# Patient Record
Sex: FEMALE | Race: WHITE | NOT HISPANIC OR LATINO | Employment: OTHER | URBAN - METROPOLITAN AREA
[De-identification: names, ages, dates, MRNs, and addresses within clinical notes are randomized per-mention and may not be internally consistent; named-entity substitution may affect disease eponyms.]

---

## 2017-03-09 ENCOUNTER — TRANSCRIBE ORDERS (OUTPATIENT)
Dept: LAB | Facility: CLINIC | Age: 47
End: 2017-03-09

## 2017-03-09 ENCOUNTER — ALLSCRIPTS OFFICE VISIT (OUTPATIENT)
Dept: OTHER | Facility: OTHER | Age: 47
End: 2017-03-09

## 2017-03-09 ENCOUNTER — APPOINTMENT (OUTPATIENT)
Dept: LAB | Facility: CLINIC | Age: 47
End: 2017-03-09
Payer: COMMERCIAL

## 2017-03-09 ENCOUNTER — GENERIC CONVERSION - ENCOUNTER (OUTPATIENT)
Dept: OTHER | Facility: OTHER | Age: 47
End: 2017-03-09

## 2017-03-09 DIAGNOSIS — E11.9 TYPE 2 DIABETES MELLITUS WITHOUT COMPLICATIONS (HCC): ICD-10-CM

## 2017-03-09 LAB
EST. AVERAGE GLUCOSE BLD GHB EST-MCNC: 148 MG/DL
HBA1C MFR BLD: 6.8 % (ref 4.2–6.3)

## 2017-03-09 PROCEDURE — 83036 HEMOGLOBIN GLYCOSYLATED A1C: CPT

## 2017-03-09 PROCEDURE — 36415 COLL VENOUS BLD VENIPUNCTURE: CPT

## 2017-06-13 ENCOUNTER — ALLSCRIPTS OFFICE VISIT (OUTPATIENT)
Dept: OTHER | Facility: OTHER | Age: 47
End: 2017-06-13

## 2017-06-13 DIAGNOSIS — Z00.00 ENCOUNTER FOR GENERAL ADULT MEDICAL EXAMINATION WITHOUT ABNORMAL FINDINGS: ICD-10-CM

## 2017-06-13 DIAGNOSIS — Z12.31 ENCOUNTER FOR SCREENING MAMMOGRAM FOR MALIGNANT NEOPLASM OF BREAST: ICD-10-CM

## 2017-06-13 DIAGNOSIS — E11.9 TYPE 2 DIABETES MELLITUS WITHOUT COMPLICATIONS (HCC): ICD-10-CM

## 2017-06-16 ENCOUNTER — APPOINTMENT (OUTPATIENT)
Dept: LAB | Facility: CLINIC | Age: 47
End: 2017-06-16
Payer: COMMERCIAL

## 2017-06-16 ENCOUNTER — TRANSCRIBE ORDERS (OUTPATIENT)
Dept: LAB | Facility: CLINIC | Age: 47
End: 2017-06-16

## 2017-06-16 DIAGNOSIS — E11.8 TYPE 2 DIABETES MELLITUS WITH COMPLICATION, WITHOUT LONG-TERM CURRENT USE OF INSULIN (HCC): Primary | ICD-10-CM

## 2017-06-16 DIAGNOSIS — E11.8 TYPE 2 DIABETES MELLITUS WITH COMPLICATION, WITHOUT LONG-TERM CURRENT USE OF INSULIN (HCC): ICD-10-CM

## 2017-06-16 LAB
CHOLEST SERPL-MCNC: 168 MG/DL (ref 50–200)
HDLC SERPL-MCNC: 45 MG/DL (ref 40–60)
LDLC SERPL CALC-MCNC: 100 MG/DL (ref 0–100)
TRIGL SERPL-MCNC: 117 MG/DL

## 2017-06-16 PROCEDURE — 82043 UR ALBUMIN QUANTITATIVE: CPT | Performed by: PHYSICIAN ASSISTANT

## 2017-06-16 PROCEDURE — 80053 COMPREHEN METABOLIC PANEL: CPT | Performed by: PHYSICIAN ASSISTANT

## 2017-06-16 PROCEDURE — 83036 HEMOGLOBIN GLYCOSYLATED A1C: CPT | Performed by: PHYSICIAN ASSISTANT

## 2017-06-16 PROCEDURE — 82306 VITAMIN D 25 HYDROXY: CPT | Performed by: PHYSICIAN ASSISTANT

## 2017-06-16 PROCEDURE — 80061 LIPID PANEL: CPT

## 2017-06-16 PROCEDURE — 84443 ASSAY THYROID STIM HORMONE: CPT | Performed by: PHYSICIAN ASSISTANT

## 2017-06-16 PROCEDURE — 36415 COLL VENOUS BLD VENIPUNCTURE: CPT | Performed by: PHYSICIAN ASSISTANT

## 2017-06-16 PROCEDURE — 82570 ASSAY OF URINE CREATININE: CPT | Performed by: PHYSICIAN ASSISTANT

## 2017-07-12 ENCOUNTER — GENERIC CONVERSION - ENCOUNTER (OUTPATIENT)
Dept: INTERNAL MEDICINE CLINIC | Facility: CLINIC | Age: 47
End: 2017-07-12

## 2017-07-12 ENCOUNTER — GENERIC CONVERSION - ENCOUNTER (OUTPATIENT)
Dept: OTHER | Facility: OTHER | Age: 47
End: 2017-07-12

## 2017-08-22 ENCOUNTER — ALLSCRIPTS OFFICE VISIT (OUTPATIENT)
Dept: OTHER | Facility: OTHER | Age: 47
End: 2017-08-22

## 2017-09-05 ENCOUNTER — GENERIC CONVERSION - ENCOUNTER (OUTPATIENT)
Dept: OTHER | Facility: OTHER | Age: 47
End: 2017-09-05

## 2017-09-07 LAB
LEFT EYE DIABETIC RETINOPATHY: NORMAL
RIGHT EYE DIABETIC RETINOPATHY: NORMAL

## 2017-09-17 LAB
LEFT EYE DIABETIC RETINOPATHY: NORMAL
RIGHT EYE DIABETIC RETINOPATHY: NORMAL

## 2017-09-21 ENCOUNTER — GENERIC CONVERSION - ENCOUNTER (OUTPATIENT)
Dept: OTHER | Facility: OTHER | Age: 47
End: 2017-09-21

## 2017-09-21 ENCOUNTER — ALLSCRIPTS OFFICE VISIT (OUTPATIENT)
Dept: OTHER | Facility: OTHER | Age: 47
End: 2017-09-21

## 2017-11-10 ENCOUNTER — OFFICE VISIT (OUTPATIENT)
Dept: URGENT CARE | Facility: CLINIC | Age: 47
End: 2017-11-10
Payer: COMMERCIAL

## 2017-11-10 PROCEDURE — 99283 EMERGENCY DEPT VISIT LOW MDM: CPT

## 2017-11-10 PROCEDURE — G0382 LEV 3 HOSP TYPE B ED VISIT: HCPCS

## 2017-11-13 NOTE — PROGRESS NOTES
Assessment    1  Sinusitis (473 9) (J32 9)    Plan  Sinusitis    · Amoxicillin-Pot Clavulanate 875-125 MG Oral Tablet (Augmentin); TAKE 1 TABLETEVERY 12 HOURS DAILY    Discussion/Summary  Discussion Summary:   Discussed dx of sinusitis and will treat with augmentin and follow up with PCP in 1-2 days  Medication Side Effects Reviewed: Possible side effects of new medications were reviewed with the patient/guardian today  Understands and agrees with treatment plan: The treatment plan was reviewed with the patient/guardian  The patient/guardian understands and agrees with the treatment plan   Counseling Documentation With Imm: The patient was counseled regarding instructions for management,-- patient and family education,-- importance of compliance with treatment  total time of encounter was 25 minutes-- and-- 10 minutes was spent counseling  Follow Up Instructions: Follow Up with your Primary Care Provider in 1-2 days  If your symptoms worsen, go to the nearest [place] [address] Emergency Department  Chief Complaint    1  Ear Pain  Chief Complaint Free Text Note Form: C/o sore throat and bilateral ear pain x 3 days  History of Present Illness  HPI: 50year old female at urgent care today with chief complaint of sinus congestion for 1 week and sore throat and bilateral ear pain for 3 days has not used any OTC medications no improvement noted  Hospital Based Practices Required Assessment:  Pain Assessment  the patient states they have pain  The pain is located in the throat, ears  The patient describes the pain as aching  (on a scale of 0 to 10, the patient rates the pain at 2 )  Abuse And Domestic Violence Screen   Yes, the patient is safe at home  -- The patient states no one is hurting them  Depression And Suicide Screen  No, the patient has not had thoughts of hurting themself  No, the patient has not felt depressed in the past 7 days  Readiness To Learn: Receptive  Barriers To Learning: none  Preferred Learning: verbal  Education Completed: disease/condition,-- medications-- and-- further treatment/follow-up  Teaching Method: verbal  Person Taught: patient  Evaluation Of Learning: verbalized/demonstrated understanding   Ear Pain: Randolph Sandhu presents with complaints of ear pain  Associated symptoms include otalgia,-- ear plugging,-- ear pressure,-- nasal congestion-- and-- sore throat, but-- no ear drainage,-- no decreased hearing,-- no auricular pain,-- no ear sores,-- no ear pruritus,-- no fever,-- no cough,-- no swollen glands,-- no facial pain,-- no dental pain,-- no headache,-- no tinnitus,-- no vertigo,-- no loss of balance,-- no nausea-- and-- no temporomandibular joint pain  Review of Systems  Focused-Female:  Constitutional: No fever, no chills, feels well, no tiredness, no recent weight gain or loss  ENT: earache,-- sore throat-- and-- nasal discharge, but-- as noted in HPI  Cardiovascular: no complaints of slow or fast heart rate, no chest pain, no palpitations, no leg claudication or lower extremity edema  Respiratory: no complaints of shortness of breath, no wheezing, no dyspnea on exertion, no orthopnea or PND  Breasts: no complaints of breast pain, breast lump or nipple discharge  Gastrointestinal: no complaints of abdominal pain, no constipation, no nausea or diarrhea, no vomiting, no bloody stools  Genitourinary: no complaints of dysuria, no incontinence, no pelvic pain, no dysmenorrhea, no vaginal discharge or abnormal vaginal bleeding  Musculoskeletal: no complaints of arthralgia, no myalgia, no joint swelling or stiffness, no limb pain or swelling  Integumentary: no complaints of skin rash or lesion, no itching or dry skin, no skin wounds  Neurological: no complaints of headache, no confusion, no numbness or tingling, no dizziness or fainting  ROS Reviewed:   ROS reviewed  Active Problems  1  Arthritis (053 90) (M19 90)   2  Chest pain (786 50) (R07 9)   3  Contact dermatitis (692 9) (L25 9)   4  Depression (311) (F32 9)   5  Depression screening (V79 0) (Z13 89)   6  Fatty liver (571 8) (K76 0)   7  Gastroesophageal reflux disease (530 81) (K21 9)   8  Hypercholesterolemia (272 0) (E78 00)   9  Insomnia (780 52) (G47 00)   10  Obesity (278 00) (E66 9)   11  Pulmonary embolism (415 19) (I26 99)   12  Screening mammogram, encounter for (V76 12) (Z12 31)   13  Skin rash (782 1) (R21)   14  Type 2 diabetes mellitus (250 00) (E11 9)    Past Medical History  1  Denied: History of substance abuse   2  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)  Active Problems And Past Medical History Reviewed: The active problems and past medical history were reviewed and updated today  Family History  Mother    1  Family history of Bundle Branch Block   2  Denied: Family history of mental disorder   3  Family history of Ovarian cancer (183 0) (C56 9)   4  Family history of Tuberculosis  Father    5  Denied: Family history of mental disorder   6  Family history of substance abuse (V17 0) (Z81 4)  Brother    7  Family history of Malignant neoplasm of breast, unspecified laterality  Maternal Grandmother    8  Family history of Ascending Aortic Aneurysm Resection   9  Family history of Diabetes Mellitus (V18 0)  Paternal Grandmother    8  Family history of Diabetes Mellitus (V18 0)  Maternal Aunt    11  Family history of Tuberculosis  Family History    12  Denied: Family history of mental disorder   13  Family history of substance abuse (V17 0) (Z81 4)   14  Family history of Osteoporosis (733 00) (M81 0)  Family History Reviewed: The family history was reviewed and updated today  Social History   · Denied: History of Alcohol   · Caffeine Use   · Denied: History of Drug Use   · Former smoker (J39 92) (J03 076)  Social History Reviewed: The social history was reviewed and updated today  The social history was reviewed and is unchanged  Surgical History    1   History of  Section   2  History of Knee Surgery   3  History of Pancreatectomy  Surgical History Reviewed: The surgical history was reviewed and updated today  Current Meds   1  Abilify 20 MG Oral Tablet; TAKE 1 TABLET DAILY; Therapy: 87QBV2094 to (Ale Lang)  Requested for: 69IGZ3542; Last Rx:08Nov2016 Ordered   2  Accu-Chek Multiclix Lancet Dev KIT; Therapy: 15YSN3455 to Recorded   3  Accu-Chek SmartView In Vitro Strip; TEST ONCE DAILY; Therapy: 27XMP7090 to [phone]  Requested for: 89OVP1249; Last Rx:23Jan2015 Ordered   4  EpiPen 2-Kyler 0 3 MG/0 3ML ADONAY; INJECT INTRAMUSCULARLY AS DIRECTED; Therapy: 12EBX7587 to (Evaluate:25Jan2014)  Requested for: 35RHE4958; Last Rx:24Ycc9594 Ordered   5  Lisinopril 2 5 MG Oral Tablet; take 1 tablet by mouth once daily; Therapy: 24EGH0190 to (Evaluate:51Fbf3018)  Requested for: 46RNU8333; Last Rx:30Jun2017 Ordered   6  MetFORMIN HCl  MG Oral Tablet Extended Release 24 Hour; take 2 tablets by mouth twice a day; Therapy: 83Rdk2133 to (Evaluate:64Gvl6820)  Requested for: 21Jun2017; Last NJ:21GJN5892 Ordered   7  Pravastatin Sodium 20 MG Oral Tablet; take 1 tablet by mouth every evening for cholesterol; Therapy: 90PSY7673 to (Evaluate:20Jan2018)  Requested for: 33Nry7788; Last Rx:01Vib9717 Ordered   8  Venlafaxine HCl  MG Oral Tablet Extended Release 24 Hour; Take one daily along with 75mg tab; Therapy: 26SQD6049 to (Last Rx:14Jun2017) Ordered   9  Venlafaxine HCl ER 75 MG Oral Capsule Extended Release 24 Hour; take 1 capsule by mouth once daily; Therapy: 91Heb2322 to (Evaluate:37Wqo1827) Recorded   10  Ventolin  (90 Base) MCG/ACT Inhalation Aerosol Solution; two puffs every four  hours as needed; Therapy: 45Lyk0293 to (Evaluate:07Jun2017)  Requested for: 53XNS6521; Last  Rx:09Mar2017 Ordered   11  Xarelto 20 MG Oral Tablet; Take 1 tablet daily; Last Rx:05Sep2017 Ordered  Medication List Reviewed:    The medication list was reviewed and updated today  Allergies  1  No Known Drug Allergies    2  IVP Dye   3  Nuts    Vitals  Signs   Recorded: 10RFH6955 12:09PM   Temperature: 97 9 F  Heart Rate: 108  Respiration: 18  Systolic: 706  Diastolic: 78  BP Cuff Size: Large  Height: 5 ft 2 in  Weight: 216 lb   BMI Calculated: 39 51  BSA Calculated: 1 98  O2 Saturation: 96    Physical Exam   Constitutional  General appearance: No acute distress, well appearing and well nourished  Eyes  Conjunctiva and lids: No swelling, erythema or discharge  Pupils and irises: Equal, round and reactive to light  Ears, Nose, Mouth, and Throat  External inspection of ears and nose: Normal    Otoscopic examination: Tympanic membranes translucent with normal light reflex  Canals patent without erythema  Nasal mucosa, septum, and turbinates: Abnormal   normal nasal septum,-- no intranasal masses or polyps-- and-- normal nasal turbinates  There was a purulent discharge from both nares  The bilateral nasal mucosa was boggy-- and-- edematous  -- PND  Pulmonary  Respiratory effort: No increased work of breathing or signs of respiratory distress  Auscultation of lungs: Clear to auscultation  Cardiovascular  Palpation of heart: Normal PMI, no thrills  Auscultation of heart: Normal rate and rhythm, normal S1 and S2, without murmurs  Lymphatic  Palpation of lymph nodes in neck: No lymphadenopathy     Musculoskeletal  Gait and station: Normal    Psychiatric  Orientation to person, place, and time: Normal    Mood and affect: Normal        Future Appointments    Date/Time Provider Specialty Site   11/21/2017 11:00 AM [facility] [address] [facility]   Electronically signed by : Evelin Fagan NP; Nov 10 2017 12:21PM EST                       (Author)    Electronically signed by : JORGITO Stone ; Nov 12 2017  9:57AM EST                       (Co-author)

## 2017-11-21 ENCOUNTER — TRANSCRIBE ORDERS (OUTPATIENT)
Dept: LAB | Facility: CLINIC | Age: 47
End: 2017-11-21

## 2017-11-21 ENCOUNTER — APPOINTMENT (OUTPATIENT)
Dept: LAB | Facility: CLINIC | Age: 47
End: 2017-11-21
Payer: COMMERCIAL

## 2017-11-21 ENCOUNTER — ALLSCRIPTS OFFICE VISIT (OUTPATIENT)
Dept: OTHER | Facility: OTHER | Age: 47
End: 2017-11-21

## 2017-11-21 DIAGNOSIS — I26.99 OTHER PULMONARY EMBOLISM WITHOUT ACUTE COR PULMONALE (HCC): ICD-10-CM

## 2017-11-21 DIAGNOSIS — K76.0 FATTY (CHANGE OF) LIVER, NOT ELSEWHERE CLASSIFIED: ICD-10-CM

## 2017-11-21 LAB
ALBUMIN SERPL BCP-MCNC: 3.3 G/DL (ref 3.5–5)
ALP SERPL-CCNC: 100 U/L (ref 46–116)
ALT SERPL W P-5'-P-CCNC: 24 U/L (ref 12–78)
ANION GAP SERPL CALCULATED.3IONS-SCNC: 5 MMOL/L (ref 4–13)
AST SERPL W P-5'-P-CCNC: 12 U/L (ref 5–45)
BILIRUB SERPL-MCNC: 0.19 MG/DL (ref 0.2–1)
BUN SERPL-MCNC: 11 MG/DL (ref 5–25)
CALCIUM SERPL-MCNC: 8.9 MG/DL (ref 8.3–10.1)
CHLORIDE SERPL-SCNC: 105 MMOL/L (ref 100–108)
CO2 SERPL-SCNC: 28 MMOL/L (ref 21–32)
CREAT SERPL-MCNC: 0.82 MG/DL (ref 0.6–1.3)
ERYTHROCYTE [DISTWIDTH] IN BLOOD BY AUTOMATED COUNT: 13.9 % (ref 11.6–15.1)
GFR SERPL CREATININE-BSD FRML MDRD: 84 ML/MIN/1.73SQ M
GLUCOSE SERPL-MCNC: 147 MG/DL (ref 65–140)
HBA1C MFR BLD HPLC: 6.6 %
HCT VFR BLD AUTO: 39.9 % (ref 34.8–46.1)
HGB BLD-MCNC: 13 G/DL (ref 11.5–15.4)
MCH RBC QN AUTO: 28.9 PG (ref 26.8–34.3)
MCHC RBC AUTO-ENTMCNC: 32.6 G/DL (ref 31.4–37.4)
MCV RBC AUTO: 89 FL (ref 82–98)
PLATELET # BLD AUTO: 251 THOUSANDS/UL (ref 149–390)
PMV BLD AUTO: 11.4 FL (ref 8.9–12.7)
POTASSIUM SERPL-SCNC: 4.2 MMOL/L (ref 3.5–5.3)
PROT SERPL-MCNC: 6.8 G/DL (ref 6.4–8.2)
RBC # BLD AUTO: 4.5 MILLION/UL (ref 3.81–5.12)
SODIUM SERPL-SCNC: 138 MMOL/L (ref 136–145)
WBC # BLD AUTO: 6.97 THOUSAND/UL (ref 4.31–10.16)

## 2017-11-21 PROCEDURE — 80053 COMPREHEN METABOLIC PANEL: CPT

## 2017-11-21 PROCEDURE — 85027 COMPLETE CBC AUTOMATED: CPT

## 2017-11-21 PROCEDURE — 36415 COLL VENOUS BLD VENIPUNCTURE: CPT

## 2017-11-22 ENCOUNTER — GENERIC CONVERSION - ENCOUNTER (OUTPATIENT)
Dept: OTHER | Facility: OTHER | Age: 47
End: 2017-11-22

## 2017-11-29 NOTE — PROGRESS NOTES
Assessment    1  Pulmonary embolism (415 19) (I26 99)   2  Type 2 diabetes mellitus (250 00) (E11 9)   3  Sinusitis (473 9) (J32 9)    Plan  Hypercholesterolemia    · Pravastatin Sodium 20 MG Oral Tablet; take 1 tablet by mouth every evening for cholesterol  Sinusitis    · Flonase Allergy Relief 50 MCG/ACT Nasal Suspension (Fluticasone Propionate); USE 1 TO 2SPRAYS IN EACH NOSTRIL ONCE DAILY  Type 2 diabetes mellitus    · Lisinopril 2 5 MG Oral Tablet; take 1 tablet by mouth once daily   · MetFORMIN HCl  MG Oral Tablet Extended Release 24 Hour; take 2 tablets by mouthtwice a day   · Hemoglobin A1c- POC; Status:Complete;   Done: 35BKH5343 11:20AM    Discussion/Summary  Discussion Summary:   Pt is doing well overall  Start flonase for nasal congestion  Will get repeat CT chest to evaluate PE  Check labs per request of pts psychiatrist       Chief Complaint  Chief Complaint Free Text Note Form: Pt presents today for follow up to pain in left pain in armpit and breast  Pt states that this is gone  Pt seen in  on 11/10/17 for double ear infect, nose and throat  Pt finished course of 10 day Augmentin but still feels discomfort in ears and throat  HgbA1c today 6 6%  History of Present Illness  HPI: Pt presents for routine visit  She is doing well overall  At last visit she was complaining of episodic chest pain that radiated to her axilla and this has resolved  She is due for repeat CT of her chest to evalaute her PE that was dx in august  A1C in office today 6 6 which is stable for her  She was seen in urgent care on 11/10 and dx with b/l ear infection and treated with augmentin  She is noting lingering pressure in her ears and congestion  Review of Systems  Complete-Female:  Constitutional: as noted in HPI  Eyes: No complaints of eye pain, no red eyes, no eyesight problems, no discharge, no dry eyes, no itching of eyes    ENT: no complaints of earache, no loss of hearing, no nose bleeds, no nasal discharge, no sore throat, no hoarseness  Cardiovascular: No complaints of slow heart rate, no fast heart rate, no chest pain, no palpitations, no leg claudication, no lower extremity edema  Respiratory: No complaints of shortness of breath, no wheezing, no cough, no SOB on exertion, no orthopnea, no PND  Gastrointestinal: No complaints of abdominal pain, no constipation, no nausea or vomiting, no diarrhea, no bloody stools  Genitourinary: No complaints of dysuria, no incontinence, no pelvic pain, no dysmenorrhea, no vaginal discharge or bleeding  Musculoskeletal: No complaints of arthralgias, no myalgias, no joint swelling or stiffness, no limb pain or swelling  Psychiatric: Not suicidal, no sleep disturbance, no anxiety or depression, no change in personality, no emotional problems  Endocrine: No complaints of proptosis, no hot flashes, no muscle weakness, no deepening of the voice, no feelings of weakness  ROS Reviewed:   ROS reviewed  Active Problems  1  Arthritis (716 90) (M19 90)   2  Depression (311) (F32 9)   3  Depression screening (V79 0) (Z13 89)   4  Fatty liver (571 8) (K76 0)   5  Gastroesophageal reflux disease (530 81) (K21 9)   6  Hypercholesterolemia (272 0) (E78 00)   7  Insomnia (780 52) (G47 00)   8  Obesity (278 00) (E66 9)   9  Pulmonary embolism (415 19) (I26 99)   10  Screening mammogram, encounter for (V76 12) (Z12 31)   11  Sinusitis (473 9) (J32 9)   12  Type 2 diabetes mellitus (250 00) (E11 9)    Past Medical History  1  Denied: History of substance abuse   2  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)    Surgical History  1  History of  Section   2  History of Knee Surgery   3  History of Pancreatectomy  Surgical History Reviewed: The surgical history was reviewed and updated today  Family History  Mother    1  Family history of Bundle Branch Block   2  Denied: Family history of mental disorder   3  Family history of Ovarian cancer (183 0) (C56 9)   4   Family history of Tuberculosis  Father    5  Denied: Family history of mental disorder   6  Family history of substance abuse (V17 0) (Z81 4)  Brother    7  Family history of Malignant neoplasm of breast, unspecified laterality  Maternal Grandmother    8  Family history of Ascending Aortic Aneurysm Resection   9  Family history of Diabetes Mellitus (V18 0)  Paternal Grandmother    8  Family history of Diabetes Mellitus (V18 0)  Maternal Aunt    11  Family history of Tuberculosis  Family History    12  Denied: Family history of mental disorder   13  Family history of substance abuse (V17 0) (Z81 4)   14  Family history of Osteoporosis (733 00) (M81 0)  Family History Reviewed: The family history was reviewed and updated today  Social History     · Denied: History of Alcohol   · Caffeine Use   · Denied: History of Drug Use   · Former smoker (V35 21) (I39 923)  Social History Reviewed: The social history was reviewed and updated today  The social history was reviewed and is unchanged  Current Meds   1  Abilify 20 MG Oral Tablet; TAKE 1 TABLET DAILY; Therapy: 36RZJ6035 to (Julia Shane)  Requested for: 26QGE0005; Last Rx:08Nov2016 Ordered   2  Accu-Chek Multiclix Lancet Dev KIT; Therapy: 09XMQ0438 to Recorded   3  Accu-Chek SmartView In Vitro Strip; TEST ONCE DAILY; Therapy: 64WTG9673 to (Alyssa Guerrero)  Requested for: 97ONI7069; Last Rx:23Jan2015 Ordered   4  EpiPen 2-Kyler 0 3 MG/0 3ML ADONAY; INJECT INTRAMUSCULARLY AS DIRECTED; Therapy: 81RCE1818 to (Evaluate:25Jan2014)  Requested for: 11HJE9009; Last Rx:18Bcq9355 Ordered   5  Lisinopril 2 5 MG Oral Tablet; take 1 tablet by mouth once daily; Therapy: 12UXC4530 to (Evaluate:01Tpf2085)  Requested for: 71EFU0265; Last Rx:30Jun2017 Ordered   6  MetFORMIN HCl  MG Oral Tablet Extended Release 24 Hour; take 2 tablets by mouth twice a day; Therapy: 80Czx8348 to (Evaluate:92Wsy1073)  Requested for: 21Jun2017; Last LY:75DPN4682 Ordered   7   Pravastatin Sodium 20 MG Oral Tablet; take 1 tablet by mouth every evening for cholesterol; Therapy: 95WKX3900 to (Evaluate:20Jan2018)  Requested for: 24Jcw6384; Last Rx:24Jul2017 Ordered   8  Venlafaxine HCl  MG Oral Tablet Extended Release 24 Hour; Take one daily along with 75mg tab; Therapy: 16YOH0795 to (Last Rx:14Jun2017) Ordered   9  Venlafaxine HCl ER 75 MG Oral Capsule Extended Release 24 Hour; take 1 capsule by mouth once daily; Therapy: 67Inb5943 to (Evaluate:13Jul2017) Recorded   10  Ventolin  (90 Base) MCG/ACT Inhalation Aerosol Solution; two puffs every four hours as  needed; Therapy: 02Aug2013 to (Evaluate:07Jun2017)  Requested for: 36UXA1130; Last Rx:09Mar2017  Ordered   11  Xarelto 20 MG Oral Tablet; Take 1 tablet daily; Last Rx:11Xyz9720 Ordered  Medication List Reviewed: The medication list was reviewed and updated today  Allergies  1  No Known Drug Allergies  2  IVP Dye   3  Nuts    Vitals  Vital Signs    Recorded: 21Nov2017 11:12AM   Temperature 98 5 F   Heart Rate 100   Respiration 18   Systolic 063   Diastolic 78   Height 5 ft 2 in   Weight 222 lb    BMI Calculated 40 6   BSA Calculated 2   O2 Saturation 98       Physical Exam   Constitutional  General appearance: No acute distress, well appearing and well nourished  Ears, Nose, Mouth, and Throat  External inspection of ears and nose: Normal    Otoscopic examination: Tympanic membranes translucent with normal light reflex  Canals patent without erythema  Nasal mucosa, septum, and turbinates: Normal without edema or erythema  Oropharynx: Normal with no erythema, edema, exudate or lesions  Pulmonary  Respiratory effort: No increased work of breathing or signs of respiratory distress  Auscultation of lungs: Clear to auscultation  Cardiovascular  Auscultation of heart: Normal rate and rhythm, normal S1 and S2, without murmurs     Examination of extremities for edema and/or varicosities: Normal    Musculoskeletal  Gait and station: Normal    Inspection/palpation of joints, bones, and muscles: Normal    Skin  Skin and subcutaneous tissue: Normal without rashes or lesions  Psychiatric  Orientation to person, place, and time: Normal    Mood and affect: Normal          Results/Data  Hemoglobin A1c- POC 72YGX7860 11:20AM Gertrudis Obrien     Test Name Result Flag Reference   HEMOGLOBIN A1C 6 6         Attending Note  Collaborating Physician Note: Collaborating Physician: I agree with the Advanced Practitioner note        Future Appointments    Date/Time Provider Specialty Site   02/20/2018 11:00 AM Nubia Obrien, [facility]       Signatures   Electronically signed by : Nuno Marcano [facility]; Nov 21 2017 12:07PM EST                       (Author)    Electronically signed by : Vernell Garibay DO; Nov 28 2017  1:26PM EST                       (Co-author)

## 2018-01-11 NOTE — MISCELLANEOUS
Assessment    1  Pulmonary embolism (415 19) (I26 99)    Plan  Depression screening    · *VB-Depression Screening; Status:Complete;   Done: 78Kmp3823 10:28AM   Performed: In Office; Due:46Rfr7614; Ordered; For:Depression screening; Ordered By:Gertrudis Obrien;  Pulmonary embolism    · Xarelto 15 MG Oral Tablet; Take 1 tablet twice daily   Rx By: Snow Kam; Dispense: 21 Days ; #:42 Tablet; Refill: 0; For: Pulmonary embolism; ALBERTO = N; Record    Discussion/Summary  Discussion Summary:   Pt is much improved  Continue xarelto and transition to 20mg daily on day 22  Will get repeat CT in 3 months  Will likely discontinue anticoagulation in 6 months  No clotting workup necessary due to clot being provoked  RTO for routine visit  Counseling Documentation With Imm: The patient was counseled regarding instructions for management, risk factor reductions, patient and family education, risks and benefits of treatment options  total time of encounter was 30 minutes and 25 minutes was spent counseling  Medication SE Review and Pt Understands Tx: Possible side effects of new medications were reviewed with the patient/guardian today  The treatment plan was reviewed with the patient/guardian  The patient/guardian understands and agrees with the treatment plan      Chief Complaint  Chief Complaint Free Text Note Form: Pt presents today for FAUSTO IBARRA from [address] [place] [address] from 8/11/17-8/14/17 for Pulmonary Embolism  Pt was puto Xarelto 15 mg twice daily for 21 days the up to 20 mg daily  Pt states that she is doing okay but has some weakness in legs and tension under rib cage at times  History of Present Illness  TCM Communication [facility]: The patient is being contacted for APPT 8-22-17  Hospital records were reviewed  She was hospitalized [facility]  The date of admission: 8-11-17, date of discharge: 8-14-17  Diagnosis: PE  She was discharged to home     She scheduled a follow up appointment  The patient is currently asymptomatic  Counseling was provided to the patient  Topics counseled included diagnostic results, instructions for management and prognosis  Communication performed and completed by ROBERT 8-15-17   HPI: Pt presents for ROCHELLE  She was recently in [place] and awoke experiencing left sided CP  She went to the ED and was found to have elevated D-dimer  VQ scan was then performed and pt was found to have PE  It was presumed that this was likely provoked as the pt spent 2 days in the car driving to [facility]  She has no previous hx of DVT/PE in herself or immediate family member and no hx of coagulopathy  She was admitted on 8/11 and placed on Xarelto and is currently on 15mg BID x 21 days then will transition to 20mg daily  She is feeling much improved  Chest pain has resolved  Review of Systems  Complete-Female:   Constitutional: as noted in HPI  Eyes: No complaints of eye pain, no red eyes, no eyesight problems, no discharge, no dry eyes, no itching of eyes  ENT: no complaints of earache, no loss of hearing, no nose bleeds, no nasal discharge, no sore throat, no hoarseness  Respiratory: as noted in HPI  Gastrointestinal: No complaints of abdominal pain, no constipation, no nausea or vomiting, no diarrhea, no bloody stools  Genitourinary: No complaints of dysuria, no incontinence, no pelvic pain, no dysmenorrhea, no vaginal discharge or bleeding  Musculoskeletal: No complaints of arthralgias, no myalgias, no joint swelling or stiffness, no limb pain or swelling  Integumentary: No complaints of skin rash or lesions, no itching, no skin wounds, no breast pain or lump  Hematologic/Lymphatic: as noted in HPI  Active Problems    1  Arthritis (716 90) (M19 90)   2  Depression (311) (F32 9)   3  Fatty liver (571 8) (K76 0)   4  Gastroesophageal reflux disease (530 81) (K21 9)   5  Hypercholesterolemia (272 0) (E78 00)   6  Insomnia (780 52) (G47 00)   7   Obesity (278 00) (E66 9)   8  Screening mammogram, encounter for (V76 12) (Z12 31)   9  Type 2 diabetes mellitus (250 00) (E11 9)    Past Medical History    1  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)    Surgical History    1  History of  Section   2  History of Knee Surgery   3  History of Pancreatectomy  Surgical History Reviewed: The surgical history was reviewed and updated today  Family History  Mother    1  Family history of Bundle Branch Block   2  Family history of Ovarian cancer (183 0) (C56 9)   3  Family history of Tuberculosis  Brother    4  Family history of Malignant neoplasm of breast, unspecified laterality  Maternal Grandmother    5  Family history of Ascending Aortic Aneurysm Resection   6  Family history of Diabetes Mellitus (V18 0)  Paternal Grandmother    9  Family history of Diabetes Mellitus (V18 0)  Maternal Aunt    8  Family history of Tuberculosis  Family History    9  Family history of Osteoporosis (733 00) (M81 0)  Family History Reviewed: The family history was reviewed and updated today  Social History    · Denied: History of Alcohol   · Caffeine Use   · Denied: History of Drug Use   · Former smoker (G50 85) (X00 128)  Social History Reviewed: The social history was reviewed and updated today  The social history was reviewed and is unchanged  Current Meds   1  Abilify 20 MG Oral Tablet; TAKE 1 TABLET DAILY; Therapy: 65FNA0677 to (Vara Pour)  Requested for: 82VBD8168; Last   Rx:2016 Ordered   2  Accu-Chek Multiclix Lancet Dev KIT; Therapy: 39EFN9117 to Recorded   3  Accu-Chek SmartView In Vitro Strip; TEST ONCE DAILY; Therapy: 26BEJ6040 to ([phone])  Requested for: 31AGA9431; Last   Rx:2015 Ordered   4  EpiPen 2-Kyler 0 3 MG/0 3ML ADONAY; INJECT INTRAMUSCULARLY AS DIRECTED; Therapy: 65VHX3370 to (Evaluate:2014)  Requested for: 35OSS4447; Last   Rx:34Hdu3542 Ordered   5   Ibuprofen 400 MG Oral Tablet; TAKE 1 TABLET EVERY 6 HOURS AS NEEDED; Therapy: 16RAH6376 to ([phone])  Requested for: 07Hfx9475; Last   Rx:57Lzm4816 Ordered   6  Lisinopril 2 5 MG Oral Tablet; take 1 tablet by mouth once daily; Therapy: 23AZC3720 to (Evaluate:62Qzj7842)  Requested for: 11CZX1446; Last   Rx:30Jun2017 Ordered   7  MetFORMIN HCl  MG Oral Tablet Extended Release 24 Hour; take 2 tablets by   mouth twice a day; Therapy: 66Jld4047 to (Evaluate:33Mmb6887)  Requested for: 21Jun2017; Last   DI:95YAB8775 Ordered   8  Pravastatin Sodium 20 MG Oral Tablet; take 1 tablet by mouth every evening for   cholesterol; Therapy: 92RQV1071 to (Evaluate:20Jan2018)  Requested for: 05Xmh6328; Last   Rx:44Cvu2543 Ordered   9  Venlafaxine HCl  MG Oral Tablet Extended Release 24 Hour; Take one daily along   with 75mg tab; Therapy: 32PHB8166 to (Last Rx:14Jun2017) Ordered   10  Venlafaxine HCl ER 75 MG Oral Capsule Extended Release 24 Hour; take 1 capsule by    mouth once daily; Therapy: 89Nhv9470 to (Evaluate:33Wgd9682) Recorded   11  Ventolin  (90 Base) MCG/ACT Inhalation Aerosol Solution; two puffs every four    hours as needed; Therapy: 47Kvb2870 to (Evaluate:07Jun2017)  Requested for: 03HIW2768; Last    Rx:09Mar2017 Ordered   12  Xarelto 15 MG Oral Tablet; Take 1 tablet twice daily; Therapy: (Robbie Roles) to Recorded  Medication List Reviewed: The medication list was reviewed and updated today  Allergies    1  No Known Drug Allergies    2  IVP Dye   3  Nuts    Vitals  Signs   Recorded: 22Aug2017 09:12AM   Temperature: 96 F  Heart Rate: 120  Respiration: 18  Systolic: 689  Diastolic: 82  Height: 5 ft 2 in  Weight: 214 lb 8 oz  BMI Calculated: 39 23  BSA Calculated: 1 97  O2 Saturation: 98    Physical Exam    Constitutional   General appearance: No acute distress, well appearing and well nourished      Ears, Nose, Mouth, and Throat   External inspection of ears and nose: Normal     Otoscopic examination: Tympanic membranes translucent with normal light reflex  Canals patent without erythema  Nasal mucosa, septum, and turbinates: Normal without edema or erythema  Oropharynx: Normal with no erythema, edema, exudate or lesions  Pulmonary   Respiratory effort: No increased work of breathing or signs of respiratory distress  Auscultation of lungs: Clear to auscultation  Cardiovascular   Auscultation of heart: Normal rate and rhythm, normal S1 and S2, without murmurs  Examination of extremities for edema and/or varicosities: Normal     Carotid pulses: Normal     Abdomen   Abdomen: Non-tender, no masses  Musculoskeletal   Gait and station: Normal     Digits and nails: Normal without clubbing or cyanosis  Inspection/palpation of joints, bones, and muscles: Normal     Skin   Skin and subcutaneous tissue: Normal without rashes or lesions  Psychiatric   Orientation to person, place, and time: Normal     Mood and affect: Normal          Results/Data  *VB-Depression Screening 70Ttt7922 10:28AM Gertrudis Obrien     Test Name Result Flag Reference   Depression Scale Result      Depression Screen - Positive Findings     PHQ-9 Adult Depression Screening 64Luv0060 10:27AM Norma Calixto     Test Name Result Flag Reference   PHQ-9 Adult Depression Score 5     Over the last two weeks, how often have you been bothered by any of the following problems? Little interest or pleasure in doing things: Several days - 1  Feeling down, depressed, or hopeless: Several days - 1  Trouble falling or staying asleep, or sleeping too much: Several days - 1  Feeling tired or having little energy: Several days - 1  Poor appetite or over eating: Several days - 1  Feeling bad about yourself - or that you are a failure or have let yourself or your family down: Not at all - 0  Trouble concentrating on things, such as reading the newspaper or watching television: Not at all - 0  Moving or speaking so slowly that other people could have noticed   Or the opposite -  being so fidgety or restless that you have been moving around a lot more than usual: Not at all - 0  Thoughts that you would be better off dead, or of hurting yourself in some way: Not at all - 0   PHQ-9 Adult Depression Screening Negative     PHQ-9 Difficulty Level Not difficult at all     PHQ-9 Severity Mild Depression         Future Appointments    Date/Time Provider Specialty Site   09/21/2017 09:45 AM Ramon Obrien, [facility]     Signatures   Electronically signed by : Francisco Hester, [facility];  Aug 24 2017 12:00PM EST                       (Author)    Electronically signed by : DAMON Pham ; Aug 24 2017 12:14PM EST                       (Author)

## 2018-01-13 VITALS
DIASTOLIC BLOOD PRESSURE: 82 MMHG | RESPIRATION RATE: 18 BRPM | OXYGEN SATURATION: 98 % | TEMPERATURE: 96 F | HEART RATE: 120 BPM | SYSTOLIC BLOOD PRESSURE: 128 MMHG | BODY MASS INDEX: 39.47 KG/M2 | HEIGHT: 62 IN | WEIGHT: 214.5 LBS

## 2018-01-13 NOTE — RESULT NOTES
Verified Results  (1) HEMOGLOBIN A1C 39EJG0059 10:38AM Talita Obrien [facility] Order Number: [id number]     Test Name Result Flag Reference   HEMOGLOBIN A1C 6 8 % H 4 2-6 3   EST  AVG   GLUCOSE 148 mg/dl

## 2018-01-14 VITALS
SYSTOLIC BLOOD PRESSURE: 120 MMHG | HEART RATE: 76 BPM | DIASTOLIC BLOOD PRESSURE: 60 MMHG | BODY MASS INDEX: 50.28 KG/M2 | RESPIRATION RATE: 16 BRPM | HEIGHT: 55 IN | WEIGHT: 217.25 LBS | TEMPERATURE: 98.5 F

## 2018-01-15 VITALS
RESPIRATION RATE: 18 BRPM | HEART RATE: 82 BPM | WEIGHT: 213.25 LBS | SYSTOLIC BLOOD PRESSURE: 116 MMHG | HEIGHT: 62 IN | DIASTOLIC BLOOD PRESSURE: 72 MMHG | BODY MASS INDEX: 39.24 KG/M2 | TEMPERATURE: 98.6 F

## 2018-01-15 VITALS
BODY MASS INDEX: 40.85 KG/M2 | RESPIRATION RATE: 18 BRPM | TEMPERATURE: 98.5 F | WEIGHT: 222 LBS | HEIGHT: 62 IN | OXYGEN SATURATION: 98 % | HEART RATE: 100 BPM | SYSTOLIC BLOOD PRESSURE: 118 MMHG | DIASTOLIC BLOOD PRESSURE: 78 MMHG

## 2018-01-22 VITALS
BODY MASS INDEX: 39.75 KG/M2 | HEART RATE: 116 BPM | HEIGHT: 62 IN | DIASTOLIC BLOOD PRESSURE: 88 MMHG | RESPIRATION RATE: 18 BRPM | WEIGHT: 216 LBS | TEMPERATURE: 98.6 F | SYSTOLIC BLOOD PRESSURE: 124 MMHG

## 2018-01-22 VITALS
RESPIRATION RATE: 16 BRPM | BODY MASS INDEX: 39.41 KG/M2 | HEART RATE: 92 BPM | WEIGHT: 214.13 LBS | HEIGHT: 62 IN | OXYGEN SATURATION: 98 % | DIASTOLIC BLOOD PRESSURE: 88 MMHG | TEMPERATURE: 96.8 F | SYSTOLIC BLOOD PRESSURE: 120 MMHG

## 2018-02-13 NOTE — RESULT NOTES
Verified Results  (1) CBC/ PLT (NO DIFF) 09HWU2291 11:35AM [facility] Order Number: [id number]     Test Name Result Flag Reference   HEMATOCRIT 39 9 %  34 8-46 1   HEMOGLOBIN 13 0 g/dL  11 5-15 4   MCHC 32 6 g/dL  31 4-37 4   MCH 28 9 pg  26 8-34 3   MCV 89 fL  82-98   PLATELET COUNT 380 Thousands/uL  149-390   RBC COUNT 4 50 Million/uL  3 81-5 12   RDW 13 9 %  11 6-15 1   WBC COUNT 6 97 Thousand/uL  4 31-10 16   MPV 11 4 fL  8 9-12 7     (1) COMPREHENSIVE METABOLIC PANEL 74EHA8976 22:58CT [facility] Order Number: [id number]     Test Name Result Flag Reference   GLUCOSE,RANDM 147 mg/dL H    If the patient is fasting, the ADA then defines impaired fasting glucose as > 100 mg/dL and diabetes as > or equal to 123 mg/dL  Specimen collection should occur prior to Sulfasalazine administration due to the potential for falsely depressed results  Specimen collection should occur prior to Sulfapyridine administration due to the potential for falsely elevated results  SODIUM 138 mmol/L  136-145   POTASSIUM 4 2 mmol/L  3 5-5 3   CHLORIDE 105 mmol/L  100-108   CARBON DIOXIDE 28 mmol/L  21-32   ANION GAP (CALC) 5 mmol/L  4-13   BLOOD UREA NITROGEN 11 mg/dL  5-25   CREATININE 0 82 mg/dL  0 60-1 30   Standardized to IDMS reference method   CALCIUM 8 9 mg/dL  8 3-10 1   BILI, TOTAL 0 19 mg/dL L 0 20-1 00   ALK PHOSPHATAS 100 U/L     ALT (SGPT) 24 U/L  12-78   Specimen collection should occur prior to Sulfasalazine and/or Sulfapyridine administration due to the potential for falsely depressed results  AST(SGOT) 12 U/L  5-45   Specimen collection should occur prior to Sulfasalazine administration due to the potential for falsely depressed results     ALBUMIN 3 3 g/dL L 3 5-5 0   TOTAL PROTEIN 6 8 g/dL  6 4-8 2   eGFR 84 ml/min/1 73sq m     National Kidney Disease Education Program recommendations are as follows:  GFR calculation is accurate only with a steady state creatinine  Chronic Kidney disease less than 60 ml/min/1 73 sq  meters  Kidney failure less than 15 ml/min/1 73 sq  meters

## 2018-02-19 RX ORDER — ALBUTEROL SULFATE 90 UG/1
AEROSOL, METERED RESPIRATORY (INHALATION) EVERY 4 HOURS PRN
COMMUNITY
Start: 2013-08-02 | End: 2018-06-01 | Stop reason: SDUPTHER

## 2018-02-19 RX ORDER — METFORMIN HYDROCHLORIDE 500 MG/1
2 TABLET, EXTENDED RELEASE ORAL 2 TIMES DAILY
COMMUNITY
Start: 2013-08-02 | End: 2018-05-28 | Stop reason: SDUPTHER

## 2018-02-19 RX ORDER — VENLAFAXINE HYDROCHLORIDE 150 MG/1
150 CAPSULE, EXTENDED RELEASE ORAL DAILY
COMMUNITY
Start: 2013-08-02

## 2018-02-19 RX ORDER — FLUTICASONE PROPIONATE 50 MCG
1-2 SPRAY, SUSPENSION (ML) NASAL DAILY
COMMUNITY
Start: 2017-11-21 | End: 2018-07-03

## 2018-02-19 RX ORDER — VENLAFAXINE HYDROCHLORIDE 150 MG/1
TABLET, EXTENDED RELEASE ORAL
COMMUNITY
Start: 2017-06-14 | End: 2018-06-01 | Stop reason: ALTCHOICE

## 2018-02-19 RX ORDER — PRAVASTATIN SODIUM 20 MG
1 TABLET ORAL
COMMUNITY
Start: 2015-03-11 | End: 2018-06-01 | Stop reason: SDUPTHER

## 2018-02-19 RX ORDER — EPINEPHRINE 0.3 MG/.3ML
INJECTION SUBCUTANEOUS
COMMUNITY
Start: 2013-09-27 | End: 2018-06-01 | Stop reason: SDUPTHER

## 2018-02-19 RX ORDER — LISINOPRIL 2.5 MG/1
1 TABLET ORAL DAILY
COMMUNITY
Start: 2013-12-30 | End: 2018-06-01 | Stop reason: SDUPTHER

## 2018-02-19 RX ORDER — ARIPIPRAZOLE 15 MG/1
1 TABLET ORAL DAILY
COMMUNITY
Start: 2011-06-28 | End: 2022-01-13

## 2018-02-20 ENCOUNTER — OFFICE VISIT (OUTPATIENT)
Dept: INTERNAL MEDICINE CLINIC | Facility: CLINIC | Age: 48
End: 2018-02-20
Payer: COMMERCIAL

## 2018-02-20 VITALS
BODY MASS INDEX: 41.7 KG/M2 | WEIGHT: 226.6 LBS | OXYGEN SATURATION: 97 % | DIASTOLIC BLOOD PRESSURE: 86 MMHG | RESPIRATION RATE: 16 BRPM | HEIGHT: 62 IN | TEMPERATURE: 98.3 F | SYSTOLIC BLOOD PRESSURE: 124 MMHG | HEART RATE: 104 BPM

## 2018-02-20 DIAGNOSIS — E11.9 TYPE 2 DIABETES MELLITUS WITHOUT COMPLICATION, WITHOUT LONG-TERM CURRENT USE OF INSULIN (HCC): Primary | ICD-10-CM

## 2018-02-20 PROBLEM — I26.99 PULMONARY EMBOLISM (HCC): Status: ACTIVE | Noted: 2017-08-22

## 2018-02-20 PROCEDURE — 2028F FOOT EXAM PERFORMED: CPT | Performed by: PHYSICIAN ASSISTANT

## 2018-02-20 PROCEDURE — 99214 OFFICE O/P EST MOD 30 MIN: CPT | Performed by: PHYSICIAN ASSISTANT

## 2018-02-20 NOTE — PROGRESS NOTES
Assessment/Plan:    No problem-specific Assessment & Plan notes found for this encounter  There are no diagnoses linked to this encounter  Subjective:      Patient ID: Stacy Garcia is a 50 y o  female  Diabetes   She presents for her follow-up diabetic visit  She has type 2 diabetes mellitus  No MedicAlert identification noted  Her disease course has been stable  There are no hypoglycemic associated symptoms  Pertinent negatives for hypoglycemia include no dizziness, headaches or nervousness/anxiousness  Associated symptoms include polyphagia  Pertinent negatives for diabetes include no blurred vision, no chest pain, no fatigue, no foot paresthesias, no polydipsia, no polyuria and no weakness  There are no hypoglycemic complications  Symptoms are stable  There are no diabetic complications  Risk factors for coronary artery disease include dyslipidemia, hypertension and obesity  Current diabetic treatment includes oral agent (monotherapy)  She is compliant with treatment all of the time  Her weight is stable  She is following a generally healthy diet  When asked about meal planning, she reported none  She has not had a previous visit with a dietitian  She participates in exercise intermittently  Her home blood glucose trend is decreasing steadily  Her overall blood glucose range is 140-180 mg/dl  An ACE inhibitor/angiotensin II receptor blocker is being taken  She does not see a podiatrist Eye exam is current  The following portions of the patient's history were reviewed and updated as appropriate:   She  has a past medical history of Anxiety; Depression; and Diabetes mellitus ([facility] )    She   Patient Active Problem List    Diagnosis Date Noted    Pulmonary embolism ([facility] ) 08/22/2017    Gastroesophageal reflux disease 03/11/2015    Insomnia 08/02/2013    Type 2 diabetes mellitus ([facility] ) 08/02/2013    Depression 07/31/2013    Hypercholesterolemia 07/31/2013     She  has no past surgical history on file   Her family history includes Heart disease in her mother; Hyperlipidemia in her mother; Hypertension in her mother; Obesity in her mother  She  reports that she has quit smoking  She has never used smokeless tobacco  She reports that she does not drink alcohol or use drugs  Current Outpatient Prescriptions   Medication Sig Dispense Refill    albuterol (VENTOLIN HFA) 90 mcg/act inhaler Inhale      ARIPiprazole (ABILIFY) 20 MG tablet Take 1 tablet by mouth daily      fluticasone (FLONASE ALLERGY RELIEF) 50 mcg/act nasal spray 1-2 sprays into each nostril daily      glucose blood test strip by In Vitro route daily      Lancets Misc  (ACCU-CHEK MULTICLIX LANCET DEV) KIT by Does not apply route      lisinopril (ZESTRIL) 2 5 mg tablet Take 1 tablet by mouth daily      metFORMIN (GLUCOPHAGE-XR) 500 mg 24 hr tablet Take 2 tablets by mouth 2 (two) times a day      pravastatin (PRAVACHOL) 20 mg tablet Take 1 tablet by mouth      venlafaxine (EFFEXOR-XR) 75 mg 24 hr capsule Take 1 capsule by mouth daily      venlafaxine 150 MG TB24 Take by mouth      EPINEPHrine (EPIPEN) 0 3 mg/0 3 mL SOAJ Inject as directed       No current facility-administered medications for this visit  No current outpatient prescriptions on file prior to visit  No current facility-administered medications on file prior to visit  She is allergic to nuts and other       Review of Systems   Constitutional: Negative for chills, fatigue and fever  HENT: Negative for congestion, ear pain, hearing loss, postnasal drip, rhinorrhea, sinus pain, sinus pressure, sore throat and trouble swallowing  Eyes: Negative for blurred vision, pain and visual disturbance  Respiratory: Negative for cough, chest tightness, shortness of breath and wheezing  Cardiovascular: Negative  Negative for chest pain, palpitations and leg swelling     Gastrointestinal: Negative for abdominal pain, blood in stool, constipation, diarrhea, nausea and vomiting  Endocrine: Positive for polyphagia  Negative for cold intolerance, heat intolerance, polydipsia and polyuria  Genitourinary: Negative for difficulty urinating, dysuria, flank pain and urgency  Musculoskeletal: Negative for arthralgias, back pain, gait problem and myalgias  Skin: Negative for rash  Allergic/Immunologic: Negative  Neurological: Negative for dizziness, weakness, light-headedness and headaches  Hematological: Negative  Psychiatric/Behavioral: Negative for behavioral problems, dysphoric mood and sleep disturbance  The patient is not nervous/anxious  Objective:      /86 (BP Location: Left arm, Patient Position: Sitting, Cuff Size: Large)   Pulse 104   Temp 98 3 °F (36 8 °C)   Resp 16   Ht 5' 2" (1 575 m)   Wt 103 kg (226 lb 9 6 oz)   SpO2 97%   BMI 41 45 kg/m²          Physical Exam   Constitutional: She is oriented to person, place, and time  She appears well-developed and well-nourished  No distress  HENT:   Head: Normocephalic and atraumatic  Right Ear: External ear normal    Left Ear: External ear normal    Nose: Nose normal    Mouth/Throat: Oropharynx is clear and moist  No oropharyngeal exudate  Eyes: Conjunctivae and EOM are normal  Pupils are equal, round, and reactive to light  Right eye exhibits no discharge  Left eye exhibits no discharge  No scleral icterus  Neck: Normal range of motion  Neck supple  No thyromegaly present  Cardiovascular: Normal rate, regular rhythm and normal heart sounds  Exam reveals no gallop and no friction rub  Pulses are no weak pulses  No murmur heard  Pulses:       Dorsalis pedis pulses are 2+ on the right side, and 2+ on the left side  Posterior tibial pulses are 2+ on the right side, and 2+ on the left side  Pulmonary/Chest: Effort normal and breath sounds normal  No respiratory distress  She has no wheezes  She has no rales  Abdominal: Soft   Bowel sounds are normal  She exhibits no distension  There is no tenderness  Musculoskeletal: Normal range of motion  She exhibits no edema, tenderness or deformity  Feet:   Right Foot:   Skin Integrity: Negative for ulcer, skin breakdown, erythema, warmth, callus or dry skin  Left Foot:   Skin Integrity: Negative for ulcer, skin breakdown, erythema, warmth, callus or dry skin  Neurological: She is alert and oriented to person, place, and time  No cranial nerve deficit  Skin: Skin is warm and dry  She is not diaphoretic  Psychiatric: She has a normal mood and affect  Her behavior is normal  Judgment and thought content normal        SUBJECTIVE:  50 y o  female for follow up of diabetes  Diabetic Review of Systems - medication compliance: compliant all of the time  Current Outpatient Prescriptions   Medication Sig Dispense Refill    albuterol (VENTOLIN HFA) 90 mcg/act inhaler Inhale      ARIPiprazole (ABILIFY) 20 MG tablet Take 1 tablet by mouth daily      fluticasone (FLONASE ALLERGY RELIEF) 50 mcg/act nasal spray 1-2 sprays into each nostril daily      glucose blood test strip by In Vitro route daily      Lancets Misc  (ACCU-CHEK MULTICLIX LANCET DEV) KIT by Does not apply route      lisinopril (ZESTRIL) 2 5 mg tablet Take 1 tablet by mouth daily      metFORMIN (GLUCOPHAGE-XR) 500 mg 24 hr tablet Take 2 tablets by mouth 2 (two) times a day      pravastatin (PRAVACHOL) 20 mg tablet Take 1 tablet by mouth      venlafaxine (EFFEXOR-XR) 75 mg 24 hr capsule Take 1 capsule by mouth daily      venlafaxine 150 MG TB24 Take by mouth      EPINEPHrine (EPIPEN) 0 3 mg/0 3 mL SOAJ Inject as directed       No current facility-administered medications for this visit  OBJECTIVE:  Appearance: alert, well appearing, and in no distress    /86 (BP Location: Left arm, Patient Position: Sitting, Cuff Size: Large)   Pulse 104   Temp 98 3 °F (36 8 °C)   Resp 16   Ht 5' 2" (1 575 m)   Wt 103 kg (226 lb 9 6 oz)   SpO2 97%   BMI 41 45 kg/m²     Patient's shoes and socks removed  Right Foot/Ankle   Right Foot Inspection  Skin Exam: skin normal and skin intact no dry skin, no warmth, no callus, no erythema, no maceration, no abnormal color, no pre-ulcer, no ulcer and no callus                          Toe Exam: ROM and strength within normal limits  Sensory   Vibration: intact  Proprioception: intact   Monofilament testing: intact  Vascular  Capillary refills: < 3 seconds  The right DP pulse is 2+  The right PT pulse is 2+  Left Foot/Ankle  Left Foot Inspection  Skin Exam: skin normal and skin intactno dry skin, no warmth, no erythema, no maceration, normal color, no pre-ulcer, no ulcer and no callus                         Toe Exam: ROM and strength within normal limits                   Sensory   Vibration: intact  Proprioception: intact  Monofilament: intact  Vascular  Capillary refills: < 3 seconds  The left DP pulse is 2+  The left PT pulse is 2+  Assign Risk Category:  No deformity present; No loss of protective sensation;  No weak pulses       Risk: 0

## 2018-05-28 DIAGNOSIS — E11.9 TYPE 2 DIABETES MELLITUS WITHOUT COMPLICATION, WITHOUT LONG-TERM CURRENT USE OF INSULIN (HCC): Primary | ICD-10-CM

## 2018-05-29 RX ORDER — METFORMIN HYDROCHLORIDE 500 MG/1
TABLET, EXTENDED RELEASE ORAL
Qty: 120 TABLET | Refills: 5 | Status: SHIPPED | OUTPATIENT
Start: 2018-05-29 | End: 2018-11-27 | Stop reason: SDUPTHER

## 2018-06-01 ENCOUNTER — OFFICE VISIT (OUTPATIENT)
Dept: FAMILY MEDICINE CLINIC | Facility: CLINIC | Age: 48
End: 2018-06-01
Payer: COMMERCIAL

## 2018-06-01 VITALS
WEIGHT: 225 LBS | HEART RATE: 100 BPM | RESPIRATION RATE: 16 BRPM | SYSTOLIC BLOOD PRESSURE: 108 MMHG | BODY MASS INDEX: 41.41 KG/M2 | HEIGHT: 62 IN | DIASTOLIC BLOOD PRESSURE: 78 MMHG | TEMPERATURE: 99.5 F

## 2018-06-01 DIAGNOSIS — M62.838 SPASM OF LEFT PIRIFORMIS MUSCLE: ICD-10-CM

## 2018-06-01 DIAGNOSIS — E78.5 HYPERLIPIDEMIA ASSOCIATED WITH TYPE 2 DIABETES MELLITUS (HCC): ICD-10-CM

## 2018-06-01 DIAGNOSIS — E11.8 TYPE 2 DIABETES MELLITUS WITH COMPLICATION, WITHOUT LONG-TERM CURRENT USE OF INSULIN (HCC): ICD-10-CM

## 2018-06-01 DIAGNOSIS — Z13.0 SCREENING FOR DEFICIENCY ANEMIA: ICD-10-CM

## 2018-06-01 DIAGNOSIS — E55.9 VITAMIN D DEFICIENCY: ICD-10-CM

## 2018-06-01 DIAGNOSIS — Z13.29 SCREENING FOR THYROID DISORDER: ICD-10-CM

## 2018-06-01 DIAGNOSIS — Z91.030 BEE STING ALLERGY: ICD-10-CM

## 2018-06-01 DIAGNOSIS — E11.69 HYPERLIPIDEMIA ASSOCIATED WITH TYPE 2 DIABETES MELLITUS (HCC): ICD-10-CM

## 2018-06-01 DIAGNOSIS — I10 ESSENTIAL HYPERTENSION: ICD-10-CM

## 2018-06-01 DIAGNOSIS — J45.909 ASTHMA, UNSPECIFIED ASTHMA SEVERITY, UNSPECIFIED WHETHER COMPLICATED, UNSPECIFIED WHETHER PERSISTENT: ICD-10-CM

## 2018-06-01 DIAGNOSIS — R19.7 DIARRHEA, UNSPECIFIED TYPE: Primary | ICD-10-CM

## 2018-06-01 PROCEDURE — 3078F DIAST BP <80 MM HG: CPT | Performed by: PHYSICIAN ASSISTANT

## 2018-06-01 PROCEDURE — 3074F SYST BP LT 130 MM HG: CPT | Performed by: PHYSICIAN ASSISTANT

## 2018-06-01 PROCEDURE — 4010F ACE/ARB THERAPY RXD/TAKEN: CPT | Performed by: PHYSICIAN ASSISTANT

## 2018-06-01 PROCEDURE — 99214 OFFICE O/P EST MOD 30 MIN: CPT | Performed by: PHYSICIAN ASSISTANT

## 2018-06-01 RX ORDER — ALBUTEROL SULFATE 90 UG/1
2 AEROSOL, METERED RESPIRATORY (INHALATION) EVERY 4 HOURS PRN
Qty: 3 INHALER | Refills: 1 | Status: SHIPPED | OUTPATIENT
Start: 2018-06-01 | End: 2020-03-30 | Stop reason: SDUPTHER

## 2018-06-01 RX ORDER — PRAVASTATIN SODIUM 20 MG
20 TABLET ORAL DAILY
Qty: 90 TABLET | Refills: 1 | Status: SHIPPED | OUTPATIENT
Start: 2018-06-01 | End: 2019-01-15 | Stop reason: SDUPTHER

## 2018-06-01 RX ORDER — LISINOPRIL 2.5 MG/1
2.5 TABLET ORAL DAILY
Qty: 90 TABLET | Refills: 1 | Status: SHIPPED | OUTPATIENT
Start: 2018-06-01 | End: 2018-12-20 | Stop reason: SDUPTHER

## 2018-06-01 RX ORDER — MELOXICAM 15 MG/1
15 TABLET ORAL DAILY
Qty: 30 TABLET | Refills: 0 | Status: SHIPPED | OUTPATIENT
Start: 2018-06-01 | End: 2018-09-05 | Stop reason: SDUPTHER

## 2018-06-01 RX ORDER — EPINEPHRINE 0.3 MG/.3ML
0.3 INJECTION SUBCUTANEOUS ONCE
Qty: 0.3 ML | Refills: 2 | Status: SHIPPED | OUTPATIENT
Start: 2018-06-01 | End: 2022-03-23

## 2018-06-01 NOTE — PROGRESS NOTES
Assessment/Plan:    Spasm of left piriformis muscle  Pt did well with mobic in the past, will restart  Diarrhea  Check complete labs and get ultrasound  If negative consider hida  Diagnoses and all orders for this visit:    Diarrhea, unspecified type  -     Amylase; Future  -     Lipase; Future  -     US abdomen complete; Future    Screening for deficiency anemia  -     CBC and differential; Future    Type 2 diabetes mellitus with complication, without long-term current use of insulin ([facility])  -     Comprehensive metabolic panel; Future  -     HEMOGLOBIN A1C W/ EAG ESTIMATION; Future  -     Microalbumin / creatinine urine ratio    Hyperlipidemia associated with type 2 diabetes mellitus ([facility] [address] )  -     Lipid panel; Future  -     pravastatin (PRAVACHOL) 20 mg tablet; Take 1 tablet (20 mg total) by mouth daily    Screening for thyroid disorder  -     TSH, 3rd generation with T4 reflex; Future    Vitamin D deficiency  -     Vitamin D 25 hydroxy; Future    Spasm of left piriformis muscle  -     meloxicam (MOBIC) 15 mg tablet; Take 1 tablet (15 mg total) by mouth daily    Asthma, unspecified asthma severity, unspecified whether complicated, unspecified whether persistent  -     albuterol (VENTOLIN HFA) 90 mcg/act inhaler; Inhale 2 puffs every 4 (four) hours as needed for shortness of breath    Bee sting allergy  -     EPINEPHrine (EPIPEN) 0 3 mg/0 3 mL SOAJ; Inject 0 3 mL (0 3 mg total) into the shoulder, thigh, or buttocks once for 1 dose    Essential hypertension  -     lisinopril (ZESTRIL) 2 5 mg tablet; Take 1 tablet (2 5 mg total) by mouth daily          Subjective:      Patient ID: Nicole Hector is a 50 y o  female  Pt presents for evaluation of diarrhea that has been daily for the last month  She notes her stools are watery at times and other times loose and fluffy  She notes increased flatus  At times she has RUQ discomfort  She denies significant nausea or vomiting   At times she has fecal urgency that is worsened by eating  She is due for labs  Of note, 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  She denies urinary symptoms  She also complains of piriformis strain on the right  She has had this in the past and did well for a long time until she aggravated it more recently  The following portions of the patient's history were reviewed and updated as appropriate:   She  has a past medical history of Anxiety; Asthma; Depression; Diabetes mellitus ([place] [address] ); Diverticulitis; Hyperlipidemia associated with type 2 diabetes mellitus ([facility] ); and Hypertension  She   Patient Active Problem List    Diagnosis Date Noted    Diarrhea 2018    Spasm of left piriformis muscle 2018    Pulmonary embolism ([place] [address] ) 2017    Gastroesophageal reflux disease 2015    Insomnia 2013    Type 2 diabetes mellitus ([place] [address] ) 2013    Depression 2013    Hypercholesterolemia 2013     She  has a past surgical history that includes Pancreatectomy and  section  Her family history includes Heart disease in her mother; Hyperlipidemia in her mother; Hypertension in her mother; Obesity in her mother  She  reports that she quit smoking about 3 years ago  She has never used smokeless tobacco  She reports that she does not drink alcohol or use drugs    Current Outpatient Prescriptions   Medication Sig Dispense Refill    albuterol (VENTOLIN HFA) 90 mcg/act inhaler Inhale 2 puffs every 4 (four) hours as needed for shortness of breath 3 Inhaler 1    ARIPiprazole (ABILIFY) 20 MG tablet Take 1 tablet by mouth daily      glucose blood test strip by In Vitro route daily      Lancets Misc  (ACCU-CHEK MULTICLIX LANCET DEV) KIT by Does not apply route      lisinopril (ZESTRIL) 2 5 mg tablet Take 1 tablet (2 5 mg total) by mouth daily 90 tablet 1    metFORMIN (GLUCOPHAGE-XR) 500 mg 24 hr tablet  TAKE 2 TABLETS BY MOUTH TWICE DAILY 120 tablet 5    pravastatin (PRAVACHOL) 20 mg tablet Take 1 tablet (20 mg total) by mouth daily 90 tablet 1    venlafaxine (EFFEXOR-XR) 150 mg 24 hr capsule Take 150 mg by mouth daily        EPINEPHrine (EPIPEN) 0 3 mg/0 3 mL SOAJ Inject 0 3 mL (0 3 mg total) into the shoulder, thigh, or buttocks once for 1 dose 0 3 mL 2    fluticasone (FLONASE ALLERGY RELIEF) 50 mcg/act nasal spray 1-2 sprays into each nostril daily      meloxicam (MOBIC) 15 mg tablet Take 1 tablet (15 mg total) by mouth daily 30 tablet 0     No current facility-administered medications for this visit  Current Outpatient Prescriptions on File Prior to Visit   Medication Sig    ARIPiprazole (ABILIFY) 20 MG tablet Take 1 tablet by mouth daily    glucose blood test strip by In Vitro route daily    Lancets Misc  (ACCU-CHEK MULTICLIX LANCET DEV) KIT by Does not apply route    metFORMIN (GLUCOPHAGE-XR) 500 mg 24 hr tablet  TAKE 2 TABLETS BY MOUTH TWICE DAILY    venlafaxine (EFFEXOR-XR) 150 mg 24 hr capsule Take 150 mg by mouth daily      [DISCONTINUED] albuterol (VENTOLIN HFA) 90 mcg/act inhaler Inhale every 4 (four) hours as needed for shortness of breath      [DISCONTINUED] lisinopril (ZESTRIL) 2 5 mg tablet Take 1 tablet by mouth daily    [DISCONTINUED] pravastatin (PRAVACHOL) 20 mg tablet Take 1 tablet by mouth    fluticasone (FLONASE ALLERGY RELIEF) 50 mcg/act nasal spray 1-2 sprays into each nostril daily    [DISCONTINUED] EPINEPHrine (EPIPEN) 0 3 mg/0 3 mL SOAJ Inject as directed    [DISCONTINUED] venlafaxine 150 MG TB24 Take by mouth     No current facility-administered medications on file prior to visit  She is allergic to iodine and nuts       Review of Systems   Constitutional: Negative for chills and fever  HENT: Negative for congestion, ear pain, hearing loss, postnasal drip, rhinorrhea, sinus pain, sinus pressure, sore throat and trouble swallowing  Eyes: Negative for pain and visual disturbance     Respiratory: Negative for cough, chest tightness, shortness of breath and wheezing  Cardiovascular: Negative  Negative for chest pain, palpitations and leg swelling  Gastrointestinal: Positive for diarrhea  Negative for abdominal pain, blood in stool, constipation, nausea and vomiting  Endocrine: Negative for cold intolerance, heat intolerance, polydipsia, polyphagia and polyuria  Genitourinary: Negative for difficulty urinating, dysuria, flank pain and urgency  Musculoskeletal: Negative for arthralgias, back pain, gait problem and myalgias  Skin: Negative for rash  Allergic/Immunologic: Negative  Neurological: Negative for dizziness, weakness, light-headedness and headaches  Hematological: Negative  Psychiatric/Behavioral: Negative for behavioral problems, dysphoric mood and sleep disturbance  The patient is not nervous/anxious  Objective:      /78 (BP Location: Left arm, Patient Position: Sitting, Cuff Size: Large)   Pulse 100   Temp 99 5 °F (37 5 °C) (Tympanic)   Resp 16   Ht 5' 2" (1 575 m)   Wt 102 kg (225 lb)   BMI 41 15 kg/m²          Physical Exam   Constitutional: She is oriented to person, place, and time  She appears well-developed and well-nourished  No distress  HENT:   Head: Normocephalic and atraumatic  Right Ear: External ear normal    Left Ear: External ear normal    Nose: Nose normal    Mouth/Throat: Oropharynx is clear and moist  No oropharyngeal exudate  Eyes: Conjunctivae and EOM are normal  Pupils are equal, round, and reactive to light  Right eye exhibits no discharge  Left eye exhibits no discharge  No scleral icterus  Neck: Normal range of motion  Neck supple  No thyromegaly present  Cardiovascular: Normal rate, regular rhythm and normal heart sounds  Exam reveals no gallop and no friction rub  Pulses are no weak pulses  No murmur heard  Pulses:       Dorsalis pedis pulses are 2+ on the right side, and 2+ on the left side          Posterior tibial pulses are 2+ on the right side, and 2+ on the left side  Pulmonary/Chest: Effort normal and breath sounds normal  No respiratory distress  She has no wheezes  She has no rales  Abdominal: Soft  Bowel sounds are normal  She exhibits no distension  There is no tenderness  Musculoskeletal: Normal range of motion  She exhibits no edema, tenderness or deformity  Feet:   Right Foot:   Skin Integrity: Negative for ulcer, skin breakdown, erythema, warmth, callus or dry skin  Left Foot:   Skin Integrity: Negative for ulcer, skin breakdown, erythema, warmth, callus or dry skin  Neurological: She is alert and oriented to person, place, and time  No cranial nerve deficit  Skin: Skin is warm and dry  She is not diaphoretic  Psychiatric: She has a normal mood and affect  Her behavior is normal  Judgment and thought content normal            Patient's shoes and socks removed  Right Foot/Ankle   Right Foot Inspection  Skin Exam: skin normal and skin intact no dry skin, no warmth, no callus, no erythema, no maceration, no abnormal color, no pre-ulcer, no ulcer and no callus                          Toe Exam: ROM and strength within normal limits  Sensory   Vibration: intact  Proprioception: intact   Monofilament testing: intact  Vascular  Capillary refills: < 3 seconds  The right DP pulse is 2+  The right PT pulse is 2+  Left Foot/Ankle  Left Foot Inspection  Skin Exam: skin normal and skin intactno dry skin, no warmth, no erythema, no maceration, normal color, no pre-ulcer, no ulcer and no callus                         Toe Exam: ROM and strength within normal limits                   Sensory   Vibration: intact  Proprioception: intact  Monofilament: intact  Vascular  Capillary refills: < 3 seconds  The left DP pulse is 2+  The left PT pulse is 2+  Assign Risk Category:  No deformity present; Loss of protective sensation;  No weak pulses       Risk: 0

## 2018-06-09 LAB
25(OH)D3 SERPL-MCNC: 30.33 NG/ML
ALBUMIN SERPL BCP-MCNC: 3.9 G/DL (ref 3.5–5.7)
ALP SERPL-CCNC: 75 IU/L (ref 40–150)
ALT SERPL W P-5'-P-CCNC: 19 IU/L (ref 0–50)
AMYLASE (HISTORICAL): 20 U/L (ref 29–103)
ANION GAP SERPL CALCULATED.3IONS-SCNC: 11.3 MM/L
AST SERPL W P-5'-P-CCNC: 16 U/L (ref 7–26)
BASOPHILS # BLD AUTO: 0 X3/UL (ref 0–0.3)
BASOPHILS # BLD AUTO: 0.6 % (ref 0–2)
BILIRUB SERPL-MCNC: 0.4 MG/DL (ref 0.3–1)
BUN SERPL-MCNC: 9 MG/DL (ref 7–25)
CALCIUM SERPL-MCNC: 8.9 MG/DL (ref 8.6–10.5)
CHLORIDE SERPL-SCNC: 103 MM/L (ref 98–107)
CHOLEST SERPL-MCNC: 160 MG/DL (ref 0–200)
CO2 SERPL-SCNC: 26 MM/L (ref 21–31)
CREAT SERPL-MCNC: 0.76 MG/DL (ref 0.6–1.2)
DEPRECATED RDW RBC AUTO: 15.2 % (ref 11.5–14.5)
EGFR (HISTORICAL): > 60 GFR
EGFR AFRICAN AMERICAN (HISTORICAL): > 60 GFR
EOSINOPHIL # BLD AUTO: 0.1 X3/UL (ref 0–0.5)
EOSINOPHIL NFR BLD AUTO: 1.4 % (ref 0–5)
EST. AVERAGE GLUCOSE BLD GHB EST-MCNC: 169 MG/DL
GLUCOSE (HISTORICAL): 126 MG/DL (ref 65–99)
HBA1C MFR BLD HPLC: 7.5 % (ref 4–6.2)
HCT VFR BLD AUTO: 37.9 % (ref 37–47)
HDLC SERPL-MCNC: 42 MG/DL (ref 40–60)
HGB BLD-MCNC: 12.6 G/DL (ref 12–16)
LDLC SERPL CALC-MCNC: 97.9 MG/DL (ref 75–193)
LIPASE SERPL-CCNC: 27 U/L (ref 11–82)
LYMPHOCYTES # BLD AUTO: 1.6 X3/UL (ref 1.2–4.2)
LYMPHOCYTES NFR BLD AUTO: 36 % (ref 20.5–51.1)
MCH RBC QN AUTO: 28.3 PG (ref 26–34)
MCHC RBC AUTO-ENTMCNC: 33.2 G/DL (ref 31–36)
MCV RBC AUTO: 85.1 FL (ref 81–99)
MONOCYTES # BLD AUTO: 0.4 X3/UL (ref 0–1)
MONOCYTES NFR BLD AUTO: 9.4 % (ref 1.7–12)
NEUTROPHILS # BLD AUTO: 2.4 X3/UL (ref 1.4–6.5)
NEUTS SEG NFR BLD AUTO: 52.6 % (ref 42.2–75.2)
OSMOLALITY, SERUM (HISTORICAL): 272 MOSM (ref 262–291)
PLATELET # BLD AUTO: 204 X3/UL (ref 130–400)
PMV BLD AUTO: 9.3 FL (ref 8.6–11.7)
POTASSIUM SERPL-SCNC: 4.3 MM/L (ref 3.5–5.5)
RBC # BLD AUTO: 4.45 X6/UL (ref 3.9–5.2)
SODIUM SERPL-SCNC: 136 MM/L (ref 134–143)
TOTAL PROTEIN (HISTORICAL): 6 G/DL (ref 6.4–8.9)
TRIGL SERPL-MCNC: 103 MG/DL (ref 44–166)
TSH SERPL DL<=0.05 MIU/L-ACNC: 1.63 UIU/M (ref 0.45–5.33)
VLDL CHOLESTEROL (HISTORICAL): 21 MG/DL (ref 5–51)
WBC # BLD AUTO: 4.5 X3/UL (ref 4.8–10.8)

## 2018-06-11 LAB — HBA1C MFR BLD HPLC: 7.5 %

## 2018-06-15 ENCOUNTER — TELEPHONE (OUTPATIENT)
Dept: FAMILY MEDICINE CLINIC | Facility: CLINIC | Age: 48
End: 2018-06-15

## 2018-06-18 DIAGNOSIS — R19.7 DIARRHEA, UNSPECIFIED TYPE: Primary | ICD-10-CM

## 2018-06-26 ENCOUNTER — TRANSCRIBE ORDERS (OUTPATIENT)
Dept: ADMINISTRATIVE | Facility: HOSPITAL | Age: 48
End: 2018-06-26

## 2018-06-26 ENCOUNTER — HOSPITAL ENCOUNTER (OUTPATIENT)
Dept: NUCLEAR MEDICINE | Facility: HOSPITAL | Age: 48
Discharge: HOME | End: 2018-06-26
Payer: COMMERCIAL

## 2018-06-26 DIAGNOSIS — K81.9 CHOLECYSTITIS: Primary | ICD-10-CM

## 2018-06-26 DIAGNOSIS — R19.7 DIARRHEA, UNSPECIFIED TYPE: ICD-10-CM

## 2018-06-26 PROCEDURE — 78227 HEPATOBIL SYST IMAGE W/DRUG: CPT

## 2018-06-26 PROCEDURE — A9537 TC99M MEBROFENIN: HCPCS

## 2018-06-26 RX ADMIN — SINCALIDE 2 MCG: 5 INJECTION, POWDER, LYOPHILIZED, FOR SOLUTION INTRAVENOUS at 09:45

## 2018-07-03 ENCOUNTER — OFFICE VISIT (OUTPATIENT)
Dept: SURGERY | Facility: HOSPITAL | Age: 48
End: 2018-07-03
Payer: COMMERCIAL

## 2018-07-03 VITALS
HEART RATE: 102 BPM | DIASTOLIC BLOOD PRESSURE: 76 MMHG | WEIGHT: 224 LBS | HEIGHT: 62 IN | BODY MASS INDEX: 41.22 KG/M2 | TEMPERATURE: 97.9 F | SYSTOLIC BLOOD PRESSURE: 121 MMHG

## 2018-07-03 DIAGNOSIS — K81.9 CHOLECYSTITIS: ICD-10-CM

## 2018-07-03 DIAGNOSIS — K82.8 BILIARY DYSKINESIA: Primary | ICD-10-CM

## 2018-07-03 PROCEDURE — 99204 OFFICE O/P NEW MOD 45 MIN: CPT | Performed by: SURGERY

## 2018-07-03 NOTE — LETTER
July 8, 2018     Leonides Plaza PA-C  [address]  [facility] [address]    Patient: Efraín Goldstein   YOB: 1970   Date of Visit: 7/3/2018       Dear Dr Izzy Matt: Thank you for referring Katie Marnie to me for evaluation  Below are my notes for this consultation  If you have questions, please do not hesitate to call me  I look forward to following your patient along with you  Sincerely,        Lula Ludwig DO        CC: No Recipients  Lula Ludwig DO  7/8/2018 10:40 PM  Sign at close encounter  Assessment/Plan:    Biliary dyskinesia  Symptomatic biliary dyskinesia  Patient with right upper quadrant pain and bloating after eating  Does also have some diarrhea  History of distal pancreatectomy for insulinoma many many years ago which was done open  Patient is agreeable to have her gallbladder removed and she is aware that based on her previous surgery that the laparoscopic procedure may be converted open  - preop consent for laparoscopic cholecystectomy, possible open  The procedure itself including all associated risks and benefits were discussed with the patient in great length  The patient verbalized understands risks is willing to proceed, consent was signed  Diagnoses and all orders for this visit:    Biliary dyskinesia    Cholecystitis  -     Ambulatory referral to General Surgery  -     Case request operating room: CHOLECYSTECTOMY LAPAROSCOPIC; Standing  -     Case request operating room: CHOLECYSTECTOMY LAPAROSCOPIC    Other orders  -     Incentive spirometry; Standing  -     Insert and maintain IV line; Standing  -     Void On-Call to O R ; Standing  -     Place sequential compression device; Standing          Subjective:      Patient ID: Efraín Goldstein is a 50 y o  female      77-year-old female with history of diabetes, GERD, status post open distal pancreatectomy for insulinoma, presents today for evaluation of right upper quadrant pain and bloated after eating  Patient underwent ultrasound which was unremarkable  She also underwent HIDA scan which did show a decreased ejection fraction approximately 19% consistent with biliary dyskinesia  Patient states that at times it is pain with food that she is she will developed right upper quadrant and pain with associated bloating  She also complains of belching  And is currently having intermittent diarrhea  No chest pain shortness of breath  No changes in urinary habits  The following portions of the patient's history were reviewed and updated as appropriate:   She  has a past medical history of Anxiety; Asthma; Depression; Diabetes mellitus ([email or web address] ); Diverticulitis; Hyperlipidemia associated with type 2 diabetes mellitus ([email or web address] ); Hypertension; and Insulinoma  She   Patient Active Problem List    Diagnosis Date Noted    Biliary dyskinesia 2018    Cholecystitis 2018    Diarrhea 2018    Spasm of left piriformis muscle 2018    Pulmonary embolism ([email or web address] ) 2017    Gastroesophageal reflux disease 2015    Insomnia 2013    Type 2 diabetes mellitus ([email or web address] ) 2013    Depression 2013    Hypercholesterolemia 2013     She  has a past surgical history that includes Pancreatectomy;  section; and Knee surgery  Her family history includes Breast cancer in her brother; Diabetes in her maternal grandmother and paternal grandmother; Heart disease in her mother; Hyperlipidemia in her mother; Hypertension in her mother; Obesity in her mother; Osteoporosis in her family; Other in her maternal grandmother and mother; Ovarian cancer in her mother; Substance Abuse in her family and father; Tuberculosis in her maternal aunt and mother  She  reports that she quit smoking about 3 years ago  She has never used smokeless tobacco  She reports that she does not drink alcohol or use drugs    Current Outpatient Prescriptions   Medication Sig Dispense Refill    albuterol (VENTOLIN HFA) 90 mcg/act inhaler Inhale 2 puffs every 4 (four) hours as needed for shortness of breath 3 Inhaler 1    ARIPiprazole (ABILIFY) 20 MG tablet Take 1 tablet by mouth daily      glucose blood test strip by In Vitro route daily      Lancets Misc  (ACCU-CHEK MULTICLIX LANCET DEV) KIT by Does not apply route      lisinopril (ZESTRIL) 2 5 mg tablet Take 1 tablet (2 5 mg total) by mouth daily 90 tablet 1    metFORMIN (GLUCOPHAGE-XR) 500 mg 24 hr tablet  TAKE 2 TABLETS BY MOUTH TWICE DAILY 120 tablet 5    pravastatin (PRAVACHOL) 20 mg tablet Take 1 tablet (20 mg total) by mouth daily 90 tablet 1    venlafaxine (EFFEXOR-XR) 150 mg 24 hr capsule Take 150 mg by mouth daily        EPINEPHrine (EPIPEN) 0 3 mg/0 3 mL SOAJ Inject 0 3 mL (0 3 mg total) into the shoulder, thigh, or buttocks once for 1 dose 0 3 mL 2    meloxicam (MOBIC) 15 mg tablet Take 1 tablet (15 mg total) by mouth daily 30 tablet 0     No current facility-administered medications for this visit  She is allergic to iodine and nuts       Review of Systems      A 10 point review of systems was conducted, all negative except as noted above HPI  Objective:      /76   Pulse 102   Temp 97 9 °F (36 6 °C)   Ht 5' 2" (1 575 m)   Wt 102 kg (224 lb)   BMI 40 97 kg/m²           Physical Exam   Constitutional: She is oriented to person, place, and time  She appears well-developed and well-nourished  No distress  HENT:   Head: Normocephalic and atraumatic  Eyes: No scleral icterus  Neck: Normal range of motion  No tracheal deviation present  Cardiovascular: Normal rate, regular rhythm and normal heart sounds  Exam reveals no gallop and no friction rub  No murmur heard  Pulmonary/Chest: Effort normal and breath sounds normal  No respiratory distress  She has no wheezes  She has no rales  She exhibits no tenderness  Abdominal: Soft  She exhibits no distension and no mass   There is tenderness (Mild right upper quadrant tenderness with palpation)  There is no rebound and no guarding  Bilateral subcostal incision noted  Musculoskeletal: Normal range of motion  She exhibits no edema, tenderness or deformity  Lymphadenopathy:     She has no cervical adenopathy  Neurological: She is alert and oriented to person, place, and time  No cranial nerve deficit  Skin: Skin is warm and dry  No rash noted  She is not diaphoretic  No erythema  No pallor  Psychiatric: She has a normal mood and affect  Her behavior is normal    Vitals reviewed

## 2018-07-05 PROBLEM — K81.9 CHOLECYSTITIS: Status: ACTIVE | Noted: 2018-07-05

## 2018-07-08 PROBLEM — K82.8 BILIARY DYSKINESIA: Status: ACTIVE | Noted: 2018-07-08

## 2018-07-08 RX ORDER — SODIUM CHLORIDE, SODIUM LACTATE, POTASSIUM CHLORIDE, CALCIUM CHLORIDE 600; 310; 30; 20 MG/100ML; MG/100ML; MG/100ML; MG/100ML
125 INJECTION, SOLUTION INTRAVENOUS CONTINUOUS
Status: CANCELLED | OUTPATIENT
Start: 2018-08-01 | End: 2019-08-01

## 2018-07-08 RX ORDER — CHLORHEXIDINE GLUCONATE 4 G/100ML
SOLUTION TOPICAL DAILY PRN
Status: CANCELLED | OUTPATIENT
Start: 2018-07-08 | End: 2019-07-08

## 2018-07-09 NOTE — ASSESSMENT & PLAN NOTE
Symptomatic biliary dyskinesia  Patient with right upper quadrant pain and bloating after eating  Does also have some diarrhea  History of distal pancreatectomy for insulinoma many many years ago which was done open  Patient is agreeable to have her gallbladder removed and she is aware that based on her previous surgery that the laparoscopic procedure may be converted open  - preop consent for laparoscopic cholecystectomy, possible open  The procedure itself including all associated risks and benefits were discussed with the patient in great length  The patient verbalized understands risks is willing to proceed, consent was signed

## 2018-07-09 NOTE — PROGRESS NOTES
Assessment/Plan:    Biliary dyskinesia  Symptomatic biliary dyskinesia  Patient with right upper quadrant pain and bloating after eating  Does also have some diarrhea  History of distal pancreatectomy for insulinoma many many years ago which was done open  Patient is agreeable to have her gallbladder removed and she is aware that based on her previous surgery that the laparoscopic procedure may be converted open  - preop consent for laparoscopic cholecystectomy, possible open  The procedure itself including all associated risks and benefits were discussed with the patient in great length  The patient verbalized understands risks is willing to proceed, consent was signed  Diagnoses and all orders for this visit:    Biliary dyskinesia    Cholecystitis  -     Ambulatory referral to General Surgery  -     Case request operating room: CHOLECYSTECTOMY LAPAROSCOPIC; Standing  -     Case request operating room: CHOLECYSTECTOMY LAPAROSCOPIC    Other orders  -     Incentive spirometry; Standing  -     Insert and maintain IV line; Standing  -     Void On-Call to O R ; Standing  -     Place sequential compression device; Standing          Subjective:      Patient ID: Andressa Speaker is a 50 y o  female  43-year-old female with history of diabetes, GERD, status post open distal pancreatectomy for insulinoma, presents today for evaluation of right upper quadrant pain and bloated after eating  Patient underwent ultrasound which was unremarkable  She also underwent HIDA scan which did show a decreased ejection fraction approximately 19% consistent with biliary dyskinesia  Patient states that at times it is pain with food that she is she will developed right upper quadrant and pain with associated bloating  She also complains of belching  And is currently having intermittent diarrhea  No chest pain shortness of breath  No changes in urinary habits          The following portions of the patient's history were reviewed and updated as appropriate:   She  has a past medical history of Anxiety; Asthma; Depression; Diabetes mellitus ([place] [address] ); Diverticulitis; Hyperlipidemia associated with type 2 diabetes mellitus ([place] [address] ); Hypertension; and Insulinoma  She   Patient Active Problem List    Diagnosis Date Noted    Biliary dyskinesia 2018    Cholecystitis 2018    Diarrhea 2018    Spasm of left piriformis muscle 2018    Pulmonary embolism ([place] [address] ) 2017    Gastroesophageal reflux disease 2015    Insomnia 2013    Type 2 diabetes mellitus ([place] [address] ) 2013    Depression 2013    Hypercholesterolemia 2013     She  has a past surgical history that includes Pancreatectomy;  section; and Knee surgery  Her family history includes Breast cancer in her brother; Diabetes in her maternal grandmother and paternal grandmother; Heart disease in her mother; Hyperlipidemia in her mother; Hypertension in her mother; Obesity in her mother; Osteoporosis in her family; Other in her maternal grandmother and mother; Ovarian cancer in her mother; Substance Abuse in her family and father; Tuberculosis in her maternal aunt and mother  She  reports that she quit smoking about 3 years ago  She has never used smokeless tobacco  She reports that she does not drink alcohol or use drugs    Current Outpatient Prescriptions   Medication Sig Dispense Refill    albuterol (VENTOLIN HFA) 90 mcg/act inhaler Inhale 2 puffs every 4 (four) hours as needed for shortness of breath 3 Inhaler 1    ARIPiprazole (ABILIFY) 20 MG tablet Take 1 tablet by mouth daily      glucose blood test strip by In Vitro route daily      Lancets Misc  (ACCU-CHEK MULTICLIX LANCET DEV) KIT by Does not apply route      lisinopril (ZESTRIL) 2 5 mg tablet Take 1 tablet (2 5 mg total) by mouth daily 90 tablet 1    metFORMIN (GLUCOPHAGE-XR) 500 mg 24 hr tablet  TAKE 2 TABLETS BY MOUTH TWICE DAILY 120 tablet 5    pravastatin (PRAVACHOL) 20 mg tablet Take 1 tablet (20 mg total) by mouth daily 90 tablet 1    venlafaxine (EFFEXOR-XR) 150 mg 24 hr capsule Take 150 mg by mouth daily        EPINEPHrine (EPIPEN) 0 3 mg/0 3 mL SOAJ Inject 0 3 mL (0 3 mg total) into the shoulder, thigh, or buttocks once for 1 dose 0 3 mL 2    meloxicam (MOBIC) 15 mg tablet Take 1 tablet (15 mg total) by mouth daily 30 tablet 0     No current facility-administered medications for this visit  She is allergic to iodine and nuts       Review of Systems      A 10 point review of systems was conducted, all negative except as noted above HPI  Objective:      /76   Pulse 102   Temp 97 9 °F (36 6 °C)   Ht 5' 2" (1 575 m)   Wt 102 kg (224 lb)   BMI 40 97 kg/m²          Physical Exam   Constitutional: She is oriented to person, place, and time  She appears well-developed and well-nourished  No distress  HENT:   Head: Normocephalic and atraumatic  Eyes: No scleral icterus  Neck: Normal range of motion  No tracheal deviation present  Cardiovascular: Normal rate, regular rhythm and normal heart sounds  Exam reveals no gallop and no friction rub  No murmur heard  Pulmonary/Chest: Effort normal and breath sounds normal  No respiratory distress  She has no wheezes  She has no rales  She exhibits no tenderness  Abdominal: Soft  She exhibits no distension and no mass  There is tenderness (Mild right upper quadrant tenderness with palpation)  There is no rebound and no guarding  Bilateral subcostal incision noted  Musculoskeletal: Normal range of motion  She exhibits no edema, tenderness or deformity  Lymphadenopathy:     She has no cervical adenopathy  Neurological: She is alert and oriented to person, place, and time  No cranial nerve deficit  Skin: Skin is warm and dry  No rash noted  She is not diaphoretic  No erythema  No pallor  Psychiatric: She has a normal mood and affect  Her behavior is normal    Vitals reviewed

## 2018-07-09 NOTE — H&P
Gallbladder consult     Assessment/Plan:    Biliary dyskinesia  Symptomatic biliary dyskinesia  Patient with right upper quadrant pain and bloating after eating  Does also have some diarrhea  History of distal pancreatectomy for insulinoma many many years ago which was done open  Patient is agreeable to have her gallbladder removed and she is aware that based on her previous surgery that the laparoscopic procedure may be converted open  - preop consent for laparoscopic cholecystectomy, possible open  The procedure itself including all associated risks and benefits were discussed with the patient in great length  The patient verbalized understands risks is willing to proceed, consent was signed  Diagnoses and all orders for this visit:    Biliary dyskinesia    Cholecystitis  -     Ambulatory referral to General Surgery  -     Case request operating room: CHOLECYSTECTOMY LAPAROSCOPIC; Standing  -     Case request operating room: CHOLECYSTECTOMY LAPAROSCOPIC    Other orders  -     Incentive spirometry; Standing  -     Insert and maintain IV line; Standing  -     Void On-Call to O R ; Standing  -     Place sequential compression device; Standing          Subjective:      Patient ID: Jesica Hackett is a 50 y o  female  47-year-old female with history of diabetes, GERD, status post open distal pancreatectomy for insulinoma, presents today for evaluation of right upper quadrant pain and bloated after eating  Patient underwent ultrasound which was unremarkable  She also underwent HIDA scan which did show a decreased ejection fraction approximately 19% consistent with biliary dyskinesia  Patient states that at times it is pain with food that she is she will developed right upper quadrant and pain with associated bloating  She also complains of belching  And is currently having intermittent diarrhea  No chest pain shortness of breath  No changes in urinary habits          The following portions of the patient's history were reviewed and updated as appropriate:   She  has a past medical history of Anxiety; Asthma; Depression; Diabetes mellitus ([place] [address] ); Diverticulitis; Hyperlipidemia associated with type 2 diabetes mellitus ([place] [address] ); Hypertension; and Insulinoma  She   Patient Active Problem List    Diagnosis Date Noted    Biliary dyskinesia 2018    Cholecystitis 2018    Diarrhea 2018    Spasm of left piriformis muscle 2018    Pulmonary embolism ([place] [address] ) 2017    Gastroesophageal reflux disease 2015    Insomnia 2013    Type 2 diabetes mellitus ([place] [address] ) 2013    Depression 2013    Hypercholesterolemia 2013     She  has a past surgical history that includes Pancreatectomy;  section; and Knee surgery  Her family history includes Breast cancer in her brother; Diabetes in her maternal grandmother and paternal grandmother; Heart disease in her mother; Hyperlipidemia in her mother; Hypertension in her mother; Obesity in her mother; Osteoporosis in her family; Other in her maternal grandmother and mother; Ovarian cancer in her mother; Substance Abuse in her family and father; Tuberculosis in her maternal aunt and mother  She  reports that she quit smoking about 3 years ago  She has never used smokeless tobacco  She reports that she does not drink alcohol or use drugs    Current Outpatient Prescriptions   Medication Sig Dispense Refill    albuterol (VENTOLIN HFA) 90 mcg/act inhaler Inhale 2 puffs every 4 (four) hours as needed for shortness of breath 3 Inhaler 1    ARIPiprazole (ABILIFY) 20 MG tablet Take 1 tablet by mouth daily      glucose blood test strip by In Vitro route daily      Lancets Misc  (ACCU-CHEK MULTICLIX LANCET DEV) KIT by Does not apply route      lisinopril (ZESTRIL) 2 5 mg tablet Take 1 tablet (2 5 mg total) by mouth daily 90 tablet 1    metFORMIN (GLUCOPHAGE-XR) 500 mg 24 hr tablet  TAKE 2 TABLETS BY MOUTH TWICE DAILY 120 tablet 5  pravastatin (PRAVACHOL) 20 mg tablet Take 1 tablet (20 mg total) by mouth daily 90 tablet 1    venlafaxine (EFFEXOR-XR) 150 mg 24 hr capsule Take 150 mg by mouth daily        EPINEPHrine (EPIPEN) 0 3 mg/0 3 mL SOAJ Inject 0 3 mL (0 3 mg total) into the shoulder, thigh, or buttocks once for 1 dose 0 3 mL 2    meloxicam (MOBIC) 15 mg tablet Take 1 tablet (15 mg total) by mouth daily 30 tablet 0     No current facility-administered medications for this visit  She is allergic to iodine and nuts       Review of Systems      A 10 point review of systems was conducted, all negative except as noted above HPI  Objective:      /76   Pulse 102   Temp 97 9 °F (36 6 °C)   Ht 5' 2" (1 575 m)   Wt 102 kg (224 lb)   BMI 40 97 kg/m²           Physical Exam   Constitutional: She is oriented to person, place, and time  She appears well-developed and well-nourished  No distress  HENT:   Head: Normocephalic and atraumatic  Eyes: No scleral icterus  Neck: Normal range of motion  No tracheal deviation present  Cardiovascular: Normal rate, regular rhythm and normal heart sounds  Exam reveals no gallop and no friction rub  No murmur heard  Pulmonary/Chest: Effort normal and breath sounds normal  No respiratory distress  She has no wheezes  She has no rales  She exhibits no tenderness  Abdominal: Soft  She exhibits no distension and no mass  There is tenderness (Mild right upper quadrant tenderness with palpation)  There is no rebound and no guarding  Bilateral subcostal incision noted  Musculoskeletal: Normal range of motion  She exhibits no edema, tenderness or deformity  Lymphadenopathy:     She has no cervical adenopathy  Neurological: She is alert and oriented to person, place, and time  No cranial nerve deficit  Skin: Skin is warm and dry  No rash noted  She is not diaphoretic  No erythema  No pallor  Psychiatric: She has a normal mood and affect   Her behavior is normal    Vitals reviewed

## 2018-07-25 NOTE — PRE-PROCEDURE INSTRUCTIONS
Pre-Surgery Instructions:   Medication Instructions    albuterol (VENTOLIN HFA) 90 mcg/act inhaler Instructed patient per Anesthesia Guidelines   ARIPiprazole (ABILIFY) 20 MG tablet Instructed patient per Anesthesia Guidelines   lisinopril (ZESTRIL) 2 5 mg tablet Instructed patient per Anesthesia Guidelines   meloxicam (MOBIC) 15 mg tablet Instructed patient per Anesthesia Guidelines   metFORMIN (GLUCOPHAGE-XR) 500 mg 24 hr tablet Instructed patient per Anesthesia Guidelines   Multiple Vitamins-Minerals (MULTI ADULT GUMMIES PO) Instructed patient per Anesthesia Guidelines   pravastatin (PRAVACHOL) 20 mg tablet Instructed patient per Anesthesia Guidelines   venlafaxine (EFFEXOR-XR) 150 mg 24 hr capsule Instructed patient per Anesthesia Guidelines

## 2018-07-31 ENCOUNTER — ANESTHESIA EVENT (OUTPATIENT)
Dept: PERIOP | Facility: HOSPITAL | Age: 48
End: 2018-07-31
Payer: COMMERCIAL

## 2018-08-01 ENCOUNTER — ANESTHESIA (OUTPATIENT)
Dept: PERIOP | Facility: HOSPITAL | Age: 48
End: 2018-08-01
Payer: COMMERCIAL

## 2018-08-01 ENCOUNTER — HOSPITAL ENCOUNTER (OUTPATIENT)
Facility: HOSPITAL | Age: 48
Setting detail: OUTPATIENT SURGERY
Discharge: HOME | End: 2018-08-01
Attending: SURGERY | Admitting: SURGERY
Payer: COMMERCIAL

## 2018-08-01 VITALS
TEMPERATURE: 97.7 F | HEIGHT: 62 IN | SYSTOLIC BLOOD PRESSURE: 122 MMHG | HEART RATE: 72 BPM | RESPIRATION RATE: 18 BRPM | WEIGHT: 224 LBS | BODY MASS INDEX: 41.22 KG/M2 | OXYGEN SATURATION: 94 % | DIASTOLIC BLOOD PRESSURE: 65 MMHG

## 2018-08-01 DIAGNOSIS — K81.9 CHOLECYSTITIS: ICD-10-CM

## 2018-08-01 DIAGNOSIS — K82.8 BILIARY DYSKINESIA: Primary | ICD-10-CM

## 2018-08-01 LAB — GLUCOSE SERPL-MCNC: 148 MG/DL (ref 65–140)

## 2018-08-01 PROCEDURE — 88304 TISSUE EXAM BY PATHOLOGIST: CPT | Performed by: PATHOLOGY

## 2018-08-01 PROCEDURE — 82948 REAGENT STRIP/BLOOD GLUCOSE: CPT

## 2018-08-01 PROCEDURE — 47562 LAPAROSCOPIC CHOLECYSTECTOMY: CPT | Performed by: SURGERY

## 2018-08-01 PROCEDURE — 47562 LAPAROSCOPIC CHOLECYSTECTOMY: CPT | Performed by: PHYSICIAN ASSISTANT

## 2018-08-01 RX ORDER — BUPIVACAINE HYDROCHLORIDE 5 MG/ML
INJECTION, SOLUTION PERINEURAL AS NEEDED
Status: DISCONTINUED | OUTPATIENT
Start: 2018-08-01 | End: 2018-08-01 | Stop reason: HOSPADM

## 2018-08-01 RX ORDER — ONDANSETRON 2 MG/ML
INJECTION INTRAMUSCULAR; INTRAVENOUS AS NEEDED
Status: DISCONTINUED | OUTPATIENT
Start: 2018-08-01 | End: 2018-08-01 | Stop reason: SURG

## 2018-08-01 RX ORDER — ROCURONIUM BROMIDE 10 MG/ML
INJECTION, SOLUTION INTRAVENOUS AS NEEDED
Status: DISCONTINUED | OUTPATIENT
Start: 2018-08-01 | End: 2018-08-01 | Stop reason: SURG

## 2018-08-01 RX ORDER — CHLORHEXIDINE GLUCONATE 4 G/100ML
SOLUTION TOPICAL DAILY PRN
Status: DISCONTINUED | OUTPATIENT
Start: 2018-08-01 | End: 2018-08-01 | Stop reason: HOSPADM

## 2018-08-01 RX ORDER — PROPOFOL 10 MG/ML
INJECTION, EMULSION INTRAVENOUS AS NEEDED
Status: DISCONTINUED | OUTPATIENT
Start: 2018-08-01 | End: 2018-08-01 | Stop reason: SURG

## 2018-08-01 RX ORDER — OXYCODONE HYDROCHLORIDE AND ACETAMINOPHEN 5; 325 MG/1; MG/1
2 TABLET ORAL EVERY 4 HOURS PRN
Status: DISCONTINUED | OUTPATIENT
Start: 2018-08-01 | End: 2018-08-01 | Stop reason: HOSPADM

## 2018-08-01 RX ORDER — SODIUM CHLORIDE, SODIUM LACTATE, POTASSIUM CHLORIDE, CALCIUM CHLORIDE 600; 310; 30; 20 MG/100ML; MG/100ML; MG/100ML; MG/100ML
125 INJECTION, SOLUTION INTRAVENOUS CONTINUOUS
Status: DISCONTINUED | OUTPATIENT
Start: 2018-08-01 | End: 2018-08-01 | Stop reason: HOSPADM

## 2018-08-01 RX ORDER — ONDANSETRON 2 MG/ML
4 INJECTION INTRAMUSCULAR; INTRAVENOUS EVERY 8 HOURS PRN
Status: DISCONTINUED | OUTPATIENT
Start: 2018-08-01 | End: 2018-08-01 | Stop reason: HOSPADM

## 2018-08-01 RX ORDER — FENTANYL CITRATE 50 UG/ML
INJECTION, SOLUTION INTRAMUSCULAR; INTRAVENOUS AS NEEDED
Status: DISCONTINUED | OUTPATIENT
Start: 2018-08-01 | End: 2018-08-01 | Stop reason: SURG

## 2018-08-01 RX ORDER — MORPHINE SULFATE 4 MG/ML
2 INJECTION, SOLUTION INTRAMUSCULAR; INTRAVENOUS EVERY 4 HOURS PRN
Status: DISCONTINUED | OUTPATIENT
Start: 2018-08-01 | End: 2018-08-01 | Stop reason: HOSPADM

## 2018-08-01 RX ORDER — ONDANSETRON 2 MG/ML
4 INJECTION INTRAMUSCULAR; INTRAVENOUS ONCE AS NEEDED
Status: COMPLETED | OUTPATIENT
Start: 2018-08-01 | End: 2018-08-01

## 2018-08-01 RX ORDER — GLYCOPYRROLATE 0.2 MG/ML
INJECTION INTRAMUSCULAR; INTRAVENOUS AS NEEDED
Status: DISCONTINUED | OUTPATIENT
Start: 2018-08-01 | End: 2018-08-01 | Stop reason: SURG

## 2018-08-01 RX ORDER — SODIUM CHLORIDE, SODIUM LACTATE, POTASSIUM CHLORIDE, CALCIUM CHLORIDE 600; 310; 30; 20 MG/100ML; MG/100ML; MG/100ML; MG/100ML
125 INJECTION, SOLUTION INTRAVENOUS CONTINUOUS
Status: DISCONTINUED | OUTPATIENT
Start: 2018-08-01 | End: 2018-08-01

## 2018-08-01 RX ORDER — METOCLOPRAMIDE HYDROCHLORIDE 5 MG/ML
INJECTION INTRAMUSCULAR; INTRAVENOUS AS NEEDED
Status: DISCONTINUED | OUTPATIENT
Start: 2018-08-01 | End: 2018-08-01 | Stop reason: SURG

## 2018-08-01 RX ORDER — FENTANYL CITRATE/PF 50 MCG/ML
25 SYRINGE (ML) INJECTION
Status: COMPLETED | OUTPATIENT
Start: 2018-08-01 | End: 2018-08-01

## 2018-08-01 RX ORDER — OXYCODONE HYDROCHLORIDE AND ACETAMINOPHEN 5; 325 MG/1; MG/1
1-2 TABLET ORAL EVERY 4 HOURS PRN
Qty: 40 TABLET | Refills: 0 | Status: SHIPPED | OUTPATIENT
Start: 2018-08-01 | End: 2018-09-05 | Stop reason: ALTCHOICE

## 2018-08-01 RX ORDER — MEPERIDINE HYDROCHLORIDE 25 MG/ML
12.5 INJECTION INTRAMUSCULAR; INTRAVENOUS; SUBCUTANEOUS AS NEEDED
Status: DISCONTINUED | OUTPATIENT
Start: 2018-08-01 | End: 2018-08-01 | Stop reason: HOSPADM

## 2018-08-01 RX ORDER — ALBUTEROL SULFATE 2.5 MG/3ML
2.5 SOLUTION RESPIRATORY (INHALATION) ONCE AS NEEDED
Status: DISCONTINUED | OUTPATIENT
Start: 2018-08-01 | End: 2018-08-01 | Stop reason: HOSPADM

## 2018-08-01 RX ORDER — LIDOCAINE HYDROCHLORIDE 10 MG/ML
INJECTION, SOLUTION INFILTRATION; PERINEURAL AS NEEDED
Status: DISCONTINUED | OUTPATIENT
Start: 2018-08-01 | End: 2018-08-01 | Stop reason: SURG

## 2018-08-01 RX ORDER — LABETALOL HYDROCHLORIDE 5 MG/ML
5 INJECTION, SOLUTION INTRAVENOUS
Status: DISCONTINUED | OUTPATIENT
Start: 2018-08-01 | End: 2018-08-01 | Stop reason: HOSPADM

## 2018-08-01 RX ORDER — MIDAZOLAM HYDROCHLORIDE 1 MG/ML
INJECTION INTRAMUSCULAR; INTRAVENOUS AS NEEDED
Status: DISCONTINUED | OUTPATIENT
Start: 2018-08-01 | End: 2018-08-01 | Stop reason: SURG

## 2018-08-01 RX ORDER — ALBUTEROL SULFATE 2.5 MG/3ML
SOLUTION RESPIRATORY (INHALATION) AS NEEDED
Status: DISCONTINUED | OUTPATIENT
Start: 2018-08-01 | End: 2018-08-01 | Stop reason: SURG

## 2018-08-01 RX ADMIN — ALBUTEROL SULFATE 2.5 MG: 2.5 SOLUTION RESPIRATORY (INHALATION) at 13:10

## 2018-08-01 RX ADMIN — FENTANYL CITRATE 50 MCG: 50 INJECTION, SOLUTION INTRAMUSCULAR; INTRAVENOUS at 11:30

## 2018-08-01 RX ADMIN — ONDANSETRON 4 MG: 2 INJECTION INTRAMUSCULAR; INTRAVENOUS at 13:59

## 2018-08-01 RX ADMIN — ROCURONIUM BROMIDE 50 MG: 10 INJECTION INTRAVENOUS at 11:09

## 2018-08-01 RX ADMIN — FENTANYL CITRATE 25 MCG: 50 INJECTION, SOLUTION INTRAMUSCULAR; INTRAVENOUS at 13:47

## 2018-08-01 RX ADMIN — OXYCODONE HYDROCHLORIDE AND ACETAMINOPHEN 2 TABLET: 5; 325 TABLET ORAL at 15:56

## 2018-08-01 RX ADMIN — FENTANYL CITRATE 25 MCG: 50 INJECTION, SOLUTION INTRAMUSCULAR; INTRAVENOUS at 13:41

## 2018-08-01 RX ADMIN — FENTANYL CITRATE 50 MCG: 50 INJECTION, SOLUTION INTRAMUSCULAR; INTRAVENOUS at 12:00

## 2018-08-01 RX ADMIN — MIDAZOLAM HYDROCHLORIDE 2 MG: 1 INJECTION, SOLUTION INTRAMUSCULAR; INTRAVENOUS at 11:05

## 2018-08-01 RX ADMIN — GLYCOPYRROLATE 0.3 MG: 0.2 INJECTION, SOLUTION INTRAMUSCULAR; INTRAVENOUS at 13:10

## 2018-08-01 RX ADMIN — SODIUM CHLORIDE, SODIUM LACTATE, POTASSIUM CHLORIDE, AND CALCIUM CHLORIDE 125 ML/HR: .6; .31; .03; .02 INJECTION, SOLUTION INTRAVENOUS at 09:50

## 2018-08-01 RX ADMIN — FENTANYL CITRATE 25 MCG: 50 INJECTION, SOLUTION INTRAMUSCULAR; INTRAVENOUS at 13:50

## 2018-08-01 RX ADMIN — SODIUM CHLORIDE, SODIUM LACTATE, POTASSIUM CHLORIDE, AND CALCIUM CHLORIDE: .6; .31; .03; .02 INJECTION, SOLUTION INTRAVENOUS at 13:06

## 2018-08-01 RX ADMIN — ONDANSETRON HYDROCHLORIDE 4 MG: 2 INJECTION, SOLUTION INTRAVENOUS at 11:15

## 2018-08-01 RX ADMIN — LIDOCAINE HYDROCHLORIDE 50 MG: 10 INJECTION, SOLUTION INFILTRATION; PERINEURAL at 11:09

## 2018-08-01 RX ADMIN — PROPOFOL 200 MG: 10 INJECTION, EMULSION INTRAVENOUS at 11:09

## 2018-08-01 RX ADMIN — CEFAZOLIN SODIUM 2000 MG: 2 SOLUTION INTRAVENOUS at 11:05

## 2018-08-01 RX ADMIN — FENTANYL CITRATE 100 MCG: 50 INJECTION, SOLUTION INTRAMUSCULAR; INTRAVENOUS at 11:09

## 2018-08-01 RX ADMIN — METOCLOPRAMIDE HYDROCHLORIDE 10 MG: 5 INJECTION INTRAMUSCULAR; INTRAVENOUS at 11:15

## 2018-08-01 RX ADMIN — FENTANYL CITRATE 25 MCG: 50 INJECTION, SOLUTION INTRAMUSCULAR; INTRAVENOUS at 13:44

## 2018-08-01 RX ADMIN — HYDROMORPHONE HYDROCHLORIDE 0.5 MG: 1 INJECTION, SOLUTION INTRAMUSCULAR; INTRAVENOUS; SUBCUTANEOUS at 14:06

## 2018-08-01 RX ADMIN — ONDANSETRON HYDROCHLORIDE 4 MG: 2 INJECTION, SOLUTION INTRAVENOUS at 13:05

## 2018-08-01 RX ADMIN — NEOSTIGMINE METHYLSULFATE 2 MG: 1 INJECTION, SOLUTION INTRAMUSCULAR; INTRAVENOUS; SUBCUTANEOUS at 13:10

## 2018-08-01 NOTE — H&P (VIEW-ONLY)
Gallbladder consult     Assessment/Plan:    Biliary dyskinesia  Symptomatic biliary dyskinesia  Patient with right upper quadrant pain and bloating after eating  Does also have some diarrhea  History of distal pancreatectomy for insulinoma many many years ago which was done open  Patient is agreeable to have her gallbladder removed and she is aware that based on her previous surgery that the laparoscopic procedure may be converted open  - preop consent for laparoscopic cholecystectomy, possible open  The procedure itself including all associated risks and benefits were discussed with the patient in great length  The patient verbalized understands risks is willing to proceed, consent was signed  Diagnoses and all orders for this visit:    Biliary dyskinesia    Cholecystitis  -     Ambulatory referral to General Surgery  -     Case request operating room: CHOLECYSTECTOMY LAPAROSCOPIC; Standing  -     Case request operating room: CHOLECYSTECTOMY LAPAROSCOPIC    Other orders  -     Incentive spirometry; Standing  -     Insert and maintain IV line; Standing  -     Void On-Call to O R ; Standing  -     Place sequential compression device; Standing          Subjective:      Patient ID: Ana Smith is a 50 y o  female  66-year-old female with history of diabetes, GERD, status post open distal pancreatectomy for insulinoma, presents today for evaluation of right upper quadrant pain and bloated after eating  Patient underwent ultrasound which was unremarkable  She also underwent HIDA scan which did show a decreased ejection fraction approximately 19% consistent with biliary dyskinesia  Patient states that at times it is pain with food that she is she will developed right upper quadrant and pain with associated bloating  She also complains of belching  And is currently having intermittent diarrhea  No chest pain shortness of breath  No changes in urinary habits          The following portions of the patient's history were reviewed and updated as appropriate:   She  has a past medical history of Anxiety; Asthma; Depression; Diabetes mellitus ([place] [address] ); Diverticulitis; Hyperlipidemia associated with type 2 diabetes mellitus ([place] [address] ); Hypertension; and Insulinoma  She   Patient Active Problem List    Diagnosis Date Noted    Biliary dyskinesia 2018    Cholecystitis 2018    Diarrhea 2018    Spasm of left piriformis muscle 2018    Pulmonary embolism ([place] [address] ) 2017    Gastroesophageal reflux disease 2015    Insomnia 2013    Type 2 diabetes mellitus ([place] [address] ) 2013    Depression 2013    Hypercholesterolemia 2013     She  has a past surgical history that includes Pancreatectomy;  section; and Knee surgery  Her family history includes Breast cancer in her brother; Diabetes in her maternal grandmother and paternal grandmother; Heart disease in her mother; Hyperlipidemia in her mother; Hypertension in her mother; Obesity in her mother; Osteoporosis in her family; Other in her maternal grandmother and mother; Ovarian cancer in her mother; Substance Abuse in her family and father; Tuberculosis in her maternal aunt and mother  She  reports that she quit smoking about 3 years ago  She has never used smokeless tobacco  She reports that she does not drink alcohol or use drugs    Current Outpatient Prescriptions   Medication Sig Dispense Refill    albuterol (VENTOLIN HFA) 90 mcg/act inhaler Inhale 2 puffs every 4 (four) hours as needed for shortness of breath 3 Inhaler 1    ARIPiprazole (ABILIFY) 20 MG tablet Take 1 tablet by mouth daily      glucose blood test strip by In Vitro route daily      Lancets Misc  (ACCU-CHEK MULTICLIX LANCET DEV) KIT by Does not apply route      lisinopril (ZESTRIL) 2 5 mg tablet Take 1 tablet (2 5 mg total) by mouth daily 90 tablet 1    metFORMIN (GLUCOPHAGE-XR) 500 mg 24 hr tablet  TAKE 2 TABLETS BY MOUTH TWICE DAILY 120 tablet 5  pravastatin (PRAVACHOL) 20 mg tablet Take 1 tablet (20 mg total) by mouth daily 90 tablet 1    venlafaxine (EFFEXOR-XR) 150 mg 24 hr capsule Take 150 mg by mouth daily        EPINEPHrine (EPIPEN) 0 3 mg/0 3 mL SOAJ Inject 0 3 mL (0 3 mg total) into the shoulder, thigh, or buttocks once for 1 dose 0 3 mL 2    meloxicam (MOBIC) 15 mg tablet Take 1 tablet (15 mg total) by mouth daily 30 tablet 0     No current facility-administered medications for this visit  She is allergic to iodine and nuts       Review of Systems      A 10 point review of systems was conducted, all negative except as noted above HPI  Objective:      /76   Pulse 102   Temp 97 9 °F (36 6 °C)   Ht 5' 2" (1 575 m)   Wt 102 kg (224 lb)   BMI 40 97 kg/m²           Physical Exam   Constitutional: She is oriented to person, place, and time  She appears well-developed and well-nourished  No distress  HENT:   Head: Normocephalic and atraumatic  Eyes: No scleral icterus  Neck: Normal range of motion  No tracheal deviation present  Cardiovascular: Normal rate, regular rhythm and normal heart sounds  Exam reveals no gallop and no friction rub  No murmur heard  Pulmonary/Chest: Effort normal and breath sounds normal  No respiratory distress  She has no wheezes  She has no rales  She exhibits no tenderness  Abdominal: Soft  She exhibits no distension and no mass  There is tenderness (Mild right upper quadrant tenderness with palpation)  There is no rebound and no guarding  Bilateral subcostal incision noted  Musculoskeletal: Normal range of motion  She exhibits no edema, tenderness or deformity  Lymphadenopathy:     She has no cervical adenopathy  Neurological: She is alert and oriented to person, place, and time  No cranial nerve deficit  Skin: Skin is warm and dry  No rash noted  She is not diaphoretic  No erythema  No pallor  Psychiatric: She has a normal mood and affect   Her behavior is normal    Vitals reviewed

## 2018-08-01 NOTE — ANESTHESIA POSTPROCEDURE EVALUATION
Post-Op Assessment Note      CV Status:  Stable    Mental Status:  Alert and awake    Hydration Status:  Euvolemic    PONV Controlled:  Controlled    Airway Patency:  Patent    Post Op Vitals Reviewed: Yes          Staff: Anesthesiologist, CRNA           BP   164/88   Temp   99 0   Pulse  101   Resp   18   SpO2   96%

## 2018-08-01 NOTE — INTERIM OP NOTE
CHOLECYSTECTOMY LAPAROSCOPIC  Postoperative Note  PATIENT NAME: Liudmila Bruno  : 1970  MRN: [id number]  [facility] ROOM 02    Surgery Date: 2018    Preop Diagnosis:  Cholecystitis [K81 9]    Post-Op Diagnosis Codes:     * Cholecystitis [K81 9]    Procedure(s) (LRB):  CHOLECYSTECTOMY LAPAROSCOPIC (N/A)    Surgeon(s) and Role: * Kusum Hernandez DO - Primary     * Fritz Rizo PA-C - Assisting    Specimens:  ID Type Source Tests Collected by Time Destination   1 :  Tissue Gallbladder TISSUE EXAM Kusum Hernandez DO 2018 1259        Estimated Blood Loss:   Minimal    Anesthesia Type:   General     Findings:    Distended gallbladder  Significant amount of adhesions of colon and omentum to the anterior abdominal wall    Complications:   None    SIGNATURE: Kusum Hernandez DO   DATE: 2018   TIME: 1:22 PM

## 2018-08-01 NOTE — ANESTHESIA PREPROCEDURE EVALUATION
Review of Systems/Medical History  Patient summary reviewed        Cardiovascular  Negative cardio ROS Hyperlipidemia, Hypertension ,    Pulmonary  Negative pulmonary ROS Asthma , well controlled/ stable , Sleep apnea ,        GI/Hepatic  Negative GI/hepatic ROS          Negative  ROS        Endo/Other  Negative endo/other ROS   Obesity    GYN  Negative gynecology ROS          Hematology  Negative hematology ROS      Musculoskeletal  Negative musculoskeletal ROS        Neurology  Negative neurology ROS      Psychology   Negative psychology ROS Anxiety, Depression ,              Physical Exam    Airway    Mallampati score: II  TM Distance: >3 FB  Neck ROM: full     Dental   No notable dental hx     Cardiovascular  Comment: Negative ROS,     Pulmonary      Other Findings        Anesthesia Plan  ASA Score- 3     Anesthesia Type- general with ASA Monitors  Additional Monitors:   Airway Plan: ETT  Comment: Patient seen and examined  History reviewed  Patient to be done under general anesthesia with ETT and routine monitors  Risks discussed with the patient  Consent obtained        Plan Factors-    Induction- intravenous  Postoperative Plan-     Informed Consent- Anesthetic plan and risks discussed with patient  I personally reviewed this patient with the CRNA  Discussed and agreed on the Anesthesia Plan with the CRNA  Maddison Rivas

## 2018-08-01 NOTE — OP NOTE
OPERATIVE REPORT  PATIENT Kasi Meza    :  1970  MRN: [id number]  Pt Location: [facility] ROOM 02    SURGERY DATE: 2018    Surgeon(s) and Role: * Marisol Zhang DO - Primary     * Helen Combs PA-C - Assisting    Preop Diagnosis:  Cholecystitis [K81 9]    Post-Op Diagnosis Codes:     * Cholecystitis [K81 9]    Procedure(s) (LRB):  CHOLECYSTECTOMY LAPAROSCOPIC (N/A)    Specimen(s):  ID Type Source Tests Collected by Time Destination   1 :  Tissue Gallbladder TISSUE EXAM Marisol Zhang DO 2018 1259        Estimated Blood Loss:   Minimal    Drains:  Closed/Suction Drain Right Abdomen Bulb 15 Fr  (Active)   Dressing Status Clean; Intact;Dry 2018  1:19 PM   Drainage Appearance Bloody 2018  1:19 PM   Status To bulb suction 2018  1:19 PM   Number of days: 0       Anesthesia Type:   General    Operative Indications:  Cholecystitis [K81 9]      Operative Findings:  Distended gallbladder  Significant adhesions of the colon and omentum to the anterior abdominal wall    Complications:   None    Procedure and Technique:  The patient was brought to the operating arena and placed in supine position  All regular monitoring devices were connected  The patient underwent general anesthesia with endotracheal intubation without complication  The patient received perioperative antibiotics  The patient received subcutaneous heparin in addition to bilateral lower extremity sequential compression devices for DVT prophylaxis  A timeout was performed prior to incision to ensure correct patient position, procedure, and site  A vertical supraumbilical incision was made using a 15 blade scalpel  Incision was deepened through the deep dermis and simultaneous fat using electrocautery  Hemostasis was obtained  Using S retractors dissection was completed down to the fascia  The fascia was visualized grasped with Ysabel was elevated and incised  2 stay sutures of 0 Vicryl were then placed   A finger was inserted and the peritoneum palpated and using finger fracture technique the peritoneum was incised  The underside of the peritoneum was palpated and there was no evidence of any bowel or adhesions in the vicinity area  A Ayala trocar was then placed under direct vision  The abdomen was insufflated with carbon dioxide to a pressure of 12-14 mmHg  The patient tolerated insufflation well  A laparoscope was then entered and there was no evidence of any trauma from the initial trocar placement  The abdomen was inspected and there is a significant amount of adhesions of the colon and the omentum to the anterior abdominal wall  At that point time the liver and gallbladder were not into view  One additional 5 mm port was then placed in the right lower quadrant  Using a combination of blunt and sharp technique, and using graspers and also obviously hot scissors adhesions were taken down from the anterior abdominal wall  Bluntly the colon which pushed down which revealed the liver  The gallbladder was then in view and was grasped and elevated  Two additional trochars were then placed in the following positions: An 5 mm trocar was placed in the epigastrium, 1 additional 5 mm trochars were placed on the right costal margin  All trochars were inserted under direct vision and there is no evidence of any injury   The patient was then placed in reverse trendelenberg and right side up position  Additional filmy adhesion between the gallbladder and omentum, and/or other nearby organs were taken down with a combination of blunt and sharp technique  Additional atraumatic grasper was then placed in the infundibulum of the gallbladder was grasped and retracted to the right lower quadrant  The peritoneum was then was incised using Bovie electrocautery  There is a large amount of fat surrounding the gallbladder addition to numerous adhesions  These were all taken down with a combination of blunt sharp technique   Because of the numerous med adhesions a dome down approach was then started in the gallbladder was easily dissected from the top liver  The cystic artery was initially found and circumferentially dissected and doubly clipped and left transected  The  cystic duct were then circumferentially dissected and then doubly clipped close to the gallbladder  However in doing so I portion cystic duct may ripped and/or a clip may have actually going through portion the cystic duct as there is still some noted fatty material   There was some leakage of bile around the clip  This point time it appeared that an endoloop with safely fit around and just beneath the clips to prevent any additional leakage  Dr Gamble was called in for 2nd opinion given his expertise in biliary surgery and he agreed that an endoloop just beneath clips would likely work  One endoloop PDS was then placed snug just underneath the clip  The area was irrigated there appeared to be no additional leakage of bile  The gallbladder was then taken off the liver bed using hook electrocautery  Hemostasis was achieved with electrocautery  The gallbladder was in place an Endo Catch bag  The cystic artery was inspected and there is appeared to be no oozing  Cystic duct was also inspected and the clips in addition to the endoloop were intact and appeared to be no leakage of bile  A 50 [address] Krystina Mike drain was then placed within the gallbladder fossa and the bed of the more lateral right port and attached to bulb suction The upper abdominal trochars were then removed under direct vision there is no bleeding from trocar site  The Ayala cannula was then removed along with the Endo Catch bag containing the gallbladder and the abdomen was allowed to desufflate  The fascia of the umbillical port was then closed using 0 Vicryl suture  The skin of all trochars were then closed with a 4-0 Monocryl       The patient tolerated procedure well was taken to the post anesthesia care unit in stable condition  All lap, needle, and instrument counts were correct  I was present for the entire procedure and A qualified resident physician was not available, Chava Jones PA-C, was required for adequate exposure retraction during the case      Patient Disposition:  PACU     SIGNATURE: Anuel Devries DO  DATE: August 1, 2018  TIME: 1:23 PM

## 2018-08-01 NOTE — DISCHARGE INSTRUCTIONS
Follow-up with Dr Andrea Leonardo in 1 week as per appointment  Regular diet as tolerated  Low intensity activity as tolerated, no heavy lifting, nothing greater than 20 lb for 2 weeks  The dressings may be removed on Friday  May shower on Friday  No baths or swimming      If he developed fevers, nausea vomiting, worsening abdominal pain, redness or drainage from incisions or:  Ulcer go to the ER

## 2018-08-07 ENCOUNTER — OFFICE VISIT (OUTPATIENT)
Dept: SURGERY | Facility: HOSPITAL | Age: 48
End: 2018-08-07

## 2018-08-07 VITALS
WEIGHT: 228 LBS | HEART RATE: 91 BPM | SYSTOLIC BLOOD PRESSURE: 128 MMHG | BODY MASS INDEX: 41.96 KG/M2 | DIASTOLIC BLOOD PRESSURE: 75 MMHG | HEIGHT: 62 IN

## 2018-08-07 DIAGNOSIS — Z09 POSTOP CHECK: Primary | ICD-10-CM

## 2018-08-07 DIAGNOSIS — K82.8 BILIARY DYSKINESIA: ICD-10-CM

## 2018-08-07 PROCEDURE — 99024 POSTOP FOLLOW-UP VISIT: CPT | Performed by: SURGERY

## 2018-08-07 NOTE — ASSESSMENT & PLAN NOTE
Status post laparoscopic cholecystectomy  Doing well  Tolerating diet  Having bowel movements  Pain control  Drain removed in the office  Doing well  Pathology still pending    Patient will follow up in 2 weeks

## 2018-08-07 NOTE — PROGRESS NOTES
Assessment/Plan:    Biliary dyskinesia   Status post laparoscopic cholecystectomy  Doing well  Tolerating diet  Having bowel movements  Pain control  Drain removed in the office  Doing well  Pathology still pending  Patient will follow up in 2 weeks       Diagnoses and all orders for this visit:    Postop check    Biliary dyskinesia          Subjective:      Patient ID: Carl Mcdaniel is a 50 y o  female  23-year-old female status post laparoscopic cholecystectomy presents today for follow-up  Overall doing well  Patient had a significant amount of adhesions intraoperatively and therefore there was lysis adhesions and also removal of the gallbladder which was also tedious given significant he shins to the gallbladder  Drain was placed at that time and she comes in today for removal   Tolerating diet  No nausea vomiting  No fevers or chills  There is some abdominal pain that is controlled  Patient reports minimal pinkish drainage  The following portions of the patient's history were reviewed and updated as appropriate:   She  has a past medical history of Anxiety; Arthritis; Asthma; Cancer ([facility] [address] ); Depression; Diabetes mellitus ([address] ); Diverticulitis; Hyperlipidemia associated with type 2 diabetes mellitus ([address] ); Hypertension; Insulinoma; Pulmonary emboli ([address] ); and Sleep apnea  She   Patient Active Problem List    Diagnosis Date Noted    Biliary dyskinesia 2018    Cholecystitis 2018    Diarrhea 2018    Spasm of left piriformis muscle 2018    Pulmonary embolism ([address] ) 2017    Gastroesophageal reflux disease 2015    Insomnia 2013    Type 2 diabetes mellitus ([facility] [address] ) 2013    Depression 2013    Hypercholesterolemia 2013     She  has a past surgical history that includes Pancreatectomy;  section; Knee surgery; Knee arthroscopy (Left); and pr lap,cholecystectomy (N/A, 2018)    Her family history includes Breast cancer in her brother; Diabetes in her maternal grandmother and paternal grandmother; Heart disease in her mother; Hyperlipidemia in her mother; Hypertension in her mother; Obesity in her mother; Osteoporosis in her family; Other in her maternal grandmother and mother; Ovarian cancer in her mother; Substance Abuse in her family and father; Tuberculosis in her maternal aunt and mother  She  reports that she has quit smoking  She has never used smokeless tobacco  She reports that she does not drink alcohol or use drugs  Current Outpatient Prescriptions   Medication Sig Dispense Refill    albuterol (VENTOLIN HFA) 90 mcg/act inhaler Inhale 2 puffs every 4 (four) hours as needed for shortness of breath 3 Inhaler 1    ARIPiprazole (ABILIFY) 20 MG tablet Take 1 tablet by mouth daily      glucose blood test strip by In Vitro route daily      Lancets Misc  (ACCU-CHEK MULTICLIX LANCET DEV) KIT by Does not apply route      lisinopril (ZESTRIL) 2 5 mg tablet Take 1 tablet (2 5 mg total) by mouth daily 90 tablet 1    meloxicam (MOBIC) 15 mg tablet Take 1 tablet (15 mg total) by mouth daily 30 tablet 0    metFORMIN (GLUCOPHAGE-XR) 500 mg 24 hr tablet  TAKE 2 TABLETS BY MOUTH TWICE DAILY 120 tablet 5    Multiple Vitamins-Minerals (MULTI ADULT GUMMIES PO) Take by mouth      oxyCODONE-acetaminophen (PERCOCET) 5-325 mg per tablet Take 1-2 tablets by mouth every 4 (four) hours as needed for moderate pain or severe pain Max Daily Amount: 12 tablets 40 tablet 0    pravastatin (PRAVACHOL) 20 mg tablet Take 1 tablet (20 mg total) by mouth daily 90 tablet 1    venlafaxine (EFFEXOR-XR) 150 mg 24 hr capsule Take 150 mg by mouth daily        EPINEPHrine (EPIPEN) 0 3 mg/0 3 mL SOAJ Inject 0 3 mL (0 3 mg total) into the shoulder, thigh, or buttocks once for 1 dose 0 3 mL 2     No current facility-administered medications for this visit  She is allergic to iodine and nuts       Review of Systems   Constitutional: Negative for activity change, appetite change, chills, diaphoresis, fatigue, fever and unexpected weight change  Gastrointestinal: Positive for abdominal pain ( at the incisions)  Skin: Positive for wound ( drain in place right lower quadrant)  Objective:      /75 (BP Location: Right arm, Patient Position: Sitting)   Pulse 91   Ht 5' 2" (1 575 m)   Wt 103 kg (228 lb)   BMI 41 70 kg/m²          Physical Exam   Constitutional: She appears well-developed  No distress  Abdominal: Soft  She exhibits no distension and no mass  There is tenderness ( appropriately tenderAt the incision sites)  There is no rebound and no guarding  Incisions x3 clean, dry, intact with Steri-Strips  There is a small wound which appears to be a skin tear from the tape  Addition there is a drain in the right lower quadrant  Skin: She is not diaphoretic  Vitals reviewed         procedure:   right lower quadrant drain removed without complication

## 2018-09-05 ENCOUNTER — OFFICE VISIT (OUTPATIENT)
Dept: INTERNAL MEDICINE CLINIC | Facility: CLINIC | Age: 48
End: 2018-09-05
Payer: COMMERCIAL

## 2018-09-05 VITALS
DIASTOLIC BLOOD PRESSURE: 74 MMHG | HEIGHT: 62 IN | RESPIRATION RATE: 18 BRPM | HEART RATE: 104 BPM | TEMPERATURE: 96.9 F | OXYGEN SATURATION: 97 % | BODY MASS INDEX: 41.18 KG/M2 | WEIGHT: 223.8 LBS | SYSTOLIC BLOOD PRESSURE: 112 MMHG

## 2018-09-05 DIAGNOSIS — Z12.31 VISIT FOR SCREENING MAMMOGRAM: ICD-10-CM

## 2018-09-05 DIAGNOSIS — K82.8 BILIARY DYSKINESIA: ICD-10-CM

## 2018-09-05 DIAGNOSIS — E11.9 TYPE 2 DIABETES MELLITUS WITHOUT COMPLICATION, WITHOUT LONG-TERM CURRENT USE OF INSULIN (HCC): Primary | ICD-10-CM

## 2018-09-05 DIAGNOSIS — M62.838 SPASM OF LEFT PIRIFORMIS MUSCLE: ICD-10-CM

## 2018-09-05 PROBLEM — K81.9 CHOLECYSTITIS: Status: RESOLVED | Noted: 2018-07-05 | Resolved: 2018-09-05

## 2018-09-05 PROBLEM — I26.99 PULMONARY EMBOLISM (HCC): Status: RESOLVED | Noted: 2017-08-22 | Resolved: 2018-09-05

## 2018-09-05 PROCEDURE — 3008F BODY MASS INDEX DOCD: CPT | Performed by: PHYSICIAN ASSISTANT

## 2018-09-05 PROCEDURE — 99213 OFFICE O/P EST LOW 20 MIN: CPT | Performed by: PHYSICIAN ASSISTANT

## 2018-09-05 RX ORDER — MELOXICAM 15 MG/1
15 TABLET ORAL DAILY
Qty: 30 TABLET | Refills: 2 | Status: SHIPPED | OUTPATIENT
Start: 2018-09-05 | End: 2018-12-06 | Stop reason: SDUPTHER

## 2018-09-05 NOTE — PROGRESS NOTES
Assessment/Plan:    Type 2 diabetes mellitus (HCC)  Lab Results   Component Value Date    HGBA1C 7 5 (H) 06/09/2018       No results for input(s): POCGLU in the last 72 hours  Blood Sugar Average: Last 72 hrs:    A1C due next week and pt provided with order      Biliary dyskinesia  Symptoms resolved since cholecystectomy       Diagnoses and all orders for this visit:    Type 2 diabetes mellitus without complication, without long-term current use of insulin (HCC)  -     Hemoglobin A1C; Future  -     Glucometer  -     Lancets  -     Glucometer test strips    Spasm of left piriformis muscle  -     meloxicam (MOBIC) 15 mg tablet; Take 1 tablet (15 mg total) by mouth daily    Visit for screening mammogram  -     Mammo screening bilateral w 3d & cad; Future    Biliary dyskinesia          Subjective:      Patient ID: Daija Garcia is a 50 y o  female  Pt presents for routine visit  Since her last visit she did undergo laparoscopic cholecystectomy  She is doing well and many of her prior symptoms have resolved  She does have some ongoing loose stools but is aware this is to be expected  Her incisions are clean, dry, and well healing  She is not due for an A1C until next week and will provide order for this  She is also due for a mammogram  She feels well and denies any new complaints or concerns  The following portions of the patient's history were reviewed and updated as appropriate:   She  has a past medical history of Anxiety; Arthritis; Asthma; Cancer ([address] ); Depression; Diabetes mellitus ([address] ); Diverticulitis; GERD (gastroesophageal reflux disease); Hyperlipidemia associated with type 2 diabetes mellitus ([address] ); Hypertension; Insulinoma; Pulmonary emboli ([address] ); and Sleep apnea    She   Patient Active Problem List    Diagnosis Date Noted    Biliary dyskinesia 07/08/2018    Diarrhea 06/01/2018    Spasm of left piriformis muscle 06/01/2018    Gastroesophageal reflux disease 03/11/2015    Insomnia 08/02/2013  Type 2 diabetes mellitus ([facility] [address] ) 2013    Depression 2013    Hypercholesterolemia 2013     She  has a past surgical history that includes Pancreatectomy;  section; Knee surgery; Knee arthroscopy (Left); pr lap,cholecystectomy (N/A, 2018); and Cholecystectomy  Her family history includes Breast cancer in her brother; Diabetes in her maternal grandmother and paternal grandmother; Heart disease in her mother; Hyperlipidemia in her mother; Hypertension in her mother; Obesity in her mother; Osteoporosis in her family; Other in her maternal grandmother and mother; Ovarian cancer in her mother; Substance Abuse in her family and father; Tuberculosis in her maternal aunt and mother  She  reports that she has quit smoking  She has never used smokeless tobacco  She reports that she does not drink alcohol or use drugs    Current Outpatient Prescriptions   Medication Sig Dispense Refill    albuterol (VENTOLIN HFA) 90 mcg/act inhaler Inhale 2 puffs every 4 (four) hours as needed for shortness of breath 3 Inhaler 1    ARIPiprazole (ABILIFY) 20 MG tablet Take 1 tablet by mouth daily      Lancets Misc  (ACCU-CHEK MULTICLIX LANCET DEV) KIT by Does not apply route      lisinopril (ZESTRIL) 2 5 mg tablet Take 1 tablet (2 5 mg total) by mouth daily 90 tablet 1    meloxicam (MOBIC) 15 mg tablet Take 1 tablet (15 mg total) by mouth daily 30 tablet 2    metFORMIN (GLUCOPHAGE-XR) 500 mg 24 hr tablet  TAKE 2 TABLETS BY MOUTH TWICE DAILY 120 tablet 5    Multiple Vitamins-Minerals (MULTI ADULT GUMMIES PO) Take by mouth      pravastatin (PRAVACHOL) 20 mg tablet Take 1 tablet (20 mg total) by mouth daily 90 tablet 1    venlafaxine (EFFEXOR-XR) 150 mg 24 hr capsule Take 150 mg by mouth daily        EPINEPHrine (EPIPEN) 0 3 mg/0 3 mL SOAJ Inject 0 3 mL (0 3 mg total) into the shoulder, thigh, or buttocks once for 1 dose 0 3 mL 2    glucose blood test strip by In Vitro route daily       No current facility-administered medications for this visit  Current Outpatient Prescriptions on File Prior to Visit   Medication Sig    albuterol (VENTOLIN HFA) 90 mcg/act inhaler Inhale 2 puffs every 4 (four) hours as needed for shortness of breath    ARIPiprazole (ABILIFY) 20 MG tablet Take 1 tablet by mouth daily    Lancets Misc  (ACCU-CHEK MULTICLIX LANCET DEV) KIT by Does not apply route    lisinopril (ZESTRIL) 2 5 mg tablet Take 1 tablet (2 5 mg total) by mouth daily    metFORMIN (GLUCOPHAGE-XR) 500 mg 24 hr tablet  TAKE 2 TABLETS BY MOUTH TWICE DAILY    Multiple Vitamins-Minerals (MULTI ADULT GUMMIES PO) Take by mouth    pravastatin (PRAVACHOL) 20 mg tablet Take 1 tablet (20 mg total) by mouth daily    venlafaxine (EFFEXOR-XR) 150 mg 24 hr capsule Take 150 mg by mouth daily      [DISCONTINUED] meloxicam (MOBIC) 15 mg tablet Take 1 tablet (15 mg total) by mouth daily    EPINEPHrine (EPIPEN) 0 3 mg/0 3 mL SOAJ Inject 0 3 mL (0 3 mg total) into the shoulder, thigh, or buttocks once for 1 dose    glucose blood test strip by In Vitro route daily    [DISCONTINUED] oxyCODONE-acetaminophen (PERCOCET) 5-325 mg per tablet Take 1-2 tablets by mouth every 4 (four) hours as needed for moderate pain or severe pain Max Daily Amount: 12 tablets (Patient not taking: Reported on 9/5/2018 )     No current facility-administered medications on file prior to visit  She is allergic to iodine and nuts       Review of Systems   Constitutional: Negative for chills and fever  HENT: Negative for congestion, ear pain, hearing loss, postnasal drip, rhinorrhea, sinus pain, sinus pressure, sore throat and trouble swallowing  Eyes: Negative for pain and visual disturbance  Respiratory: Negative for cough, chest tightness, shortness of breath and wheezing  Cardiovascular: Negative  Negative for chest pain, palpitations and leg swelling  Gastrointestinal: Positive for diarrhea   Negative for abdominal pain, blood in stool, constipation, nausea and vomiting  Endocrine: Negative for cold intolerance, heat intolerance, polydipsia, polyphagia and polyuria  Genitourinary: Negative for difficulty urinating, dysuria, flank pain and urgency  Musculoskeletal: Negative for arthralgias, back pain, gait problem and myalgias  Skin: Negative for rash  Allergic/Immunologic: Negative  Neurological: Negative for dizziness, weakness, light-headedness and headaches  Hematological: Negative  Psychiatric/Behavioral: Negative for behavioral problems, dysphoric mood and sleep disturbance  The patient is not nervous/anxious  Objective:      /74 (BP Location: Left arm, Patient Position: Sitting, Cuff Size: Adult)   Pulse 104   Temp (!) 96 9 °F (36 1 °C) (Tympanic)   Resp 18   Ht 5' 2" (1 575 m)   Wt 102 kg (223 lb 12 8 oz)   SpO2 97%   BMI 40 93 kg/m²          Physical Exam   Constitutional: She is oriented to person, place, and time  She appears well-developed and well-nourished  No distress  HENT:   Head: Normocephalic and atraumatic  Right Ear: External ear normal    Left Ear: External ear normal    Nose: Nose normal    Mouth/Throat: Oropharynx is clear and moist  No oropharyngeal exudate  Eyes: Conjunctivae and EOM are normal  Pupils are equal, round, and reactive to light  Right eye exhibits no discharge  Left eye exhibits no discharge  No scleral icterus  Neck: Normal range of motion  Neck supple  No thyromegaly present  Cardiovascular: Normal rate, regular rhythm and normal heart sounds  Exam reveals no gallop and no friction rub  No murmur heard  Pulmonary/Chest: Effort normal and breath sounds normal  No respiratory distress  She has no wheezes  She has no rales  Abdominal: Soft  Bowel sounds are normal  She exhibits no distension  There is no tenderness  Musculoskeletal: Normal range of motion  She exhibits no edema, tenderness or deformity     Neurological: She is alert and oriented to person, place, and time  No cranial nerve deficit  Skin: Skin is warm and dry  She is not diaphoretic  Psychiatric: She has a normal mood and affect   Her behavior is normal  Judgment and thought content normal

## 2018-09-05 NOTE — ASSESSMENT & PLAN NOTE
Lab Results   Component Value Date    HGBA1C 7 5 (H) 06/09/2018       No results for input(s): POCGLU in the last 72 hours      Blood Sugar Average: Last 72 hrs:    A1C due next week and pt provided with order

## 2018-10-10 ENCOUNTER — IMMUNIZATION (OUTPATIENT)
Dept: INTERNAL MEDICINE CLINIC | Facility: CLINIC | Age: 48
End: 2018-10-10
Payer: COMMERCIAL

## 2018-10-10 ENCOUNTER — APPOINTMENT (OUTPATIENT)
Dept: LAB | Facility: CLINIC | Age: 48
End: 2018-10-10
Payer: COMMERCIAL

## 2018-10-10 DIAGNOSIS — Z23 ENCOUNTER FOR IMMUNIZATION: ICD-10-CM

## 2018-10-10 DIAGNOSIS — E11.9 TYPE 2 DIABETES MELLITUS WITHOUT COMPLICATION, WITHOUT LONG-TERM CURRENT USE OF INSULIN (HCC): ICD-10-CM

## 2018-10-10 LAB
EST. AVERAGE GLUCOSE BLD GHB EST-MCNC: 169 MG/DL
HBA1C MFR BLD: 7.5 % (ref 4.2–6.3)

## 2018-10-10 PROCEDURE — 83036 HEMOGLOBIN GLYCOSYLATED A1C: CPT

## 2018-10-10 PROCEDURE — 90682 RIV4 VACC RECOMBINANT DNA IM: CPT | Performed by: PHYSICIAN ASSISTANT

## 2018-10-10 PROCEDURE — 36415 COLL VENOUS BLD VENIPUNCTURE: CPT

## 2018-10-10 PROCEDURE — 90471 IMMUNIZATION ADMIN: CPT | Performed by: PHYSICIAN ASSISTANT

## 2018-10-11 DIAGNOSIS — E11.65 TYPE 2 DIABETES MELLITUS WITH HYPERGLYCEMIA, WITHOUT LONG-TERM CURRENT USE OF INSULIN (HCC): Primary | ICD-10-CM

## 2018-10-21 ENCOUNTER — APPOINTMENT (EMERGENCY)
Dept: RADIOLOGY | Facility: HOSPITAL | Age: 48
End: 2018-10-21
Payer: COMMERCIAL

## 2018-10-21 ENCOUNTER — HOSPITAL ENCOUNTER (EMERGENCY)
Facility: HOSPITAL | Age: 48
Discharge: HOME | End: 2018-10-21
Attending: EMERGENCY MEDICINE | Admitting: EMERGENCY MEDICINE
Payer: COMMERCIAL

## 2018-10-21 VITALS
DIASTOLIC BLOOD PRESSURE: 89 MMHG | HEIGHT: 62 IN | SYSTOLIC BLOOD PRESSURE: 135 MMHG | OXYGEN SATURATION: 97 % | BODY MASS INDEX: 41.04 KG/M2 | TEMPERATURE: 98.2 F | HEART RATE: 92 BPM | RESPIRATION RATE: 18 BRPM | WEIGHT: 223 LBS

## 2018-10-21 DIAGNOSIS — S60.221A CONTUSION OF RIGHT HAND, INITIAL ENCOUNTER: Primary | ICD-10-CM

## 2018-10-21 PROCEDURE — 99283 EMERGENCY DEPT VISIT LOW MDM: CPT

## 2018-10-21 PROCEDURE — 73130 X-RAY EXAM OF HAND: CPT

## 2018-10-21 NOTE — ED PROVIDER NOTES
History  Chief Complaint   Patient presents with    Hand Injury     Last week patient had a bar land on her hand and still has pain and swelling  Patient was using the son machine approximately 1 week ago when a bar came down and hit her in the hand  She complains pain and swelling to the left hand for the last 5-6 days difficulty moving it secondary to pain  She offers no other complaints at            Prior to Admission Medications   Prescriptions Last Dose Informant Patient Reported? Taking?    ARIPiprazole (ABILIFY) 20 MG tablet 10/21/2018 at Unknown time Self Yes Yes   Sig: Take 1 tablet by mouth daily   EPINEPHrine (EPIPEN) 0 3 mg/0 3 mL SOAJ   No No   Sig: Inject 0 3 mL (0 3 mg total) into the shoulder, thigh, or buttocks once for 1 dose   Lancets Misc  (ACCU-CHEK MULTICLIX LANCET DEV) KIT 10/21/2018 at Unknown time Self Yes Yes   Sig: by Does not apply route   Multiple Vitamins-Minerals (MULTI ADULT GUMMIES PO) 10/21/2018 at Unknown time  Yes Yes   Sig: Take by mouth   albuterol (VENTOLIN HFA) 90 mcg/act inhaler Past Month at Unknown time Self No Yes   Sig: Inhale 2 puffs every 4 (four) hours as needed for shortness of breath   canagliflozin (INVOKANA) 100 mg Not Taking at Unknown time  No No   Sig: Take 1 tablet (100 mg total) by mouth daily before breakfast   Patient not taking: Reported on 10/21/2018    glucose blood test strip 10/21/2018 at Unknown time Self Yes Yes   Sig: by In Vitro route daily   lisinopril (ZESTRIL) 2 5 mg tablet 10/21/2018 at Unknown time Self No Yes   Sig: Take 1 tablet (2 5 mg total) by mouth daily   meloxicam (MOBIC) 15 mg tablet 10/21/2018 at Unknown time  No Yes   Sig: Take 1 tablet (15 mg total) by mouth daily   metFORMIN (GLUCOPHAGE-XR) 500 mg 24 hr tablet 10/21/2018 at Unknown time Self No Yes   Sig:  TAKE 2 TABLETS BY MOUTH TWICE DAILY   pravastatin (PRAVACHOL) 20 mg tablet 10/20/2018 at Unknown time Self No Yes   Sig: Take 1 tablet (20 mg total) by mouth daily venlafaxine (EFFEXOR-XR) 150 mg 24 hr capsule 10/21/2018 at Unknown time Self Yes Yes   Sig: Take 150 mg by mouth daily        Facility-Administered Medications: None       Past Medical History:   Diagnosis Date    Anxiety     Arthritis     Asthma     Cancer ([place] [address] )     renal,cervical    Depression     Diabetes mellitus ([place] [address] )     Diverticulitis     GERD (gastroesophageal reflux disease)     Hyperlipidemia associated with type 2 diabetes mellitus ([place] [address] )     Hypertension     Insulinoma     Pulmonary emboli ([place] [address] )     Sleep apnea        Past Surgical History:   Procedure Laterality Date     SECTION      twice    CHOLECYSTECTOMY      KNEE ARTHROSCOPY Left     KNEE SURGERY      PANCREATECTOMY      Partial     [place] LAP,CHOLECYSTECTOMY N/A 2018    Procedure: CHOLECYSTECTOMY LAPAROSCOPIC;  Surgeon: Yuri Romero DO;  Location: [facility];  Service: General       Family History   Problem Relation Age of Onset    Heart disease Mother     Hyperlipidemia Mother     Obesity Mother     Hypertension Mother    Janak Boogie Other Mother         Bundle branch block    Ovarian cancer Mother     Tuberculosis Mother     Substance Abuse Father     Breast cancer Brother     Other Maternal Grandmother         ascending aortic aneurysm resection    Diabetes Maternal Grandmother     Diabetes Paternal Grandmother     Tuberculosis Maternal Aunt     Substance Abuse Family     Osteoporosis Family      I have reviewed and agree with the history as documented  Social History   Substance Use Topics    Smoking status: Former Smoker    Smokeless tobacco: Never Used      Comment: quit 6 yrs ago    Alcohol use No        Review of Systems   Constitutional: Negative for chills, diaphoresis, fatigue and fever  HENT: Negative for congestion, ear pain, rhinorrhea, sneezing and sore throat  Respiratory: Negative for cough, shortness of breath, wheezing and stridor      Cardiovascular: Negative for chest pain, palpitations and leg swelling  Gastrointestinal: Negative for abdominal distention, abdominal pain, blood in stool, constipation, diarrhea, nausea and vomiting  Genitourinary: Negative for difficulty urinating, dysuria, frequency, hematuria and urgency  Musculoskeletal: Positive for arthralgias and joint swelling  Negative for gait problem, myalgias and neck pain  Skin: Negative for color change, pallor, rash and wound  Neurological: Negative for dizziness, syncope, weakness, light-headedness and headaches  All other systems reviewed and are negative  Physical Exam  Physical Exam   Constitutional: She is oriented to person, place, and time  She appears well-developed and well-nourished  No distress  Eyes: Pupils are equal, round, and reactive to light  Conjunctivae and EOM are normal    Cardiovascular: Normal rate, regular rhythm and normal heart sounds  Exam reveals no gallop and no friction rub  No murmur heard  Pulmonary/Chest: Effort normal and breath sounds normal  No respiratory distress  She has no wheezes  She has no rales  She exhibits no tenderness  Musculoskeletal: She exhibits tenderness  She exhibits no edema or deformity  Neurological: She is alert and oriented to person, place, and time  Skin: Skin is warm and dry  No rash noted  She is not diaphoretic  No erythema  No pallor  Psychiatric: She has a normal mood and affect  Her behavior is normal    Nursing note and vitals reviewed        Vital Signs  ED Triage Vitals [10/21/18 1358]   Temperature Pulse Respirations Blood Pressure SpO2   98 2 °F (36 8 °C) 92 18 135/89 97 %      Temp Source Heart Rate Source Patient Position - Orthostatic VS BP Location FiO2 (%)   Tympanic Monitor Sitting Left arm --      Pain Score       7           Vitals:    10/21/18 1358   BP: 135/89   Pulse: 92   Patient Position - Orthostatic VS: Sitting       Visual Acuity      ED Medications  Medications - No data to display    Diagnostic Studies  Results Reviewed     None                 XR hand 3+ views RIGHT   ED Interpretation by Jake Benitez PA-C (10/21 7105)   No acute fracture dislocation of the right hand  Procedures  Procedures       Phone Contacts  ED Phone Contact    ED Course                               MDM  CritCare Time    Disposition  Final diagnoses:   Contusion of right hand, initial encounter     Time reflects when diagnosis was documented in both MDM as applicable and the Disposition within this note     Time User Action Codes Description Comment    10/21/2018  2:52 PM Price Cordial Add [[id number] [id number]] Contusion of right hand, initial encounter       ED Disposition     ED Disposition Condition Comment    Discharge  Katie Dye discharge to home/self care  Condition at discharge: Good        Follow-up Information     Follow up With Specialties Details Why Contact Haider Earl, DO Orthopedic Surgery In 1 week If symptoms worsen [address]   [id number] [facility] [phone]  [address] [facility] [address]            Discharge Medication List as of 10/21/2018  2:52 PM      CONTINUE these medications which have NOT CHANGED    Details   albuterol (VENTOLIN HFA) 90 mcg/act inhaler Inhale 2 puffs every 4 (four) hours as needed for shortness of breath, Starting Fri 6/1/2018, Normal      ARIPiprazole (ABILIFY) 20 MG tablet Take 1 tablet by mouth daily, Starting Tue 6/28/2011, Historical Med      glucose blood test strip by In Vitro route daily, Starting Fri 9/27/2013, Historical Med      Lancets Misc  (ACCU-CHEK MULTICLIX LANCET DEV) KIT by Does not apply route, Starting Fri 9/27/2013, Historical Med      lisinopril (ZESTRIL) 2 5 mg tablet Take 1 tablet (2 5 mg total) by mouth daily, Starting Fri 6/1/2018, Normal      meloxicam (MOBIC) 15 mg tablet Take 1 tablet (15 mg total) by mouth daily, Starting Wed 9/5/2018, Normal      metFORMIN (GLUCOPHAGE-XR) 500 mg 24 hr tablet  TAKE 2 TABLETS BY MOUTH TWICE DAILY, Normal Multiple Vitamins-Minerals (MULTI ADULT GUMMIES PO) Take by mouth, Historical Med      pravastatin (PRAVACHOL) 20 mg tablet Take 1 tablet (20 mg total) by mouth daily, Starting Fri 6/1/2018, Normal      venlafaxine (EFFEXOR-XR) 150 mg 24 hr capsule Take 150 mg by mouth daily  , Starting Fri 8/2/2013, Historical Med      canagliflozin (INVOKANA) 100 mg Take 1 tablet (100 mg total) by mouth daily before breakfast, Starting Thu 10/11/2018, Normal      EPINEPHrine (EPIPEN) 0 3 mg/0 3 mL SOAJ Inject 0 3 mL (0 3 mg total) into the shoulder, thigh, or buttocks once for 1 dose, Starting Fri 6/1/2018, Normal           No discharge procedures on file      ED Provider  Electronically Signed by           Derek Strong PA-C  10/21/18 5861

## 2018-10-21 NOTE — DISCHARGE INSTRUCTIONS

## 2018-10-23 DIAGNOSIS — E11.65 TYPE 2 DIABETES MELLITUS WITH HYPERGLYCEMIA, WITHOUT LONG-TERM CURRENT USE OF INSULIN (HCC): Primary | ICD-10-CM

## 2018-11-02 ENCOUNTER — HOSPITAL ENCOUNTER (OUTPATIENT)
Dept: MAMMOGRAPHY | Facility: HOSPITAL | Age: 48
Discharge: HOME | End: 2018-11-02
Payer: COMMERCIAL

## 2018-11-02 DIAGNOSIS — Z12.31 VISIT FOR SCREENING MAMMOGRAM: ICD-10-CM

## 2018-11-02 PROCEDURE — 77067 SCR MAMMO BI INCL CAD: CPT

## 2018-11-02 PROCEDURE — 77063 BREAST TOMOSYNTHESIS BI: CPT

## 2018-11-04 ENCOUNTER — OFFICE VISIT (OUTPATIENT)
Dept: URGENT CARE | Facility: CLINIC | Age: 48
End: 2018-11-04
Payer: COMMERCIAL

## 2018-11-04 VITALS
TEMPERATURE: 98.1 F | SYSTOLIC BLOOD PRESSURE: 129 MMHG | HEART RATE: 94 BPM | RESPIRATION RATE: 16 BRPM | HEIGHT: 62 IN | OXYGEN SATURATION: 97 % | BODY MASS INDEX: 41.04 KG/M2 | DIASTOLIC BLOOD PRESSURE: 62 MMHG | WEIGHT: 223 LBS

## 2018-11-04 DIAGNOSIS — J01.00 ACUTE NON-RECURRENT MAXILLARY SINUSITIS: Primary | ICD-10-CM

## 2018-11-04 PROCEDURE — 99283 EMERGENCY DEPT VISIT LOW MDM: CPT | Performed by: FAMILY MEDICINE

## 2018-11-04 PROCEDURE — G0382 LEV 3 HOSP TYPE B ED VISIT: HCPCS | Performed by: FAMILY MEDICINE

## 2018-11-04 RX ORDER — AMOXICILLIN AND CLAVULANATE POTASSIUM 875; 125 MG/1; MG/1
1 TABLET, FILM COATED ORAL EVERY 12 HOURS SCHEDULED
Qty: 20 TABLET | Refills: 0 | Status: SHIPPED | OUTPATIENT
Start: 2018-11-04 | End: 2018-11-14

## 2018-11-04 NOTE — PROGRESS NOTES
[facility]        NAME: Rosalind Romero is a 46 y o  female  : 1970    MRN: [id number]  DATE: 2018  TIME: 11:20 AM    Assessment and Plan   Acute non-recurrent maxillary sinusitis [J01 00]  1  Acute non-recurrent maxillary sinusitis           Patient Instructions     Tussin or Mucinex for congestion  Delsym for cough  Sinus massage, as demonstrated, every 2-3 hours  Follow up with PCP in 3-5 days  Proceed to  ER if symptoms worsen  Chief Complaint     Chief Complaint   Patient presents with    Cough    Sinusitis     x 3 days    Earache     bilateral         History of Present Illness       Cough   Associated symptoms include chills and ear pain  Sinusitis   This is a new problem  The current episode started in the past 7 days  The problem has been gradually worsening since onset  The maximum temperature recorded prior to her arrival was 101 - 101 9 F  The pain is moderate  Associated symptoms include chills, coughing, ear pain, sinus pressure and sneezing  Earache    Associated symptoms include coughing  Review of Systems   Review of Systems   Constitutional: Positive for chills  HENT: Positive for ear pain, sinus pressure and sneezing  Respiratory: Positive for cough  Cardiovascular: Negative            Current Medications       Current Outpatient Prescriptions:     albuterol (VENTOLIN HFA) 90 mcg/act inhaler, Inhale 2 puffs every 4 (four) hours as needed for shortness of breath, Disp: 3 Inhaler, Rfl: 1    ARIPiprazole (ABILIFY) 20 MG tablet, Take 1 tablet by mouth daily, Disp: , Rfl:     glucose blood test strip, by In Vitro route daily, Disp: , Rfl:     Lancets Misc  (ACCU-CHEK MULTICLIX LANCET DEV) KIT, by Does not apply route, Disp: , Rfl:     lisinopril (ZESTRIL) 2 5 mg tablet, Take 1 tablet (2 5 mg total) by mouth daily, Disp: 90 tablet, Rfl: 1    meloxicam (MOBIC) 15 mg tablet, Take 1 tablet (15 mg total) by mouth daily, Disp: 30 tablet, Rfl: 2   metFORMIN (GLUCOPHAGE-XR) 500 mg 24 hr tablet,  TAKE 2 TABLETS BY MOUTH TWICE DAILY, Disp: 120 tablet, Rfl: 5    Multiple Vitamins-Minerals (MULTI ADULT GUMMIES PO), Take by mouth, Disp: , Rfl:     pravastatin (PRAVACHOL) 20 mg tablet, Take 1 tablet (20 mg total) by mouth daily, Disp: 90 tablet, Rfl: 1    sitaGLIPtin (JANUVIA) 50 mg tablet, Take 1 tablet (50 mg total) by mouth daily, Disp: 30 tablet, Rfl: 5    venlafaxine (EFFEXOR-XR) 150 mg 24 hr capsule, Take 150 mg by mouth daily  , Disp: , Rfl:     EPINEPHrine (EPIPEN) 0 3 mg/0 3 mL SOAJ, Inject 0 3 mL (0 3 mg total) into the shoulder, thigh, or buttocks once for 1 dose, Disp: 0 3 mL, Rfl: 2    Current Allergies     Allergies as of 2018 - Reviewed 2018   Allergen Reaction Noted    Iodine Anaphylaxis 2018    Nuts Anaphylaxis 2013            The following portions of the patient's history were reviewed and updated as appropriate: allergies, current medications, past family history, past medical history, past social history, past surgical history and problem list      Past Medical History:   Diagnosis Date    Anxiety     Arthritis     Asthma     Cancer ([facility] [address] )     renal,cervical    Depression     Diabetes mellitus ([address] )     Diverticulitis     GERD (gastroesophageal reflux disease)     Hyperlipidemia associated with type 2 diabetes mellitus ([address] )     Hypertension     Insulinoma     Pulmonary emboli ([address] )     Sleep apnea        Past Surgical History:   Procedure Laterality Date     SECTION      twice    CHOLECYSTECTOMY      KNEE ARTHROSCOPY Left     KNEE SURGERY      PANCREATECTOMY      Partial     IA LAP,CHOLECYSTECTOMY N/A 2018    Procedure: CHOLECYSTECTOMY LAPAROSCOPIC;  Surgeon: Greg Quijano DO;  Location: [facility];  Service: General       Family History   Problem Relation Age of Onset    Heart disease Mother     Hyperlipidemia Mother     Obesity Mother     Hypertension Mother    Bharati Light Other Mother Bundle branch block    Ovarian cancer Mother     Tuberculosis Mother     Substance Abuse Father     Breast cancer Brother     Other Maternal Grandmother         ascending aortic aneurysm resection    Diabetes Maternal Grandmother     Diabetes Paternal Grandmother     Tuberculosis Maternal Aunt     Substance Abuse Family     Osteoporosis Family          Medications have been verified  Objective   /62   Pulse 94   Temp 98 1 °F (36 7 °C)   Resp 16   Ht 5' 2" (1 575 m)   Wt 101 kg (223 lb)   SpO2 97%   BMI 40 79 kg/m²        Physical Exam     Physical Exam   Constitutional: She is oriented to person, place, and time  She appears well-developed and well-nourished  HENT:   Right Ear: External ear normal    Left Ear: External ear normal    Nose: Right sinus exhibits maxillary sinus tenderness  Left sinus exhibits maxillary sinus tenderness  Mouth/Throat: Oropharynx is clear and moist  No oropharyngeal exudate  Eyes: Conjunctivae are normal    Neck: Normal range of motion  Neck supple  Cardiovascular: Normal rate, regular rhythm and normal heart sounds  No murmur heard  Pulmonary/Chest: Effort normal and breath sounds normal  No respiratory distress  She has no wheezes  She has no rales  She exhibits no tenderness  Abdominal: Soft  Musculoskeletal: Normal range of motion  Lymphadenopathy:     She has no cervical adenopathy  Neurological: She is alert and oriented to person, place, and time  Skin: Skin is warm  No rash noted  No erythema

## 2018-11-04 NOTE — PATIENT INSTRUCTIONS
Tussin or Mucinex for congestion  Delsym for cough  Sinus massage, as demonstrated, every 2-3 hours  Follow up with PCP in 3-5 days  Proceed to  ER if symptoms worsen  Sinusitis   AMBULATORY CARE:   Sinusitis  is inflammation or infection of your sinuses  It is most often caused by a virus  Acute sinusitis may last up to 12 weeks  Chronic sinusitis lasts longer than 12 weeks  Recurrent sinusitis means you have 4 or more times in 1 year  Common symptoms include the following:   · Fever    · Pain, pressure, redness, or swelling around the forehead, cheeks, or eyes    · Thick yellow or green discharge from your nose    · Tenderness when you touch your face over your sinuses    · Dry cough that happens mostly at night or when you lie down    · Headache and face pain that is worse when you lean forward    · Tooth pain, or pain when you chew  Seek care immediately if:   · Your eye and eyelid are red, swollen, and painful  · You cannot open your eye  · You have vision changes, such as double vision  · Your eyeball bulges out or you cannot move your eye  · You are more sleepy than normal, or you notice changes in your ability to think, move, or talk  · You have a stiff neck, a fever, or a bad headache  · You have swelling of your forehead or scalp  Contact your healthcare provider if:   · Your symptoms do not improve after 3 days  · Your symptoms do not go away after 10 days  · You have nausea and are vomiting  · Your nose is bleeding  · You have questions or concerns about your condition or care  Treatment for sinusitis:  Your symptoms may go away on their own  Your healthcare provider may recommend watchful waiting for up to 10 days before starting antibiotics  You may  need any of the following:  · Acetaminophen  decreases pain and fever  It is available without a doctor's order  Ask how much to take and how often to take it  Follow directions   Read the labels of all other medicines you are using to see if they also contain acetaminophen, or ask your doctor or pharmacist  Acetaminophen can cause liver damage if not taken correctly  Do not use more than 4 grams (4,000 milligrams) total of acetaminophen in one day  · NSAIDs , such as ibuprofen, help decrease swelling, pain, and fever  This medicine is available with or without a doctor's order  NSAIDs can cause stomach bleeding or kidney problems in certain people  If you take blood thinner medicine, always ask your healthcare provider if NSAIDs are safe for you  Always read the medicine label and follow directions  · Nasal steroid sprays  may help decrease inflammation in your nose and sinuses  · Decongestants  help reduce swelling and drain mucus in the nose and sinuses  They may help you breathe easier  · Antihistamines  help dry mucus in the nose and relieve sneezing  · Antibiotics  help treat or prevent a bacterial infection  · Take your medicine as directed  Contact your healthcare provider if you think your medicine is not helping or if you have side effects  Tell him or her if you are allergic to any medicine  Keep a list of the medicines, vitamins, and herbs you take  Include the amounts, and when and why you take them  Bring the list or the pill bottles to follow-up visits  Carry your medicine list with you in case of an emergency  Self-care:   · Rinse your sinuses  Use a sinus rinse device to rinse your nasal passages with a saline (salt water) solution or distilled water  Do not use tap water  This will help thin the mucus in your nose and rinse away pollen and dirt  It will also help reduce swelling so you can breathe normally  Ask your healthcare provider how often to do this  · Breathe in steam   Heat a bowl of water until you see steam  Lean over the bowl and make a tent over your head with a large towel  Breathe deeply for about 20 minutes   Be careful not to get too close to the steam or burn yourself  Do this 3 times a day  You can also breathe deeply when you take a hot shower  · Sleep with your head elevated  Place an extra pillow under your head before you go to sleep to help your sinuses drain  · Drink liquids as directed  Ask your healthcare provider how much liquid to drink each day and which liquids are best for you  Liquids will thin the mucus in your nose and help it drain  Avoid drinks that contain alcohol or caffeine  · Do not smoke, and avoid secondhand smoke  Nicotine and other chemicals in cigarettes and cigars can make your symptoms worse  Ask your healthcare provider for information if you currently smoke and need help to quit  E-cigarettes or smokeless tobacco still contain nicotine  Talk to your healthcare provider before you use these products  Prevent the spread of germs that cause sinusitis:  Wash your hands often with soap and water  Wash your hands after you use the bathroom, change a child's diaper, or sneeze  Wash your hands before you prepare or eat food  Follow up with your healthcare provider as directed: You may be referred to an ear, nose, and throat specialist  Write down your questions so you remember to ask them during your visits  © 2017 [address]  Information is for End User's use only and may not be sold, redistributed or otherwise used for commercial purposes  All illustrations and images included in CareNotes® are the copyrighted property of [employer] , [employer]  or [employer]  The above information is an  only  It is not intended as medical advice for individual conditions or treatments  Talk to your doctor, nurse or pharmacist before following any medical regimen to see if it is safe and effective for you

## 2018-11-27 DIAGNOSIS — E11.9 TYPE 2 DIABETES MELLITUS WITHOUT COMPLICATION, WITHOUT LONG-TERM CURRENT USE OF INSULIN (HCC): ICD-10-CM

## 2018-11-27 RX ORDER — METFORMIN HYDROCHLORIDE 500 MG/1
TABLET, EXTENDED RELEASE ORAL
Qty: 120 TABLET | Refills: 5 | Status: SHIPPED | OUTPATIENT
Start: 2018-11-27 | End: 2019-04-16

## 2018-12-05 ENCOUNTER — OFFICE VISIT (OUTPATIENT)
Dept: INTERNAL MEDICINE CLINIC | Facility: CLINIC | Age: 48
End: 2018-12-05
Payer: COMMERCIAL

## 2018-12-05 VITALS
BODY MASS INDEX: 42.1 KG/M2 | OXYGEN SATURATION: 98 % | SYSTOLIC BLOOD PRESSURE: 112 MMHG | DIASTOLIC BLOOD PRESSURE: 78 MMHG | RESPIRATION RATE: 18 BRPM | HEIGHT: 62 IN | TEMPERATURE: 98.1 F | WEIGHT: 228.8 LBS | HEART RATE: 99 BPM

## 2018-12-05 DIAGNOSIS — K21.9 GASTROESOPHAGEAL REFLUX DISEASE WITHOUT ESOPHAGITIS: ICD-10-CM

## 2018-12-05 DIAGNOSIS — T14.8XXA CONTUSION OF BONE: ICD-10-CM

## 2018-12-05 DIAGNOSIS — E11.65 TYPE 2 DIABETES MELLITUS WITH HYPERGLYCEMIA, WITHOUT LONG-TERM CURRENT USE OF INSULIN (HCC): Primary | ICD-10-CM

## 2018-12-05 DIAGNOSIS — Z12.11 SCREEN FOR COLON CANCER: ICD-10-CM

## 2018-12-05 PROBLEM — K82.8 BILIARY DYSKINESIA: Status: RESOLVED | Noted: 2018-07-08 | Resolved: 2018-12-05

## 2018-12-05 PROBLEM — R19.7 DIARRHEA: Status: RESOLVED | Noted: 2018-06-01 | Resolved: 2018-12-05

## 2018-12-05 PROCEDURE — 3008F BODY MASS INDEX DOCD: CPT | Performed by: PHYSICIAN ASSISTANT

## 2018-12-05 PROCEDURE — 99214 OFFICE O/P EST MOD 30 MIN: CPT | Performed by: PHYSICIAN ASSISTANT

## 2018-12-05 RX ORDER — RANITIDINE 150 MG/1
150 CAPSULE ORAL 2 TIMES DAILY
Qty: 60 CAPSULE | Refills: 2 | Status: SHIPPED | OUTPATIENT
Start: 2018-12-05 | End: 2019-02-07 | Stop reason: SDUPTHER

## 2018-12-05 NOTE — PROGRESS NOTES
Assessment/Plan:    Gastroesophageal reflux disease  Start rx zantac  If no improvement consider PPI  Type 2 diabetes mellitus (HCC)  Lab Results   Component Value Date    HGBA1C 7 5 (H) 10/10/2018       No results for input(s): POCGLU in the last 72 hours  Blood Sugar Average: Last 72 hrs:     She is tolerating [facility]  Will get A1C at next visit  Contusion of bone  Symptoms steadily improving  Try to rest the hand  Continue mobic prn       Diagnoses and all orders for this visit:    Type 2 diabetes mellitus with hyperglycemia, without long-term current use of insulin ([address] )  -     Ambulatory referral to Podiatry; Future    Screen for colon cancer  -     Ambulatory referral to Gastroenterology    Gastroesophageal reflux disease without esophagitis  -     ranitidine (ZANTAC) 150 MG capsule; Take 1 capsule (150 mg total) by mouth 2 (two) times a day    Contusion of bone          Subjective:      Patient ID: Charly Ford is a 46 y o  female  Pt presents for routine visit  Since she was last seen she suffered an injury to her hand  She had xray that was negative  Pt is improved but not completely resolved  It is her dominant hand and admits it has been hard to avoid using her hand so much  She is noting increased acid reflux symptoms  She has symptoms about 4 times per week  She also is interested in Shingrix  She is due for colonoscopy  Eye exam and labs are up to date  She was started on januvia in October to bring her A1C down from 7 5  She is tolerating the medication well  The following portions of the patient's history were reviewed and updated as appropriate:   She  has a past medical history of Anxiety; Arthritis; Asthma; Cancer ([address] ); Depression; Diabetes mellitus ([address] ); Diverticulitis; GERD (gastroesophageal reflux disease); Hyperlipidemia associated with type 2 diabetes mellitus ([address] ); Hypertension; Insulinoma; Pulmonary emboli ([address] ); and Sleep apnea    She   Patient Active Problem List    Diagnosis Date Noted    Contusion of bone 2018    Spasm of left piriformis muscle 2018    Gastroesophageal reflux disease 2015    Insomnia 2013    [facility] 2013    Depression 2013    Hypercholesterolemia 2013     She  has a past surgical history that includes Pancreatectomy;  section; Knee surgery; Knee arthroscopy (Left); pr lap,cholecystectomy (N/A, 2018); and Cholecystectomy  Her family history includes Breast cancer in her brother; Diabetes in her maternal grandmother and paternal grandmother; Heart disease in her mother; Hyperlipidemia in her mother; Hypertension in her mother; Obesity in her mother; Osteoporosis in her family; Other in her maternal grandmother and mother; Ovarian cancer in her mother; Substance Abuse in her family and father; Tuberculosis in her maternal aunt and mother  She  reports that she has quit smoking  She has never used smokeless tobacco  She reports that she does not drink alcohol or use drugs    Current Outpatient Prescriptions   Medication Sig Dispense Refill    albuterol (VENTOLIN HFA) 90 mcg/act inhaler Inhale 2 puffs every 4 (four) hours as needed for shortness of breath 3 Inhaler 1    ARIPiprazole (ABILIFY) 20 MG tablet Take 1 tablet by mouth daily      glucose blood test strip by In Vitro route daily      Lancets Misc  (ACCU-CHEK MULTICLIX LANCET DEV) KIT by Does not apply route      lisinopril (ZESTRIL) 2 5 mg tablet Take 1 tablet (2 5 mg total) by mouth daily 90 tablet 1    meloxicam (MOBIC) 15 mg tablet Take 1 tablet (15 mg total) by mouth daily 30 tablet 2    metFORMIN (GLUCOPHAGE-XR) 500 mg 24 hr tablet take 2 tablets by mouth twice a day 120 tablet 5    Multiple Vitamins-Minerals (MULTI ADULT GUMMIES PO) Take by mouth      pravastatin (PRAVACHOL) 20 mg tablet Take 1 tablet (20 mg total) by mouth daily 90 tablet 1    sitaGLIPtin (JANUVIA) 50 mg tablet Take 1 tablet (50 mg total) by mouth daily 30 tablet 5    venlafaxine (EFFEXOR-XR) 150 mg 24 hr capsule Take 150 mg by mouth daily        EPINEPHrine (EPIPEN) 0 3 mg/0 3 mL SOAJ Inject 0 3 mL (0 3 mg total) into the shoulder, thigh, or buttocks once for 1 dose 0 3 mL 2    ranitidine (ZANTAC) 150 MG capsule Take 1 capsule (150 mg total) by mouth 2 (two) times a day 60 capsule 2     No current facility-administered medications for this visit  Current Outpatient Prescriptions on File Prior to Visit   Medication Sig    albuterol (VENTOLIN HFA) 90 mcg/act inhaler Inhale 2 puffs every 4 (four) hours as needed for shortness of breath    ARIPiprazole (ABILIFY) 20 MG tablet Take 1 tablet by mouth daily    glucose blood test strip by In Vitro route daily    Lancets Misc  (ACCU-CHEK MULTICLIX LANCET DEV) KIT by Does not apply route    lisinopril (ZESTRIL) 2 5 mg tablet Take 1 tablet (2 5 mg total) by mouth daily    meloxicam (MOBIC) 15 mg tablet Take 1 tablet (15 mg total) by mouth daily    metFORMIN (GLUCOPHAGE-XR) 500 mg 24 hr tablet take 2 tablets by mouth twice a day    Multiple Vitamins-Minerals (MULTI ADULT GUMMIES PO) Take by mouth    pravastatin (PRAVACHOL) 20 mg tablet Take 1 tablet (20 mg total) by mouth daily    sitaGLIPtin (JANUVIA) 50 mg tablet Take 1 tablet (50 mg total) by mouth daily    venlafaxine (EFFEXOR-XR) 150 mg 24 hr capsule Take 150 mg by mouth daily      EPINEPHrine (EPIPEN) 0 3 mg/0 3 mL SOAJ Inject 0 3 mL (0 3 mg total) into the shoulder, thigh, or buttocks once for 1 dose     No current facility-administered medications on file prior to visit  She is allergic to iodine and nuts       Review of Systems   Constitutional: Negative for chills and fever  HENT: Negative for congestion, ear pain, hearing loss, postnasal drip, rhinorrhea, sinus pain, sinus pressure, sore throat and trouble swallowing  Eyes: Negative for pain and visual disturbance     Respiratory: Negative for cough, chest tightness, shortness of breath and wheezing  Cardiovascular: Negative  Negative for chest pain, palpitations and leg swelling  Gastrointestinal: Negative for abdominal pain, blood in stool, constipation, diarrhea, nausea and vomiting  Endocrine: Negative for cold intolerance, heat intolerance, polydipsia, polyphagia and polyuria  Genitourinary: Negative for difficulty urinating, dysuria, flank pain and urgency  Musculoskeletal: Positive for arthralgias  Negative for back pain, gait problem and myalgias  Skin: Negative for rash  Allergic/Immunologic: Negative  Neurological: Negative for dizziness, weakness, light-headedness and headaches  Hematological: Negative  Psychiatric/Behavioral: Negative for behavioral problems, dysphoric mood and sleep disturbance  The patient is not nervous/anxious  Objective:      /78 (BP Location: Left arm, Patient Position: Sitting, Cuff Size: Adult)   Pulse 99   Temp 98 1 °F (36 7 °C) (Tympanic)   Resp 18   Ht 5' 2" (1 575 m)   Wt 104 kg (228 lb 12 8 oz)   SpO2 98%   BMI 41 85 kg/m²          Physical Exam   Constitutional: She is oriented to person, place, and time  She appears well-developed and well-nourished  No distress  HENT:   Head: Normocephalic and atraumatic  Right Ear: External ear normal    Left Ear: External ear normal    Nose: Nose normal    Mouth/Throat: Oropharynx is clear and moist  No oropharyngeal exudate  Eyes: Pupils are equal, round, and reactive to light  Conjunctivae and EOM are normal  Right eye exhibits no discharge  Left eye exhibits no discharge  No scleral icterus  Neck: Normal range of motion  Neck supple  No thyromegaly present  Cardiovascular: Normal rate, regular rhythm and normal heart sounds  Exam reveals no gallop and no friction rub  No murmur heard  Pulmonary/Chest: Effort normal and breath sounds normal  No respiratory distress  She has no wheezes  She has no rales  Abdominal: Soft   Bowel sounds are normal  She exhibits no distension  There is no tenderness  Musculoskeletal: Normal range of motion  She exhibits no edema, tenderness or deformity  Arms:  Neurological: She is alert and oriented to person, place, and time  No cranial nerve deficit  Skin: Skin is warm and dry  She is not diaphoretic  Psychiatric: She has a normal mood and affect   Her behavior is normal  Judgment and thought content normal

## 2018-12-05 NOTE — ASSESSMENT & PLAN NOTE
Lab Results   Component Value Date    HGBA1C 7 5 (H) 10/10/2018       No results for input(s): POCGLU in the last 72 hours  Blood Sugar Average: Last 72 hrs:     She is tolerating [facility] [place]  Will get A1C at next visit

## 2018-12-06 DIAGNOSIS — M62.838 SPASM OF LEFT PIRIFORMIS MUSCLE: ICD-10-CM

## 2018-12-06 RX ORDER — MELOXICAM 15 MG/1
15 TABLET ORAL DAILY
Qty: 30 TABLET | Refills: 0 | Status: SHIPPED | OUTPATIENT
Start: 2018-12-06 | End: 2019-01-07 | Stop reason: SDUPTHER

## 2018-12-20 DIAGNOSIS — I10 ESSENTIAL HYPERTENSION: ICD-10-CM

## 2018-12-20 PROCEDURE — 4010F ACE/ARB THERAPY RXD/TAKEN: CPT | Performed by: PHYSICIAN ASSISTANT

## 2018-12-20 RX ORDER — LISINOPRIL 2.5 MG/1
TABLET ORAL
Qty: 90 TABLET | Refills: 1 | Status: SHIPPED | OUTPATIENT
Start: 2018-12-20 | End: 2019-06-10 | Stop reason: SDUPTHER

## 2018-12-27 ENCOUNTER — TELEPHONE (OUTPATIENT)
Dept: GASTROENTEROLOGY | Facility: AMBULARY SURGERY CENTER | Age: 48
End: 2018-12-27

## 2018-12-27 NOTE — TELEPHONE ENCOUNTER
NEW PT    Pt called requesting an earlier appt for colon consult in the [facility] location for diverticulitis and ibs, best time to call will be pm

## 2019-01-07 DIAGNOSIS — M62.838 SPASM OF LEFT PIRIFORMIS MUSCLE: ICD-10-CM

## 2019-01-07 RX ORDER — MELOXICAM 15 MG/1
15 TABLET ORAL DAILY
Qty: 30 TABLET | Refills: 2 | Status: SHIPPED | OUTPATIENT
Start: 2019-01-07 | End: 2019-04-05 | Stop reason: SDUPTHER

## 2019-01-15 DIAGNOSIS — E78.5 HYPERLIPIDEMIA ASSOCIATED WITH TYPE 2 DIABETES MELLITUS (HCC): ICD-10-CM

## 2019-01-15 DIAGNOSIS — E11.69 HYPERLIPIDEMIA ASSOCIATED WITH TYPE 2 DIABETES MELLITUS (HCC): ICD-10-CM

## 2019-01-15 RX ORDER — PRAVASTATIN SODIUM 20 MG
TABLET ORAL
Qty: 90 TABLET | Refills: 1 | Status: SHIPPED | OUTPATIENT
Start: 2019-01-15 | End: 2019-07-13 | Stop reason: SDUPTHER

## 2019-02-07 ENCOUNTER — APPOINTMENT (OUTPATIENT)
Dept: LAB | Facility: CLINIC | Age: 49
End: 2019-02-07
Payer: COMMERCIAL

## 2019-02-07 ENCOUNTER — OFFICE VISIT (OUTPATIENT)
Dept: INTERNAL MEDICINE CLINIC | Facility: CLINIC | Age: 49
End: 2019-02-07
Payer: COMMERCIAL

## 2019-02-07 VITALS
HEART RATE: 96 BPM | TEMPERATURE: 98.2 F | SYSTOLIC BLOOD PRESSURE: 102 MMHG | WEIGHT: 226.8 LBS | BODY MASS INDEX: 41.73 KG/M2 | HEIGHT: 62 IN | DIASTOLIC BLOOD PRESSURE: 78 MMHG | RESPIRATION RATE: 18 BRPM | OXYGEN SATURATION: 98 %

## 2019-02-07 DIAGNOSIS — E11.9 TYPE 2 DIABETES MELLITUS WITHOUT COMPLICATION, WITHOUT LONG-TERM CURRENT USE OF INSULIN (HCC): Primary | ICD-10-CM

## 2019-02-07 DIAGNOSIS — E11.65 TYPE 2 DIABETES MELLITUS WITH HYPERGLYCEMIA, WITHOUT LONG-TERM CURRENT USE OF INSULIN (HCC): ICD-10-CM

## 2019-02-07 DIAGNOSIS — J30.9 ALLERGIC RHINITIS, UNSPECIFIED SEASONALITY, UNSPECIFIED TRIGGER: ICD-10-CM

## 2019-02-07 DIAGNOSIS — K21.9 GASTROESOPHAGEAL REFLUX DISEASE WITHOUT ESOPHAGITIS: ICD-10-CM

## 2019-02-07 DIAGNOSIS — J00 COMMON COLD: ICD-10-CM

## 2019-02-07 LAB
CREAT UR-MCNC: 16.6 MG/DL
MICROALBUMIN UR-MCNC: <5 MG/L (ref 0–20)
MICROALBUMIN/CREAT 24H UR: <30 MG/G CREATININE (ref 0–30)
SL AMB POCT HEMOGLOBIN AIC: 7.3 (ref ?–6.5)

## 2019-02-07 PROCEDURE — 82043 UR ALBUMIN QUANTITATIVE: CPT | Performed by: PHYSICIAN ASSISTANT

## 2019-02-07 PROCEDURE — 2028F FOOT EXAM PERFORMED: CPT | Performed by: PHYSICIAN ASSISTANT

## 2019-02-07 PROCEDURE — 3008F BODY MASS INDEX DOCD: CPT | Performed by: PHYSICIAN ASSISTANT

## 2019-02-07 PROCEDURE — 83036 HEMOGLOBIN GLYCOSYLATED A1C: CPT | Performed by: PHYSICIAN ASSISTANT

## 2019-02-07 PROCEDURE — 82570 ASSAY OF URINE CREATININE: CPT | Performed by: PHYSICIAN ASSISTANT

## 2019-02-07 PROCEDURE — 3045F PR MOST RECENT HEMOGLOBIN A1C LEVEL 7.0-9.0%: CPT | Performed by: PHYSICIAN ASSISTANT

## 2019-02-07 PROCEDURE — 99214 OFFICE O/P EST MOD 30 MIN: CPT | Performed by: PHYSICIAN ASSISTANT

## 2019-02-07 RX ORDER — RANITIDINE 150 MG/1
150 CAPSULE ORAL 2 TIMES DAILY
Qty: 60 CAPSULE | Refills: 2 | Status: SHIPPED | OUTPATIENT
Start: 2019-02-07 | End: 2019-05-27 | Stop reason: SDUPTHER

## 2019-02-07 RX ORDER — LORATADINE 10 MG/1
10 TABLET ORAL DAILY
Qty: 30 TABLET | Refills: 1 | Status: SHIPPED | OUTPATIENT
Start: 2019-02-07 | End: 2019-04-05 | Stop reason: SDUPTHER

## 2019-02-07 NOTE — PROGRESS NOTES
Assessment/Plan:    Gastroesophageal reflux disease  Pts symptoms are stable with current regime  No changes at present  Type 2 diabetes mellitus (HCC)  Lab Results   Component Value Date    HGBA1C 7 3 (A) 02/07/2019       No results for input(s): POCGLU in the last 72 hours  Blood Sugar Average: Last 72 hrs: Foot exam performed  A1C updated  Will increase januvia to 100mg to get A1C to goal    Common cold  Pt is improving already  Treat supportively       Diagnoses and all orders for this visit:    Type 2 diabetes mellitus without complication, without long-term current use of insulin ([facility])  -     POCT hemoglobin A1c  -     Microalbumin / creatinine urine ratio    Gastroesophageal reflux disease without esophagitis  -     ranitidine (ZANTAC) 150 MG capsule; Take 1 capsule (150 mg total) by mouth 2 (two) times a day    Type 2 diabetes mellitus with hyperglycemia, without long-term current use of insulin ([facility])  -     sitaGLIPtin (JANUVIA) 100 mg tablet; Take 1 tablet (100 mg total) by mouth daily    Allergic rhinitis, unspecified seasonality, unspecified trigger  -     loratadine (CLARITIN) 10 mg tablet; Take 1 tablet (10 mg total) by mouth daily    Common cold    Other orders  -     Cancel: Ambulatory referral to Gastroenterology          Subjective:      Patient ID: Denice Palacios is a 46 y o  female  Pt presents for routine visit  She is due for A1C and foot exam  Last A1C was 7 5 and Januvia was added to her regime  She tolerates this well  A1C in the office today is 7 3  Foot exam performed and normal  Her acid reflux is improved with zantac use  She also c/o common cold symptoms as outlined below  URI    This is a new problem  The current episode started in the past 7 days  There has been no fever  Associated symptoms include congestion, coughing, rhinorrhea, sinus pain and a sore throat   Pertinent negatives include no abdominal pain, chest pain, diarrhea, dysuria, ear pain, headaches, nausea, rash, vomiting or wheezing  She has tried nothing for the symptoms  The treatment provided moderate relief  The following portions of the patient's history were reviewed and updated as appropriate:   She  has a past medical history of Anxiety; Arthritis; Asthma; Cancer ([facility] ); Depression; Diabetes mellitus ([facility] ); Diverticulitis; GERD (gastroesophageal reflux disease); Hyperlipidemia associated with type 2 diabetes mellitus ([place] [address] ); Hypertension; Insulinoma; Pulmonary emboli ([place] [address] ); and Sleep apnea  She   Patient Active Problem List    Diagnosis Date Noted    Common cold 2019    Contusion of bone 2018    Spasm of left piriformis muscle 2018    Gastroesophageal reflux disease 2015    Insomnia 2013    Type 2 diabetes mellitus ([place] [address] ) 2013    Depression 2013    Hypercholesterolemia 2013     She  has a past surgical history that includes Pancreatectomy;  section; Knee surgery; Knee arthroscopy (Left); pr lap,cholecystectomy (N/A, 2018); and Cholecystectomy  Her family history includes Breast cancer in her brother; Diabetes in her maternal grandmother and paternal grandmother; Heart disease in her mother; Hyperlipidemia in her mother; Hypertension in her mother; Obesity in her mother; Osteoporosis in her family; Other in her maternal grandmother and mother; Ovarian cancer in her mother; Substance Abuse in her family and father; Tuberculosis in her maternal aunt and mother  She  reports that she has quit smoking  She has never used smokeless tobacco  She reports that she does not drink alcohol or use drugs  Current Outpatient Prescriptions   Medication Sig Dispense Refill    albuterol (VENTOLIN HFA) 90 mcg/act inhaler Inhale 2 puffs every 4 (four) hours as needed for shortness of breath 3 Inhaler 1    ARIPiprazole (ABILIFY) 20 MG tablet Take 1 tablet by mouth daily      glucose blood test strip by In Vitro route daily      Lancets Misc  (ACCU-CHEK MULTICLIX LANCET DEV) KIT by Does not apply route      lisinopril (ZESTRIL) 2 5 mg tablet take 1 tablet by mouth daily 90 tablet 1    meloxicam (MOBIC) 15 mg tablet Take 1 tablet (15 mg total) by mouth daily 30 tablet 2    metFORMIN (GLUCOPHAGE-XR) 500 mg 24 hr tablet take 2 tablets by mouth twice a day 120 tablet 5    Multiple Vitamins-Minerals (MULTI ADULT GUMMIES PO) Take by mouth      pravastatin (PRAVACHOL) 20 mg tablet take 1 tablet by mouth once daily 90 tablet 1    ranitidine (ZANTAC) 150 MG capsule Take 1 capsule (150 mg total) by mouth 2 (two) times a day 60 capsule 2    sitaGLIPtin (JANUVIA) 100 mg tablet Take 1 tablet (100 mg total) by mouth daily 30 tablet 5    venlafaxine (EFFEXOR-XR) 150 mg 24 hr capsule Take 150 mg by mouth daily        EPINEPHrine (EPIPEN) 0 3 mg/0 3 mL SOAJ Inject 0 3 mL (0 3 mg total) into the shoulder, thigh, or buttocks once for 1 dose 0 3 mL 2    loratadine (CLARITIN) 10 mg tablet Take 1 tablet (10 mg total) by mouth daily 30 tablet 1     No current facility-administered medications for this visit        Current Outpatient Prescriptions on File Prior to Visit   Medication Sig    albuterol (VENTOLIN HFA) 90 mcg/act inhaler Inhale 2 puffs every 4 (four) hours as needed for shortness of breath    ARIPiprazole (ABILIFY) 20 MG tablet Take 1 tablet by mouth daily    glucose blood test strip by In Vitro route daily    Lancets Misc  (ACCU-CHEK MULTICLIX LANCET DEV) KIT by Does not apply route    lisinopril (ZESTRIL) 2 5 mg tablet take 1 tablet by mouth daily    meloxicam (MOBIC) 15 mg tablet Take 1 tablet (15 mg total) by mouth daily    metFORMIN (GLUCOPHAGE-XR) 500 mg 24 hr tablet take 2 tablets by mouth twice a day    Multiple Vitamins-Minerals (MULTI ADULT GUMMIES PO) Take by mouth    pravastatin (PRAVACHOL) 20 mg tablet take 1 tablet by mouth once daily    venlafaxine (EFFEXOR-XR) 150 mg 24 hr capsule Take 150 mg by mouth daily      [DISCONTINUED] ranitidine (ZANTAC) 150 MG capsule Take 1 capsule (150 mg total) by mouth 2 (two) times a day    [DISCONTINUED] sitaGLIPtin (JANUVIA) 50 mg tablet Take 1 tablet (50 mg total) by mouth daily    EPINEPHrine (EPIPEN) 0 3 mg/0 3 mL SOAJ Inject 0 3 mL (0 3 mg total) into the shoulder, thigh, or buttocks once for 1 dose     No current facility-administered medications on file prior to visit  She is allergic to iodine and nuts       Review of Systems   Constitutional: Negative for chills and fever  HENT: Positive for congestion, rhinorrhea, sinus pain and sore throat  Negative for ear pain, hearing loss, postnasal drip, sinus pressure and trouble swallowing  Eyes: Negative for pain and visual disturbance  Respiratory: Positive for cough  Negative for chest tightness, shortness of breath and wheezing  Cardiovascular: Negative  Negative for chest pain, palpitations and leg swelling  Gastrointestinal: Negative for abdominal pain, blood in stool, constipation, diarrhea, nausea and vomiting  Endocrine: Negative for cold intolerance, heat intolerance, polydipsia, polyphagia and polyuria  Genitourinary: Negative for difficulty urinating, dysuria, flank pain and urgency  Musculoskeletal: Negative for arthralgias, back pain, gait problem and myalgias  Skin: Negative for rash  Allergic/Immunologic: Negative  Neurological: Negative for dizziness, weakness, light-headedness and headaches  Hematological: Negative  Psychiatric/Behavioral: Negative for behavioral problems, dysphoric mood and sleep disturbance  The patient is not nervous/anxious  Objective:      /78 (BP Location: Left arm, Patient Position: Sitting, Cuff Size: Large)   Pulse 96   Temp 98 2 °F (36 8 °C)   Resp 18   Ht 5' 2" (1 575 m)   Wt 103 kg (226 lb 12 8 oz)   SpO2 98%   BMI 41 48 kg/m²          Physical Exam   Constitutional: She is oriented to person, place, and time   She appears well-developed and well-nourished  No distress  HENT:   Head: Normocephalic and atraumatic  Right Ear: External ear normal    Left Ear: External ear normal    Nose: Nose normal    Mouth/Throat: Oropharynx is clear and moist  No oropharyngeal exudate  Eyes: Pupils are equal, round, and reactive to light  Conjunctivae and EOM are normal  Right eye exhibits no discharge  Left eye exhibits no discharge  No scleral icterus  Neck: Normal range of motion  Neck supple  No thyromegaly present  Cardiovascular: Normal rate, regular rhythm and normal heart sounds  Exam reveals no gallop and no friction rub  Pulses are no weak pulses  No murmur heard  Pulses:       Dorsalis pedis pulses are 2+ on the right side, and 2+ on the left side  Posterior tibial pulses are 2+ on the right side, and 2+ on the left side  Pulmonary/Chest: Effort normal and breath sounds normal  No respiratory distress  She has no wheezes  She has no rales  Abdominal: Soft  Bowel sounds are normal  She exhibits no distension  There is no tenderness  Musculoskeletal: Normal range of motion  She exhibits no edema, tenderness or deformity  Feet:   Right Foot:   Skin Integrity: Negative for ulcer, skin breakdown, erythema, warmth, callus or dry skin  Left Foot:   Skin Integrity: Negative for ulcer, skin breakdown, erythema, warmth, callus or dry skin  Neurological: She is alert and oriented to person, place, and time  No cranial nerve deficit  Skin: Skin is warm and dry  She is not diaphoretic  Psychiatric: She has a normal mood and affect  Her behavior is normal  Judgment and thought content normal        Patient's shoes and socks removed  Right Foot/Ankle   Right Foot Inspection  Skin Exam: skin normal and skin intact no dry skin, no warmth, no callus, no erythema, no maceration, no abnormal color, no pre-ulcer, no ulcer and no callus                          Toe Exam: ROM and strength within normal limits  Sensory   Vibration: intact  Proprioception: intact   Monofilament testing: intact  Vascular  Capillary refills: < 3 seconds  The right DP pulse is 2+  The right PT pulse is 2+  Left Foot/Ankle  Left Foot Inspection  Skin Exam: skin normal and skin intactno dry skin, no warmth, no erythema, no maceration, normal color, no pre-ulcer, no ulcer and no callus                         Toe Exam: ROM and strength within normal limits                   Sensory   Vibration: intact  Proprioception: intact  Monofilament: intact  Vascular  Capillary refills: < 3 seconds  The left DP pulse is 2+  The left PT pulse is 2+  Assign Risk Category:  No deformity present; No loss of protective sensation;  No weak pulses       Risk: 0

## 2019-02-07 NOTE — ASSESSMENT & PLAN NOTE
Lab Results   Component Value Date    HGBA1C 7 3 (A) 02/07/2019       No results for input(s): POCGLU in the last 72 hours  Blood Sugar Average: Last 72 hrs: Foot exam performed  A1C updated   Will increase januvia to 100mg to get A1C to goal

## 2019-03-16 ENCOUNTER — APPOINTMENT (EMERGENCY)
Dept: CT IMAGING | Facility: HOSPITAL | Age: 49
End: 2019-03-16
Payer: COMMERCIAL

## 2019-03-16 ENCOUNTER — APPOINTMENT (EMERGENCY)
Dept: RADIOLOGY | Facility: HOSPITAL | Age: 49
End: 2019-03-16
Payer: COMMERCIAL

## 2019-03-16 ENCOUNTER — HOSPITAL ENCOUNTER (EMERGENCY)
Facility: HOSPITAL | Age: 49
Discharge: HOME | End: 2019-03-16
Payer: COMMERCIAL

## 2019-03-16 VITALS
OXYGEN SATURATION: 95 % | HEIGHT: 62 IN | DIASTOLIC BLOOD PRESSURE: 64 MMHG | TEMPERATURE: 97.3 F | WEIGHT: 225 LBS | SYSTOLIC BLOOD PRESSURE: 131 MMHG | BODY MASS INDEX: 41.41 KG/M2 | RESPIRATION RATE: 13 BRPM | HEART RATE: 96 BPM

## 2019-03-16 DIAGNOSIS — R07.89 ATYPICAL CHEST PAIN: Primary | ICD-10-CM

## 2019-03-16 LAB
ALBUMIN SERPL BCP-MCNC: 3.9 G/DL (ref 3.5–5.7)
ALP SERPL-CCNC: 81 U/L (ref 40–150)
ALT SERPL W P-5'-P-CCNC: 13 U/L (ref 7–52)
ANION GAP SERPL CALCULATED.3IONS-SCNC: 10 MMOL/L (ref 4–13)
APTT PPP: 35 SECONDS (ref 26–38)
AST SERPL W P-5'-P-CCNC: 11 U/L (ref 13–39)
ATRIAL RATE: 98 BPM
BASOPHILS # BLD AUTO: 0 THOUSANDS/ΜL (ref 0–0.1)
BASOPHILS NFR BLD AUTO: 1 % (ref 0–2)
BILIRUB SERPL-MCNC: 0.3 MG/DL (ref 0.2–1)
BUN SERPL-MCNC: 14 MG/DL (ref 7–25)
CALCIUM SERPL-MCNC: 9.5 MG/DL (ref 8.6–10.5)
CHLORIDE SERPL-SCNC: 104 MMOL/L (ref 98–107)
CO2 SERPL-SCNC: 24 MMOL/L (ref 21–31)
CREAT SERPL-MCNC: 0.73 MG/DL (ref 0.6–1.2)
DEPRECATED D DIMER PPP: 279 NG/ML (FEU)
EOSINOPHIL # BLD AUTO: 0.1 THOUSAND/ΜL (ref 0–0.61)
EOSINOPHIL NFR BLD AUTO: 2 % (ref 0–5)
ERYTHROCYTE [DISTWIDTH] IN BLOOD BY AUTOMATED COUNT: 13.9 % (ref 11.5–14.5)
GFR SERPL CREATININE-BSD FRML MDRD: 96 ML/MIN/1.73SQ M
GLUCOSE SERPL-MCNC: 165 MG/DL (ref 65–99)
HCG SERPL QL: NEGATIVE
HCT VFR BLD AUTO: 40 % (ref 42–47)
HGB BLD-MCNC: 13.4 G/DL (ref 12–16)
INR PPP: 1.06 (ref 0.9–1.5)
LYMPHOCYTES # BLD AUTO: 1.5 THOUSANDS/ΜL (ref 0.6–4.47)
LYMPHOCYTES NFR BLD AUTO: 28 % (ref 21–51)
MCH RBC QN AUTO: 28.9 PG (ref 26–34)
MCHC RBC AUTO-ENTMCNC: 33.4 G/DL (ref 31–37)
MCV RBC AUTO: 87 FL (ref 81–99)
MONOCYTES # BLD AUTO: 0.5 THOUSAND/ΜL (ref 0.17–1.22)
MONOCYTES NFR BLD AUTO: 9 % (ref 2–12)
NEUTROPHILS # BLD AUTO: 3.2 THOUSANDS/ΜL (ref 1.4–6.5)
NEUTS SEG NFR BLD AUTO: 60 % (ref 42–75)
NRBC BLD AUTO-RTO: 0 /100 WBCS
P AXIS: 53 DEGREES
PLATELET # BLD AUTO: 215 THOUSANDS/UL (ref 149–390)
PMV BLD AUTO: 8.9 FL (ref 8.6–11.7)
POTASSIUM SERPL-SCNC: 4.1 MMOL/L (ref 3.5–5.5)
PR INTERVAL: 140 MS
PROT SERPL-MCNC: 6.6 G/DL (ref 6.4–8.9)
PROTHROMBIN TIME: 12.3 SECONDS (ref 10.2–13)
QRS AXIS: 32 DEGREES
QRSD INTERVAL: 76 MS
QT INTERVAL: 546 MS
QTC INTERVAL: 697 MS
RBC # BLD AUTO: 4.62 MILLION/UL (ref 3.9–5.2)
SODIUM SERPL-SCNC: 138 MMOL/L (ref 134–143)
T WAVE AXIS: 56 DEGREES
TROPONIN I SERPL-MCNC: <0.03 NG/ML
TROPONIN I SERPL-MCNC: <0.03 NG/ML
VENTRICULAR RATE: 98 BPM
WBC # BLD AUTO: 5.3 THOUSAND/UL (ref 4.8–10.8)

## 2019-03-16 PROCEDURE — 71045 X-RAY EXAM CHEST 1 VIEW: CPT

## 2019-03-16 PROCEDURE — 85610 PROTHROMBIN TIME: CPT

## 2019-03-16 PROCEDURE — 99285 EMERGENCY DEPT VISIT HI MDM: CPT

## 2019-03-16 PROCEDURE — 84703 CHORIONIC GONADOTROPIN ASSAY: CPT

## 2019-03-16 PROCEDURE — 85730 THROMBOPLASTIN TIME PARTIAL: CPT

## 2019-03-16 PROCEDURE — 84484 ASSAY OF TROPONIN QUANT: CPT

## 2019-03-16 PROCEDURE — 93005 ELECTROCARDIOGRAM TRACING: CPT

## 2019-03-16 PROCEDURE — 96374 THER/PROPH/DIAG INJ IV PUSH: CPT

## 2019-03-16 PROCEDURE — 71275 CT ANGIOGRAPHY CHEST: CPT

## 2019-03-16 PROCEDURE — 96375 TX/PRO/DX INJ NEW DRUG ADDON: CPT

## 2019-03-16 PROCEDURE — 85025 COMPLETE CBC W/AUTO DIFF WBC: CPT

## 2019-03-16 PROCEDURE — 93010 ELECTROCARDIOGRAM REPORT: CPT | Performed by: INTERNAL MEDICINE

## 2019-03-16 PROCEDURE — 96361 HYDRATE IV INFUSION ADD-ON: CPT

## 2019-03-16 PROCEDURE — 85379 FIBRIN DEGRADATION QUANT: CPT

## 2019-03-16 PROCEDURE — 80053 COMPREHEN METABOLIC PANEL: CPT

## 2019-03-16 PROCEDURE — 36415 COLL VENOUS BLD VENIPUNCTURE: CPT

## 2019-03-16 RX ORDER — ASPIRIN 325 MG
325 TABLET ORAL ONCE
Status: COMPLETED | OUTPATIENT
Start: 2019-03-16 | End: 2019-03-16

## 2019-03-16 RX ORDER — METHYLPREDNISOLONE SODIUM SUCCINATE 125 MG/2ML
125 INJECTION, POWDER, LYOPHILIZED, FOR SOLUTION INTRAMUSCULAR; INTRAVENOUS ONCE
Status: COMPLETED | OUTPATIENT
Start: 2019-03-16 | End: 2019-03-16

## 2019-03-16 RX ORDER — DIPHENHYDRAMINE HYDROCHLORIDE 50 MG/ML
50 INJECTION INTRAMUSCULAR; INTRAVENOUS ONCE
Status: COMPLETED | OUTPATIENT
Start: 2019-03-16 | End: 2019-03-16

## 2019-03-16 RX ORDER — SODIUM CHLORIDE 9 MG/ML
125 INJECTION, SOLUTION INTRAVENOUS CONTINUOUS
Status: DISCONTINUED | OUTPATIENT
Start: 2019-03-16 | End: 2019-03-16 | Stop reason: HOSPADM

## 2019-03-16 RX ADMIN — ASPIRIN 325 MG: 325 TABLET, FILM COATED ORAL at 11:31

## 2019-03-16 RX ADMIN — IOHEXOL 85 ML: 350 INJECTION, SOLUTION INTRAVENOUS at 14:06

## 2019-03-16 RX ADMIN — SODIUM CHLORIDE 1000 ML: 0.9 INJECTION, SOLUTION INTRAVENOUS at 14:23

## 2019-03-16 RX ADMIN — DIPHENHYDRAMINE HYDROCHLORIDE 50 MG: 50 INJECTION, SOLUTION INTRAMUSCULAR; INTRAVENOUS at 12:48

## 2019-03-16 RX ADMIN — SODIUM CHLORIDE 125 ML/HR: 0.9 INJECTION, SOLUTION INTRAVENOUS at 11:24

## 2019-03-16 RX ADMIN — METHYLPREDNISOLONE SODIUM SUCCINATE 125 MG: 125 INJECTION, POWDER, FOR SOLUTION INTRAMUSCULAR; INTRAVENOUS at 12:47

## 2019-03-16 NOTE — ED PROVIDER NOTES
History  Chief Complaint   Patient presents with    Shortness of Breath     shortness of breath  left sided back and chest pain  history of PE  This began when she bent down in the shower yesterday   Chest Pain     Guy Bowie is a 49-year-old female who came to the emergency department due to left-sided pleuritic chest pain more on the left upper back radiating to the left side of the chest that started today  Patient denies shortness of breath  Patient has occasional nonproductive by cough but she has no fever  History provided by:  Patient   used: No    Chest Pain   Pain location:  L chest  Pain quality: aching    Pain radiates to:  Upper back  Pain radiates to the back: yes    Pain severity:  Moderate  Onset quality:  Sudden  Duration: today  Timing:  Constant  Progression:  Unchanged  Chronicity:  New  Context: breathing and at rest    Context: no drug use, not eating, no intercourse, not lifting, no movement, not raising an arm, no stress and no trauma    Relieved by:  Nothing  Worsened by:  Nothing tried  Ineffective treatments:  None tried  Associated symptoms: back pain    Associated symptoms: no abdominal pain, no AICD problem, no altered mental status, no anorexia, no anxiety, no claudication, no cough, no diaphoresis, no dizziness, no dysphagia, no fatigue, no fever, no headache, no heartburn, no lower extremity edema, no nausea, no near-syncope, no numbness, no orthopnea, no palpitations, no PND, no shortness of breath, no syncope, not vomiting and no weakness    Risk factors: diabetes mellitus and obesity        Prior to Admission Medications   Prescriptions Last Dose Informant Patient Reported? Taking?    ARIPiprazole (ABILIFY) 20 MG tablet  Self Yes No   Sig: Take 1 tablet by mouth daily   EPINEPHrine (EPIPEN) 0 3 mg/0 3 mL SOAJ   No No   Sig: Inject 0 3 mL (0 3 mg total) into the shoulder, thigh, or buttocks once for 1 dose   Lancets Misc  (ACCU-CHEK MULTICLIX LANCET DEV) KIT  Self Yes No   Sig: by Does not apply route   Multiple Vitamins-Minerals (MULTI ADULT GUMMIES PO)   Yes No   Sig: Take by mouth   albuterol (VENTOLIN HFA) 90 mcg/act inhaler  Self No No   Sig: Inhale 2 puffs every 4 (four) hours as needed for shortness of breath   glucose blood test strip  Self Yes No   Sig: by In Vitro route daily   lisinopril (ZESTRIL) 2 5 mg tablet   No No   Sig: take 1 tablet by mouth daily   loratadine (CLARITIN) 10 mg tablet   No No   Sig: Take 1 tablet (10 mg total) by mouth daily   meloxicam (MOBIC) 15 mg tablet   No No   Sig: Take 1 tablet (15 mg total) by mouth daily   metFORMIN (GLUCOPHAGE-XR) 500 mg 24 hr tablet   No No   Sig: take 2 tablets by mouth twice a day   pravastatin (PRAVACHOL) 20 mg tablet   No No   Sig: take 1 tablet by mouth once daily   ranitidine (ZANTAC) 150 MG capsule   No No   Sig: Take 1 capsule (150 mg total) by mouth 2 (two) times a day   sitaGLIPtin (JANUVIA) 100 mg tablet   No No   Sig: Take 1 tablet (100 mg total) by mouth daily   venlafaxine (EFFEXOR-XR) 150 mg 24 hr capsule  Self Yes No   Sig: Take 150 mg by mouth daily        Facility-Administered Medications: None       Past Medical History:   Diagnosis Date    Anxiety     Arthritis     Asthma     [facility]     renal,cervical    Depression     [facility]     Diverticulitis     GERD (gastroesophageal reflux disease)     Hyperlipidemia associated with type 2 diabetes mellitus (HCC)     Hypertension     Insulinoma     Pulmonary emboli (HCC)     Sleep apnea        Past Surgical History:   Procedure Laterality Date     SECTION      twice    CHOLECYSTECTOMY      KNEE ARTHROSCOPY Left     KNEE SURGERY      PANCREATECTOMY      Partial     IN [place],CHOLECYSTECTOMY N/A 2018    Procedure: CHOLECYSTECTOMY LAPAROSCOPIC;  Surgeon: Paola Cruz DO;  Location: [facility];  Service: General       Family History   Problem Relation Age of Onset    Heart disease Mother    Sandeep Sandhu Hyperlipidemia Mother     Obesity Mother     Hypertension Mother    [employer] Other Mother         Bundle branch block    Ovarian cancer Mother     Tuberculosis Mother     Substance Abuse Father     Breast cancer Brother     Other Maternal Grandmother         ascending aortic aneurysm resection    Diabetes Maternal Grandmother     Diabetes Paternal Grandmother     Tuberculosis Maternal Aunt     Substance Abuse Family     Osteoporosis Family      I have reviewed and agree with the history as documented  Social History     Tobacco Use    Smoking status: Former Smoker    Smokeless tobacco: Never Used    Tobacco comment: quit 6 yrs ago   Substance Use Topics    Alcohol use: No    Drug use: No        Review of Systems   Constitutional: Negative for diaphoresis, fatigue and fever  HENT: Negative for trouble swallowing  Eyes: Negative  Respiratory: Negative for cough and shortness of breath  Cardiovascular: Positive for chest pain  Negative for palpitations, orthopnea, claudication, syncope, PND and near-syncope  Gastrointestinal: Negative for abdominal pain, anorexia, heartburn, nausea and vomiting  Endocrine: Negative  Genitourinary: Negative  Musculoskeletal: Positive for back pain  Skin: Negative  Allergic/Immunologic: Negative  Neurological: Negative for dizziness, weakness, numbness and headaches  Hematological: Negative  Psychiatric/Behavioral: Negative  Physical Exam  Physical Exam   Constitutional: She is oriented to person, place, and time  She appears well-developed and well-nourished  Non-toxic appearance  She does not appear ill  No distress  HENT:   Head: Normocephalic and atraumatic  Mouth/Throat: Oropharynx is clear and moist  No oropharyngeal exudate or posterior oropharyngeal edema  Eyes: Pupils are equal, round, and reactive to light  EOM are normal    Neck: Normal range of motion  Neck supple  No tracheal deviation present   No thyromegaly present  Cardiovascular: Normal rate and normal heart sounds  No murmur heard  Pulmonary/Chest: Effort normal and breath sounds normal  Chest wall is not dull to percussion  She exhibits no mass, no tenderness, no bony tenderness and no crepitus  Abdominal: Soft  Bowel sounds are normal  She exhibits no distension  There is no tenderness  Musculoskeletal: Normal range of motion  Right lower leg: Normal  She exhibits no tenderness and no edema  Left lower leg: Normal  She exhibits no tenderness and no edema  Neurological: She is alert and oriented to person, place, and time  She is not disoriented  No cranial nerve deficit  Skin: Skin is warm and dry  Capillary refill takes less than 2 seconds  No ecchymosis and no rash noted  She is not diaphoretic  No cyanosis or erythema  No pallor  Nails show no clubbing  Psychiatric: She has a normal mood and affect  Her behavior is normal  Her mood appears not anxious  She is not agitated  Nursing note and vitals reviewed        Vital Signs  ED Triage Vitals [03/16/19 1102]   Temperature Pulse Respirations Blood Pressure SpO2   (!) 97 3 °F (36 3 °C) 104 18 146/68 97 %      Temp Source Heart Rate Source Patient Position - Orthostatic VS BP Location FiO2 (%)   Temporal Monitor Sitting Left arm --      Pain Score       8           Vitals:    03/16/19 1445 03/16/19 1500 03/16/19 1515 03/16/19 1530   BP: 133/61 123/72 116/66 131/64   Pulse: 97 95 93 96   Patient Position - Orthostatic VS:           qSOFA     Row Name 03/16/19 1530 03/16/19 1515 03/16/19 1500 03/16/19 1445 03/16/19 1345    Altered mental status GCS < 15              Respiratory Rate > / =22  0  0  0  0  0    Systolic BP < / =953  0  0  0  0  0    Q Sofa Score  0  0  0  0  0    Row Name 03/16/19 1330 03/16/19 1230 03/16/19 1215 03/16/19 1200 03/16/19 1145    Altered mental status GCS < 15              Respiratory Rate > / =22  0  0  0  0  0    Systolic BP < / =075  0  0  0  0  0 Q Sofa Score  0  0  0  0  0    Row Name 03/16/19 1102                Altered mental status GCS < 15          Respiratory Rate > / =64  0        Systolic BP < / =351  0        Q Sofa Score  0              Visual Acuity      ED Medications  Medications   sodium chloride 0 9 % infusion (0 mL/hr Intravenous Stopped 3/16/19 1543)   aspirin tablet 325 mg (325 mg Oral Given 3/16/19 1131)   sodium chloride 0 9 % bolus 1,000 mL (0 mL Intravenous Stopped 3/16/19 1539)   methylPREDNISolone sodium succinate (Solu-MEDROL) injection 125 mg (125 mg Intravenous Given 3/16/19 1247)   diphenhydrAMINE (BENADRYL) injection 50 mg (50 mg Intravenous Given 3/16/19 1248)   iohexol (OMNIPAQUE) 350 MG/ML injection (MULTI-DOSE) 85 mL (85 mL Intravenous Given 3/16/19 1406)       Diagnostic Studies  Results Reviewed     Procedure Component Value Units Date/Time    Troponin I [[id number]]     Lab Status:  No result Specimen:  Blood     Troponin I [[id number]]  (Normal) Collected:  03/16/19 1318    Lab Status:  Final result Specimen:  Blood from Arm, Right Updated:  03/16/19 1346     Troponin I <0 03 ng/mL     hCG, qualitative pregnancy [[id number]]  (Normal) Collected:  03/16/19 1123    Lab Status:  Final result Specimen:  Blood from Arm, Right Updated:  03/16/19 1211     Preg, Serum Negative    Troponin I [[id number]]  (Normal) Collected:  03/16/19 1120    Lab Status:  Final result Specimen:  Blood from Arm, Right Updated:  03/16/19 1152     Troponin I <0 03 ng/mL     Comprehensive metabolic panel [[id number]]  (Abnormal) Collected:  03/16/19 1120    Lab Status:  Final result Specimen:  Blood from Arm, Right Updated:  03/16/19 1152     Sodium 138 mmol/L      Potassium 4 1 mmol/L      Chloride 104 mmol/L      CO2 24 mmol/L      ANION GAP 10 mmol/L      BUN 14 mg/dL      Creatinine 0 73 mg/dL      Glucose 165 mg/dL      Calcium 9 5 mg/dL      AST 11 U/L      ALT 13 U/L      Alkaline Phosphatase 81 U/L      Total Protein 6 6 g/dL      Albumin 3 9 g/dL      Total Bilirubin 0 30 mg/dL      eGFR 96 ml/min/1 73sq m     Narrative:       National Kidney Disease Education Program recommendations are as follows:  GFR calculation is accurate only with a steady state creatinine  Chronic Kidney disease less than 60 ml/min/1 73 sq  meters  Kidney failure less than 15 ml/min/1 73 sq  meters  Protime-INR [[id number]]  (Normal) Collected:  03/16/19 1120    Lab Status:  Final result Specimen:  Blood from Arm, Right Updated:  03/16/19 1144     Protime 12 3 seconds      INR 1 06    APTT [[id number]]  (Normal) Collected:  03/16/19 1120    Lab Status:  Final result Specimen:  Blood from Arm, Right Updated:  03/16/19 1144     PTT 35 seconds     D-Dimer [[id number]]  (Abnormal) Collected:  03/16/19 1120    Lab Status:  Final result Specimen:  Blood from Arm, Right Updated:  03/16/19 1144     D-Dimer, Quant 279 ng/ml (FEU)     CBC and differential [[id number]]  (Abnormal) Collected:  03/16/19 1120    Lab Status:  Final result Specimen:  Blood from Arm, Right Updated:  03/16/19 1138     WBC 5 30 Thousand/uL      RBC 4 62 Million/uL      Hemoglobin 13 4 g/dL      Hematocrit 40 0 %      MCV 87 fL      MCH 28 9 pg      MCHC 33 4 g/dL      RDW 13 9 %      MPV 8 9 fL      Platelets 865 Thousands/uL      nRBC 0 /100 WBCs      Neutrophils Relative 60 %      Lymphocytes Relative 28 %      Monocytes Relative 9 %      Eosinophils Relative 2 %      Basophils Relative 1 %      Neutrophils Absolute 3 20 Thousands/µL      Lymphocytes Absolute 1 50 Thousands/µL      Monocytes Absolute 0 50 Thousand/µL      Eosinophils Absolute 0 10 Thousand/µL      Basophils Absolute 0 00 Thousands/µL                  CTA ED chest PE study   Final Result by Vish Bearden MD (03/16 1447)         1  No pulmonary embolism  2   Hepatic steatosis                    Workstation performed: [id number]         XR chest 1 view portable   ED Interpretation by Gladis Pearl MD (03/16 4127)   No acute disease Procedures  ECG 12 Lead Documentation  Date/Time: 3/16/2019 11:09 AM  Performed by: Jaycee Donaldson MD  Authorized by: Jaycee Donaldson MD     Indications / Diagnosis:  Chest and back pain  ECG reviewed by me, the ED Provider: yes    Patient location:  ED and bedside  Previous ECG:     Previous ECG:  Unavailable    Comparison to cardiac monitor: Yes    Interpretation:     Interpretation: non-specific    Quality:     Tracing quality:  Limited by artifact  Rate:     ECG rate:  98 beats per minute    ECG rate assessment: normal    Rhythm:     Rhythm: sinus rhythm    Ectopy:     Ectopy: none    QRS:     QRS axis:  Normal    QRS intervals:  Normal  Conduction:     Conduction: normal    ST segments:     ST segments:  Non-specific  T waves:     T waves: non-specific    Other findings:     Other findings: prolonged qTc interval             Phone Contacts  ED Phone Contact    ED Course  ED Course as of Mar 16 1548   Sat Mar 16, 2019   1208 Patient is resting comfortably on the stretcher  She is not in any distress  She is pain-free at this time  I discussed with her the results of her blood work including mildly elevated D-dimer  I informed her that she well have a CT scan of her chest with intravenous contrast in order to rule out pulmonary embolism and she agreed  1235 Patient states that she had CT of chest with IV contrast 1 5 years ago in [place] to R/O Pulmonary embolism and she did not have any adverse reaction  1530 Patient is resting comfortably on the stretcher  I discussed with her the results of the CT scan of the chest rule out pulmonary embolism              HEART Risk Score      Most Recent Value   History  0 Filed at: 03/16/2019 1159   ECG  0 Filed at: 03/16/2019 1159   Age  1 Filed at: 03/16/2019 1159   Risk Factors  1 Filed at: 03/16/2019 1159   Troponin  0 Filed at: 03/16/2019 1159   Heart Score Risk Calculator   History  0 Filed at: 03/16/2019 1159   ECG  0 Filed at: 03/16/2019 1159 Age  1 Filed at: 03/16/2019 1159   Risk Factors  1 Filed at: 03/16/2019 1159   Troponin  0 Filed at: 03/16/2019 1159   HEART Score  2 Filed at: 03/16/2019 1159   HEART Score  2 Filed at: 03/16/2019 1159                            [facility]  Number of Diagnoses or Management Options  Atypical chest pain: new and requires workup     Amount and/or Complexity of Data Reviewed  Clinical lab tests: ordered and reviewed  Tests in the radiology section of CPT®: ordered and reviewed  Tests in the medicine section of CPT®: ordered and reviewed  Decide to obtain previous medical records or to obtain history from someone other than the patient: yes  Obtain history from someone other than the patient: yes  Review and summarize past medical records: yes  Independent visualization of images, tracings, or specimens: yes    Risk of Complications, Morbidity, and/or Mortality  Presenting problems: moderate  Diagnostic procedures: moderate  Management options: moderate    Patient Progress  Patient progress: stable      Disposition  Final diagnoses:   Atypical chest pain     Time reflects when diagnosis was documented in both MDM as applicable and the Disposition within this note     Time User Action Codes Description Comment    3/16/2019  3:31 PM Jazmine Gibson Add [R07 89] Atypical chest pain       ED Disposition     ED Disposition Condition Date/Time Comment    Discharge Stable Sat Mar 16, 2019  3:31 PM Katie Dye discharge to home/self care              Follow-up Information     Follow up With Specialties Details Why Contact Info    Anthony Humphrey PA-C Family Medicine, Internal Medicine, Physician Assistant In 3 days  [address] [facility]  Zaida Schroeder [address] [facility]  [phone]            Discharge Medication List as of 3/16/2019  3:31 PM      CONTINUE these medications which have NOT CHANGED    Details   albuterol (VENTOLIN HFA) 90 mcg/act inhaler Inhale 2 puffs every 4 (four) hours as needed for shortness of breath, Starting Fri 6/1/2018, Normal      ARIPiprazole (ABILIFY) 20 MG tablet Take 1 tablet by mouth daily, Starting Tue 6/28/2011, Historical Med      EPINEPHrine (EPIPEN) 0 3 mg/0 3 mL SOAJ Inject 0 3 mL (0 3 mg total) into the shoulder, thigh, or buttocks once for 1 dose, Starting Fri 6/1/2018, Normal      glucose blood test strip by In Vitro route daily, Starting Fri 9/27/2013, Historical Med      Lancets Misc  (ACCU-CHEK MULTICLIX LANCET DEV) KIT by Does not apply route, Starting Fri 9/27/2013, Historical Med      lisinopril (ZESTRIL) 2 5 mg tablet take 1 tablet by mouth daily, Normal      loratadine (CLARITIN) 10 mg tablet Take 1 tablet (10 mg total) by mouth daily, Starting Thu 2/7/2019, Normal      meloxicam (MOBIC) 15 mg tablet Take 1 tablet (15 mg total) by mouth daily, Starting Mon 1/7/2019, Normal      metFORMIN (GLUCOPHAGE-XR) 500 mg 24 hr tablet take 2 tablets by mouth twice a day, Normal      Multiple Vitamins-Minerals (MULTI ADULT GUMMIES PO) Take by mouth, Historical Med      pravastatin (PRAVACHOL) 20 mg tablet take 1 tablet by mouth once daily, Normal      ranitidine (ZANTAC) 150 MG capsule Take 1 capsule (150 mg total) by mouth 2 (two) times a day, Starting Thu 2/7/2019, Normal      sitaGLIPtin (JANUVIA) 100 mg tablet Take 1 tablet (100 mg total) by mouth daily, Starting Thu 2/7/2019, Normal      venlafaxine (EFFEXOR-XR) 150 mg 24 hr capsule Take 150 mg by mouth daily  , Starting Fri 8/2/2013, Historical Med           No discharge procedures on file      ED Provider  Electronically Signed by           Tony Renae MD  03/16/19 0421       Tony Renae MD  03/16/19 0118

## 2019-03-30 ENCOUNTER — OFFICE VISIT (OUTPATIENT)
Dept: URGENT CARE | Facility: CLINIC | Age: 49
End: 2019-03-30
Payer: COMMERCIAL

## 2019-03-30 VITALS
OXYGEN SATURATION: 96 % | TEMPERATURE: 98.3 F | WEIGHT: 230 LBS | BODY MASS INDEX: 42.33 KG/M2 | SYSTOLIC BLOOD PRESSURE: 129 MMHG | DIASTOLIC BLOOD PRESSURE: 80 MMHG | HEART RATE: 110 BPM | HEIGHT: 62 IN | RESPIRATION RATE: 16 BRPM

## 2019-03-30 DIAGNOSIS — J01.00 ACUTE NON-RECURRENT MAXILLARY SINUSITIS: Primary | ICD-10-CM

## 2019-03-30 PROCEDURE — G0382 LEV 3 HOSP TYPE B ED VISIT: HCPCS | Performed by: NURSE PRACTITIONER

## 2019-03-30 PROCEDURE — 99283 EMERGENCY DEPT VISIT LOW MDM: CPT | Performed by: NURSE PRACTITIONER

## 2019-03-30 PROCEDURE — 99213 OFFICE O/P EST LOW 20 MIN: CPT | Performed by: NURSE PRACTITIONER

## 2019-03-30 NOTE — PROGRESS NOTES
[address] [facility]        NAME: Kate Wharton is a 46 y o  female  : 1970    MRN: [id number]  DATE: 2019  TIME: 2:26 PM    Assessment and Plan   Acute non-recurrent maxillary sinusitis [J01 00]  1  Acute non-recurrent maxillary sinusitis           Patient Instructions   Take the amoxicillin as ordered until completed  You may continue to use over-the-counter medications to treat symptoms  Follow up with PCP in 3-5 days  Proceed to  ER if symptoms worsen  Chief Complaint     Chief Complaint   Patient presents with    Cold Like Symptoms     x 5 days    Earache     left         History of Present Illness       Patient reports onset of symptoms Monday morning  She reports during worse and not better  She has used over-the-counter medications with minimal effect  Review of Systems   Review of Systems   Constitutional: Positive for chills and fever  HENT: Positive for congestion, postnasal drip, rhinorrhea, sinus pressure, sinus pain and sore throat  Negative for ear discharge and ear pain  Eyes: Negative for discharge  Respiratory: Positive for cough  Negative for chest tightness and shortness of breath  Gastrointestinal: Negative for abdominal pain, constipation, diarrhea, nausea and vomiting  Musculoskeletal: Positive for myalgias  Negative for arthralgias  Allergic/Immunologic: Positive for environmental allergies  Neurological: Positive for headaches  Negative for dizziness, weakness, light-headedness and numbness  All other systems reviewed and are negative          Current Medications       Current Outpatient Medications:     albuterol (VENTOLIN HFA) 90 mcg/act inhaler, Inhale 2 puffs every 4 (four) hours as needed for shortness of breath, Disp: 3 Inhaler, Rfl: 1    ARIPiprazole (ABILIFY) 20 MG tablet, Take 1 tablet by mouth daily, Disp: , Rfl:     glucose blood test strip, by In Vitro route daily, Disp: , Rfl:     Lancets Misc  (ACCU-CHEK MULTICLIX LANCET DEV) KIT, by Does not apply route, Disp: , Rfl:     lisinopril (ZESTRIL) 2 5 mg tablet, take 1 tablet by mouth daily, Disp: 90 tablet, Rfl: 1    loratadine (CLARITIN) 10 mg tablet, Take 1 tablet (10 mg total) by mouth daily, Disp: 30 tablet, Rfl: 1    meloxicam (MOBIC) 15 mg tablet, Take 1 tablet (15 mg total) by mouth daily, Disp: 30 tablet, Rfl: 2    metFORMIN (GLUCOPHAGE-XR) 500 mg 24 hr tablet, take 2 tablets by mouth twice a day, Disp: 120 tablet, Rfl: 5    Multiple Vitamins-Minerals (MULTI ADULT GUMMIES PO), Take by mouth, Disp: , Rfl:     pravastatin (PRAVACHOL) 20 mg tablet, take 1 tablet by mouth once daily, Disp: 90 tablet, Rfl: 1    ranitidine (ZANTAC) 150 MG capsule, Take 1 capsule (150 mg total) by mouth 2 (two) times a day, Disp: 60 capsule, Rfl: 2    sitaGLIPtin (JANUVIA) 100 mg tablet, Take 1 tablet (100 mg total) by mouth daily, Disp: 30 tablet, Rfl: 5    venlafaxine (EFFEXOR-XR) 150 mg 24 hr capsule, Take 150 mg by mouth daily  , Disp: , Rfl:     EPINEPHrine (EPIPEN) 0 3 mg/0 3 mL SOAJ, Inject 0 3 mL (0 3 mg total) into the shoulder, thigh, or buttocks once for 1 dose, Disp: 0 3 mL, Rfl: 2    Current Allergies     Allergies as of 03/30/2019 - Reviewed 03/30/2019   Allergen Reaction Noted    Iodine Anaphylaxis 06/01/2018    Nuts Anaphylaxis 09/27/2013            The following portions of the patient's history were reviewed and updated as appropriate: allergies, current medications, past family history, past medical history, past social history, past surgical history and problem list      Past Medical History:   Diagnosis Date    Anxiety     Arthritis     Asthma     Cancer ([facility] )     renal,cervical    Depression     Diabetes mellitus [facility]     Diverticulitis     GERD (gastroesophageal reflux disease)     Hyperlipidemia associated with type 2 diabetes mellitus ([facility] )     Hypertension     Insulinoma     Pulmonary emboli ([facility] )     Sleep apnea        Past Surgical History: Procedure Laterality Date     SECTION      twice    CHOLECYSTECTOMY      KNEE ARTHROSCOPY Left     KNEE SURGERY      PANCREATECTOMY      Partial     TN LAP,CHOLECYSTECTOMY N/A 2018    Procedure: CHOLECYSTECTOMY LAPAROSCOPIC;  Surgeon: Yoni Silva DO;  Location: [facility];  Service: General       Family History   Problem Relation Age of Onset    Heart disease Mother     Hyperlipidemia Mother     Obesity Mother     Hypertension Mother    Conrado Sanford Other Mother         Bundle branch block    Ovarian cancer Mother     Tuberculosis Mother     Substance Abuse Father     Breast cancer Brother     Other Maternal Grandmother         ascending aortic aneurysm resection    Diabetes Maternal Grandmother     Diabetes Paternal Grandmother     Tuberculosis Maternal Aunt     Substance Abuse Family     Osteoporosis Family          Medications have been verified  Objective   /80   Pulse (!) 110   Temp 98 3 °F (36 8 °C)   Resp 16   Ht 5' 2" (1 575 m)   Wt 104 kg (230 lb)   SpO2 96%   BMI 42 07 kg/m²        Physical Exam     Physical Exam   Constitutional: She is oriented to person, place, and time  She appears well-developed and well-nourished  No distress  HENT:   Head: Normocephalic and atraumatic  Right Ear: Hearing, external ear and ear canal normal  No swelling or tenderness  Tympanic membrane is bulging  Tympanic membrane is not injected, not perforated and not erythematous  No middle ear effusion  No hemotympanum  No decreased hearing is noted  Left Ear: Hearing, external ear and ear canal normal  No swelling or tenderness  Tympanic membrane is bulging  Tympanic membrane is not injected, not perforated and not erythematous  No middle ear effusion  No hemotympanum  No decreased hearing is noted  Nose: Mucosal edema, rhinorrhea and sinus tenderness present  Right sinus exhibits maxillary sinus tenderness  Right sinus exhibits no frontal sinus tenderness   Left sinus exhibits maxillary sinus tenderness  Left sinus exhibits no frontal sinus tenderness  Mouth/Throat: Uvula is midline and mucous membranes are normal  Posterior oropharyngeal erythema present  No oropharyngeal exudate, posterior oropharyngeal edema or tonsillar abscesses  Tonsils are 0 on the right  Tonsils are 0 on the left  No tonsillar exudate  Eyes: Pupils are equal, round, and reactive to light  Neck: Normal range of motion  Neck supple  Cardiovascular: Normal rate, regular rhythm and normal heart sounds  Pulmonary/Chest: Effort normal and breath sounds normal  No accessory muscle usage or stridor  No apnea, no tachypnea and no bradypnea  No respiratory distress  She has no decreased breath sounds  She has no wheezes  She has no rhonchi  She has no rales  Abdominal: Soft  Bowel sounds are normal  She exhibits no distension  There is no tenderness  Musculoskeletal: Normal range of motion  Neurological: She is alert and oriented to person, place, and time  Skin: Skin is warm and dry  Capillary refill takes less than 2 seconds  She is not diaphoretic  Psychiatric: She has a normal mood and affect  Her behavior is normal  Judgment and thought content normal    Nursing note and vitals reviewed

## 2019-03-30 NOTE — PATIENT INSTRUCTIONS
Take the amoxicillin as ordered until completed  You may continue to use over-the-counter medications to treat symptoms  Sinusitis, Ambulatory Care   GENERAL INFORMATION:   Sinusitis  is inflammation or infection of your sinuses  It is most often caused by a virus  Acute sinusitis may last up to 12 weeks  Chronic sinusitis lasts longer than 12 weeks  Recurrent sinusitis is when you have 3 or more episodes of sinusitis in 1 year  Common symptoms include the following:   · Fever    · Pain, pressure, redness, or swelling around the forehead, cheeks, or eyes    · Thick yellow or green discharge from your nose    · Tenderness when you touch your face over your sinuses    · Dry cough that happens mostly at night or when you lie down    · Headache and face pain that is worse when you lean forward    · Teeth pain or pain when you chew  Seek immediate care for the following symptoms:   · Vision changes such as double vision    · Confusion or trouble thinking clearly    · Headache and stiff neck    · Trouble breathing  Treatment for sinusitis  may include medicines to relieve nasal and sinus congestion or to decrease pain and fever  Ask your healthcare provider which medicines you should take and how much is safe  Manage sinusitis:   · Drink liquids as directed  Ask your healthcare provider how much liquid to drink each day and which liquids are best for you  Liquids will help loosen and drain the mucus in your sinuses  · Breathe in steam   Heat a bowl of water until you see steam  Lean over the bowl and make a tent over your head with a large towel  Breathe deeply for about 20 minutes  Be careful not to get too close to the steam or burn yourself  Do this 3 times a day  You can also breathe deeply when you take a hot shower  · Rinse your sinuses  Use a sinus rinse device to rinse your nasal passages with a saline (salt water) solution  This will help thin the mucus in your nose and rinse away pollen and dirt  It will also help reduce swelling so you can breathe normally  Ask how often to do this  · Use heat on your sinuses  to decrease pain  Apply heat for 15 to 20 minutes every hour for as many days as directed  · Sleep with your head elevated  Place an extra pillow under your head before you go to sleep to help your sinuses drain  · Do not smoke and avoid secondhand smoke  If you smoke, it is never too late to quit  Ask for information about how to stop smoking if you need help  Prevent the spread of germs that cause sinusitis:  Wash your hands often with soap and water  Wash your hands after you use the bathroom, change a child's diaper, or sneeze  Wash your hands before you prepare or eat food  Follow up with your healthcare provider as directed:  Write down your questions so you remember to ask them during your visits  CARE AGREEMENT:   You have the right to help plan your care  Learn about your health condition and how it may be treated  Discuss treatment options with your caregivers to decide what care you want to receive  You always have the right to refuse treatment  The above information is an  only  It is not intended as medical advice for individual conditions or treatments  Talk to your doctor, nurse or pharmacist before following any medical regimen to see if it is safe and effective for you  © 2014 [address] is for End User's use only and may not be sold, redistributed or otherwise used for commercial purposes  All illustrations and images included in CareNotes® are the copyrighted property of [employer]  or [employer]

## 2019-04-03 ENCOUNTER — CONSULT (OUTPATIENT)
Dept: GASTROENTEROLOGY | Facility: HOSPITAL | Age: 49
End: 2019-04-03
Payer: COMMERCIAL

## 2019-04-03 VITALS
WEIGHT: 230 LBS | BODY MASS INDEX: 42.33 KG/M2 | HEIGHT: 62 IN | DIASTOLIC BLOOD PRESSURE: 71 MMHG | SYSTOLIC BLOOD PRESSURE: 118 MMHG | HEART RATE: 112 BPM

## 2019-04-03 DIAGNOSIS — K59.1 FUNCTIONAL DIARRHEA: ICD-10-CM

## 2019-04-03 DIAGNOSIS — Z12.11 COLON CANCER SCREENING: Primary | ICD-10-CM

## 2019-04-03 PROCEDURE — 99244 OFF/OP CNSLTJ NEW/EST MOD 40: CPT | Performed by: INTERNAL MEDICINE

## 2019-04-05 DIAGNOSIS — M62.838 SPASM OF LEFT PIRIFORMIS MUSCLE: ICD-10-CM

## 2019-04-05 DIAGNOSIS — J30.9 ALLERGIC RHINITIS, UNSPECIFIED SEASONALITY, UNSPECIFIED TRIGGER: ICD-10-CM

## 2019-04-05 RX ORDER — LORATADINE 10 MG/1
10 TABLET ORAL DAILY
Qty: 30 TABLET | Refills: 5 | Status: SHIPPED | OUTPATIENT
Start: 2019-04-05 | End: 2019-09-30 | Stop reason: SDUPTHER

## 2019-04-05 RX ORDER — MELOXICAM 15 MG/1
15 TABLET ORAL DAILY
Qty: 30 TABLET | Refills: 5 | Status: SHIPPED | OUTPATIENT
Start: 2019-04-05 | End: 2019-09-30 | Stop reason: SDUPTHER

## 2019-04-16 ENCOUNTER — OFFICE VISIT (OUTPATIENT)
Dept: INTERNAL MEDICINE CLINIC | Facility: CLINIC | Age: 49
End: 2019-04-16
Payer: COMMERCIAL

## 2019-04-16 ENCOUNTER — ANESTHESIA EVENT (OUTPATIENT)
Dept: PERIOP | Facility: HOSPITAL | Age: 49
End: 2019-04-16
Payer: COMMERCIAL

## 2019-04-16 VITALS
WEIGHT: 229 LBS | HEIGHT: 62 IN | SYSTOLIC BLOOD PRESSURE: 126 MMHG | DIASTOLIC BLOOD PRESSURE: 72 MMHG | TEMPERATURE: 98.6 F | RESPIRATION RATE: 18 BRPM | BODY MASS INDEX: 42.14 KG/M2 | OXYGEN SATURATION: 98 % | HEART RATE: 118 BPM

## 2019-04-16 DIAGNOSIS — E11.65 TYPE 2 DIABETES MELLITUS WITH HYPERGLYCEMIA, WITHOUT LONG-TERM CURRENT USE OF INSULIN (HCC): ICD-10-CM

## 2019-04-16 DIAGNOSIS — H69.82 EUSTACHIAN TUBE DYSFUNCTION, LEFT: Primary | ICD-10-CM

## 2019-04-16 PROBLEM — J00 COMMON COLD: Status: RESOLVED | Noted: 2019-02-07 | Resolved: 2019-04-16

## 2019-04-16 PROBLEM — H69.92 EUSTACHIAN TUBE DYSFUNCTION, LEFT: Status: ACTIVE | Noted: 2019-04-16

## 2019-04-16 PROBLEM — T14.8XXA CONTUSION OF BONE: Status: RESOLVED | Noted: 2018-12-05 | Resolved: 2019-04-16

## 2019-04-16 PROCEDURE — 99213 OFFICE O/P EST LOW 20 MIN: CPT | Performed by: PHYSICIAN ASSISTANT

## 2019-04-16 RX ORDER — SITAGLIPTIN 50 MG/1
TABLET, FILM COATED ORAL
Qty: 30 TABLET | Refills: 5 | Status: SHIPPED | OUTPATIENT
Start: 2019-04-16 | End: 2019-04-16

## 2019-04-17 ENCOUNTER — ANESTHESIA (OUTPATIENT)
Dept: PERIOP | Facility: HOSPITAL | Age: 49
End: 2019-04-17
Payer: COMMERCIAL

## 2019-04-17 ENCOUNTER — HOSPITAL ENCOUNTER (OUTPATIENT)
Facility: HOSPITAL | Age: 49
Setting detail: OUTPATIENT SURGERY
Discharge: HOME | End: 2019-04-17
Attending: INTERNAL MEDICINE | Admitting: INTERNAL MEDICINE
Payer: COMMERCIAL

## 2019-04-17 VITALS
HEIGHT: 62 IN | HEART RATE: 81 BPM | TEMPERATURE: 96.8 F | BODY MASS INDEX: 42.14 KG/M2 | DIASTOLIC BLOOD PRESSURE: 72 MMHG | OXYGEN SATURATION: 98 % | RESPIRATION RATE: 18 BRPM | WEIGHT: 229 LBS | SYSTOLIC BLOOD PRESSURE: 128 MMHG

## 2019-04-17 DIAGNOSIS — Z12.11 COLON CANCER SCREENING: ICD-10-CM

## 2019-04-17 LAB
GLUCOSE SERPL-MCNC: 133 MG/DL (ref 65–140)
GLUCOSE SERPL-MCNC: 149 MG/DL (ref 65–140)

## 2019-04-17 PROCEDURE — 88305 TISSUE EXAM BY PATHOLOGIST: CPT | Performed by: PATHOLOGY

## 2019-04-17 PROCEDURE — NC001 PR NO CHARGE: Performed by: INTERNAL MEDICINE

## 2019-04-17 PROCEDURE — 45380 COLONOSCOPY AND BIOPSY: CPT | Performed by: INTERNAL MEDICINE

## 2019-04-17 PROCEDURE — 82948 REAGENT STRIP/BLOOD GLUCOSE: CPT

## 2019-04-17 RX ORDER — LIDOCAINE HYDROCHLORIDE 10 MG/ML
INJECTION, SOLUTION INFILTRATION; PERINEURAL AS NEEDED
Status: DISCONTINUED | OUTPATIENT
Start: 2019-04-17 | End: 2019-04-17 | Stop reason: SURG

## 2019-04-17 RX ORDER — PROPOFOL 10 MG/ML
INJECTION, EMULSION INTRAVENOUS AS NEEDED
Status: DISCONTINUED | OUTPATIENT
Start: 2019-04-17 | End: 2019-04-17 | Stop reason: SURG

## 2019-04-17 RX ORDER — SODIUM CHLORIDE, SODIUM LACTATE, POTASSIUM CHLORIDE, CALCIUM CHLORIDE 600; 310; 30; 20 MG/100ML; MG/100ML; MG/100ML; MG/100ML
125 INJECTION, SOLUTION INTRAVENOUS CONTINUOUS
Status: DISCONTINUED | OUTPATIENT
Start: 2019-04-17 | End: 2019-04-17

## 2019-04-17 RX ORDER — LIDOCAINE HYDROCHLORIDE 10 MG/ML
0.5 INJECTION, SOLUTION EPIDURAL; INFILTRATION; INTRACAUDAL; PERINEURAL ONCE AS NEEDED
Status: DISCONTINUED | OUTPATIENT
Start: 2019-04-17 | End: 2019-04-17 | Stop reason: HOSPADM

## 2019-04-17 RX ADMIN — PROPOFOL 50 MG: 10 INJECTION, EMULSION INTRAVENOUS at 07:38

## 2019-04-17 RX ADMIN — PROPOFOL 50 MG: 10 INJECTION, EMULSION INTRAVENOUS at 07:34

## 2019-04-17 RX ADMIN — PROPOFOL 50 MG: 10 INJECTION, EMULSION INTRAVENOUS at 07:41

## 2019-04-17 RX ADMIN — PROPOFOL 50 MG: 10 INJECTION, EMULSION INTRAVENOUS at 07:31

## 2019-04-17 RX ADMIN — SODIUM CHLORIDE, SODIUM LACTATE, POTASSIUM CHLORIDE, AND CALCIUM CHLORIDE 125 ML/HR: .6; .31; .03; .02 INJECTION, SOLUTION INTRAVENOUS at 07:17

## 2019-04-17 RX ADMIN — PROPOFOL 50 MG: 10 INJECTION, EMULSION INTRAVENOUS at 07:25

## 2019-04-17 RX ADMIN — LIDOCAINE HYDROCHLORIDE 20 MG: 10 INJECTION, SOLUTION INFILTRATION; PERINEURAL at 07:25

## 2019-04-17 RX ADMIN — PROPOFOL 50 MG: 10 INJECTION, EMULSION INTRAVENOUS at 07:28

## 2019-04-17 RX ADMIN — PROPOFOL 50 MG: 10 INJECTION, EMULSION INTRAVENOUS at 07:27

## 2019-05-08 ENCOUNTER — OFFICE VISIT (OUTPATIENT)
Dept: INTERNAL MEDICINE CLINIC | Facility: CLINIC | Age: 49
End: 2019-05-08
Payer: COMMERCIAL

## 2019-05-08 VITALS
WEIGHT: 230.38 LBS | HEART RATE: 87 BPM | BODY MASS INDEX: 42.4 KG/M2 | HEIGHT: 62 IN | OXYGEN SATURATION: 97 % | DIASTOLIC BLOOD PRESSURE: 80 MMHG | SYSTOLIC BLOOD PRESSURE: 120 MMHG | TEMPERATURE: 97 F

## 2019-05-08 DIAGNOSIS — E78.00 HYPERCHOLESTEROLEMIA: ICD-10-CM

## 2019-05-08 DIAGNOSIS — E66.01 MORBID OBESITY WITH BMI OF 40.0-44.9, ADULT (HCC): ICD-10-CM

## 2019-05-08 DIAGNOSIS — K21.9 GASTROESOPHAGEAL REFLUX DISEASE WITHOUT ESOPHAGITIS: ICD-10-CM

## 2019-05-08 DIAGNOSIS — E11.3293 TYPE 2 DIABETES MELLITUS WITH BOTH EYES AFFECTED BY MILD NONPROLIFERATIVE RETINOPATHY WITHOUT MACULAR EDEMA, WITHOUT LONG-TERM CURRENT USE OF INSULIN (HCC): Primary | ICD-10-CM

## 2019-05-08 DIAGNOSIS — L57.0 ACTINIC KERATOSIS: ICD-10-CM

## 2019-05-08 DIAGNOSIS — K86.89 PANCREATIC INSUFFICIENCY: ICD-10-CM

## 2019-05-08 DIAGNOSIS — E55.9 VITAMIN D DEFICIENCY: ICD-10-CM

## 2019-05-08 DIAGNOSIS — Z13.29 SCREENING FOR HYPOTHYROIDISM: ICD-10-CM

## 2019-05-08 DIAGNOSIS — B07.0 PLANTAR WART: ICD-10-CM

## 2019-05-08 PROBLEM — H69.82 EUSTACHIAN TUBE DYSFUNCTION, LEFT: Status: RESOLVED | Noted: 2019-04-16 | Resolved: 2019-05-08

## 2019-05-08 PROBLEM — H69.92 EUSTACHIAN TUBE DYSFUNCTION, LEFT: Status: RESOLVED | Noted: 2019-04-16 | Resolved: 2019-05-08

## 2019-05-08 PROCEDURE — 99214 OFFICE O/P EST MOD 30 MIN: CPT | Performed by: PHYSICIAN ASSISTANT

## 2019-05-14 DIAGNOSIS — E11.9 TYPE 2 DIABETES MELLITUS WITHOUT COMPLICATION, WITHOUT LONG-TERM CURRENT USE OF INSULIN (HCC): ICD-10-CM

## 2019-05-14 RX ORDER — METFORMIN HYDROCHLORIDE 500 MG/1
TABLET, EXTENDED RELEASE ORAL
Qty: 120 TABLET | Refills: 5 | Status: SHIPPED | OUTPATIENT
Start: 2019-05-14 | End: 2019-06-11

## 2019-05-27 DIAGNOSIS — K21.9 GASTROESOPHAGEAL REFLUX DISEASE WITHOUT ESOPHAGITIS: ICD-10-CM

## 2019-05-28 ENCOUNTER — TELEPHONE (OUTPATIENT)
Dept: GASTROENTEROLOGY | Facility: AMBULARY SURGERY CENTER | Age: 49
End: 2019-05-28

## 2019-05-28 RX ORDER — RANITIDINE 150 MG/1
TABLET ORAL
Qty: 60 TABLET | Refills: 2 | Status: SHIPPED | OUTPATIENT
Start: 2019-05-28 | End: 2019-08-26 | Stop reason: SDUPTHER

## 2019-05-29 ENCOUNTER — APPOINTMENT (OUTPATIENT)
Dept: LAB | Facility: CLINIC | Age: 49
End: 2019-05-29
Payer: COMMERCIAL

## 2019-05-29 DIAGNOSIS — E11.3293 TYPE 2 DIABETES MELLITUS WITH BOTH EYES AFFECTED BY MILD NONPROLIFERATIVE RETINOPATHY WITHOUT MACULAR EDEMA, WITHOUT LONG-TERM CURRENT USE OF INSULIN (HCC): ICD-10-CM

## 2019-05-29 LAB
25(OH)D3 SERPL-MCNC: 31.7 NG/ML (ref 30–100)
CHOLEST SERPL-MCNC: 171 MG/DL (ref 50–200)
HDLC SERPL-MCNC: 44 MG/DL (ref 40–60)
LDLC SERPL CALC-MCNC: 98 MG/DL (ref 0–100)
TRIGL SERPL-MCNC: 144 MG/DL
TSH SERPL DL<=0.05 MIU/L-ACNC: 2.21 UIU/ML (ref 0.36–3.74)

## 2019-05-29 PROCEDURE — 84443 ASSAY THYROID STIM HORMONE: CPT | Performed by: PHYSICIAN ASSISTANT

## 2019-05-29 PROCEDURE — 36415 COLL VENOUS BLD VENIPUNCTURE: CPT | Performed by: PHYSICIAN ASSISTANT

## 2019-05-29 PROCEDURE — 82306 VITAMIN D 25 HYDROXY: CPT | Performed by: PHYSICIAN ASSISTANT

## 2019-05-29 PROCEDURE — 80061 LIPID PANEL: CPT | Performed by: PHYSICIAN ASSISTANT

## 2019-05-29 PROCEDURE — 83036 HEMOGLOBIN GLYCOSYLATED A1C: CPT

## 2019-05-30 LAB
EST. AVERAGE GLUCOSE BLD GHB EST-MCNC: 194 MG/DL
HBA1C MFR BLD: 8.4 % (ref 4.2–6.3)

## 2019-06-01 DIAGNOSIS — E11.65 TYPE 2 DIABETES MELLITUS WITH HYPERGLYCEMIA, WITHOUT LONG-TERM CURRENT USE OF INSULIN (HCC): Primary | ICD-10-CM

## 2019-06-03 ENCOUNTER — TELEPHONE (OUTPATIENT)
Dept: INTERNAL MEDICINE CLINIC | Facility: CLINIC | Age: 49
End: 2019-06-03

## 2019-06-10 DIAGNOSIS — I10 ESSENTIAL HYPERTENSION: ICD-10-CM

## 2019-06-10 PROCEDURE — 4010F ACE/ARB THERAPY RXD/TAKEN: CPT | Performed by: PHYSICIAN ASSISTANT

## 2019-06-10 RX ORDER — LISINOPRIL 2.5 MG/1
TABLET ORAL
Qty: 90 TABLET | Refills: 1 | Status: SHIPPED | OUTPATIENT
Start: 2019-06-10 | End: 2019-12-09 | Stop reason: SDUPTHER

## 2019-06-11 ENCOUNTER — OFFICE VISIT (OUTPATIENT)
Dept: INTERNAL MEDICINE CLINIC | Facility: CLINIC | Age: 49
End: 2019-06-11
Payer: COMMERCIAL

## 2019-06-11 VITALS
DIASTOLIC BLOOD PRESSURE: 78 MMHG | HEIGHT: 62 IN | OXYGEN SATURATION: 98 % | WEIGHT: 229 LBS | SYSTOLIC BLOOD PRESSURE: 114 MMHG | RESPIRATION RATE: 16 BRPM | TEMPERATURE: 97.3 F | HEART RATE: 90 BPM | BODY MASS INDEX: 42.14 KG/M2

## 2019-06-11 DIAGNOSIS — Z01.419 ENCOUNTER FOR GYNECOLOGICAL EXAMINATION WITHOUT ABNORMAL FINDING: Primary | ICD-10-CM

## 2019-06-11 DIAGNOSIS — Z12.4 SCREENING FOR CERVICAL CANCER: ICD-10-CM

## 2019-06-11 DIAGNOSIS — R10.2 PELVIC PAIN IN FEMALE: ICD-10-CM

## 2019-06-11 PROCEDURE — 99396 PREV VISIT EST AGE 40-64: CPT | Performed by: PHYSICIAN ASSISTANT

## 2019-06-11 PROCEDURE — G0145 SCR C/V CYTO,THINLAYER,RESCR: HCPCS | Performed by: PHYSICIAN ASSISTANT

## 2019-06-11 PROCEDURE — 99214 OFFICE O/P EST MOD 30 MIN: CPT | Performed by: PHYSICIAN ASSISTANT

## 2019-06-11 PROCEDURE — 3008F BODY MASS INDEX DOCD: CPT | Performed by: PHYSICIAN ASSISTANT

## 2019-06-11 PROCEDURE — 1036F TOBACCO NON-USER: CPT | Performed by: PHYSICIAN ASSISTANT

## 2019-06-18 LAB
LAB AP GYN PRIMARY INTERPRETATION: NORMAL
LAB AP LMP: NORMAL
Lab: NORMAL

## 2019-06-20 ENCOUNTER — HOSPITAL ENCOUNTER (OUTPATIENT)
Dept: ULTRASOUND IMAGING | Facility: HOSPITAL | Age: 49
Discharge: HOME | End: 2019-06-20
Payer: COMMERCIAL

## 2019-06-20 DIAGNOSIS — R10.2 PELVIC PAIN IN FEMALE: ICD-10-CM

## 2019-06-20 PROCEDURE — 76830 TRANSVAGINAL US NON-OB: CPT

## 2019-06-20 PROCEDURE — 76856 US EXAM PELVIC COMPLETE: CPT

## 2019-06-25 DIAGNOSIS — N85.9 DISORDER OF ENDOMETRIUM: Primary | ICD-10-CM

## 2019-07-13 DIAGNOSIS — E11.69 HYPERLIPIDEMIA ASSOCIATED WITH TYPE 2 DIABETES MELLITUS (HCC): ICD-10-CM

## 2019-07-13 DIAGNOSIS — E78.5 HYPERLIPIDEMIA ASSOCIATED WITH TYPE 2 DIABETES MELLITUS (HCC): ICD-10-CM

## 2019-07-13 RX ORDER — PRAVASTATIN SODIUM 20 MG
TABLET ORAL
Qty: 90 TABLET | Refills: 1 | Status: SHIPPED | OUTPATIENT
Start: 2019-07-13 | End: 2019-07-26 | Stop reason: SDUPTHER

## 2019-07-26 DIAGNOSIS — E11.69 HYPERLIPIDEMIA ASSOCIATED WITH TYPE 2 DIABETES MELLITUS (HCC): ICD-10-CM

## 2019-07-26 DIAGNOSIS — E78.5 HYPERLIPIDEMIA ASSOCIATED WITH TYPE 2 DIABETES MELLITUS (HCC): ICD-10-CM

## 2019-07-27 RX ORDER — PRAVASTATIN SODIUM 20 MG
20 TABLET ORAL DAILY
Qty: 90 TABLET | Refills: 1 | Status: SHIPPED | OUTPATIENT
Start: 2019-07-27 | End: 2020-07-29 | Stop reason: SDUPTHER

## 2019-07-30 DIAGNOSIS — E11.65 TYPE 2 DIABETES MELLITUS WITH HYPERGLYCEMIA, WITHOUT LONG-TERM CURRENT USE OF INSULIN (HCC): ICD-10-CM

## 2019-07-30 RX ORDER — SITAGLIPTIN 100 MG/1
TABLET, FILM COATED ORAL
Qty: 30 TABLET | Refills: 5 | Status: SHIPPED | OUTPATIENT
Start: 2019-07-30 | End: 2019-08-13

## 2019-08-13 ENCOUNTER — OFFICE VISIT (OUTPATIENT)
Dept: INTERNAL MEDICINE CLINIC | Facility: CLINIC | Age: 49
End: 2019-08-13
Payer: COMMERCIAL

## 2019-08-13 VITALS
DIASTOLIC BLOOD PRESSURE: 70 MMHG | HEART RATE: 88 BPM | SYSTOLIC BLOOD PRESSURE: 92 MMHG | TEMPERATURE: 98.3 F | RESPIRATION RATE: 16 BRPM | HEIGHT: 62 IN | BODY MASS INDEX: 42.33 KG/M2 | WEIGHT: 230 LBS

## 2019-08-13 DIAGNOSIS — H66.002 NON-RECURRENT ACUTE SUPPURATIVE OTITIS MEDIA OF LEFT EAR WITHOUT SPONTANEOUS RUPTURE OF TYMPANIC MEMBRANE: ICD-10-CM

## 2019-08-13 DIAGNOSIS — E11.65 TYPE 2 DIABETES MELLITUS WITH HYPERGLYCEMIA, WITHOUT LONG-TERM CURRENT USE OF INSULIN (HCC): Primary | ICD-10-CM

## 2019-08-13 DIAGNOSIS — R10.2 PELVIC PAIN IN FEMALE: ICD-10-CM

## 2019-08-13 PROBLEM — M62.838 SPASM OF LEFT PIRIFORMIS MUSCLE: Status: RESOLVED | Noted: 2018-06-01 | Resolved: 2019-08-13

## 2019-08-13 LAB — SL AMB POCT HEMOGLOBIN AIC: 7.6 (ref ?–6.5)

## 2019-08-13 PROCEDURE — 83036 HEMOGLOBIN GLYCOSYLATED A1C: CPT | Performed by: PHYSICIAN ASSISTANT

## 2019-08-13 PROCEDURE — 99214 OFFICE O/P EST MOD 30 MIN: CPT | Performed by: PHYSICIAN ASSISTANT

## 2019-08-13 RX ORDER — AMOXICILLIN 500 MG/1
500 CAPSULE ORAL EVERY 8 HOURS SCHEDULED
Qty: 30 CAPSULE | Refills: 0 | Status: SHIPPED | OUTPATIENT
Start: 2019-08-13 | End: 2019-08-23

## 2019-08-13 NOTE — ASSESSMENT & PLAN NOTE
Lab Results   Component Value Date    HGBA1C 8 4 (H) 05/29/2019       No results for input(s): POCGLU in the last 72 hours  Blood Sugar Average: Last 72 hrs:     Sugars improving since starting ozempic  She tolerates this well  Will increase to 1mg to see if we can achieve goal of A1C under 7

## 2019-08-13 NOTE — PROGRESS NOTES
Assessment/Plan:  Problem List Items Addressed This Visit        Endocrine    Type 2 diabetes mellitus ([facility] ) - Primary     Lab Results   Component Value Date    HGBA1C 8 4 (H) 05/29/2019       No results for input(s): POCGLU in the last 72 hours  Blood Sugar Average: Last 72 hrs:     Sugars improving since starting ozempic  She tolerates this well  Will increase to 1mg to see if we can achieve goal of A1C under 7  Relevant Medications    metFORMIN (GLUCOPHAGE) 1000 MG tablet    Semaglutide (OZEMPIC) 0 25 or 0 5 MG/DOSE SOPN    Other Relevant Orders    Ambulatory referral to Ophthalmology    POCT hemoglobin A1c       Nervous and Auditory    Non-recurrent acute suppurative otitis media of left ear without spontaneous rupture of tympanic membrane     Start amoxil  Dry ear precautions  Directions for use and possible side effects discussed and patient verbalized understanding of these  Relevant Medications    amoxicillin (AMOXIL) 500 mg capsule       Other    Pelvic pain in female     U/S reviewed and pt has appt with gyn regarding opinion for possible endometrial bx  Diagnoses and all orders for this visit:    Type 2 diabetes mellitus with hyperglycemia, without long-term current use of insulin ([place] [id number] )  -     Ambulatory referral to Ophthalmology; Future  -     POCT hemoglobin A1c  -     Semaglutide (OZEMPIC) 0 25 or 0 5 MG/DOSE SOPN; Inject 1 mg under the skin once a week    Non-recurrent acute suppurative otitis media of left ear without spontaneous rupture of tympanic membrane  -     amoxicillin (AMOXIL) 500 mg capsule; Take 1 capsule (500 mg total) by mouth every 8 (eight) hours for 10 days    Pelvic pain in female    Other orders  -     metFORMIN (GLUCOPHAGE) 1000 MG tablet; Take 1,000 mg by mouth 2 (two) times a day with meals        Pelvic pain in female  U/S reviewed and pt has appt with gyn regarding opinion for possible endometrial bx       Non-recurrent acute suppurative otitis media of left ear without spontaneous rupture of tympanic membrane  Start amoxil  Dry ear precautions  Directions for use and possible side effects discussed and patient verbalized understanding of these  Type 2 diabetes mellitus (HCC)  Lab Results   Component Value Date    HGBA1C 8 4 (H) 2019       No results for input(s): POCGLU in the last 72 hours  Blood Sugar Average: Last 72 hrs:     Sugars improving since starting ozempic  She tolerates this well  Will increase to 1mg to see if we can achieve goal of A1C under 7  Subjective:      Patient ID: Carl Mcdaniel is a 46 y o  female  Pt presents for routine visit  She is due for an A1C and diabetic eye exam  At the last visit she was started on ozempic as her A1C was 8 4  Today it has come down nicely to 7 6  She is up to date on mammogram and CRC Screening  BP is well controlled  She complains of left ear pain  She has been swimming a lot and notes associated decreased hearing and feeling like her ears need to pop  We reviewed recent pelvic u/s which showed an abnormal endometrium with recommendation for possible endometrial bx  She has appt in 2 weeks  The following portions of the patient's history were reviewed and updated as appropriate:   She has a past medical history of Anxiety, Arthritis, Asthma, Cancer ([address] ), Depression, Diabetes mellitus ([address] ), Diverticulitis, GERD (gastroesophageal reflux disease), Hyperlipidemia associated with type 2 diabetes mellitus ([address] ), Hypertension, Insulinoma, Non-recurrent acute suppurative otitis media of left ear without spontaneous rupture of tympanic membrane (2019), Pulmonary emboli ([address] ), and Sleep apnea  ,  does not have any pertinent problems on file  ,   has a past surgical history that includes Pancreatectomy;  section; Knee surgery; Knee arthroscopy (Left); pr lap,cholecystectomy (N/A, 2018);  Cholecystectomy; and pr colonoscopy flx dx w/collj spec when pfrmd (N/A, 4/17/2019)  ,  family history includes Breast cancer in her brother; Diabetes in her maternal grandmother and paternal grandmother; Heart disease in her mother; Hyperlipidemia in her mother; Hypertension in her mother; Obesity in her mother; Osteoporosis in her family; Other in her maternal grandmother and mother; Ovarian cancer in her mother; Substance Abuse in her family and father; Tuberculosis in her maternal aunt and mother  ,   reports that she has quit smoking  She has never used smokeless tobacco  She reports that she does not drink alcohol or use drugs  ,  is allergic to iodine and nuts     Current Outpatient Medications   Medication Sig Dispense Refill    albuterol (VENTOLIN HFA) 90 mcg/act inhaler Inhale 2 puffs every 4 (four) hours as needed for shortness of breath 3 Inhaler 1    ARIPiprazole (ABILIFY) 20 MG tablet Take 1 tablet by mouth daily      glucose blood test strip by In Vitro route daily      Lancets Misc  (ACCU-CHEK MULTICLIX LANCET DEV) KIT by Does not apply route      lisinopril (ZESTRIL) 2 5 mg tablet take 1 tablet by mouth once daily 90 tablet 1    loratadine (CLARITIN) 10 mg tablet Take 1 tablet (10 mg total) by mouth daily 30 tablet 5    meloxicam (MOBIC) 15 mg tablet Take 1 tablet (15 mg total) by mouth daily 30 tablet 5    metFORMIN (GLUCOPHAGE) 1000 MG tablet Take 1,000 mg by mouth 2 (two) times a day with meals      Multiple Vitamins-Minerals (MULTI ADULT GUMMIES PO) Take by mouth      pravastatin (PRAVACHOL) 20 mg tablet Take 1 tablet (20 mg total) by mouth daily 90 tablet 1    ranitidine (ZANTAC) 150 mg tablet take 1 tablet by mouth twice a day 60 tablet 2    Semaglutide (OZEMPIC) 0 25 or 0 5 MG/DOSE SOPN Inject 1 mg under the skin once a week 2 pen 2    sitaGLIPtin (JANUVIA) 100 mg tablet Take 100 mg by mouth daily      venlafaxine (EFFEXOR-XR) 150 mg 24 hr capsule Take 150 mg by mouth daily        amoxicillin (AMOXIL) 500 mg capsule Take 1 capsule (500 mg total) by mouth every 8 (eight) hours for 10 days 30 capsule 0    EPINEPHrine (EPIPEN) 0 3 mg/0 3 mL SOAJ Inject 0 3 mL (0 3 mg total) into the shoulder, thigh, or buttocks once for 1 dose 0 3 mL 2     No current facility-administered medications for this visit  Review of Systems   Constitutional: Negative for chills and fever  HENT: Positive for ear pain  Negative for congestion, hearing loss, postnasal drip, rhinorrhea, sinus pressure, sinus pain, sore throat and trouble swallowing  Eyes: Negative for pain and visual disturbance  Respiratory: Negative for cough, chest tightness, shortness of breath and wheezing  Cardiovascular: Negative  Negative for chest pain, palpitations and leg swelling  Gastrointestinal: Negative for abdominal pain, blood in stool, constipation, diarrhea, nausea and vomiting  Endocrine: Negative for cold intolerance, heat intolerance, polydipsia, polyphagia and polyuria  Genitourinary: Negative for difficulty urinating, dysuria, flank pain and urgency  Musculoskeletal: Negative for arthralgias, back pain, gait problem and myalgias  Skin: Negative for rash  Allergic/Immunologic: Negative  Neurological: Negative for dizziness, weakness, light-headedness and headaches  Hematological: Negative  Psychiatric/Behavioral: Negative for behavioral problems, dysphoric mood and sleep disturbance  The patient is not nervous/anxious  Objective:  Vitals:    08/13/19 0852   BP: 92/70   Pulse: 88   Resp: 16   Temp: 98 3 °F (36 8 °C)   TempSrc: Tympanic   Weight: 104 kg (230 lb)   Height: 5' 2" (1 575 m)     Body mass index is 42 07 kg/m²  Physical Exam   Constitutional: She is oriented to person, place, and time  She appears well-developed and well-nourished  No distress  HENT:   Head: Normocephalic and atraumatic  Right Ear: External ear normal    Left Ear: External ear normal  Tympanic membrane is injected  A middle ear effusion is present   Decreased hearing is noted    Nose: Nose normal    Mouth/Throat: Oropharynx is clear and moist  No oropharyngeal exudate  Eyes: Pupils are equal, round, and reactive to light  Conjunctivae and EOM are normal  Right eye exhibits no discharge  Left eye exhibits no discharge  No scleral icterus  Neck: Normal range of motion  Neck supple  No thyromegaly present  Cardiovascular: Normal rate, regular rhythm and normal heart sounds  Exam reveals no gallop and no friction rub  No murmur heard  Pulmonary/Chest: Effort normal and breath sounds normal  No respiratory distress  She has no wheezes  She has no rales  Abdominal: Soft  Bowel sounds are normal  She exhibits no distension  There is no tenderness  Musculoskeletal: Normal range of motion  She exhibits no edema, tenderness or deformity  Neurological: She is alert and oriented to person, place, and time  No cranial nerve deficit  Skin: Skin is warm and dry  She is not diaphoretic  Psychiatric: She has a normal mood and affect   Her behavior is normal  Judgment and thought content normal

## 2019-08-13 NOTE — ASSESSMENT & PLAN NOTE
Start amoxil  Dry ear precautions  Directions for use and possible side effects discussed and patient verbalized understanding of these

## 2019-08-23 DIAGNOSIS — E11.65 TYPE 2 DIABETES MELLITUS WITH HYPERGLYCEMIA, WITHOUT LONG-TERM CURRENT USE OF INSULIN (HCC): Primary | ICD-10-CM

## 2019-08-26 DIAGNOSIS — K21.9 GASTROESOPHAGEAL REFLUX DISEASE WITHOUT ESOPHAGITIS: ICD-10-CM

## 2019-08-26 RX ORDER — RANITIDINE 150 MG/1
TABLET ORAL
Qty: 60 TABLET | Refills: 2 | Status: SHIPPED | OUTPATIENT
Start: 2019-08-26 | End: 2019-11-22 | Stop reason: SDUPTHER

## 2019-08-29 ENCOUNTER — OFFICE VISIT (OUTPATIENT)
Dept: GASTROENTEROLOGY | Facility: HOSPITAL | Age: 49
End: 2019-08-29
Payer: COMMERCIAL

## 2019-08-29 VITALS
TEMPERATURE: 97.6 F | BODY MASS INDEX: 41.99 KG/M2 | DIASTOLIC BLOOD PRESSURE: 68 MMHG | HEIGHT: 62 IN | SYSTOLIC BLOOD PRESSURE: 123 MMHG | HEART RATE: 105 BPM | WEIGHT: 228.2 LBS

## 2019-08-29 DIAGNOSIS — Z98.890 HISTORY OF PANCREATIC SURGERY: ICD-10-CM

## 2019-08-29 DIAGNOSIS — R10.84 GENERALIZED ABDOMINAL PAIN: ICD-10-CM

## 2019-08-29 DIAGNOSIS — R19.7 DIARRHEA, UNSPECIFIED TYPE: Primary | ICD-10-CM

## 2019-08-29 DIAGNOSIS — Z90.49 HISTORY OF CHOLECYSTECTOMY: ICD-10-CM

## 2019-08-29 PROCEDURE — 99214 OFFICE O/P EST MOD 30 MIN: CPT | Performed by: PHYSICIAN ASSISTANT

## 2019-08-29 NOTE — PROGRESS NOTES
[facility] - Outpatient Follow-up Note  Nilam Rm 46 y o  female MRN: [id number]  Encounter: [id number]          ASSESSMENT AND PLAN:    1  Chronic diarrhea  2  History of partial pancreatectomy  3  History of cholecystectomy  4  Abdominal pain   -she has a long history of chronic loose stools, she states she has 2-3 bowel movements per day  She believes this 1st began after her pancreatic surgery in 1990    -she has abdominal pain and urgency associated with her diarrhea, she states that abdominal pain resolves after bowel movement    -she has been treated for IBS including with tricyclic antidepressants without any improvement her symptoms  She did not find a low FODMAP diet to be helpful    -she did have a cholecystectomy last August but she believes her symptoms predated this    -she had a colonoscopy in April that was normal, random colon biopsies were negative for microscopic colitis  -recent TSH within normal limits   -given the duration of her symptoms and no recent worsening, unlikely infectious cause   -will check pancreatic elastase to evaluate for pancreatic insufficiency  If this is positive, we can start Creon and titrate as necessary  If this is negative, we could try Questran to see if this helps with her diarrhea    -follow-up in the office in a few months to see how she is doing    ____________________________________________________________    SUBJECTIVE:    68-year-old female past medical history of insulinoma status post partial pancreatectomy, type 2 diabetes, asthma and ANSHUL, hypertension, pulmonary emboli, anxiety and depression presents to the office for diarrhea  She reports she has been having diarrhea with about 2 bowel movements per day since 1990  She states she had a partial pancreatectomy that year for insulinomas and has had loose stool since  She states that the diarrhea is associated with urgency and crampy abdominal pain    She did have a cholecystectomy last August but states the diarrhea preceded this  She has had treatments for IBS, she has tried a low FODMAP diet, Bentyl, probiotics and tricyclic antidepressants without success  She does have a history of reflux and takes Zantac 150 milligrams as needed for this  She states she does get occasional nausea, she denies any vomiting  She denies any fevers, chills, change in appetite, unintended weight loss, difficulty swallowing, early satiety, hematochezia, melena or jaundice  She had a colonoscopy in April that was normal, random colon biopsies were negative for microscopic colitis  REVIEW OF SYSTEMS IS OTHERWISE NEGATIVE        Historical Information   Past Medical History:   Diagnosis Date    Anxiety     Arthritis     Asthma     Cancer ([facility] [address] )     renal,cervical    Depression     [facility]     Diverticulitis     GERD (gastroesophageal reflux disease)     Hyperlipidemia associated with type 2 diabetes mellitus (HCC)     Hypertension     Insulinoma     Non-recurrent acute suppurative otitis media of left ear without spontaneous rupture of tympanic membrane 2019    Pulmonary emboli (HCC)     Sleep apnea      Past Surgical History:   Procedure Laterality Date     SECTION      twice    CHOLECYSTECTOMY      KNEE ARTHROSCOPY Left     KNEE SURGERY      PANCREATECTOMY      Partial     [place] COLONOSCOPY FLX DX W/COLLJ SPEC WHEN PFRMD N/A 2019    Procedure: COLONOSCOPY;  Surgeon: Ernie Freed DO;  [facility]: [facility];  Service: Gastroenterology    [place] LAP,CHOLECYSTECTOMY N/A 2018    Procedure: CHOLECYSTECTOMY LAPAROSCOPIC;  Surgeon: Zamzam Brothers DO;  Location: [facility];  Service: General     Social History   Social History     Substance and Sexual Activity   Alcohol Use No     Social History     Substance and Sexual Activity   Drug Use No     Social History     Tobacco Use   Smoking Status Former Smoker   Smokeless Tobacco Never Used Tobacco Comment    quit 6 yrs ago     Family History   Problem Relation Age of Onset    Heart disease Mother     Hyperlipidemia Mother     Obesity Mother     Hypertension Mother    Lui Art Other Mother         Bundle branch block    Ovarian cancer Mother     Tuberculosis Mother     Substance Abuse Father     Breast cancer Brother     Other Maternal Grandmother         ascending aortic aneurysm resection    Diabetes Maternal Grandmother     Diabetes Paternal Grandmother     Tuberculosis Maternal Aunt     Substance Abuse Family     Osteoporosis Family        Meds/Allergies       Current Outpatient Medications:     albuterol (VENTOLIN HFA) 90 mcg/act inhaler    ARIPiprazole (ABILIFY) 20 MG tablet    EPINEPHrine (EPIPEN) 0 3 mg/0 3 mL SOAJ    glucose blood test strip    Lancets Misc  (ACCU-CHEK MULTICLIX LANCET DEV) KIT    lisinopril (ZESTRIL) 2 5 mg tablet    loratadine (CLARITIN) 10 mg tablet    meloxicam (MOBIC) 15 mg tablet    metFORMIN (GLUCOPHAGE) 1000 MG tablet    Multiple Vitamins-Minerals (MULTI ADULT GUMMIES PO)    pravastatin (PRAVACHOL) 20 mg tablet    ranitidine (ZANTAC) 150 mg tablet    Semaglutide (OZEMPIC) 1 MG/DOSE SOPN    sitaGLIPtin (JANUVIA) 100 mg tablet    venlafaxine (EFFEXOR-XR) 150 mg 24 hr capsule    Allergies   Allergen Reactions    Iodine Anaphylaxis     IVP DYE     Nuts Anaphylaxis     Annotation - [id number]: Pine nuts           Objective     Blood pressure 123/68, pulse 105, temperature 97 6 °F (36 4 °C), temperature source Tympanic, height 5' 2" (1 575 m), weight 104 kg (228 lb 3 2 oz)  PHYSICAL EXAM:      Physical Exam   Constitutional: She is oriented to person, place, and time  She appears well-developed  No distress  Obese   HENT:   Head: Normocephalic and atraumatic  Eyes: Right eye exhibits no discharge  Left eye exhibits no discharge  No scleral icterus  Neck: Neck supple  No tracheal deviation present     Cardiovascular: Normal rate, regular rhythm and normal heart sounds  Exam reveals no gallop and no friction rub  No murmur heard  Pulmonary/Chest: Effort normal  No respiratory distress  She has no wheezes  She has no rales  Abdominal: Soft  Bowel sounds are normal  She exhibits no distension  There is no tenderness  There is no rebound and no guarding  Neurological: She is alert and oriented to person, place, and time  Skin: Skin is warm and dry  Psychiatric: She has a normal mood and affect  Lab Results:   No visits with results within 1 Day(s) from this visit  Latest known visit with results is:   Office Visit on 08/13/2019   Component Date Value    Hemoglobin A1C 08/13/2019 7 6*         Radiology Results:   No results found

## 2019-08-30 ENCOUNTER — OFFICE VISIT (OUTPATIENT)
Dept: OBGYN CLINIC | Facility: CLINIC | Age: 49
End: 2019-08-30
Payer: COMMERCIAL

## 2019-08-30 VITALS
HEIGHT: 62 IN | BODY MASS INDEX: 42.03 KG/M2 | WEIGHT: 228.4 LBS | SYSTOLIC BLOOD PRESSURE: 104 MMHG | DIASTOLIC BLOOD PRESSURE: 78 MMHG

## 2019-08-30 DIAGNOSIS — R10.2 PELVIC PAIN IN FEMALE: ICD-10-CM

## 2019-08-30 DIAGNOSIS — N92.6 IRREGULAR MENSES: Primary | ICD-10-CM

## 2019-08-30 DIAGNOSIS — R93.89 ABNORMAL FINDING ON IMAGING: ICD-10-CM

## 2019-08-30 PROCEDURE — 88305 TISSUE EXAM BY PATHOLOGIST: CPT | Performed by: PATHOLOGY

## 2019-08-30 PROCEDURE — 58100 BIOPSY OF UTERUS LINING: CPT | Performed by: OBSTETRICS & GYNECOLOGY

## 2019-08-30 PROCEDURE — 99203 OFFICE O/P NEW LOW 30 MIN: CPT | Performed by: OBSTETRICS & GYNECOLOGY

## 2019-08-30 NOTE — PROGRESS NOTES
Endometrial biopsy  Date/Time: 8/30/2019 1:07 PM  Performed by: Deon Torres MD  Authorized by: Deon Torres MD     Consent:     Consent obtained:  Verbal and written    Consent given by:  Patient    Procedural risks discussed:  Bleeding, infection and repeat procedure    Patient questions answered: yes      Patient agrees, verbalizes understanding, and wants to proceed: yes      Instructions and paperwork completed: yes    Indication:     Indications: Other disorder of menstruation and other abnormal bleeding from female genital tract    Pre-procedure:     Pre-procedure timeout performed: yes    Procedure:     Procedure: endometrial biopsy with Pipelle      A bivalve speculum was placed in the vagina: yes      Uterus sounded: yes      Uterus sound depth (cm):  9    Curettes used:  1    Specimen collected: specimen collected and sent to pathology    Findings:     Uterus size:  Non-gravid    Cervix: normal      Adnexa: normal    Comments:       Will call patient with results

## 2019-08-30 NOTE — PROGRESS NOTES
Assessment Diagnoses and all orders for this visit:    Irregular menses    Pelvic pain in female  -     Tissue Exam    Abnormal finding on imaging         Plan  Today we reviewed US sent by PCP showing abnormal lining of Uterus - given irregular cycle and uncontrolled type 2 diabetes we discussed EMB (see procedure note)   We also reviewed her pelvic pain seems to be related more the MS origin as it starts in her back and radiates to groin and personal hx of fractured dics in the past     Subjective   Katie Dye is a 46 y o  female here for a problem visit  Patient was sent for a pelvic US by PCP secondary to hx of irregular cycles and pelvic pain   Patient states her pain starts in her back and radiates to her groin  Her cycles up to approx one or 2 years ago had been fairly regular until recently when she will at times skip one or 2 cycles   Patient Active Problem List   Diagnosis    Depression    Gastroesophageal reflux disease    Hypercholesterolemia    Insomnia    Type 2 diabetes mellitus ([facility] [address] )    Encounter for gynecological examination without abnormal finding    Pancreatic insufficiency    Actinic keratosis    Pelvic pain in female    Non-recurrent acute suppurative otitis media of left ear without spontaneous rupture of tympanic membrane       Gynecologic History  No LMP recorded   Patient is perimenopausal       Past Medical History:   Diagnosis Date    Anxiety     Arthritis     Asthma     Cancer ([address] )     renal,cervical    Depression     Diabetes mellitus ([address] )     Diverticulitis     GERD (gastroesophageal reflux disease)     Hyperlipidemia associated with type 2 diabetes mellitus ([address] )     Hypertension     Insulinoma     Non-recurrent acute suppurative otitis media of left ear without spontaneous rupture of tympanic membrane 2019    Pulmonary emboli (HCC)     Sleep apnea      Past Surgical History:   Procedure Laterality Date     SECTION      twice    CHOLECYSTECTOMY      KNEE ARTHROSCOPY Left     KNEE SURGERY      PANCREATECTOMY      Partial     [place] COLONOSCOPY FLX DX W/COLLJ SPEC WHEN PFRMD N/A 4/17/2019    Procedure: COLONOSCOPY;  Surgeon: Paramjit Randolph DO;  [facility];  Service: Gastroenterology    [place] LAP,CHOLECYSTECTOMY N/A 8/1/2018    Procedure: CHOLECYSTECTOMY LAPAROSCOPIC;  Surgeon: Deonte King DO;  [facility]: [facility];  Service: General     Family History   Problem Relation Age of Onset    Heart disease Mother     Hyperlipidemia Mother     Obesity Mother     Hypertension Mother    Yahaira Roles Other Mother         Bundle branch block    Ovarian cancer Mother     Tuberculosis Mother     Substance Abuse Father     Breast cancer Brother     Other Maternal Grandmother         ascending aortic aneurysm resection    Diabetes Maternal Grandmother     Diabetes Paternal Grandmother     Tuberculosis Maternal Aunt     Substance Abuse Family     Osteoporosis Family      Social History     Socioeconomic History    Marital status:      Spouse name: Not on file    Number of children: Not on file    Years of education: Not on file    Highest education level: Not on file   Occupational History    Occupation: UNEMPLOYED   Social Needs    Financial resource strain: Not on file    Food insecurity:     Worry: Not on file     Inability: Not on file    Transportation needs:     Medical: Not on file     Non-medical: Not on file   Tobacco Use    Smoking status: Former Smoker    Smokeless tobacco: Never Used    Tobacco comment: quit 6 yrs ago   Substance and Sexual Activity    Alcohol use: No    Drug use: No    Sexual activity: Not Currently   Lifestyle    Physical activity:     Days per week: Not on file     Minutes per session: Not on file    Stress: Not on file   Relationships    Social connections:     Talks on phone: Not on file     Gets together: Not on file     Attends Congregational service: Not on file     Active member of club or organization: Not on file     Attends meetings of clubs or organizations: Not on file     Relationship status: Not on file    Intimate partner violence:     Fear of current or ex partner: Not on file     Emotionally abused: Not on file     Physically abused: Not on file     Forced sexual activity: Not on file   Other Topics Concern    Not on file   Social History Narrative    Single-    Unemployed    Lives at home with mother     Caffeine use     Allergies   Allergen Reactions    Iodine Anaphylaxis     IVP DYE     Nuts Anaphylaxis     Annotation - [id number]: Pine nuts       Current Outpatient Medications:     albuterol (VENTOLIN HFA) 90 mcg/act inhaler, Inhale 2 puffs every 4 (four) hours as needed for shortness of breath, Disp: 3 Inhaler, Rfl: 1    ARIPiprazole (ABILIFY) 20 MG tablet, Take 1 tablet by mouth daily, Disp: , Rfl:     glucose blood test strip, by In Vitro route daily, Disp: , Rfl:     Lancets Misc  (ACCU-CHEK MULTICLIX LANCET DEV) KIT, by Does not apply route, Disp: , Rfl:     lisinopril (ZESTRIL) 2 5 mg tablet, take 1 tablet by mouth once daily, Disp: 90 tablet, Rfl: 1    loratadine (CLARITIN) 10 mg tablet, Take 1 tablet (10 mg total) by mouth daily, Disp: 30 tablet, Rfl: 5    meloxicam (MOBIC) 15 mg tablet, Take 1 tablet (15 mg total) by mouth daily, Disp: 30 tablet, Rfl: 5    metFORMIN (GLUCOPHAGE) 1000 MG tablet, Take 1,000 mg by mouth 2 (two) times a day with meals, Disp: , Rfl:     Multiple Vitamins-Minerals (MULTI ADULT GUMMIES PO), Take by mouth, Disp: , Rfl:     pravastatin (PRAVACHOL) 20 mg tablet, Take 1 tablet (20 mg total) by mouth daily, Disp: 90 tablet, Rfl: 1    ranitidine (ZANTAC) 150 mg tablet, take 1 tablet by mouth twice a day, Disp: 60 tablet, Rfl: 2    Semaglutide (OZEMPIC) 1 MG/DOSE SOPN, Inject 1 mg under the skin once a week, Disp: 1 5 mL, Rfl: 3    sitaGLIPtin (JANUVIA) 100 mg tablet, Take 100 mg by mouth daily, Disp: , Rfl:     venlafaxine (EFFEXOR-XR) 150 mg 24 hr capsule, Take 150 mg by mouth daily  , Disp: , Rfl:     EPINEPHrine (EPIPEN) 0 3 mg/0 3 mL SOAJ, Inject 0 3 mL (0 3 mg total) into the shoulder, thigh, or buttocks once for 1 dose, Disp: 0 3 mL, Rfl: 2    Review of Systems  Constitutional :no fever, feels well, no tiredness, no recent weight gain or loss  ENT: no ear ache, no loss of hearing, no nosebleeds or nasal discharge, no sore throat or hoarseness  Cardiovascular: no complaints of slow or fast heart beat, no chest pain, no palpitations, no leg claudication or lower extremity edema  Respiratory: no complaints of shortness of shortness of breath, no DALE  Breasts:no complaints of breast pain, breast lump, or nipple discharge  Gastrointestinal: no complaints of abdominal pain, constipation, nausea, vomiting, or diarrhea or bloody stools  Genitourinary :  as noted in HPI  Musculoskeletal: no complaints of arthralgia, no myalgia, no joint swelling or stiffness, no limb pain or swelling  Integumentary: no complaints of skin rash or lesion, itching or dry skin  Neurological: no complaints of headache, no confusion, no numbness or tingling, no dizziness or fainting     Objective     /78   Ht 5' 2" (1 575 m)   Wt 104 kg (228 lb 6 4 oz)   BMI 41 77 kg/m²     General:   appears stated age, cooperative, alert normal mood and affect   Abdomen: soft, non-tender, without masses or organomegaly   Vulva: normal   Vagina: normal vagina, no discharge, exudate, lesion, or erythema   Urethra: normal   Cervix: Normal, no discharge  Nontender  Uterus: normal size, contour, position, consistency, mobility, non-tender, limited exam secondary to habitus    Adnexa: no mass, fullness, tenderness   Lymphatic palpation of lymph nodes in neck, axilla, groin and/or other locations: no lymphadenopathy or masses noted   Skin normal skin turgor and no rashes     Psychiatric orientation to person, place, and time: normal  mood and affect: normal

## 2019-09-03 ENCOUNTER — APPOINTMENT (OUTPATIENT)
Dept: LAB | Facility: CLINIC | Age: 49
End: 2019-09-03
Payer: COMMERCIAL

## 2019-09-03 DIAGNOSIS — R19.7 DIARRHEA, UNSPECIFIED TYPE: ICD-10-CM

## 2019-09-03 PROCEDURE — 82656 EL-1 FECAL QUAL/SEMIQ: CPT

## 2019-09-05 LAB — ELASTASE PANC STL-MCNT: 485 UG ELAST./G

## 2019-09-06 DIAGNOSIS — Z90.49 POSTCHOLECYSTECTOMY DIARRHEA: Primary | ICD-10-CM

## 2019-09-06 DIAGNOSIS — R19.7 POSTCHOLECYSTECTOMY DIARRHEA: Primary | ICD-10-CM

## 2019-09-06 RX ORDER — CHOLESTYRAMINE 4 G/9G
4 POWDER, FOR SUSPENSION ORAL
Qty: 378 G | Refills: 3 | Status: SHIPPED | OUTPATIENT
Start: 2019-09-06 | End: 2019-12-27 | Stop reason: SDUPTHER

## 2019-09-30 DIAGNOSIS — M62.838 SPASM OF LEFT PIRIFORMIS MUSCLE: ICD-10-CM

## 2019-09-30 DIAGNOSIS — J30.9 ALLERGIC RHINITIS, UNSPECIFIED SEASONALITY, UNSPECIFIED TRIGGER: ICD-10-CM

## 2019-09-30 RX ORDER — MELOXICAM 15 MG/1
15 TABLET ORAL DAILY
Qty: 30 TABLET | Refills: 5 | Status: SHIPPED | OUTPATIENT
Start: 2019-09-30 | End: 2020-01-02

## 2019-09-30 RX ORDER — LORATADINE 10 MG/1
10 TABLET ORAL DAILY
Qty: 30 TABLET | Refills: 5 | Status: SHIPPED | OUTPATIENT
Start: 2019-09-30 | End: 2020-06-01 | Stop reason: SDUPTHER

## 2019-09-30 RX ORDER — MELOXICAM 15 MG/1
TABLET ORAL
Qty: 30 TABLET | Refills: 5 | Status: SHIPPED | OUTPATIENT
Start: 2019-09-30 | End: 2019-09-30 | Stop reason: SDUPTHER

## 2019-09-30 RX ORDER — LORATADINE 10 MG/1
TABLET ORAL
Qty: 30 TABLET | Refills: 5 | Status: SHIPPED | OUTPATIENT
Start: 2019-09-30 | End: 2019-09-30 | Stop reason: SDUPTHER

## 2019-11-12 DIAGNOSIS — E11.65 TYPE 2 DIABETES MELLITUS WITH HYPERGLYCEMIA, WITHOUT LONG-TERM CURRENT USE OF INSULIN (HCC): ICD-10-CM

## 2019-11-19 ENCOUNTER — APPOINTMENT (OUTPATIENT)
Dept: RADIOLOGY | Facility: CLINIC | Age: 49
End: 2019-11-19
Payer: COMMERCIAL

## 2019-11-19 ENCOUNTER — OFFICE VISIT (OUTPATIENT)
Dept: INTERNAL MEDICINE CLINIC | Facility: CLINIC | Age: 49
End: 2019-11-19
Payer: COMMERCIAL

## 2019-11-19 VITALS
RESPIRATION RATE: 14 BRPM | BODY MASS INDEX: 41.77 KG/M2 | TEMPERATURE: 98.2 F | SYSTOLIC BLOOD PRESSURE: 108 MMHG | WEIGHT: 227 LBS | HEIGHT: 62 IN | HEART RATE: 78 BPM | DIASTOLIC BLOOD PRESSURE: 72 MMHG

## 2019-11-19 DIAGNOSIS — M54.50 LUMBAR SPINE PAIN: ICD-10-CM

## 2019-11-19 DIAGNOSIS — E11.65 TYPE 2 DIABETES MELLITUS WITH HYPERGLYCEMIA, WITHOUT LONG-TERM CURRENT USE OF INSULIN (HCC): Primary | ICD-10-CM

## 2019-11-19 LAB — SL AMB POCT HEMOGLOBIN AIC: 7.1 (ref ?–6.5)

## 2019-11-19 PROCEDURE — 99214 OFFICE O/P EST MOD 30 MIN: CPT | Performed by: PHYSICIAN ASSISTANT

## 2019-11-19 PROCEDURE — 1036F TOBACCO NON-USER: CPT | Performed by: PHYSICIAN ASSISTANT

## 2019-11-19 PROCEDURE — 72110 X-RAY EXAM L-2 SPINE 4/>VWS: CPT

## 2019-11-19 PROCEDURE — 83036 HEMOGLOBIN GLYCOSYLATED A1C: CPT | Performed by: PHYSICIAN ASSISTANT

## 2019-11-19 PROCEDURE — 3051F HG A1C>EQUAL 7.0%<8.0%: CPT | Performed by: PHYSICIAN ASSISTANT

## 2019-11-19 RX ORDER — CYCLOBENZAPRINE HCL 10 MG
10 TABLET ORAL 2 TIMES DAILY PRN
Qty: 30 TABLET | Refills: 0 | Status: SHIPPED | OUTPATIENT
Start: 2019-11-19 | End: 2019-12-03 | Stop reason: SDUPTHER

## 2019-11-19 RX ORDER — DICLOFENAC SODIUM 75 MG/1
75 TABLET, DELAYED RELEASE ORAL 2 TIMES DAILY
Qty: 60 TABLET | Refills: 2 | Status: SHIPPED | OUTPATIENT
Start: 2019-11-19 | End: 2020-02-11 | Stop reason: SDUPTHER

## 2019-11-19 NOTE — ASSESSMENT & PLAN NOTE
Try diclofenac in place of mobic as this is enteric coated  Will give flexeril for prn use  Patient was informed that this medicine has an abuse potential and may be habit forming  We discussed that this may also cause drowsiness  The medication should not be combined with alcohol  The patient was informed not to operate machinery while taking this medication and should not drive until they know how they respond to the medication  The patient verbalized understanding of this

## 2019-11-19 NOTE — PROGRESS NOTES
Assessment/Plan:  Problem List Items Addressed This Visit        Endocrine    Type 2 diabetes mellitus ([facility] [address] ) - Primary       Lab Results   Component Value Date    HGBA1C 7 1 (A) 11/19/2019       Pts symptoms are stable with current regime  No changes at present  Relevant Orders    POCT hemoglobin A1c (Completed)       Other    Lumbar spine pain     Try diclofenac in place of mobic as this is enteric coated  Will give flexeril for prn use  Patient was informed that this medicine has an abuse potential and may be habit forming  We discussed that this may also cause drowsiness  The medication should not be combined with alcohol  The patient was informed not to operate machinery while taking this medication and should not drive until they know how they respond to the medication  The patient verbalized understanding of this  Relevant Medications    diclofenac (VOLTAREN) 75 mg EC tablet    cyclobenzaprine (FLEXERIL) 10 mg tablet    Other Relevant Orders    XR spine lumbar minimum 4 views non injury           Diagnoses and all orders for this visit:    Type 2 diabetes mellitus with hyperglycemia, without long-term current use of insulin ([facility])  -     POCT hemoglobin A1c    Lumbar spine pain  -     diclofenac (VOLTAREN) 75 mg EC tablet; Take 1 tablet (75 mg total) by mouth 2 (two) times a day  -     XR spine lumbar minimum 4 views non injury; Future  -     cyclobenzaprine (FLEXERIL) 10 mg tablet; Take 1 tablet (10 mg total) by mouth 2 (two) times a day as needed for muscle spasms        Type 2 diabetes mellitus ([facility] )    Lab Results   Component Value Date    HGBA1C 7 1 (A) 11/19/2019       Pts symptoms are stable with current regime  No changes at present  Lumbar spine pain  Try diclofenac in place of mobic as this is enteric coated  Will give flexeril for prn use  Patient was informed that this medicine has an abuse potential and may be habit forming   We discussed that this may also cause drowsiness  The medication should not be combined with alcohol  The patient was informed not to operate machinery while taking this medication and should not drive until they know how they respond to the medication  The patient verbalized understanding of this  Subjective:      Patient ID: Florencia Woody is a 44 y o  female  Pt presents for routine visit  She is doing fairly well overall  Her A1C is much improved at 7 1 which has come down nicely from 8 4  She is tolerating her medications well  She notes a worsening of her lower back pain since the weather became cold  She notes that it is described as aching with some radiation into her legs  No loss of bowel or bladder function  No leg weakness  Mobic does not help and has caused some GI upset  She sees GI in 2 weeks  The following portions of the patient's history were reviewed and updated as appropriate:   She has a past medical history of Anxiety, Arthritis, Asthma, Cancer ([facility] [address] Depression, Diabetes mellitus ([address] ), Diverticulitis, GERD (gastroesophageal reflux disease), Hyperlipidemia associated with type 2 diabetes mellitus ([address] ), Hypertension, Insulinoma, Non-recurrent acute suppurative otitis media of left ear without spontaneous rupture of tympanic membrane (2019), Pulmonary emboli ([address] ), and Sleep apnea  ,  does not have any pertinent problems on file  ,   has a past surgical history that includes Pancreatectomy;  section; Knee surgery; Knee arthroscopy (Left); pr lap,cholecystectomy (N/A, 2018); Cholecystectomy; and pr colonoscopy flx dx w/collj spec when pfrmd (N/A, 2019)  ,  family history includes Breast cancer in her brother; Diabetes in her maternal grandmother and paternal grandmother; Heart disease in her mother; Hyperlipidemia in her mother; Hypertension in her mother; Obesity in her mother; Osteoporosis in her family;  Other in her maternal grandmother and mother; Ovarian cancer in her mother; Substance Abuse in her family and father; Tuberculosis in her maternal aunt and mother  ,   reports that she has quit smoking  She has never used smokeless tobacco  She reports that she does not drink alcohol or use drugs  ,  is allergic to iodine and nuts     Current Outpatient Medications   Medication Sig Dispense Refill    albuterol (VENTOLIN HFA) 90 mcg/act inhaler Inhale 2 puffs every 4 (four) hours as needed for shortness of breath 3 Inhaler 1    ARIPiprazole (ABILIFY) 20 MG tablet Take 1 tablet by mouth daily      cholestyramine (QUESTRAN) 4 GM/DOSE powder Take 1 packet (4 g total) by mouth 3 (three) times a day with meals 378 g 3    EPINEPHrine (EPIPEN) 0 3 mg/0 3 mL SOAJ Inject 0 3 mL (0 3 mg total) into the shoulder, thigh, or buttocks once for 1 dose 0 3 mL 2    glucose blood test strip by In Vitro route daily      Lancets Misc  (ACCU-CHEK MULTICLIX LANCET DEV) KIT by Does not apply route      lisinopril (ZESTRIL) 2 5 mg tablet take 1 tablet by mouth once daily 90 tablet 1    loratadine (CLARITIN) 10 mg tablet Take 1 tablet (10 mg total) by mouth daily 30 tablet 5    meloxicam (MOBIC) 15 mg tablet Take 1 tablet (15 mg total) by mouth daily 30 tablet 5    metFORMIN (GLUCOPHAGE) 1000 MG tablet Take 1,000 mg by mouth 2 (two) times a day with meals      Multiple Vitamins-Minerals (MULTI ADULT GUMMIES PO) Take by mouth      pravastatin (PRAVACHOL) 20 mg tablet Take 1 tablet (20 mg total) by mouth daily 90 tablet 1    ranitidine (ZANTAC) 150 mg tablet take 1 tablet by mouth twice a day 60 tablet 2    Semaglutide, 1 MG/DOSE, (OZEMPIC, 1 MG/DOSE,) 2 MG/1 5ML SOPN Inject 1 mg under the skin once a week 1 5 mL 3    sitaGLIPtin (JANUVIA) 100 mg tablet Take 100 mg by mouth daily      venlafaxine (EFFEXOR-XR) 150 mg 24 hr capsule Take 150 mg by mouth daily        cyclobenzaprine (FLEXERIL) 10 mg tablet Take 1 tablet (10 mg total) by mouth 2 (two) times a day as needed for muscle spasms 30 tablet 0    diclofenac (VOLTAREN) 75 mg EC tablet Take 1 tablet (75 mg total) by mouth 2 (two) times a day 60 tablet 2     No current facility-administered medications for this visit  Review of Systems   Constitutional: Negative for chills and fever  HENT: Negative for congestion, ear pain, hearing loss, postnasal drip, rhinorrhea, sinus pressure, sinus pain, sore throat and trouble swallowing  Eyes: Negative for pain and visual disturbance  Respiratory: Negative for cough, chest tightness, shortness of breath and wheezing  Cardiovascular: Negative  Negative for chest pain, palpitations and leg swelling  Gastrointestinal: Negative for abdominal pain, blood in stool, constipation, diarrhea, nausea and vomiting  Endocrine: Negative for cold intolerance, heat intolerance, polydipsia, polyphagia and polyuria  Genitourinary: Negative for difficulty urinating, dysuria, flank pain and urgency  Musculoskeletal: Positive for back pain  Negative for arthralgias, gait problem and myalgias  Skin: Negative for rash  Allergic/Immunologic: Negative  Neurological: Negative for dizziness, weakness, light-headedness and headaches  Hematological: Negative  Psychiatric/Behavioral: Negative for behavioral problems, dysphoric mood and sleep disturbance  The patient is not nervous/anxious            PHQ-9 Depression Screening    PHQ-9:    Frequency of the following problems over the past two weeks:       Little interest or pleasure in doing things:  0 - not at all  Feeling down, depressed, or hopeless:  0 - not at all  Trouble falling or staying asleep, or sleeping too much:  1 - several days  Feeling tired or having little energy:  2 - more than half the days  Poor appetite or overeatin - several days  Feeling bad about yourself - or that you are a failure or have let yourself or your family down:  0 - not at all  Trouble concentrating on things, such as reading the newspaper or watching television:  1 - several days  Moving or speaking so slowly that other people could have noticed  Or the opposite - being so fidgety or restless that you have been moving around a lot more than usual:  0 - not at all  Thoughts that you would be better off dead, or of hurting yourself in some way:  0 - not at all  PHQ-2 Score:  0  PHQ-9 Score:  5          Objective:  Vitals:    11/19/19 1102   BP: 108/72   Pulse: 78   Resp: 14   Temp: 98 2 °F (36 8 °C)   TempSrc: Tympanic   Weight: 103 kg (227 lb)   Height: 5' 2" (1 575 m)     Body mass index is 41 52 kg/m²  Physical Exam   Constitutional: She is oriented to person, place, and time  She appears well-developed and well-nourished  No distress  HENT:   Head: Normocephalic and atraumatic  Right Ear: External ear normal    Left Ear: External ear normal    Nose: Nose normal    Mouth/Throat: Oropharynx is clear and moist  No oropharyngeal exudate  Eyes: Pupils are equal, round, and reactive to light  Conjunctivae and EOM are normal  Right eye exhibits no discharge  Left eye exhibits no discharge  No scleral icterus  Neck: Normal range of motion  Neck supple  No thyromegaly present  Cardiovascular: Normal rate, regular rhythm and normal heart sounds  Exam reveals no gallop and no friction rub  No murmur heard  Pulmonary/Chest: Effort normal and breath sounds normal  No respiratory distress  She has no wheezes  She has no rales  Abdominal: Soft  Bowel sounds are normal  She exhibits no distension  There is no tenderness  Musculoskeletal: She exhibits no edema or deformity  Lumbar back: She exhibits decreased range of motion, tenderness, pain and spasm  Neurological: She is alert and oriented to person, place, and time  No cranial nerve deficit  Skin: Skin is warm and dry  She is not diaphoretic  Psychiatric: She has a normal mood and affect   Her behavior is normal  Judgment and thought content normal

## 2019-11-22 DIAGNOSIS — K21.9 GASTROESOPHAGEAL REFLUX DISEASE WITHOUT ESOPHAGITIS: ICD-10-CM

## 2019-11-22 RX ORDER — RANITIDINE 150 MG/1
TABLET ORAL
Qty: 60 TABLET | Refills: 2 | Status: SHIPPED | OUTPATIENT
Start: 2019-11-22 | End: 2020-01-02

## 2019-11-26 DIAGNOSIS — E11.65 TYPE 2 DIABETES MELLITUS WITH HYPERGLYCEMIA, WITHOUT LONG-TERM CURRENT USE OF INSULIN (HCC): Primary | ICD-10-CM

## 2019-11-26 DIAGNOSIS — K21.9 GASTROESOPHAGEAL REFLUX DISEASE WITHOUT ESOPHAGITIS: ICD-10-CM

## 2019-11-26 RX ORDER — FAMOTIDINE 40 MG/1
40 TABLET, FILM COATED ORAL DAILY
Qty: 30 TABLET | Refills: 2 | Status: SHIPPED | OUTPATIENT
Start: 2019-11-26 | End: 2020-03-02

## 2019-12-03 DIAGNOSIS — M54.50 LUMBAR SPINE PAIN: ICD-10-CM

## 2019-12-03 RX ORDER — CYCLOBENZAPRINE HCL 10 MG
TABLET ORAL
Qty: 30 TABLET | Refills: 0 | Status: SHIPPED | OUTPATIENT
Start: 2019-12-03 | End: 2019-12-14 | Stop reason: SDUPTHER

## 2019-12-09 DIAGNOSIS — I10 ESSENTIAL HYPERTENSION: ICD-10-CM

## 2019-12-09 PROCEDURE — 4010F ACE/ARB THERAPY RXD/TAKEN: CPT | Performed by: PHYSICIAN ASSISTANT

## 2019-12-09 RX ORDER — LISINOPRIL 2.5 MG/1
TABLET ORAL
Qty: 90 TABLET | Refills: 0 | Status: SHIPPED | OUTPATIENT
Start: 2019-12-09 | End: 2020-03-02 | Stop reason: SDUPTHER

## 2019-12-14 DIAGNOSIS — M54.50 LUMBAR SPINE PAIN: ICD-10-CM

## 2019-12-14 RX ORDER — CYCLOBENZAPRINE HCL 10 MG
TABLET ORAL
Qty: 30 TABLET | Refills: 0 | Status: SHIPPED | OUTPATIENT
Start: 2019-12-14 | End: 2020-01-02 | Stop reason: SDUPTHER

## 2019-12-23 DIAGNOSIS — E11.65 TYPE 2 DIABETES MELLITUS WITH HYPERGLYCEMIA, WITHOUT LONG-TERM CURRENT USE OF INSULIN (HCC): Primary | ICD-10-CM

## 2019-12-23 NOTE — TELEPHONE ENCOUNTER
Pt needs rx for ascenia contour next #1machine or Life Scan one touch ultra test, the insurance will cover these, pls send to [facility]

## 2019-12-26 RX ORDER — LANCETS
EACH MISCELLANEOUS DAILY
Qty: 100 EACH | Refills: 3 | Status: SHIPPED | OUTPATIENT
Start: 2019-12-26 | End: 2020-04-13 | Stop reason: SDUPTHER

## 2019-12-27 DIAGNOSIS — Z90.49 POSTCHOLECYSTECTOMY DIARRHEA: ICD-10-CM

## 2019-12-27 DIAGNOSIS — R19.7 POSTCHOLECYSTECTOMY DIARRHEA: ICD-10-CM

## 2019-12-27 RX ORDER — CHOLESTYRAMINE 4 G/9G
POWDER, FOR SUSPENSION ORAL
Qty: 378 G | Refills: 6 | Status: SHIPPED | OUTPATIENT
Start: 2019-12-27 | End: 2020-03-02

## 2019-12-31 DIAGNOSIS — E11.65 TYPE 2 DIABETES MELLITUS WITH HYPERGLYCEMIA, WITHOUT LONG-TERM CURRENT USE OF INSULIN (HCC): Primary | ICD-10-CM

## 2020-01-02 ENCOUNTER — HOSPITAL ENCOUNTER (EMERGENCY)
Facility: HOSPITAL | Age: 50
Discharge: HOME | End: 2020-01-02
Attending: EMERGENCY MEDICINE | Admitting: EMERGENCY MEDICINE
Payer: COMMERCIAL

## 2020-01-02 ENCOUNTER — TELEPHONE (OUTPATIENT)
Dept: INTERNAL MEDICINE CLINIC | Facility: CLINIC | Age: 50
End: 2020-01-02

## 2020-01-02 VITALS
TEMPERATURE: 97.1 F | HEIGHT: 62 IN | DIASTOLIC BLOOD PRESSURE: 75 MMHG | WEIGHT: 230 LBS | BODY MASS INDEX: 42.33 KG/M2 | RESPIRATION RATE: 12 BRPM | SYSTOLIC BLOOD PRESSURE: 137 MMHG | HEART RATE: 85 BPM | OXYGEN SATURATION: 98 %

## 2020-01-02 DIAGNOSIS — M54.50 LUMBAR SPINE PAIN: ICD-10-CM

## 2020-01-02 DIAGNOSIS — R73.9 HYPERGLYCEMIA: Primary | ICD-10-CM

## 2020-01-02 LAB
ALBUMIN SERPL BCP-MCNC: 3.9 G/DL (ref 3.5–5.7)
ALP SERPL-CCNC: 78 U/L (ref 40–150)
ALT SERPL W P-5'-P-CCNC: 64 U/L (ref 7–52)
ANION GAP SERPL CALCULATED.3IONS-SCNC: 7 MMOL/L (ref 4–13)
AST SERPL W P-5'-P-CCNC: 29 U/L (ref 13–39)
BASE EX.OXY STD BLDV CALC-SCNC: 95.3 %
BASE EXCESS BLDV CALC-SCNC: -0.7 MMOL/L
BETA-HYDROXYBUTYRATE: 0.1 MMOL/L
BILIRUB SERPL-MCNC: 0.2 MG/DL (ref 0.2–1)
BUN SERPL-MCNC: 14 MG/DL (ref 7–25)
CALCIUM SERPL-MCNC: 9.3 MG/DL (ref 8.6–10.5)
CHLORIDE SERPL-SCNC: 99 MMOL/L (ref 98–107)
CO2 SERPL-SCNC: 26 MMOL/L (ref 21–31)
CREAT SERPL-MCNC: 0.92 MG/DL (ref 0.6–1.2)
ERYTHROCYTE [DISTWIDTH] IN BLOOD BY AUTOMATED COUNT: 14.2 % (ref 11.5–14.5)
EST. AVERAGE GLUCOSE BLD GHB EST-MCNC: 169 MG/DL
GFR SERPL CREATININE-BSD FRML MDRD: 72 ML/MIN/1.73SQ M
GLUCOSE SERPL-MCNC: 280 MG/DL (ref 65–99)
HBA1C MFR BLD: 7.5 % (ref 4.2–6.3)
HCO3 BLDV-SCNC: 24 MMOL/L (ref 24–30)
HCT VFR BLD AUTO: 40.3 % (ref 42–47)
HGB BLD-MCNC: 12.8 G/DL (ref 12–16)
MCH RBC QN AUTO: 27.8 PG (ref 26–34)
MCHC RBC AUTO-ENTMCNC: 31.8 G/DL (ref 31–37)
MCV RBC AUTO: 87 FL (ref 81–99)
O2 CT BLDV-SCNC: 17.7 ML/DL
PCO2 BLDV: 41.4 MM HG
PH BLDV: 7.38 [PH] (ref 7.3–7.4)
PLATELET # BLD AUTO: 220 THOUSANDS/UL (ref 149–390)
PMV BLD AUTO: 8.7 FL (ref 8.6–11.7)
PO2 BLDV: 131 MM HG (ref 35–45)
POTASSIUM SERPL-SCNC: 3.9 MMOL/L (ref 3.5–5.5)
PROT SERPL-MCNC: 6.7 G/DL (ref 6.4–8.9)
RBC # BLD AUTO: 4.61 MILLION/UL (ref 3.9–5.2)
SODIUM SERPL-SCNC: 132 MMOL/L (ref 134–143)
WBC # BLD AUTO: 5.6 THOUSAND/UL (ref 4.8–10.8)

## 2020-01-02 PROCEDURE — 83036 HEMOGLOBIN GLYCOSYLATED A1C: CPT | Performed by: EMERGENCY MEDICINE

## 2020-01-02 PROCEDURE — 82805 BLOOD GASES W/O2 SATURATION: CPT | Performed by: EMERGENCY MEDICINE

## 2020-01-02 PROCEDURE — 99283 EMERGENCY DEPT VISIT LOW MDM: CPT

## 2020-01-02 PROCEDURE — 96361 HYDRATE IV INFUSION ADD-ON: CPT

## 2020-01-02 PROCEDURE — 96360 HYDRATION IV INFUSION INIT: CPT

## 2020-01-02 PROCEDURE — 80053 COMPREHEN METABOLIC PANEL: CPT | Performed by: EMERGENCY MEDICINE

## 2020-01-02 PROCEDURE — 99283 EMERGENCY DEPT VISIT LOW MDM: CPT | Performed by: EMERGENCY MEDICINE

## 2020-01-02 PROCEDURE — 82010 KETONE BODYS QUAN: CPT | Performed by: EMERGENCY MEDICINE

## 2020-01-02 PROCEDURE — 85027 COMPLETE CBC AUTOMATED: CPT | Performed by: EMERGENCY MEDICINE

## 2020-01-02 PROCEDURE — 36415 COLL VENOUS BLD VENIPUNCTURE: CPT | Performed by: EMERGENCY MEDICINE

## 2020-01-02 RX ORDER — CYCLOBENZAPRINE HCL 10 MG
10 TABLET ORAL 2 TIMES DAILY
Qty: 60 TABLET | Refills: 5 | Status: SHIPPED | OUTPATIENT
Start: 2020-01-02 | End: 2020-09-22

## 2020-01-02 RX ADMIN — SODIUM CHLORIDE 1000 ML: 0.9 INJECTION, SOLUTION INTRAVENOUS at 12:05

## 2020-01-02 NOTE — ED PROVIDER NOTES
History  Chief Complaint   Patient presents with    Hyperglycemia - no symptoms     patient states that for the past few days her glucose has been higher than normal from 200's-400's      Patient was sent to the ED by her PCP for evaluation of elevated blood sugars  She states that over the past few days she has been checking her sugars and finding them to be 288 to greater than 400  She was not checking her blood sugars for the 4 months prior to that due to a malfunction with her machine  Her last hemoglobin A1c was 7 1, measured in November  Three weeks ago, her Ozempic was changed to Trulicity for insurance coverage  She correlates much of her symptomatology to this timeframe but does add that she has had polyuria, polydipsia and polyphagia for longer  She denies a h/o DKA  She has vague symptoms of malaise but no nausea, vomiting or abdominal pain  She reports having an impacted wisdom tooth that is being scheduled for surgery but no apparent infection and no recent antibiotic treatment  She denies recent steroid use  She states she has had rhinorrhea "forever" without more definitive infectious symptoms  No recent travel or sick contacts  Denies recent f/c, HA, CP, SOB, abdominal pain, n/v/d or dysuria  12 system ROS o/w negative  History provided by:  Patient and medical records  Hyperglycemia - no symptoms   Blood sugar level PTA:  >400  Severity:  Moderate  Onset quality:  Unable to specify  Duration: Unclear, but at least the past week    Progression:  Waxing and waning  Chronicity:  Recurrent  Diabetes status:  Controlled with insulin  Current diabetic therapy:  See medical record  Context: change in medication (Three weeks ago)    Context: not insulin pump use, not new diabetes diagnosis and not noncompliance    Relieved by:  None tried  Associated symptoms: increased appetite, increased thirst, malaise and polyuria    Associated symptoms: no abdominal pain, no altered mental status, no blurred vision, no chest pain, no confusion, no dehydration, no diaphoresis, no dizziness, no dysuria, no fatigue, no fever, no nausea, no shortness of breath, no syncope, no vomiting and no weakness    Risk factors: obesity    Risk factors: no hx of DKA, no pregnancy and no recent steroid use        Prior to Admission Medications   Prescriptions Last Dose Informant Patient Reported? Taking?    ARIPiprazole (ABILIFY) 20 MG tablet  Self Yes No   Sig: Take 1 tablet by mouth daily   ASCENSIA DEX ADONAY   No No   Sig: Test once daily   Dulaglutide (TRULICITY) 1 5 CG/0 8ZD SOPN   No No   Sig: Inject 0 5 mL (1 5 mg total) under the skin once a week   EPINEPHrine (EPIPEN) 0 3 mg/0 3 mL SOAJ   No No   Sig: Inject 0 3 mL (0 3 mg total) into the shoulder, thigh, or buttocks once for 1 dose   Lancets Misc  (ACCU-CHEK MULTICLIX LANCET DEV) KIT  Self Yes No   Sig: by Does not apply route   MICROLET LANCETS MISC   No No   Sig: by Does not apply route daily Test once daily   Multiple Vitamins-Minerals (MULTI ADULT GUMMIES PO)   Yes No   Sig: Take by mouth   albuterol (VENTOLIN HFA) 90 mcg/act inhaler  Self No No   Sig: Inhale 2 puffs every 4 (four) hours as needed for shortness of breath   cholestyramine (QUESTRAN) 4 GM/DOSE powder   No No   Sig: take 1 scoopful DISSOLVED IN WATER three times a day with meals   cyclobenzaprine (FLEXERIL) 10 mg tablet   No No   Sig: Take 1 tablet (10 mg total) by mouth 2 (two) times a day   diclofenac (VOLTAREN) 75 mg EC tablet   No No   Sig: Take 1 tablet (75 mg total) by mouth 2 (two) times a day   famotidine (PEPCID) 40 MG tablet   No No   Sig: Take 1 tablet (40 mg total) by mouth daily   glucose blood test strip   No No   Sig: Test once daily   lisinopril (ZESTRIL) 2 5 mg tablet   No No   Sig: take 1 tablet by mouth once daily   loratadine (CLARITIN) 10 mg tablet   No No   Sig: Take 1 tablet (10 mg total) by mouth daily   metFORMIN (GLUCOPHAGE) 1000 MG tablet   No No   Sig: Take 2 tablets (2,000 mg total) by mouth 2 (two) times a day with meals   pravastatin (PRAVACHOL) 20 mg tablet   No No   Sig: Take 1 tablet (20 mg total) by mouth daily   sitaGLIPtin (JANUVIA) 100 mg tablet   Yes No   Sig: Take 100 mg by mouth daily   venlafaxine (EFFEXOR-XR) 150 mg 24 hr capsule  Self Yes No   Sig: Take 150 mg by mouth daily        Facility-Administered Medications: None       Past Medical History:   Diagnosis Date    Anxiety     Arthritis     Asthma     Cancer ([place] [id number] )     renal,cervical    Depression     Diabetes mellitus ([place] [address] )     Diverticulitis     GERD (gastroesophageal reflux disease)     Hyperlipidemia associated with type 2 diabetes mellitus ([place] [address] )     Hypertension     Insulinoma     Non-recurrent acute suppurative otitis media of left ear without spontaneous rupture of tympanic membrane 2019    Pulmonary emboli ([place] [address] )     Sleep apnea        Past Surgical History:   Procedure Laterality Date     SECTION      twice    CHOLECYSTECTOMY      KNEE ARTHROSCOPY Left     KNEE SURGERY      PANCREATECTOMY      Partial     [place] COLONOSCOPY FLX DX W/COLLJ SPEC WHEN PFRMD N/A 2019    Procedure: COLONOSCOPY;  Surgeon: Ari Lambert DO;  Location: [facility];  Service: Gastroenterology    [place] LAP,CHOLECYSTECTOMY N/A 2018    Procedure: CHOLECYSTECTOMY LAPAROSCOPIC;  Surgeon: Shane Mcclure DO;  Location: [facility];  Service: General       Family History   Problem Relation Age of Onset    Heart disease Mother     Hyperlipidemia Mother     Obesity Mother     Hypertension Mother    Kurt Kumar Other Mother         Bundle branch block    Ovarian cancer Mother     Tuberculosis Mother     Substance Abuse Father     Breast cancer Brother     Other Maternal Grandmother         ascending aortic aneurysm resection    Diabetes Maternal Grandmother     Diabetes Paternal Grandmother     Tuberculosis Maternal Aunt     Substance Abuse Family     Osteoporosis Family      I have reviewed and agree with the history as documented  Social History     Tobacco Use    Smoking status: Former Smoker    Smokeless tobacco: Never Used    Tobacco comment: quit 6 yrs ago   Substance Use Topics    Alcohol use: No    Drug use: No        Review of Systems   Constitutional: Negative for chills, diaphoresis, fatigue and fever  HENT: Negative for rhinorrhea, sore throat and trouble swallowing  Eyes: Negative for blurred vision  Respiratory: Negative for cough, chest tightness, shortness of breath and wheezing  Cardiovascular: Negative for chest pain, palpitations, leg swelling and syncope  Gastrointestinal: Negative for abdominal distention, abdominal pain, constipation, diarrhea, nausea and vomiting  Endocrine: Positive for polydipsia, polyphagia and polyuria  Genitourinary: Negative for difficulty urinating, dysuria, flank pain, frequency, hematuria and urgency  Musculoskeletal: Negative for arthralgias, back pain, myalgias, neck pain and neck stiffness  Skin: Negative for pallor and rash  Neurological: Negative for dizziness, weakness, light-headedness and headaches  Hematological: Negative for adenopathy  Psychiatric/Behavioral: Negative for confusion  The patient is not nervous/anxious  All other systems reviewed and are negative  Physical Exam  Physical Exam   Constitutional: She is oriented to person, place, and time  She appears well-developed and well-nourished  No distress  HENT:   Head: Normocephalic  Mouth/Throat: Oropharynx is clear and moist  No oropharyngeal exudate  No apparent dental infection or gum abscess   Eyes: Pupils are equal, round, and reactive to light  Conjunctivae and EOM are normal  No scleral icterus  Neck: Normal range of motion  Neck supple  Cardiovascular: Normal rate and regular rhythm  No murmur heard  Pulmonary/Chest: Effort normal and breath sounds normal    Abdominal: Soft  Bowel sounds are normal  She exhibits no distension  There is no tenderness  Obese   Musculoskeletal: Normal range of motion  She exhibits no edema or tenderness  Lymphadenopathy:     She has no cervical adenopathy  Neurological: She is alert and oriented to person, place, and time  She has normal reflexes  No cranial nerve deficit  She exhibits normal muscle tone  Skin: Skin is warm and dry  No rash noted  She is not diaphoretic  No erythema  No pallor  Psychiatric: She has a normal mood and affect  Her behavior is normal  Thought content normal    Vitals reviewed        Vital Signs  ED Triage Vitals [01/02/20 1143]   Temperature Pulse Respirations Blood Pressure SpO2   (!) 97 1 °F (36 2 °C) (!) 108 18 137/75 98 %      Temp Source Heart Rate Source Patient Position - Orthostatic VS BP Location FiO2 (%)   Temporal Monitor Sitting Left arm --      Pain Score       No Pain           Vitals:    01/02/20 1143 01/02/20 1315   BP: 137/75    Pulse: (!) 108 85   Patient Position - Orthostatic VS: Sitting          Visual Acuity      ED Medications  Medications   sodium chloride 0 9 % bolus 1,000 mL (0 mL Intravenous Stopped 1/2/20 1339)       Diagnostic Studies  Results Reviewed     Procedure Component Value Units Date/Time    Comprehensive metabolic panel [[id number]]  (Abnormal) Collected:  01/02/20 1202    Lab Status:  Final result Specimen:  Blood from Arm, Right Updated:  01/02/20 1227     Sodium 132 mmol/L      Potassium 3 9 mmol/L      Chloride 99 mmol/L      CO2 26 mmol/L      ANION GAP 7 mmol/L      BUN 14 mg/dL      Creatinine 0 92 mg/dL      Glucose 280 mg/dL      Calcium 9 3 mg/dL      AST 29 U/L      ALT 64 U/L      Alkaline Phosphatase 78 U/L      Total Protein 6 7 g/dL      Albumin 3 9 g/dL      Total Bilirubin 0 20 mg/dL      eGFR 72 ml/min/1 73sq m     Narrative:       [place] guidelines for Chronic Kidney Disease (CKD):     Stage 1 with normal or high GFR (GFR > 90 mL/min/1 73 square meters)    Stage 2 Mild CKD (GFR = 60-89 mL/min/1 73 square meters)    Stage 3A Moderate CKD (GFR = 45-59 mL/min/1 73 square meters)    Stage 3B Moderate CKD (GFR = 30-44 mL/min/1 73 square meters)    Stage 4 Severe CKD (GFR = 15-29 mL/min/1 73 square meters)    Stage 5 End Stage CKD (GFR <15 mL/min/1 73 square meters)  Note: GFR calculation is accurate only with a steady state creatinine    Blood gas, venous [[id number]]  (Abnormal) Collected:  01/02/20 1202    Lab Status:  Final result Specimen:  Blood from Arm, Right Updated:  01/02/20 1220     pH, Darin 7 380     pCO2, Darin 41 4 mm Hg      pO2, Darin 131 0 mm Hg      HCO3, Darin 24 0 mmol/L      Base Excess, Darin -0 7 mmol/L      O2 Content, Darin 17 7 ml/dL      O2 HGB, VENOUS 95 3 %     Beta Hydroxybutyrate [[id number]]  (Normal) Collected:  01/02/20 1202    Lab Status:  Final result Specimen:  Blood from Arm, Right Updated:  01/02/20 1215     BETA-HYDROXYBUTYRATE 0 1 mmol/L     CBC [[id number]]  (Abnormal) Collected:  01/02/20 1202    Lab Status:  Final result Specimen:  Blood from Arm, Right Updated:  01/02/20 1209     WBC 5 60 Thousand/uL      RBC 4 61 Million/uL      Hemoglobin 12 8 g/dL      Hematocrit 40 3 %      MCV 87 fL      MCH 27 8 pg      MCHC 31 8 g/dL      RDW 14 2 %      Platelets 939 Thousands/uL      MPV 8 7 fL     Hemoglobin A1C [[id number]] Collected:  01/02/20 1202    Lab Status: In process Specimen:  Blood from Arm, Right Updated:  01/02/20 1205                 No orders to display              Procedures  Procedures         ED Course                               MDM  Number of Diagnoses or Management Options  Diagnosis management comments: DDx: Hyperglycemia - insulin deficit, calorie excess, infection/inflammation, less likely EtOH, drugs, doubt DKA, sepsis  A/P: Will check metabolic workup including the BG and hemoglobin A1c, reevaluate for further w/u or disposition         Amount and/or Complexity of Data Reviewed  Clinical lab tests: ordered and reviewed  Review and summarize past medical records: yes          Disposition  Final diagnoses:   Hyperglycemia     Time reflects when diagnosis was documented in both MDM as applicable and the Disposition within this note     Time User Action Codes Description Comment    1/2/2020 12:02 PM [address] [place] [address],[facility]  [address] Ilda Regan [R73 [id number]] Hyperglycemia       ED Disposition     ED Disposition Condition Date/Time Comment    Discharge Stable Thu Jan 2, 2020 12:51 PM Katie Dye discharge to home/self care              Follow-up Information     Follow up With Specialties Details Why Contact Info    Lobito Smith PA-C Family Medicine, Internal Medicine, Physician Assistant Call today for further evaluation and treatment including medication dose adjustments [address] [facility]  [phone]            Discharge Medication List as of 1/2/2020 12:52 PM      CONTINUE these medications which have NOT CHANGED    Details   albuterol (VENTOLIN HFA) 90 mcg/act inhaler Inhale 2 puffs every 4 (four) hours as needed for shortness of breath, Starting Fri 6/1/2018, Normal      ARIPiprazole (ABILIFY) 20 MG tablet Take 1 tablet by mouth daily, Starting Tue 6/28/2011, Historical Med      ASCENSIA DEX ADONAY Test once daily, Normal      cholestyramine (QUESTRAN) 4 GM/DOSE powder take 1 scoopful DISSOLVED IN WATER three times a day with meals, Normal      cyclobenzaprine (FLEXERIL) 10 mg tablet Take 1 tablet (10 mg total) by mouth 2 (two) times a day, Starting Thu 1/2/2020, Normal      diclofenac (VOLTAREN) 75 mg EC tablet Take 1 tablet (75 mg total) by mouth 2 (two) times a day, Starting Tue 11/19/2019, Normal      Dulaglutide (TRULICITY) 1 5 PO/7 0RK SOPN Inject 0 5 mL (1 5 mg total) under the skin once a week, Starting Tue 11/26/2019, Normal      EPINEPHrine (EPIPEN) 0 3 mg/0 3 mL SOAJ Inject 0 3 mL (0 3 mg total) into the shoulder, thigh, or buttocks once for 1 dose, Starting Fri 6/1/2018, Normal      famotidine (PEPCID) 40 MG tablet Take 1 tablet (40 mg total) by mouth daily, Starting Tue 11/26/2019, Normal      glucose blood test strip Test once daily, Normal      Lancets Misc  (ACCU-CHEK MULTICLIX LANCET DEV) KIT by Does not apply route, Starting Fri 9/27/2013, Historical Med      lisinopril (ZESTRIL) 2 5 mg tablet take 1 tablet by mouth once daily, Normal      loratadine (CLARITIN) 10 mg tablet Take 1 tablet (10 mg total) by mouth daily, Starting Mon 9/30/2019, Normal      metFORMIN (GLUCOPHAGE) 1000 MG tablet Take 2 tablets (2,000 mg total) by mouth 2 (two) times a day with meals, Starting Wed 1/1/2020, Normal      MICROLET LANCETS MISC by Does not apply route daily Test once daily, Starting Thu 12/26/2019, Normal      Multiple Vitamins-Minerals (MULTI ADULT GUMMIES PO) Take by mouth, Historical Med      pravastatin (PRAVACHOL) 20 mg tablet Take 1 tablet (20 mg total) by mouth daily, Starting Sat 7/27/2019, Normal      sitaGLIPtin (JANUVIA) 100 mg tablet Take 100 mg by mouth daily, Historical Med      venlafaxine (EFFEXOR-XR) 150 mg 24 hr capsule Take 150 mg by mouth daily  , Starting Fri 8/2/2013, Historical Med           No discharge procedures on file      ED Provider  Electronically Signed by           Oralia Carranza DO  01/02/20 4096

## 2020-01-02 NOTE — TELEPHONE ENCOUNTER
CAROI, pt called, sugars up, Tuesday night was 442,, today 287, pt not feeling good, very shaky, going to ER, wanted you to be aware

## 2020-01-03 DIAGNOSIS — E11.65 TYPE 2 DIABETES MELLITUS WITH HYPERGLYCEMIA, WITHOUT LONG-TERM CURRENT USE OF INSULIN (HCC): ICD-10-CM

## 2020-01-09 ENCOUNTER — TELEPHONE (OUTPATIENT)
Dept: INTERNAL MEDICINE CLINIC | Facility: CLINIC | Age: 50
End: 2020-01-09

## 2020-01-09 ENCOUNTER — OFFICE VISIT (OUTPATIENT)
Dept: INTERNAL MEDICINE CLINIC | Facility: CLINIC | Age: 50
End: 2020-01-09
Payer: COMMERCIAL

## 2020-01-09 VITALS
BODY MASS INDEX: 42.1 KG/M2 | TEMPERATURE: 98.6 F | SYSTOLIC BLOOD PRESSURE: 112 MMHG | DIASTOLIC BLOOD PRESSURE: 76 MMHG | HEIGHT: 62 IN | HEART RATE: 92 BPM | WEIGHT: 228.8 LBS | RESPIRATION RATE: 18 BRPM

## 2020-01-09 DIAGNOSIS — E11.65 TYPE 2 DIABETES MELLITUS WITH HYPERGLYCEMIA, WITHOUT LONG-TERM CURRENT USE OF INSULIN (HCC): Primary | ICD-10-CM

## 2020-01-09 PROCEDURE — 3008F BODY MASS INDEX DOCD: CPT | Performed by: PHYSICIAN ASSISTANT

## 2020-01-09 PROCEDURE — 1036F TOBACCO NON-USER: CPT | Performed by: PHYSICIAN ASSISTANT

## 2020-01-09 PROCEDURE — 99214 OFFICE O/P EST MOD 30 MIN: CPT | Performed by: PHYSICIAN ASSISTANT

## 2020-01-09 RX ORDER — BLOOD-GLUCOSE METER
EACH MISCELLANEOUS
COMMUNITY
End: 2020-04-13 | Stop reason: ALTCHOICE

## 2020-01-09 NOTE — PROGRESS NOTES
Assessment/Plan:  Problem List Items Addressed This Visit        Endocrine    Type 2 diabetes mellitus ([facility] ) - Primary       Lab Results   Component Value Date    HGBA1C 7 5 (H) 01/02/2020     Foot exam performed  Pt needs to update eye exam  A1C 7 5 on 1/2/20  Continue januvia and trulicity  Will change metformin to invokamet and recheck 3 months  Directions for use and possible side effects discussed and patient verbalized understanding of these  Relevant Medications    Canagliflozin-metFORMIN HCl (INVOKAMET) 150-1000 MG TABS           Diagnoses and all orders for this visit:    Type 2 diabetes mellitus with hyperglycemia, without long-term current use of insulin ([facility])  -     Canagliflozin-metFORMIN HCl (INVOKAMET) 150-1000 MG TABS; Take 1 tablet by mouth 2 (two) times a day    Other orders  -     Blood Glucose Monitoring Suppl (ACCU-CHEK GUIDE) w/Device KIT; by Does not apply route        Type 2 diabetes mellitus ([place] [address] )    Lab Results   Component Value Date    HGBA1C 7 5 (H) 01/02/2020     Foot exam performed  Pt needs to update eye exam  A1C 7 5 on 1/2/20  Continue januvia and trulicity  Will change metformin to invokamet and recheck 3 months  Directions for use and possible side effects discussed and patient verbalized understanding of these  Subjective:      Patient ID: Mirlande Jimenez is a 44 y o  female  Pt presents for follow up from the ER  She was evaluated for elevated blood sugars  Just prior to her evaluation she had been transitioned from ozempic to trulicity due to her insurance no longer covering ozempic  Since some time has passed her sugars are improving but not yet at goal  A1C on 1/2/20 was 7 5  We did discuss giving the medication more time to work vs adding an SGLT-2 inhibitor  She is on the max dose of Trulicity along with max dose metformin and max dose januvia  She opted for addition of sglt-2         The following portions of the patient's history were reviewed and updated as appropriate:   She has a past medical history of Anxiety, Arthritis, Asthma, Cancer ([facility] [address] ), Depression, Diabetes mellitus ([facility] ), Diverticulitis, GERD (gastroesophageal reflux disease), Hyperlipidemia associated with type 2 diabetes mellitus ([facility] ), Hypertension, Insulinoma, Non-recurrent acute suppurative otitis media of left ear without spontaneous rupture of tympanic membrane (2019), Pulmonary emboli ([facility] ), and Sleep apnea  ,  does not have any pertinent problems on file  ,   has a past surgical history that includes Pancreatectomy;  section; Knee surgery; Knee arthroscopy (Left); pr lap,cholecystectomy (N/A, 2018); Cholecystectomy; and pr colonoscopy flx dx w/collj spec when pfrmd (N/A, 2019)  ,  family history includes Breast cancer in her brother; Diabetes in her maternal grandmother and paternal grandmother; Heart disease in her mother; Hyperlipidemia in her mother; Hypertension in her mother; Obesity in her mother; Osteoporosis in her family; Other in her maternal grandmother and mother; Ovarian cancer in her mother; Substance Abuse in her family and father; Tuberculosis in her maternal aunt and mother  ,   reports that she has quit smoking  She has never used smokeless tobacco  She reports that she does not drink alcohol or use drugs  ,  is allergic to iodine and nuts     Current Outpatient Medications   Medication Sig Dispense Refill    albuterol (VENTOLIN HFA) 90 mcg/act inhaler Inhale 2 puffs every 4 (four) hours as needed for shortness of breath 3 Inhaler 1    ARIPiprazole (ABILIFY) 15 mg tablet Take 1 tablet by mouth daily       Blood Glucose Monitoring Suppl (ACCU-CHEK GUIDE) w/Device KIT by Does not apply route      cyclobenzaprine (FLEXERIL) 10 mg tablet Take 1 tablet (10 mg total) by mouth 2 (two) times a day 60 tablet 5    diclofenac (VOLTAREN) 75 mg EC tablet Take 1 tablet (75 mg total) by mouth 2 (two) times a day 60 tablet 2    Dulaglutide (TRULICITY) 1 5 JH/3 4BF SOPN Inject 0 5 mL (1 5 mg total) under the skin once a week 2 mL 5    glucose blood test strip Test once daily 100 each 3    Lancets Misc  (ACCU-CHEK MULTICLIX LANCET DEV) KIT by Does not apply route      lisinopril (ZESTRIL) 2 5 mg tablet take 1 tablet by mouth once daily 90 tablet 0    loratadine (CLARITIN) 10 mg tablet Take 1 tablet (10 mg total) by mouth daily 30 tablet 5    metFORMIN (GLUCOPHAGE) 1000 MG tablet Take 1 tablet (1,000 mg total) by mouth 2 (two) times a day with meals 60 tablet 5    MICROLET LANCETS MISC by Does not apply route daily Test once daily 100 each 3    Multiple Vitamins-Minerals (MULTI ADULT GUMMIES PO) Take by mouth      pravastatin (PRAVACHOL) 20 mg tablet Take 1 tablet (20 mg total) by mouth daily 90 tablet 1    sitaGLIPtin (JANUVIA) 100 mg tablet Take 100 mg by mouth daily      venlafaxine (EFFEXOR-XR) 150 mg 24 hr capsule Take 150 mg by mouth daily        Canagliflozin-metFORMIN HCl (INVOKAMET) 150-1000 MG TABS Take 1 tablet by mouth 2 (two) times a day 60 tablet 2    cholestyramine (QUESTRAN) 4 GM/DOSE powder take 1 scoopful DISSOLVED IN WATER three times a day with meals (Patient not taking: Reported on 1/9/2020) 378 g 6    EPINEPHrine (EPIPEN) 0 3 mg/0 3 mL SOAJ Inject 0 3 mL (0 3 mg total) into the shoulder, thigh, or buttocks once for 1 dose 0 3 mL 2    famotidine (PEPCID) 40 MG tablet Take 1 tablet (40 mg total) by mouth daily (Patient not taking: Reported on 1/9/2020) 30 tablet 2     No current facility-administered medications for this visit  Review of Systems   Constitutional: Negative for chills and fever  HENT: Negative for congestion, ear pain, hearing loss, postnasal drip, rhinorrhea, sinus pressure, sinus pain, sore throat and trouble swallowing  Eyes: Negative for pain and visual disturbance  Respiratory: Negative for cough, chest tightness, shortness of breath and wheezing  Cardiovascular: Negative    Negative for chest pain, palpitations and leg swelling  Gastrointestinal: Negative for abdominal pain, blood in stool, constipation, diarrhea, nausea and vomiting  Endocrine: Negative for cold intolerance, heat intolerance, polydipsia, polyphagia and polyuria  Genitourinary: Negative for difficulty urinating, dysuria, flank pain and urgency  Musculoskeletal: Negative for arthralgias, back pain, gait problem and myalgias  Skin: Negative for rash  Allergic/Immunologic: Negative  Neurological: Negative for dizziness, weakness, light-headedness and headaches  Hematological: Negative  Psychiatric/Behavioral: Negative for behavioral problems, dysphoric mood and sleep disturbance  The patient is not nervous/anxious  PHQ-9 Depression Screening    PHQ-9:    Frequency of the following problems over the past two weeks:               Objective:  Vitals:    01/09/20 1031   BP: 112/76   BP Location: Right arm   Patient Position: Sitting   Cuff Size: Large   Pulse: 92   Resp: 18   Temp: 98 6 °F (37 °C)   Weight: 104 kg (228 lb 12 8 oz)   Height: 5' 2" (1 575 m)     Body mass index is 41 85 kg/m²  Physical Exam   Constitutional: She is oriented to person, place, and time  She appears well-developed and well-nourished  No distress  HENT:   Head: Normocephalic and atraumatic  Right Ear: External ear normal    Left Ear: External ear normal    Nose: Nose normal    Mouth/Throat: Oropharynx is clear and moist  No oropharyngeal exudate  Eyes: Pupils are equal, round, and reactive to light  Conjunctivae and EOM are normal  Right eye exhibits no discharge  Left eye exhibits no discharge  No scleral icterus  Neck: Normal range of motion  Neck supple  No thyromegaly present  Cardiovascular: Normal rate, regular rhythm and normal heart sounds  Exam reveals no gallop and no friction rub  Pulses are no weak pulses  No murmur heard  Pulses:       Dorsalis pedis pulses are 2+ on the right side, and 2+ on the left side          Posterior tibial pulses are 2+ on the right side, and 2+ on the left side  Pulmonary/Chest: Effort normal and breath sounds normal  No respiratory distress  She has no wheezes  She has no rales  Abdominal: Soft  Bowel sounds are normal  She exhibits no distension  There is no tenderness  Musculoskeletal: Normal range of motion  She exhibits no edema, tenderness or deformity  Feet:   Right Foot:   Skin Integrity: Negative for ulcer, skin breakdown, erythema, warmth, callus or dry skin  Left Foot:   Skin Integrity: Negative for ulcer, skin breakdown, erythema, warmth, callus or dry skin  Neurological: She is alert and oriented to person, place, and time  No cranial nerve deficit  Skin: Skin is warm and dry  She is not diaphoretic  Psychiatric: She has a normal mood and affect  Her behavior is normal  Judgment and thought content normal      BMI Counseling: Body mass index is 41 85 kg/m²  The BMI is above normal  Nutrition recommendations include decreasing portion sizes, consuming healthier snacks and limiting drinks that contain sugar  Patient's shoes and socks removed  Right Foot/Ankle   Right Foot Inspection  Skin Exam: skin normal and skin intact no dry skin, no warmth, no callus, no erythema, no maceration, no abnormal color, no pre-ulcer, no ulcer and no callus                          Toe Exam: ROM and strength within normal limits  Sensory   Vibration: intact  Proprioception: intact   Monofilament testing: intact  Vascular  Capillary refills: < 3 seconds  The right DP pulse is 2+  The right PT pulse is 2+       Left Foot/Ankle  Left Foot Inspection  Skin Exam: skin normal and skin intactno dry skin, no warmth, no erythema, no maceration, normal color, no pre-ulcer, no ulcer and no callus                         Toe Exam: ROM and strength within normal limits                   Sensory   Vibration: intact  Proprioception: intact  Monofilament: intact  Vascular  Capillary refills: < 3 seconds  The left DP pulse is 2+  The left PT pulse is 2+  Assign Risk Category:  No deformity present; No loss of protective sensation;  No weak pulses       Risk: 0

## 2020-01-09 NOTE — ASSESSMENT & PLAN NOTE
Lab Results   Component Value Date    HGBA1C 7 5 (H) 01/02/2020     Foot exam performed  Pt needs to update eye exam  A1C 7 5 on 1/2/20  Continue januvia and trulicity  Will change metformin to invokamet and recheck 3 months  Directions for use and possible side effects discussed and patient verbalized understanding of these

## 2020-01-09 NOTE — TELEPHONE ENCOUNTER
Can we reach out to [facility] and send them a copy of our diabetic eye form  Last one completed for pt was 2017   Thanks

## 2020-02-11 DIAGNOSIS — M54.50 LUMBAR SPINE PAIN: ICD-10-CM

## 2020-02-11 RX ORDER — DICLOFENAC SODIUM 75 MG/1
75 TABLET, DELAYED RELEASE ORAL 2 TIMES DAILY
Qty: 60 TABLET | Refills: 2 | Status: SHIPPED | OUTPATIENT
Start: 2020-02-11 | End: 2020-05-04 | Stop reason: SDUPTHER

## 2020-03-02 ENCOUNTER — OFFICE VISIT (OUTPATIENT)
Dept: INTERNAL MEDICINE CLINIC | Facility: CLINIC | Age: 50
End: 2020-03-02
Payer: COMMERCIAL

## 2020-03-02 VITALS
OXYGEN SATURATION: 98 % | HEART RATE: 127 BPM | SYSTOLIC BLOOD PRESSURE: 124 MMHG | RESPIRATION RATE: 18 BRPM | DIASTOLIC BLOOD PRESSURE: 74 MMHG | TEMPERATURE: 98.3 F | BODY MASS INDEX: 41.96 KG/M2 | HEIGHT: 62 IN | WEIGHT: 228 LBS

## 2020-03-02 DIAGNOSIS — R39.15 URINARY URGENCY: ICD-10-CM

## 2020-03-02 DIAGNOSIS — I10 ESSENTIAL HYPERTENSION: ICD-10-CM

## 2020-03-02 DIAGNOSIS — E11.65 TYPE 2 DIABETES MELLITUS WITH HYPERGLYCEMIA, WITHOUT LONG-TERM CURRENT USE OF INSULIN (HCC): ICD-10-CM

## 2020-03-02 DIAGNOSIS — N39.41 URGENCY INCONTINENCE: ICD-10-CM

## 2020-03-02 DIAGNOSIS — R30.0 DYSURIA: Primary | ICD-10-CM

## 2020-03-02 DIAGNOSIS — R35.0 URINARY FREQUENCY: ICD-10-CM

## 2020-03-02 LAB
SL AMB  POCT GLUCOSE, UA: 2000
SL AMB LEUKOCYTE ESTERASE,UA: POSITIVE
SL AMB POCT BILIRUBIN,UA: NEGATIVE
SL AMB POCT BLOOD,UA: ABNORMAL
SL AMB POCT CLARITY,UA: ABNORMAL
SL AMB POCT COLOR,UA: CLEAR
SL AMB POCT KETONES,UA: NEGATIVE
SL AMB POCT NITRITE,UA: NEGATIVE
SL AMB POCT PH,UA: 6
SL AMB POCT SPECIFIC GRAVITY,UA: 1.01
SL AMB POCT URINE PROTEIN: NEGATIVE
SL AMB POCT UROBILINOGEN: NORMAL

## 2020-03-02 PROCEDURE — 3061F NEG MICROALBUMINURIA REV: CPT | Performed by: INTERNAL MEDICINE

## 2020-03-02 PROCEDURE — 3051F HG A1C>EQUAL 7.0%<8.0%: CPT | Performed by: INTERNAL MEDICINE

## 2020-03-02 PROCEDURE — 87086 URINE CULTURE/COLONY COUNT: CPT | Performed by: INTERNAL MEDICINE

## 2020-03-02 PROCEDURE — 3074F SYST BP LT 130 MM HG: CPT | Performed by: INTERNAL MEDICINE

## 2020-03-02 PROCEDURE — 1036F TOBACCO NON-USER: CPT | Performed by: INTERNAL MEDICINE

## 2020-03-02 PROCEDURE — 99213 OFFICE O/P EST LOW 20 MIN: CPT | Performed by: INTERNAL MEDICINE

## 2020-03-02 PROCEDURE — 3008F BODY MASS INDEX DOCD: CPT | Performed by: INTERNAL MEDICINE

## 2020-03-02 PROCEDURE — 3078F DIAST BP <80 MM HG: CPT | Performed by: INTERNAL MEDICINE

## 2020-03-02 PROCEDURE — 81002 URINALYSIS NONAUTO W/O SCOPE: CPT | Performed by: INTERNAL MEDICINE

## 2020-03-02 RX ORDER — LISINOPRIL 2.5 MG/1
2.5 TABLET ORAL DAILY
Qty: 90 TABLET | Refills: 1 | Status: SHIPPED | OUTPATIENT
Start: 2020-03-02 | End: 2020-08-25

## 2020-03-02 RX ORDER — FLUCONAZOLE 150 MG/1
TABLET ORAL
Qty: 2 TABLET | Refills: 0 | Status: SHIPPED | OUTPATIENT
Start: 2020-03-02 | End: 2020-03-02

## 2020-03-02 RX ORDER — SULFAMETHOXAZOLE AND TRIMETHOPRIM 800; 160 MG/1; MG/1
1 TABLET ORAL EVERY 12 HOURS SCHEDULED
Qty: 20 TABLET | Refills: 0 | Status: SHIPPED | OUTPATIENT
Start: 2020-03-02 | End: 2020-03-12

## 2020-03-02 NOTE — PROGRESS NOTES
Assessment/Plan:  Problem List Items Addressed This Visit        Endocrine    Type 2 diabetes mellitus ([facility] )      Other Visit Diagnoses     Dysuria    -  Primary    Relevant Medications    sulfamethoxazole-trimethoprim (BACTRIM DS) 800-160 mg per tablet    fluconazole (DIFLUCAN) 150 mg tablet    Other Relevant Orders    POCT urine dip (Completed)    Urine culture    Urgency incontinence        Relevant Medications    sulfamethoxazole-trimethoprim (BACTRIM DS) 800-160 mg per tablet    fluconazole (DIFLUCAN) 150 mg tablet    Other Relevant Orders    Urine culture    Urinary urgency        Relevant Medications    sulfamethoxazole-trimethoprim (BACTRIM DS) 800-160 mg per tablet    fluconazole (DIFLUCAN) 150 mg tablet    Other Relevant Orders    Urine culture    Urinary frequency        Relevant Medications    sulfamethoxazole-trimethoprim (BACTRIM DS) 800-160 mg per tablet    fluconazole (DIFLUCAN) 150 mg tablet    Other Relevant Orders    Urine culture           Diagnoses and all orders for this visit:    Dysuria  -     POCT urine dip  -     sulfamethoxazole-trimethoprim (BACTRIM DS) 800-160 mg per tablet; Take 1 tablet by mouth every 12 (twelve) hours for 10 days  -     fluconazole (DIFLUCAN) 150 mg tablet; One po now and may repeat in one week if needed  -     Urine culture; Future    Urgency incontinence  -     sulfamethoxazole-trimethoprim (BACTRIM DS) 800-160 mg per tablet; Take 1 tablet by mouth every 12 (twelve) hours for 10 days  -     fluconazole (DIFLUCAN) 150 mg tablet; One po now and may repeat in one week if needed  -     Urine culture; Future    Urinary urgency  -     sulfamethoxazole-trimethoprim (BACTRIM DS) 800-160 mg per tablet; Take 1 tablet by mouth every 12 (twelve) hours for 10 days  -     fluconazole (DIFLUCAN) 150 mg tablet; One po now and may repeat in one week if needed  -     Urine culture;  Future    Urinary frequency  -     sulfamethoxazole-trimethoprim (BACTRIM DS) 800-160 mg per tablet; Take 1 tablet by mouth every 12 (twelve) hours for 10 days  -     fluconazole (DIFLUCAN) 150 mg tablet; One po now and may repeat in one week if needed  -     Urine culture; Future    Type 2 diabetes mellitus with hyperglycemia, without long-term current use of insulin (HCC)        No problem-specific Assessment & Plan notes found for this encounter  A/P: Dip with leukocytes, but no nitrates  Will send for culture and empiric abx  Increase po fluids  RTC as scheduled  Subjective:      Patient ID: Claudia Darby is a 44 y o  female  WF presents with a several day h/o urinary frequency, urgency, incontinence, and discomfort with urination  No fever or chills  No nausea or vomiting  Whitish vaginal d/c and  reports some vaginal itching  No gross blood  Last UTI about three years ago  The following portions of the patient's history were reviewed and updated as appropriate:   She has a past medical history of Anxiety, Arthritis, Asthma, Cancer ([address] ), Depression, Diabetes mellitus ([address] ), Diverticulitis, GERD (gastroesophageal reflux disease), Hyperlipidemia associated with type 2 diabetes mellitus ([address] ), Hypertension, Insulinoma, Non-recurrent acute suppurative otitis media of left ear without spontaneous rupture of tympanic membrane (2019), Pulmonary emboli ([address] ), and Sleep apnea  ,  does not have any pertinent problems on file  ,   has a past surgical history that includes Pancreatectomy;  section; Knee surgery; Knee arthroscopy (Left); pr lap,cholecystectomy (N/A, 2018); Cholecystectomy; and pr colonoscopy flx dx w/collj spec when pfrmd (N/A, 2019)  ,  family history includes Breast cancer in her brother; Diabetes in her maternal grandmother and paternal grandmother; Heart disease in her mother; Hyperlipidemia in her mother; Hypertension in her mother; Obesity in her mother; Osteoporosis in her family;  Other in her maternal grandmother and mother; Ovarian cancer in her mother; Substance Abuse in her family and father; Tuberculosis in her maternal aunt and mother  ,   reports that she has quit smoking  She has never used smokeless tobacco  She reports that she does not drink alcohol or use drugs  ,  is allergic to iodine and nuts     Current Outpatient Medications   Medication Sig Dispense Refill    albuterol (VENTOLIN HFA) 90 mcg/act inhaler Inhale 2 puffs every 4 (four) hours as needed for shortness of breath 3 Inhaler 1    ARIPiprazole (ABILIFY) 15 mg tablet Take 1 tablet by mouth daily       Blood Glucose Monitoring Suppl (ACCU-CHEK GUIDE) w/Device KIT by Does not apply route      Canagliflozin-metFORMIN HCl (INVOKAMET) 150-1000 MG TABS Take 1 tablet by mouth 2 (two) times a day 60 tablet 2    cyclobenzaprine (FLEXERIL) 10 mg tablet Take 1 tablet (10 mg total) by mouth 2 (two) times a day 60 tablet 5    diclofenac (VOLTAREN) 75 mg EC tablet Take 1 tablet (75 mg total) by mouth 2 (two) times a day 60 tablet 2    Dulaglutide (TRULICITY) 1 5 MD/0 4TV SOPN Inject 0 5 mL (1 5 mg total) under the skin once a week 2 mL 5    EPINEPHrine (EPIPEN) 0 3 mg/0 3 mL SOAJ Inject 0 3 mL (0 3 mg total) into the shoulder, thigh, or buttocks once for 1 dose 0 3 mL 2    glucose blood test strip Test once daily 100 each 3    Lancets Misc  (ACCU-CHEK MULTICLIX LANCET DEV) KIT by Does not apply route      lisinopril (ZESTRIL) 2 5 mg tablet Take 1 tablet (2 5 mg total) by mouth daily 90 tablet 1    loratadine (CLARITIN) 10 mg tablet Take 1 tablet (10 mg total) by mouth daily 30 tablet 5    MICROLET LANCETS MISC by Does not apply route daily Test once daily 100 each 3    Multiple Vitamins-Minerals (MULTI ADULT GUMMIES PO) Take by mouth      pravastatin (PRAVACHOL) 20 mg tablet Take 1 tablet (20 mg total) by mouth daily 90 tablet 1    sitaGLIPtin (JANUVIA) 100 mg tablet Take 100 mg by mouth daily      venlafaxine (EFFEXOR-XR) 150 mg 24 hr capsule Take 150 mg by mouth daily        fluconazole (DIFLUCAN) 150 mg tablet One po now and may repeat in one week if needed  2 tablet 0    sulfamethoxazole-trimethoprim (BACTRIM DS) 800-160 mg per tablet Take 1 tablet by mouth every 12 (twelve) hours for 10 days 20 tablet 0     No current facility-administered medications for this visit  Review of Systems   Constitutional: Negative for activity change, chills, diaphoresis, fatigue and fever  Respiratory: Negative for cough, chest tightness, shortness of breath and wheezing  Cardiovascular: Negative for chest pain, palpitations and leg swelling  Gastrointestinal: Negative for abdominal pain, constipation, diarrhea, nausea and vomiting  Genitourinary: Positive for dysuria, frequency, urgency and vaginal discharge  Negative for difficulty urinating, flank pain, hematuria, vaginal bleeding and vaginal pain  Musculoskeletal: Negative for arthralgias, gait problem and myalgias  Neurological: Negative for light-headedness and headaches  Psychiatric/Behavioral: Negative for confusion  The patient is not nervous/anxious  PHQ-9 Depression Screening    PHQ-9:    Frequency of the following problems over the past two weeks:             Objective:  Vitals:    03/02/20 1534   BP: 124/74   BP Location: Left arm   Patient Position: Sitting   Cuff Size: Large   Pulse: (!) 127   Resp: 18   Temp: 98 3 °F (36 8 °C)   TempSrc: Tympanic   SpO2: 98%   Weight: 103 kg (228 lb)   Height: 5' 2" (1 575 m)     Body mass index is 41 7 kg/m²  Physical Exam   Constitutional: She is oriented to person, place, and time  She appears well-developed and well-nourished  No distress  HENT:   Head: Normocephalic and atraumatic  Mouth/Throat: Oropharynx is clear and moist    Eyes: Pupils are equal, round, and reactive to light  Conjunctivae and EOM are normal    Cardiovascular: Normal rate, regular rhythm and normal heart sounds  Pulmonary/Chest: Effort normal and breath sounds normal  No respiratory distress   She has no wheezes  She has no rales  Abdominal: Soft  Bowel sounds are normal  She exhibits no distension  There is no tenderness  Neurological: She is alert and oriented to person, place, and time  Psychiatric: She has a normal mood and affect  Her behavior is normal  Judgment and thought content normal    Nursing note and vitals reviewed

## 2020-03-04 LAB — BACTERIA UR CULT: NORMAL

## 2020-03-17 DIAGNOSIS — E11.65 TYPE 2 DIABETES MELLITUS WITH HYPERGLYCEMIA, WITHOUT LONG-TERM CURRENT USE OF INSULIN (HCC): ICD-10-CM

## 2020-03-30 DIAGNOSIS — E11.65 TYPE 2 DIABETES MELLITUS WITH HYPERGLYCEMIA, WITHOUT LONG-TERM CURRENT USE OF INSULIN (HCC): ICD-10-CM

## 2020-03-30 DIAGNOSIS — J45.909 ASTHMA, UNSPECIFIED ASTHMA SEVERITY, UNSPECIFIED WHETHER COMPLICATED, UNSPECIFIED WHETHER PERSISTENT: ICD-10-CM

## 2020-03-30 RX ORDER — ALBUTEROL SULFATE 90 UG/1
2 AEROSOL, METERED RESPIRATORY (INHALATION) EVERY 4 HOURS PRN
Qty: 3 INHALER | Refills: 1 | Status: SHIPPED | OUTPATIENT
Start: 2020-03-30 | End: 2020-08-03 | Stop reason: SDUPTHER

## 2020-04-07 DIAGNOSIS — E11.65 TYPE 2 DIABETES MELLITUS WITH HYPERGLYCEMIA, WITHOUT LONG-TERM CURRENT USE OF INSULIN (HCC): ICD-10-CM

## 2020-04-13 ENCOUNTER — DOCUMENTATION (OUTPATIENT)
Dept: INTERNAL MEDICINE CLINIC | Facility: CLINIC | Age: 50
End: 2020-04-13

## 2020-04-13 DIAGNOSIS — E11.65 TYPE 2 DIABETES MELLITUS WITH HYPERGLYCEMIA, WITHOUT LONG-TERM CURRENT USE OF INSULIN (HCC): Primary | ICD-10-CM

## 2020-04-13 RX ORDER — BLOOD-GLUCOSE METER
1 EACH MISCELLANEOUS 2 TIMES DAILY PRN
Qty: 1 KIT | Refills: 0 | Status: SHIPPED | OUTPATIENT
Start: 2020-04-13 | End: 2022-01-17

## 2020-04-13 RX ORDER — SYRING-NEEDL,DISP,INSUL,0.3 ML 30 GX5/16"
SYRINGE, EMPTY DISPOSABLE MISCELLANEOUS
COMMUNITY

## 2020-04-13 RX ORDER — BLOOD-GLUCOSE METER
EACH MISCELLANEOUS
COMMUNITY
End: 2020-04-13 | Stop reason: SDUPTHER

## 2020-04-13 RX ORDER — LANCETS
EACH MISCELLANEOUS
Qty: 100 EACH | Refills: 3 | Status: SHIPPED | OUTPATIENT
Start: 2020-04-13 | End: 2022-04-13 | Stop reason: SDUPTHER

## 2020-05-01 DIAGNOSIS — E11.65 TYPE 2 DIABETES MELLITUS WITH HYPERGLYCEMIA, WITHOUT LONG-TERM CURRENT USE OF INSULIN (HCC): Primary | ICD-10-CM

## 2020-05-04 DIAGNOSIS — M54.50 LUMBAR SPINE PAIN: ICD-10-CM

## 2020-05-04 RX ORDER — DICLOFENAC SODIUM 75 MG/1
75 TABLET, DELAYED RELEASE ORAL 2 TIMES DAILY
Qty: 60 TABLET | Refills: 5 | Status: SHIPPED | OUTPATIENT
Start: 2020-05-04 | End: 2020-10-21

## 2020-06-01 ENCOUNTER — TELEPHONE (OUTPATIENT)
Dept: INTERNAL MEDICINE CLINIC | Facility: CLINIC | Age: 50
End: 2020-06-01

## 2020-06-01 DIAGNOSIS — E11.65 TYPE 2 DIABETES MELLITUS WITH HYPERGLYCEMIA, WITHOUT LONG-TERM CURRENT USE OF INSULIN (HCC): ICD-10-CM

## 2020-06-01 DIAGNOSIS — J30.9 ALLERGIC RHINITIS, UNSPECIFIED SEASONALITY, UNSPECIFIED TRIGGER: ICD-10-CM

## 2020-06-01 RX ORDER — LORATADINE 10 MG/1
10 TABLET ORAL DAILY
Qty: 30 TABLET | Refills: 5 | Status: SHIPPED | OUTPATIENT
Start: 2020-06-01 | End: 2020-11-23 | Stop reason: SDUPTHER

## 2020-06-17 ENCOUNTER — APPOINTMENT (OUTPATIENT)
Dept: LAB | Facility: HOSPITAL | Age: 50
End: 2020-06-17
Payer: COMMERCIAL

## 2020-06-17 ENCOUNTER — HOSPITAL ENCOUNTER (OUTPATIENT)
Dept: CT IMAGING | Facility: HOSPITAL | Age: 50
Discharge: HOME | End: 2020-06-17
Payer: COMMERCIAL

## 2020-06-17 ENCOUNTER — TELEPHONE (OUTPATIENT)
Dept: INTERNAL MEDICINE CLINIC | Facility: CLINIC | Age: 50
End: 2020-06-17

## 2020-06-17 ENCOUNTER — OFFICE VISIT (OUTPATIENT)
Dept: INTERNAL MEDICINE CLINIC | Facility: CLINIC | Age: 50
End: 2020-06-17
Payer: COMMERCIAL

## 2020-06-17 ENCOUNTER — TRANSCRIBE ORDERS (OUTPATIENT)
Dept: LAB | Facility: HOSPITAL | Age: 50
End: 2020-06-17

## 2020-06-17 VITALS
SYSTOLIC BLOOD PRESSURE: 102 MMHG | HEART RATE: 98 BPM | OXYGEN SATURATION: 98 % | WEIGHT: 218.8 LBS | BODY MASS INDEX: 40.27 KG/M2 | RESPIRATION RATE: 18 BRPM | DIASTOLIC BLOOD PRESSURE: 72 MMHG | HEIGHT: 62 IN | TEMPERATURE: 98.1 F

## 2020-06-17 DIAGNOSIS — R07.9 CHEST PAIN, UNSPECIFIED TYPE: Primary | ICD-10-CM

## 2020-06-17 DIAGNOSIS — E11.65 TYPE 2 DIABETES MELLITUS WITH HYPERGLYCEMIA, WITHOUT LONG-TERM CURRENT USE OF INSULIN (HCC): ICD-10-CM

## 2020-06-17 DIAGNOSIS — R22.31 MASS OF FINGER OF RIGHT HAND: ICD-10-CM

## 2020-06-17 DIAGNOSIS — R07.9 CHEST PAIN, UNSPECIFIED TYPE: ICD-10-CM

## 2020-06-17 DIAGNOSIS — Z01.00 DIABETIC EYE EXAM (HCC): ICD-10-CM

## 2020-06-17 DIAGNOSIS — B07.0 PLANTAR WART: ICD-10-CM

## 2020-06-17 DIAGNOSIS — Z12.39 SCREENING FOR BREAST CANCER: ICD-10-CM

## 2020-06-17 DIAGNOSIS — E11.9 DIABETIC EYE EXAM (HCC): ICD-10-CM

## 2020-06-17 DIAGNOSIS — Z91.041 CONTRAST MEDIA ALLERGY: Primary | ICD-10-CM

## 2020-06-17 PROBLEM — H66.002 NON-RECURRENT ACUTE SUPPURATIVE OTITIS MEDIA OF LEFT EAR WITHOUT SPONTANEOUS RUPTURE OF TYMPANIC MEMBRANE: Status: RESOLVED | Noted: 2019-08-13 | Resolved: 2020-06-17

## 2020-06-17 LAB
ANION GAP SERPL CALCULATED.3IONS-SCNC: 8 MMOL/L (ref 4–13)
BUN SERPL-MCNC: 15 MG/DL (ref 7–25)
CALCIUM SERPL-MCNC: 9.5 MG/DL (ref 8.6–10.5)
CHLORIDE SERPL-SCNC: 102 MMOL/L (ref 98–107)
CO2 SERPL-SCNC: 27 MMOL/L (ref 21–31)
CREAT SERPL-MCNC: 0.8 MG/DL (ref 0.6–1.2)
EST. AVERAGE GLUCOSE BLD GHB EST-MCNC: 151 MG/DL
GFR SERPL CREATININE-BSD FRML MDRD: 86 ML/MIN/1.73SQ M
GLUCOSE P FAST SERPL-MCNC: 96 MG/DL (ref 65–99)
HBA1C MFR BLD: 6.9 %
POTASSIUM SERPL-SCNC: 4.2 MMOL/L (ref 3.5–5.5)
SODIUM SERPL-SCNC: 137 MMOL/L (ref 134–143)

## 2020-06-17 PROCEDURE — 3078F DIAST BP <80 MM HG: CPT | Performed by: PHYSICIAN ASSISTANT

## 2020-06-17 PROCEDURE — 36415 COLL VENOUS BLD VENIPUNCTURE: CPT

## 2020-06-17 PROCEDURE — 99214 OFFICE O/P EST MOD 30 MIN: CPT | Performed by: PHYSICIAN ASSISTANT

## 2020-06-17 PROCEDURE — 83036 HEMOGLOBIN GLYCOSYLATED A1C: CPT

## 2020-06-17 PROCEDURE — 80048 BASIC METABOLIC PNL TOTAL CA: CPT

## 2020-06-17 PROCEDURE — 1036F TOBACCO NON-USER: CPT | Performed by: PHYSICIAN ASSISTANT

## 2020-06-17 PROCEDURE — 3051F HG A1C>EQUAL 7.0%<8.0%: CPT | Performed by: PHYSICIAN ASSISTANT

## 2020-06-17 PROCEDURE — 3008F BODY MASS INDEX DOCD: CPT | Performed by: PHYSICIAN ASSISTANT

## 2020-06-17 PROCEDURE — 3044F HG A1C LEVEL LT 7.0%: CPT | Performed by: INTERNAL MEDICINE

## 2020-06-17 PROCEDURE — 3074F SYST BP LT 130 MM HG: CPT | Performed by: PHYSICIAN ASSISTANT

## 2020-06-17 RX ORDER — IMIQUIMOD 12.5 MG/.25G
1 CREAM TOPICAL 2 TIMES WEEKLY
Qty: 12 EACH | Refills: 0 | Status: SHIPPED | OUTPATIENT
Start: 2020-06-18 | End: 2020-12-22

## 2020-06-17 RX ORDER — METHYLPREDNISOLONE 32 MG/1
TABLET ORAL
Qty: 2 TABLET | Refills: 0 | Status: SHIPPED | OUTPATIENT
Start: 2020-06-17 | End: 2020-08-03 | Stop reason: ALTCHOICE

## 2020-06-18 ENCOUNTER — TELEPHONE (OUTPATIENT)
Dept: INTERVENTIONAL RADIOLOGY/VASCULAR | Facility: HOSPITAL | Age: 50
End: 2020-06-18

## 2020-06-19 ENCOUNTER — HOSPITAL ENCOUNTER (OUTPATIENT)
Dept: CT IMAGING | Facility: HOSPITAL | Age: 50
Discharge: HOME | End: 2020-06-19
Payer: COMMERCIAL

## 2020-06-19 PROCEDURE — 71275 CT ANGIOGRAPHY CHEST: CPT

## 2020-06-19 RX ADMIN — IOHEXOL 100 ML: 350 INJECTION, SOLUTION INTRAVENOUS at 09:52

## 2020-06-22 DIAGNOSIS — R07.9 CHEST PAIN, UNSPECIFIED TYPE: Primary | ICD-10-CM

## 2020-06-26 ENCOUNTER — CONSULT (OUTPATIENT)
Dept: OBGYN CLINIC | Facility: CLINIC | Age: 50
End: 2020-06-26
Payer: COMMERCIAL

## 2020-06-26 VITALS
HEART RATE: 98 BPM | BODY MASS INDEX: 40.27 KG/M2 | WEIGHT: 218.8 LBS | HEIGHT: 62 IN | DIASTOLIC BLOOD PRESSURE: 76 MMHG | SYSTOLIC BLOOD PRESSURE: 111 MMHG

## 2020-06-26 DIAGNOSIS — M18.11 PRIMARY OSTEOARTHRITIS OF FIRST CARPOMETACARPAL JOINT OF RIGHT HAND: ICD-10-CM

## 2020-06-26 DIAGNOSIS — M67.449 MUCOUS CYST OF DIGIT OF HAND: Primary | ICD-10-CM

## 2020-06-26 DIAGNOSIS — R22.31 MASS OF FINGER OF RIGHT HAND: ICD-10-CM

## 2020-06-26 PROCEDURE — 3074F SYST BP LT 130 MM HG: CPT | Performed by: ORTHOPAEDIC SURGERY

## 2020-06-26 PROCEDURE — 20612 ASPIRATE/INJ GANGLION CYST: CPT | Performed by: ORTHOPAEDIC SURGERY

## 2020-06-26 PROCEDURE — 3078F DIAST BP <80 MM HG: CPT | Performed by: ORTHOPAEDIC SURGERY

## 2020-06-26 PROCEDURE — 1036F TOBACCO NON-USER: CPT | Performed by: ORTHOPAEDIC SURGERY

## 2020-06-26 PROCEDURE — 99203 OFFICE O/P NEW LOW 30 MIN: CPT | Performed by: ORTHOPAEDIC SURGERY

## 2020-06-26 PROCEDURE — 3008F BODY MASS INDEX DOCD: CPT | Performed by: ORTHOPAEDIC SURGERY

## 2020-06-26 PROCEDURE — 20600 DRAIN/INJ JOINT/BURSA W/O US: CPT | Performed by: ORTHOPAEDIC SURGERY

## 2020-06-26 PROCEDURE — 3044F HG A1C LEVEL LT 7.0%: CPT | Performed by: ORTHOPAEDIC SURGERY

## 2020-06-26 RX ORDER — LIDOCAINE HYDROCHLORIDE 10 MG/ML
2.5 INJECTION, SOLUTION INFILTRATION; PERINEURAL
Status: COMPLETED | OUTPATIENT
Start: 2020-06-26 | End: 2020-06-26

## 2020-06-26 RX ORDER — TRIAMCINOLONE ACETONIDE 40 MG/ML
20 INJECTION, SUSPENSION INTRA-ARTICULAR; INTRAMUSCULAR
Status: COMPLETED | OUTPATIENT
Start: 2020-06-26 | End: 2020-06-26

## 2020-06-26 RX ORDER — LIDOCAINE HYDROCHLORIDE 10 MG/ML
0.5 INJECTION, SOLUTION INFILTRATION; PERINEURAL
Status: COMPLETED | OUTPATIENT
Start: 2020-06-26 | End: 2020-06-26

## 2020-06-26 RX ADMIN — LIDOCAINE HYDROCHLORIDE 0.5 ML: 10 INJECTION, SOLUTION INFILTRATION; PERINEURAL at 08:52

## 2020-06-26 RX ADMIN — TRIAMCINOLONE ACETONIDE 20 MG: 40 INJECTION, SUSPENSION INTRA-ARTICULAR; INTRAMUSCULAR at 08:52

## 2020-06-26 RX ADMIN — LIDOCAINE HYDROCHLORIDE 2.5 ML: 10 INJECTION, SOLUTION INFILTRATION; PERINEURAL at 08:52

## 2020-06-26 RX ADMIN — Medication 0.05 MEQ: at 08:52

## 2020-06-29 ENCOUNTER — HOSPITAL ENCOUNTER (OUTPATIENT)
Dept: NON INVASIVE DIAGNOSTICS | Facility: CLINIC | Age: 50
Discharge: HOME | End: 2020-06-29
Payer: COMMERCIAL

## 2020-06-29 ENCOUNTER — DOCUMENTATION (OUTPATIENT)
Dept: CARDIOLOGY CLINIC | Facility: CLINIC | Age: 50
End: 2020-06-29

## 2020-06-29 DIAGNOSIS — R94.39 ABNORMAL STRESS TEST: ICD-10-CM

## 2020-06-29 DIAGNOSIS — R07.9 CHEST PAIN, UNSPECIFIED TYPE: Primary | ICD-10-CM

## 2020-06-29 DIAGNOSIS — R07.9 CHEST PAIN, UNSPECIFIED TYPE: ICD-10-CM

## 2020-06-29 PROCEDURE — 93351 STRESS TTE COMPLETE: CPT | Performed by: INTERNAL MEDICINE

## 2020-06-29 PROCEDURE — 93350 STRESS TTE ONLY: CPT

## 2020-07-02 ENCOUNTER — HOSPITAL ENCOUNTER (OUTPATIENT)
Dept: NUCLEAR MEDICINE | Facility: HOSPITAL | Age: 50
Discharge: HOME | End: 2020-07-02
Attending: INTERNAL MEDICINE
Payer: COMMERCIAL

## 2020-07-02 ENCOUNTER — HOSPITAL ENCOUNTER (OUTPATIENT)
Dept: NON INVASIVE DIAGNOSTICS | Facility: HOSPITAL | Age: 50
Discharge: HOME | End: 2020-07-02
Attending: INTERNAL MEDICINE
Payer: COMMERCIAL

## 2020-07-02 DIAGNOSIS — R07.9 CHEST PAIN, UNSPECIFIED TYPE: ICD-10-CM

## 2020-07-02 DIAGNOSIS — R94.39 ABNORMAL STRESS TEST: ICD-10-CM

## 2020-07-02 PROCEDURE — 93017 CV STRESS TEST TRACING ONLY: CPT

## 2020-07-02 PROCEDURE — A9502 TC99M TETROFOSMIN: HCPCS

## 2020-07-02 PROCEDURE — 93018 CV STRESS TEST I&R ONLY: CPT | Performed by: INTERNAL MEDICINE

## 2020-07-02 PROCEDURE — 78452 HT MUSCLE IMAGE SPECT MULT: CPT

## 2020-07-02 PROCEDURE — 93016 CV STRESS TEST SUPVJ ONLY: CPT | Performed by: INTERNAL MEDICINE

## 2020-07-02 PROCEDURE — 78452 HT MUSCLE IMAGE SPECT MULT: CPT | Performed by: INTERNAL MEDICINE

## 2020-07-02 RX ADMIN — REGADENOSON 0.4 MG: 0.08 INJECTION, SOLUTION INTRAVENOUS at 09:15

## 2020-07-06 LAB
ARRHY DURING EX: NORMAL
CHEST PAIN STATEMENT: NORMAL
MAX DIASTOLIC BP: 76 MMHG
MAX HEART RATE: 126 BPM
MAX PREDICTED HEART RATE: 169 BPM
MAX. SYSTOLIC BP: 120 MMHG
PROTOCOL NAME: NORMAL
REASON FOR TERMINATION: NORMAL
TARGET HR FORMULA: NORMAL
TEST INDICATION: NORMAL
TIME IN EXERCISE PHASE: NORMAL

## 2020-07-17 ENCOUNTER — OFFICE VISIT (OUTPATIENT)
Dept: OBGYN CLINIC | Facility: CLINIC | Age: 50
End: 2020-07-17
Payer: COMMERCIAL

## 2020-07-17 VITALS
HEIGHT: 62 IN | WEIGHT: 218.8 LBS | DIASTOLIC BLOOD PRESSURE: 75 MMHG | BODY MASS INDEX: 40.27 KG/M2 | SYSTOLIC BLOOD PRESSURE: 105 MMHG | HEART RATE: 97 BPM

## 2020-07-17 DIAGNOSIS — M18.11 PRIMARY OSTEOARTHRITIS OF FIRST CARPOMETACARPAL JOINT OF RIGHT HAND: ICD-10-CM

## 2020-07-17 DIAGNOSIS — M67.449 MUCOUS CYST OF DIGIT OF HAND: Primary | ICD-10-CM

## 2020-07-17 PROCEDURE — 3078F DIAST BP <80 MM HG: CPT | Performed by: ORTHOPAEDIC SURGERY

## 2020-07-17 PROCEDURE — 1036F TOBACCO NON-USER: CPT | Performed by: ORTHOPAEDIC SURGERY

## 2020-07-17 PROCEDURE — 3074F SYST BP LT 130 MM HG: CPT | Performed by: ORTHOPAEDIC SURGERY

## 2020-07-17 PROCEDURE — 3044F HG A1C LEVEL LT 7.0%: CPT | Performed by: ORTHOPAEDIC SURGERY

## 2020-07-17 PROCEDURE — 99213 OFFICE O/P EST LOW 20 MIN: CPT | Performed by: ORTHOPAEDIC SURGERY

## 2020-07-17 PROCEDURE — 3008F BODY MASS INDEX DOCD: CPT | Performed by: ORTHOPAEDIC SURGERY

## 2020-07-17 NOTE — PROGRESS NOTES
CHIEF COMPLAINT:  Chief Complaint   Patient presents with    Right Hand - Follow-up       SUBJECTIVE:  Araseli Ny is a 44y o  year old female who presents to the office s/p right [facility] cortisone steroid injection and aspiration of right index finger mucous cyst  Pt states that the CSI provided relief for both and the cyst has not returned         PAST MEDICAL HISTORY:  Past Medical History:   Diagnosis Date    Anxiety     Arthritis     Asthma     Cancer ([facility] )     renal,cervical    Depression     Diabetes mellitus ([facility] )     Diverticulitis     GERD (gastroesophageal reflux disease)     Hyperlipidemia associated with type 2 diabetes mellitus ([facility] )     Hypertension     Insulinoma     Non-recurrent acute suppurative otitis media of left ear without spontaneous rupture of tympanic membrane 2019    Pulmonary emboli (HCC)     Sleep apnea        PAST SURGICAL HISTORY:  Past Surgical History:   Procedure Laterality Date     SECTION      twice    CHOLECYSTECTOMY      KNEE ARTHROSCOPY Left     KNEE SURGERY      PANCREATECTOMY      Partial     VT COLONOSCOPY FLX DX W/COLLJ SPEC WHEN PFRMD N/A 2019    Procedure: COLONOSCOPY;  Surgeon: Arielle Mueller DO;  [facility];  Service: Gastroenterology    VT LAP,CHOLECYSTECTOMY N/A 2018    Procedure: CHOLECYSTECTOMY LAPAROSCOPIC;  Surgeon: Claudia Messer DO;  [facility]: [facility];  Service: General       FAMILY HISTORY:  Family History   Problem Relation Age of Onset    Heart disease Mother     Hyperlipidemia Mother     Obesity Mother     Hypertension Mother    Bertrand Other Mother         Bundle branch block    Ovarian cancer Mother     Tuberculosis Mother     Substance Abuse Father     Breast cancer Brother     Other Maternal Grandmother         ascending aortic aneurysm resection    Diabetes Maternal Grandmother     Diabetes Paternal Grandmother     Tuberculosis Maternal Aunt     Substance Abuse Family     Osteoporosis Family        SOCIAL HISTORY:  Social History     Tobacco Use    Smoking status: Former Smoker    Smokeless tobacco: Never Used    Tobacco comment: quit 6 yrs ago   Substance Use Topics    Alcohol use: No    Drug use: No       MEDICATIONS:    Current Outpatient Medications:     albuterol (Ventolin HFA) 90 mcg/act inhaler, Inhale 2 puffs every 4 (four) hours as needed for shortness of breath, Disp: 3 Inhaler, Rfl: 1    ARIPiprazole (ABILIFY) 15 mg tablet, Take 1 tablet by mouth daily , Disp: , Rfl:     Blood Glucose Monitoring Suppl (CONTOUR NEXT MONITOR) w/Device KIT, 1 kit by Does not apply route 2 (two) times a day as needed (as needed), Disp: 1 kit, Rfl: 0    Canagliflozin-metFORMIN HCl (Invokamet) 150-1000 MG TABS, Take 1 tablet by mouth 2 (two) times a day, Disp: 60 tablet, Rfl: 5    cyclobenzaprine (FLEXERIL) 10 mg tablet, Take 1 tablet (10 mg total) by mouth 2 (two) times a day, Disp: 60 tablet, Rfl: 5    diclofenac (VOLTAREN) 75 mg EC tablet, Take 1 tablet (75 mg total) by mouth 2 (two) times a day, Disp: 60 tablet, Rfl: 5    Dulaglutide (Trulicity) 1 5 EZ/6 2PC SOPN, Inject 0 5 mL (1 5 mg total) under the skin once a week, Disp: 2 mL, Rfl: 5    glucose blood (Contour Next Test) test strip, 1 each by Other route 2 (two) times a day as needed (As needed) Use as instructed, Disp: 100 each, Rfl: 5    imiquimod (ALDARA) 5 % cream, Apply 1 packet topically 2 (two) times a week, Disp: 12 each, Rfl: 0    Lancet Device MISC, by Does not apply route, Disp: , Rfl:     lisinopril (ZESTRIL) 2 5 mg tablet, Take 1 tablet (2 5 mg total) by mouth daily, Disp: 90 tablet, Rfl: 1    loratadine (CLARITIN) 10 mg tablet, Take 1 tablet (10 mg total) by mouth daily, Disp: 30 tablet, Rfl: 5    methylPREDNISolone (MEDROL) 32 MG tablet, Take one tablet 12 hours prior to contrast and again 2 hours prior to contrast, Disp: 2 tablet, Rfl: 0    Microlet Lancets MISC, Test twice daily, Disp: 100 each, Rfl: 3    Multiple Vitamins-Minerals (MULTI ADULT GUMMIES PO), Take by mouth, Disp: , Rfl:     pravastatin (PRAVACHOL) 20 mg tablet, Take 1 tablet (20 mg total) by mouth daily, Disp: 90 tablet, Rfl: 1    sitaGLIPtin (JANUVIA) 100 mg tablet, Take 1 tablet (100 mg total) by mouth daily, Disp: 30 tablet, Rfl: 5    venlafaxine (EFFEXOR-XR) 150 mg 24 hr capsule, Take 150 mg by mouth daily  , Disp: , Rfl:     EPINEPHrine (EPIPEN) 0 3 mg/0 3 mL SOAJ, Inject 0 3 mL (0 3 mg total) into the shoulder, thigh, or buttocks once for 1 dose, Disp: 0 3 mL, Rfl: 2    ALLERGIES:  Allergies   Allergen Reactions    Iodine Anaphylaxis     IVP DYE     Nuts Anaphylaxis     Annotation - [id number]: Pine nuts       REVIEW OF SYSTEMS:  Review of Systems   Constitutional: Negative for chills, fever and unexpected weight change  HENT: Negative for hearing loss, nosebleeds and sore throat  Eyes: Negative for pain, redness and visual disturbance  Respiratory: Negative for cough, shortness of breath and wheezing  Cardiovascular: Negative for chest pain, palpitations and leg swelling  Gastrointestinal: Negative for abdominal pain, nausea and vomiting  Endocrine: Negative for polydipsia and polyuria  Genitourinary: Negative for dysuria and hematuria  Skin: Negative for rash and wound  Neurological: Negative for dizziness and headaches  Psychiatric/Behavioral: Negative for decreased concentration, dysphoric mood and suicidal ideas  The patient is not nervous/anxious          VITALS:  Vitals:    07/17/20 0920   BP: 105/75   Pulse: 97       LABS:  HgA1c:   Lab Results   Component Value Date    HGBA1C 6 9 (H) 06/17/2020     BMP:   Lab Results   Component Value Date    GLUCOSE 116 11/19/2015    CALCIUM 9 5 06/17/2020     06/09/2018    K 4 2 06/17/2020    CO2 27 06/17/2020     06/17/2020    BUN 15 06/17/2020    CREATININE 0 80 06/17/2020       _____________________________________________________  PHYSICAL EXAMINATION:  General: well developed and well nourished, alert, oriented times 3 and appears comfortable  Psychiatric: Normal  HEENT: Trachea Midline, No torticollis  Pulmonary: No audible wheezing or strider  Cardiovascular: No discernable arrhythmia   Skin: No masses, erythema, lacerations, fluctation, ulcerations  Neurovascular: Sensation Intact to the Median, Ulnar, Radial Nerve, Motor Intact to the Median, Ulnar, Radial Nerve and Pulses Intact    MUSCULOSKELETAL EXAMINATION:  right CMC Exam:  No adduction contracture  No hyperextension deformity of MCP joint  Negative localized tenderness over radial and dorsal aspect of thumb (CMC joint)  Grind test is Negative for pain and Positive for crepitus  No triggering or tenderness over the A1 pulley  No pain with Finkelsteins maneuver       Right index finger   No palpable cyst at DIP joint       ___________________________________________________  STUDIES REVIEWED:  No studies reviewed  PROCEDURES PERFORMED:  Procedures  No Procedures performed today    _____________________________________________________  ASSESSMENT/PLAN:    S/P right CMC CSI  *Pt was advised to take OTC NSAID if pain returns  *Patient is advised to call the office if the pain is not manageable we can consider repeat cortisone steroid injection, patient understands she cannot receive these injections more often than every 3 months apart      S/p aspiration right index finger mucous cyst  * patient was advised to call the office if the cyst returns      Follow Up:  Return if symptoms worsen or fail to improve          To Do Next Visit:  Re-evaluation of current issue        Scribe Attestation    I,:   Ryan Bullock am acting as a scribe while in the presence of the attending physician :        I,:   Rick Joy MD personally performed the services described in this documentation    as scribed in my presence :

## 2020-07-29 ENCOUNTER — TELEPHONE (OUTPATIENT)
Dept: INTERNAL MEDICINE CLINIC | Facility: CLINIC | Age: 50
End: 2020-07-29

## 2020-07-29 DIAGNOSIS — E78.5 HYPERLIPIDEMIA ASSOCIATED WITH TYPE 2 DIABETES MELLITUS (HCC): ICD-10-CM

## 2020-07-29 DIAGNOSIS — E11.69 HYPERLIPIDEMIA ASSOCIATED WITH TYPE 2 DIABETES MELLITUS (HCC): ICD-10-CM

## 2020-07-29 RX ORDER — PRAVASTATIN SODIUM 20 MG
20 TABLET ORAL DAILY
Qty: 90 TABLET | Refills: 1 | Status: SHIPPED | OUTPATIENT
Start: 2020-07-29 | End: 2021-02-16 | Stop reason: SDUPTHER

## 2020-08-03 ENCOUNTER — CONSULT (OUTPATIENT)
Dept: PULMONOLOGY | Facility: CLINIC | Age: 50
End: 2020-08-03
Payer: COMMERCIAL

## 2020-08-03 ENCOUNTER — HOSPITAL ENCOUNTER (OUTPATIENT)
Dept: MAMMOGRAPHY | Facility: HOSPITAL | Age: 50
Discharge: HOME | End: 2020-08-03
Payer: COMMERCIAL

## 2020-08-03 VITALS
SYSTOLIC BLOOD PRESSURE: 118 MMHG | WEIGHT: 215 LBS | BODY MASS INDEX: 39.56 KG/M2 | DIASTOLIC BLOOD PRESSURE: 68 MMHG | HEIGHT: 62 IN | HEART RATE: 125 BPM | OXYGEN SATURATION: 96 %

## 2020-08-03 VITALS — WEIGHT: 216 LBS | HEIGHT: 62 IN | BODY MASS INDEX: 39.75 KG/M2

## 2020-08-03 DIAGNOSIS — E66.09 CLASS 2 OBESITY DUE TO EXCESS CALORIES WITH BODY MASS INDEX (BMI) OF 39.0 TO 39.9 IN ADULT, UNSPECIFIED WHETHER SERIOUS COMORBIDITY PRESENT: ICD-10-CM

## 2020-08-03 DIAGNOSIS — R07.9 CHEST PAIN, UNSPECIFIED TYPE: ICD-10-CM

## 2020-08-03 DIAGNOSIS — G47.30 SLEEP APNEA, UNSPECIFIED TYPE: ICD-10-CM

## 2020-08-03 DIAGNOSIS — J45.909 ASTHMA, UNSPECIFIED ASTHMA SEVERITY, UNSPECIFIED WHETHER COMPLICATED, UNSPECIFIED WHETHER PERSISTENT: ICD-10-CM

## 2020-08-03 DIAGNOSIS — R93.89 ABNORMAL CHEST CT: Primary | ICD-10-CM

## 2020-08-03 DIAGNOSIS — J45.30 MILD PERSISTENT ASTHMA WITHOUT COMPLICATION: ICD-10-CM

## 2020-08-03 DIAGNOSIS — K21.9 GASTROESOPHAGEAL REFLUX DISEASE WITHOUT ESOPHAGITIS: ICD-10-CM

## 2020-08-03 DIAGNOSIS — Z12.39 SCREENING FOR BREAST CANCER: ICD-10-CM

## 2020-08-03 DIAGNOSIS — Z86.711 HISTORY OF PULMONARY EMBOLISM: ICD-10-CM

## 2020-08-03 PROBLEM — E66.812 CLASS 2 OBESITY DUE TO EXCESS CALORIES IN ADULT: Status: ACTIVE | Noted: 2020-08-03

## 2020-08-03 PROCEDURE — 77067 SCR MAMMO BI INCL CAD: CPT

## 2020-08-03 PROCEDURE — 77063 BREAST TOMOSYNTHESIS BI: CPT

## 2020-08-03 PROCEDURE — 99245 OFF/OP CONSLTJ NEW/EST HI 55: CPT | Performed by: INTERNAL MEDICINE

## 2020-08-03 PROCEDURE — 1036F TOBACCO NON-USER: CPT | Performed by: INTERNAL MEDICINE

## 2020-08-03 PROCEDURE — 3008F BODY MASS INDEX DOCD: CPT | Performed by: INTERNAL MEDICINE

## 2020-08-03 PROCEDURE — 3044F HG A1C LEVEL LT 7.0%: CPT | Performed by: INTERNAL MEDICINE

## 2020-08-03 RX ORDER — FLUTICASONE FUROATE AND VILANTEROL 100; 25 UG/1; UG/1
1 POWDER RESPIRATORY (INHALATION) DAILY
Qty: 1 INHALER | Refills: 6 | Status: SHIPPED | OUTPATIENT
Start: 2020-08-03 | End: 2020-08-12 | Stop reason: ALTCHOICE

## 2020-08-03 RX ORDER — ALBUTEROL SULFATE 90 UG/1
2 AEROSOL, METERED RESPIRATORY (INHALATION) EVERY 4 HOURS PRN
Qty: 3 INHALER | Refills: 1 | Status: SHIPPED | OUTPATIENT
Start: 2020-08-03 | End: 2021-06-02 | Stop reason: SDUPTHER

## 2020-08-03 NOTE — ASSESSMENT & PLAN NOTE
I believe patient had chronic untreated asthma and probably with history of smoking she may have underlying COPD  Will check PFTs  I prescribed Breo 100/25 and p r n  albuterol and a showed her how to use and give a sample, she used in the office once

## 2020-08-03 NOTE — PROGRESS NOTES
Consultation - Pulmonary Medicine   Katie Dye 44 y o  female MRN: [id number]        Physician Requesting Consult: Leilani Alcala  Reason for Consult:   Abnormal CT scan and asthma    Abnormal chest CT  Stable cystic lesion most likely either congenital or secondary to smoking history  Also tiny lung nodule stable also over 6 months  Will need CT scan in 1 year and then after that probably lung cancer screening annually  Will order CT scan next visit  Mild persistent asthma without complication  I believe patient had chronic untreated asthma and probably with history of smoking she may have underlying COPD  Will check PFTs  I prescribed Breo 100/25 and p r n  albuterol and a showed her how to use and give a sample, she used in the office once  Gastroesophageal reflux disease  Continue Pepcid for now  Sleep apnea  Patient has multiple size and symptoms of a year, I ordered sleep study    Class 2 obesity due to excess calories in adult  Encouraged to decrease calorie intake and exercise to lose weight and hopefully with the management of her asthma she may be able to exercise more  History of pulmonary embolism  Probably provoked with obesity as a risk factor  No further intervention at this time  Diagnoses and all orders for this visit:    Abnormal chest CT    Chest pain, unspecified type  -     Ambulatory referral to Pulmonology    Gastroesophageal reflux disease without esophagitis    Mild persistent asthma without complication  -     Complete PFT with post bronchodilator; Future  -     fluticasone-vilanterol (BREO ELLIPTA) 100-25 mcg/inh inhaler; Inhale 1 puff daily Rinse mouth after use  Class 2 obesity due to excess calories with body mass index (BMI) of 39 0 to 39 9 in adult, unspecified whether serious comorbidity present    Sleep apnea, unspecified type  -     Diagnostic Sleep Study;  Future    History of pulmonary embolism    Asthma, unspecified asthma severity, unspecified whether complicated, unspecified whether persistent  -     albuterol (Ventolin HFA) 90 mcg/act inhaler; Inhale 2 puffs every 4 (four) hours as needed for shortness of breath      ______________________________________________________________________    HPI:    Chanelle Rae is a 44 y o  female who presents for evaluation of shortness of breath/asthma and also abnormal CT scan  Patient has history of asthma since age 13, treated with p r n  albuterol, she has chronic mild dyspnea on exertion and intermittent wheezing and sometimes dry cough and chest tightness  She avoids the symptoms by being less active as she states  Her triggers usually is exertion and fumes  No history of intubation  No history of exacerbation of her asthma to require corticosteroids  Patient has history of PE 3 years ago after a drive trip to [place] with no other risks factors, she is morbidly obese, she was not on any hormonal therapy at that time  She was treated with anticoagulation for few months and stop  Patient had chest pain recently and underwent chest CT scan to rule out PE that was negative and showed the cystic lesion in her lung and the lung nodule and so referred to see Pulmonary  She has history of GERD that is controlled with Pepcid but sometimes there is shortage on that as she states  She has remote history of sleep apnea and narcolepsy more than 20 years ago, was prescribed Ritalin but she did not take  Currently she denies narcolepsy symptoms but she continues to have significant snoring, witnessed apneic episodes, excessive daytime sleepiness, restless leg syndrome and also fatigue  Patient lives with her mother, she has a dog, no exposure to birds  Patient smoked for 30 years, 1 pack per day, quit 8 years ago  No history of acute patient exposure  Review of Systems:  Review of Systems   Constitutional: Negative  HENT: Negative  Eyes: Negative  Respiratory:        As HPI   Cardiovascular: Negative  Gastrointestinal: Negative  Endocrine: Negative  Genitourinary: Negative  Musculoskeletal: Negative  Skin: Negative  Allergic/Immunologic: Negative  Neurological: Negative  Hematological: Negative  Psychiatric/Behavioral: Negative          Current Medications:    Current Outpatient Medications:     albuterol (Ventolin HFA) 90 mcg/act inhaler, Inhale 2 puffs every 4 (four) hours as needed for shortness of breath, Disp: 3 Inhaler, Rfl: 1    ARIPiprazole (ABILIFY) 15 mg tablet, Take 1 tablet by mouth daily , Disp: , Rfl:     Blood Glucose Monitoring Suppl (CONTOUR NEXT MONITOR) w/Device KIT, 1 kit by Does not apply route 2 (two) times a day as needed (as needed), Disp: 1 kit, Rfl: 0    Canagliflozin-metFORMIN HCl (Invokamet) 150-1000 MG TABS, Take 1 tablet by mouth 2 (two) times a day, Disp: 60 tablet, Rfl: 5    cyclobenzaprine (FLEXERIL) 10 mg tablet, Take 1 tablet (10 mg total) by mouth 2 (two) times a day, Disp: 60 tablet, Rfl: 5    diclofenac (VOLTAREN) 75 mg EC tablet, Take 1 tablet (75 mg total) by mouth 2 (two) times a day, Disp: 60 tablet, Rfl: 5    Dulaglutide (Trulicity) 1 5 KX/4 0JD SOPN, Inject 0 5 mL (1 5 mg total) under the skin once a week, Disp: 2 mL, Rfl: 5    glucose blood (Contour Next Test) test strip, 1 each by Other route 2 (two) times a day as needed (As needed) Use as instructed, Disp: 100 each, Rfl: 5    imiquimod (ALDARA) 5 % cream, Apply 1 packet topically 2 (two) times a week, Disp: 12 each, Rfl: 0    Lancet Device MISC, by Does not apply route, Disp: , Rfl:     lisinopril (ZESTRIL) 2 5 mg tablet, Take 1 tablet (2 5 mg total) by mouth daily, Disp: 90 tablet, Rfl: 1    loratadine (CLARITIN) 10 mg tablet, Take 1 tablet (10 mg total) by mouth daily, Disp: 30 tablet, Rfl: 5    Microlet Lancets MISC, Test twice daily, Disp: 100 each, Rfl: 3    Multiple Vitamins-Minerals (MULTI ADULT GUMMIES PO), Take by mouth, Disp: , Rfl:     pravastatin (PRAVACHOL) 20 mg tablet, Take 1 tablet (20 mg total) by mouth daily, Disp: 90 tablet, Rfl: 1    sitaGLIPtin (JANUVIA) 100 mg tablet, Take 1 tablet (100 mg total) by mouth daily, Disp: 30 tablet, Rfl: 5    venlafaxine (EFFEXOR-XR) 150 mg 24 hr capsule, Take 150 mg by mouth daily  , Disp: , Rfl:     EPINEPHrine (EPIPEN) 0 3 mg/0 3 mL SOAJ, Inject 0 3 mL (0 3 mg total) into the shoulder, thigh, or buttocks once for 1 dose, Disp: 0 3 mL, Rfl: 2    Historical Information   Past Medical History:   Diagnosis Date    Anxiety     Arthritis     Asthma     [facility] )     renal,cervical    Depression     [facility]     Diverticulitis     GERD (gastroesophageal reflux disease)     Hyperlipidemia associated with type 2 diabetes mellitus [facility]     Hypertension     Insulinoma     Non-recurrent acute suppurative otitis media of left ear without spontaneous rupture of tympanic membrane 2019    Pulmonary emboli (HCC)     Sleep apnea      Past Surgical History:   Procedure Laterality Date     SECTION      twice    CHOLECYSTECTOMY      KNEE ARTHROSCOPY Left     KNEE SURGERY      PANCREATECTOMY      Partial     CA COLONOSCOPY FLX DX W/COLLJ SPEC WHEN PFRMD N/A 2019    Procedure: COLONOSCOPY;  Surgeon: Aylin Duque DO;  [facility];  Service: Gastroenterology    CA LAP,CHOLECYSTECTOMY N/A 2018    Procedure: CHOLECYSTECTOMY LAPAROSCOPIC;  Surgeon: Doni Han DO;  [facility];  Service: General     Social History   Social History     Tobacco Use   Smoking Status Former Smoker   Smokeless Tobacco Never Used   Tobacco Comment    quit 6 yrs ago       Occupational history:  No occupational exposure    Family History:   Family History   Problem Relation Age of Onset    Heart disease Mother     Hyperlipidemia Mother     Obesity Mother     Hypertension Mother     Other Mother         Bundle branch block    Tuberculosis Mother     Substance Abuse Father     Breast cancer Brother     Other Maternal Grandmother         ascending aortic aneurysm resection    Diabetes Maternal Grandmother     Diabetes Paternal Grandmother     Tuberculosis Maternal Aunt     Substance Abuse Family     Osteoporosis Family     No Known Problems Sister     No Known Problems Daughter     No Known Problems Sister     No Known Problems Half-Sister     No Known Problems Paternal Aunt     No Known Problems Paternal Aunt     No Known Problems Paternal Aunt     No Known Problems Paternal Aunt     No Known Problems Paternal Aunt          PhysicalExamination:  Vitals:   /68 (BP Location: Left arm, Patient Position: Sitting)   Pulse (!) 125   Ht 5' 2"   Wt 97 5 kg (215 lb)   SpO2 96%   BMI 39 32 kg/m²     General: alert, not in acute distress  HEENT: PERRL, no icteric sclera or cyanosis, no thrush  Neck:  Supple, no lymphadenopathy or thyromegaly, no JVD  Lungs:  Equal breath sounds and clear auscultations bilaterally, no wheezing or crackles  Heart: S1S2 regular, no murmures or gallops  Abdomen: soft, non-tender, bowel sounds  present  Extrimities: no edema, no clubbing or cyanosis  Neuro: Alert and oriented x 3, no focal neurodeficits   Skin: intact, no rashes      Diagnostic Data:  Labs:   I personally reviewed the most recent laboratory data pertinent to today's visit    Lab Results   Component Value Date    WBC 5 60 01/02/2020    HGB 12 8 01/02/2020    HCT 40 3 (L) 01/02/2020    MCV 87 01/02/2020     01/02/2020     Lab Results   Component Value Date    GLUCOSE 116 11/19/2015    CALCIUM 9 5 06/17/2020     06/09/2018    K 4 2 06/17/2020    CO2 27 06/17/2020     06/17/2020    BUN 15 06/17/2020    CREATININE 0 80 06/17/2020     No results found for: IGE  Lab Results   Component Value Date    ALT 64 (H) 01/02/2020    AST 29 01/02/2020    ALKPHOS 78 01/02/2020    BILITOT 0 4 06/09/2018     ABG in January 2020:  7 38/41/131/95%  (labeled as VBG)        Imaging:  I personally reviewed the images on the [facility] system pertinent to today's visit  Chest CT scan reviewed on PACs:  Stable cystic lesion in the right lung measuring about 2 cm, tiny 2 mm lung nodule  No other abnormalities except of few emphysematous changes  Other studies:  Cardiac stress Echo:  IMPRESSIONS:  Limited aerobic capacity  Normal ECG and BP response  The echo portion was limited and inconclusive  Nuclear stress test:IMPRESSIONS: Normal study after pharmacologic vasodilation  Myocardial perfusion imaging was normal at rest and with stress   Left ventricular systolic function was normal         Matt Solis MD

## 2020-08-03 NOTE — ASSESSMENT & PLAN NOTE
Stable cystic lesion most likely either congenital or secondary to smoking history  Also tiny lung nodule stable also over 6 months  Will need CT scan in 1 year and then after that probably lung cancer screening annually  Will order CT scan next visit

## 2020-08-03 NOTE — ASSESSMENT & PLAN NOTE
Encouraged to decrease calorie intake and exercise to lose weight and hopefully with the management of her asthma she may be able to exercise more

## 2020-08-04 ENCOUNTER — TELEPHONE (OUTPATIENT)
Dept: PULMONOLOGY | Facility: CLINIC | Age: 50
End: 2020-08-04

## 2020-08-06 NOTE — TELEPHONE ENCOUNTER
Patient calling [facility]  told her Patience Eloy prior Katrin Coppola was denied   Please advise

## 2020-08-12 DIAGNOSIS — J45.30 MILD PERSISTENT ASTHMA WITHOUT COMPLICATION: Primary | ICD-10-CM

## 2020-08-12 RX ORDER — BUDESONIDE AND FORMOTEROL FUMARATE DIHYDRATE 80; 4.5 UG/1; UG/1
2 AEROSOL RESPIRATORY (INHALATION) 2 TIMES DAILY
Qty: 1 INHALER | Refills: 5 | Status: SHIPPED | OUTPATIENT
Start: 2020-08-12 | End: 2021-01-19 | Stop reason: SDUPTHER

## 2020-08-12 NOTE — PROGRESS NOTES
Seen by Dr Kavin Diana who ordered [facility] for mild persistent asthma  Breo not covered  Will provide prescription for Symbicort 80/4 5 2 puffs twice daily  Sent to pharmacy

## 2020-08-12 NOTE — TELEPHONE ENCOUNTER
Formulary alternatives are: [facility], Symbicort, Advair HFA Diskus, fluticasone-slameterol   Provider to be notified

## 2020-08-14 ENCOUNTER — TELEPHONE (OUTPATIENT)
Dept: PULMONOLOGY | Facility: CLINIC | Age: 50
End: 2020-08-14

## 2020-08-25 DIAGNOSIS — I10 ESSENTIAL HYPERTENSION: ICD-10-CM

## 2020-08-25 PROCEDURE — 4010F ACE/ARB THERAPY RXD/TAKEN: CPT | Performed by: INTERNAL MEDICINE

## 2020-08-25 RX ORDER — LISINOPRIL 2.5 MG/1
TABLET ORAL
Qty: 90 TABLET | Refills: 1 | Status: SHIPPED | OUTPATIENT
Start: 2020-08-25 | End: 2021-02-16 | Stop reason: SDUPTHER

## 2020-09-02 ENCOUNTER — TELEPHONE (OUTPATIENT)
Dept: SLEEP CENTER | Facility: CLINIC | Age: 50
End: 2020-09-02

## 2020-09-02 ENCOUNTER — HOSPITAL ENCOUNTER (OUTPATIENT)
Dept: SLEEP CENTER | Facility: HOSPITAL | Age: 50
Discharge: HOME | End: 2020-09-02
Attending: INTERNAL MEDICINE
Payer: COMMERCIAL

## 2020-09-02 DIAGNOSIS — G47.30 SLEEP APNEA, UNSPECIFIED TYPE: ICD-10-CM

## 2020-09-02 PROCEDURE — 95810 POLYSOM 6/> YRS 4/> PARAM: CPT

## 2020-09-02 PROCEDURE — 95810 POLYSOM 6/> YRS 4/> PARAM: CPT | Performed by: INTERNAL MEDICINE

## 2020-09-02 NOTE — TELEPHONE ENCOUNTER
----- Message from Waylon Mortensen MD sent at 9/2/2020  2:36 PM EDT -----  Approved  ----- Message -----  From: Dion Reyna  Sent: 9/2/2020   2:27 PM EDT  To: Waylon Mortensen MD    This sleep study needs approval      If approved please sign and return to clerical pool  If denied please include reasons why  Also provide alternative testing if warranted  Please sign and return to clerical pool

## 2020-09-02 NOTE — TELEPHONE ENCOUNTER
9/2 I called patient and offer an opening for Tonight at [facility], pt accepted  Pt understood our no show policy  lc  I reviewed the patient liability acknowledgement form with the patient on the telephone  Patients who cancel their morning sleep study after 3:00 pm the day prior to the study, or cancel their evening sleep study after 12:00 pm on the day of the study, or fail to arrive for their scheduled appointment will be charged a $100 No Show Fee  Sleep studies scheduled on Sundays must be cancelled by 12:00 noon on the preceding Friday  The patient acknowledges verbally that they are financially responsible for a $100 No Show charge if they do not cancel their sleep study by the cancellation time requirement listed above  This charge will not be covered by their insurance company  This charge must be paid prior to the test being re-scheduled

## 2020-09-03 NOTE — PROGRESS NOTES
Sleep Study Documentation    Pre-Sleep Study       Sleep testing procedure explained to patient:YES    Patient napped prior to study:NO    Caffeine:Dayshift worker after 12PM   Caffeine use:NO    Alcohol:Dayshift workers after 5PM: Alcohol use:NO    Typical day for patient:YES       Study Documentation    Sleep Study Indications: snoring, EDS, Witnessed apneas    Sleep Study: Diagnostic   Snore:Mild  Supplemental O2: no    O2 flow rate (L/min) range n/a  O2 flow rate (L/min) final n/a  Minimum SaO2 93   Baseline SaO2 88        Mode of Therapy:    EKG abnormalities: no     EEG abnormalities: no    Sleep Study Recorded < 2 hours: N/A    Sleep Study Recorded > 2 hours but incomplete study: N/A    Sleep Study Recorded 6 hours but no sleep obtained: NO    Patient classification: disabled       Post-Sleep Study    Medication used at bedtime or during sleep study:YES other prescription medications    Patient reports time it took to fall asleep:20 to 30 minutes    Patient reports waking up during study:3 or more times  Patient reports returning to sleep in 10 to 30 minutes  Patient reports sleeping 6 to 8 hours without dreaming  Patient reports sleep during study:typical    Patient rated sleepiness: Somewhat sleepy or tired    PAP treatment:no

## 2020-09-09 ENCOUNTER — TELEPHONE (OUTPATIENT)
Dept: SLEEP CENTER | Facility: CLINIC | Age: 50
End: 2020-09-09

## 2020-09-09 NOTE — TELEPHONE ENCOUNTER
----- Message from Tyson Lux MD sent at 9/7/2020  2:21 PM EDT -----  approved  ----- Message -----  From: Virginia Hickey  Sent: 9/2/2020   2:27 PM EDT  To: Tyson Lux MD    This sleep study needs approval      If approved please sign and return to clerical pool  If denied please include reasons why  Also provide alternative testing if warranted  Please sign and return to clerical pool

## 2020-09-11 DIAGNOSIS — G47.33 OSA (OBSTRUCTIVE SLEEP APNEA): Primary | ICD-10-CM

## 2020-09-16 ENCOUNTER — TELEPHONE (OUTPATIENT)
Dept: PULMONOLOGY | Facility: CLINIC | Age: 50
End: 2020-09-16

## 2020-09-17 NOTE — TELEPHONE ENCOUNTER
Spoke with pt, informed pt of AHI levels (8 1) she has mild obstructive sleep apnea  Also due to central events she was having, it is recommended for pt to have a CPAP titration  Pt informed to contact central scheduling  All questions answered for pt

## 2020-09-18 ENCOUNTER — TELEPHONE (OUTPATIENT)
Dept: SLEEP CENTER | Facility: CLINIC | Age: 50
End: 2020-09-18

## 2020-09-18 DIAGNOSIS — Z20.822 ENCOUNTER FOR LABORATORY TESTING FOR COVID-19 VIRUS: Primary | ICD-10-CM

## 2020-09-22 ENCOUNTER — OFFICE VISIT (OUTPATIENT)
Dept: INTERNAL MEDICINE CLINIC | Facility: CLINIC | Age: 50
End: 2020-09-22
Payer: COMMERCIAL

## 2020-09-22 VITALS
RESPIRATION RATE: 16 BRPM | OXYGEN SATURATION: 98 % | HEART RATE: 102 BPM | WEIGHT: 215 LBS | DIASTOLIC BLOOD PRESSURE: 70 MMHG | HEIGHT: 62 IN | BODY MASS INDEX: 39.56 KG/M2 | TEMPERATURE: 97.4 F | SYSTOLIC BLOOD PRESSURE: 108 MMHG

## 2020-09-22 DIAGNOSIS — E11.65 TYPE 2 DIABETES MELLITUS WITH HYPERGLYCEMIA, WITHOUT LONG-TERM CURRENT USE OF INSULIN (HCC): Primary | ICD-10-CM

## 2020-09-22 DIAGNOSIS — K21.9 GASTROESOPHAGEAL REFLUX DISEASE WITHOUT ESOPHAGITIS: ICD-10-CM

## 2020-09-22 DIAGNOSIS — E78.00 HYPERCHOLESTEROLEMIA: ICD-10-CM

## 2020-09-22 DIAGNOSIS — Z20.822 ENCOUNTER FOR LABORATORY TESTING FOR COVID-19 VIRUS: ICD-10-CM

## 2020-09-22 DIAGNOSIS — M54.2 NECK PAIN: ICD-10-CM

## 2020-09-22 DIAGNOSIS — S02.5XXB OPEN FRACTURE OF TOOTH, INITIAL ENCOUNTER: ICD-10-CM

## 2020-09-22 DIAGNOSIS — Z13.29 SCREENING FOR THYROID DISORDER: ICD-10-CM

## 2020-09-22 DIAGNOSIS — Z13.0 SCREENING FOR DEFICIENCY ANEMIA: ICD-10-CM

## 2020-09-22 DIAGNOSIS — E55.9 VITAMIN D DEFICIENCY: ICD-10-CM

## 2020-09-22 PROBLEM — R07.9 CHEST PAIN: Status: RESOLVED | Noted: 2020-06-17 | Resolved: 2020-09-22

## 2020-09-22 LAB — SL AMB POCT HEMOGLOBIN AIC: 6.7 (ref ?–6.5)

## 2020-09-22 PROCEDURE — 83036 HEMOGLOBIN GLYCOSYLATED A1C: CPT | Performed by: PHYSICIAN ASSISTANT

## 2020-09-22 PROCEDURE — 93000 ELECTROCARDIOGRAM COMPLETE: CPT | Performed by: PHYSICIAN ASSISTANT

## 2020-09-22 PROCEDURE — 99214 OFFICE O/P EST MOD 30 MIN: CPT | Performed by: PHYSICIAN ASSISTANT

## 2020-09-22 PROCEDURE — U0003 INFECTIOUS AGENT DETECTION BY NUCLEIC ACID (DNA OR RNA); SEVERE ACUTE RESPIRATORY SYNDROME CORONAVIRUS 2 (SARS-COV-2) (CORONAVIRUS DISEASE [COVID-19]), AMPLIFIED PROBE TECHNIQUE, MAKING USE OF HIGH THROUGHPUT TECHNOLOGIES AS DESCRIBED BY CMS-2020-01-R: HCPCS | Performed by: INTERNAL MEDICINE

## 2020-09-22 RX ORDER — AMOXICILLIN 500 MG/1
500 CAPSULE ORAL EVERY 8 HOURS SCHEDULED
Qty: 30 CAPSULE | Refills: 0 | Status: SHIPPED | OUTPATIENT
Start: 2020-09-22 | End: 2020-10-02

## 2020-09-22 RX ORDER — OMEPRAZOLE 40 MG/1
40 CAPSULE, DELAYED RELEASE ORAL
Qty: 30 CAPSULE | Refills: 5 | Status: SHIPPED | OUTPATIENT
Start: 2020-09-22 | End: 2021-03-09

## 2020-09-22 NOTE — ASSESSMENT & PLAN NOTE
Start amoxil  Directions for use and possible side effects discussed and patient verbalized understanding of these

## 2020-09-22 NOTE — ASSESSMENT & PLAN NOTE
Lab Results   Component Value Date    HGBA1C 6 7 (A) 09/22/2020     A1C improved   Continue current regime

## 2020-09-22 NOTE — PROGRESS NOTES
Assessment/Plan:  Problem List Items Addressed This Visit        Digestive    Gastroesophageal reflux disease    Relevant Medications    omeprazole (PriLOSEC) 40 MG capsule    Open fracture of tooth     Start amoxil  Directions for use and possible side effects discussed and patient verbalized understanding of these  Relevant Medications    amoxicillin (AMOXIL) 500 mg capsule       Endocrine    Type 2 diabetes mellitus ([facility] ) - Primary       Lab Results   Component Value Date    HGBA1C 6 7 (A) 09/22/2020     A1C improved  Continue current regime         Relevant Orders    POCT hemoglobin A1c (Completed)    Comprehensive metabolic panel       Other    Hypercholesterolemia    Relevant Orders    Lipid panel    Neck pain     EKG in the office is normal  Pt defers muscle relaxer  Continue supportive care  Other Visit Diagnoses     Vitamin D deficiency        Relevant Orders    Vitamin D 25 hydroxy    Screening for deficiency anemia        Relevant Orders    CBC and differential    Screening for thyroid disorder        Relevant Orders    TSH, 3rd generation           Diagnoses and all orders for this visit:    Type 2 diabetes mellitus with hyperglycemia, without long-term current use of insulin ([facility])  -     POCT hemoglobin A1c  -     Comprehensive metabolic panel; Future    Hypercholesterolemia  -     Lipid panel; Future    Vitamin D deficiency  -     Vitamin D 25 hydroxy; Future    Screening for deficiency anemia  -     CBC and differential; Future    Screening for thyroid disorder  -     TSH, 3rd generation; Future    Gastroesophageal reflux disease without esophagitis  -     omeprazole (PriLOSEC) 40 MG capsule; Take 1 capsule (40 mg total) by mouth daily before breakfast    Open fracture of tooth, initial encounter  -     amoxicillin (AMOXIL) 500 mg capsule;  Take 1 capsule (500 mg total) by mouth every 8 (eight) hours for 10 days    Neck pain        Type 2 diabetes mellitus ([facility] )    Lab Results Component Value Date    HGBA1C 6 7 (A) 2020     A1C improved  Continue current regime    Neck pain  EKG in the office is normal  Pt defers muscle relaxer  Continue supportive care  Open fracture of tooth  Start amoxil  Directions for use and possible side effects discussed and patient verbalized understanding of these  Subjective:      Patient ID: Nini He is a 44 y o  female  Pt presents for routine visit  She is due for A1C and labs  Mammogram, CRC screening are up to date  She is on an ACE and statin  A1C today is 6 7  She notes one episode of sternal chest pain that has since resolved  She now related some neck and shoulder pain on the left  No vision change  Denies CP, SOB, palpitations  EKG shows NSR  Stress test recently was normal  The pain is worse with palpation and rotation of her neck  Defers muscle relaxer  She did break a tooth recently and is concerned that she may be starting with an infection  The following portions of the patient's history were reviewed and updated as appropriate:   She has a past medical history of Anxiety, Arthritis, Asthma, Cancer ([address] ), Depression, Diabetes mellitus ([address] ), Diverticulitis, GERD (gastroesophageal reflux disease), Hyperlipidemia associated with type 2 diabetes mellitus ([address] ), Hypertension, Insulinoma, Non-recurrent acute suppurative otitis media of left ear without spontaneous rupture of tympanic membrane (2019), Pulmonary emboli ([address] ), and Sleep apnea  ,  does not have any pertinent problems on file  ,   has a past surgical history that includes Pancreatectomy;  section; Knee surgery; Knee arthroscopy (Left); pr lap,cholecystectomy (N/A, 2018); Cholecystectomy; and pr colonoscopy flx dx w/collj spec when pfrmd (N/A, 2019)  ,  family history includes Breast cancer in her brother; Diabetes in her maternal grandmother and paternal grandmother; Heart disease in her mother; Hyperlipidemia in her mother; Hypertension in her mother; No Known Problems in her daughter, half-sister, paternal aunt, paternal aunt, paternal aunt, paternal aunt, paternal aunt, sister, and sister; Obesity in her mother; Osteoporosis in her family; Other in her maternal grandmother and mother; Substance Abuse in her family and father; Tuberculosis in her maternal aunt and mother  ,   reports that she has quit smoking  She has never used smokeless tobacco  She reports that she does not drink alcohol or use drugs  ,  is allergic to iodine and nuts     Current Outpatient Medications   Medication Sig Dispense Refill    albuterol (Ventolin HFA) 90 mcg/act inhaler Inhale 2 puffs every 4 (four) hours as needed for shortness of breath 3 Inhaler 1    ARIPiprazole (ABILIFY) 15 mg tablet Take 1 tablet by mouth daily       Blood Glucose Monitoring Suppl (CONTOUR NEXT MONITOR) w/Device KIT 1 kit by Does not apply route 2 (two) times a day as needed (as needed) 1 kit 0    budesonide-formoterol (SYMBICORT) 80-4 5 MCG/ACT inhaler Inhale 2 puffs 2 (two) times a day Rinse mouth after use   1 Inhaler 5    Canagliflozin-metFORMIN HCl (Invokamet) 150-1000 MG TABS Take 1 tablet by mouth 2 (two) times a day 60 tablet 5    diclofenac (VOLTAREN) 75 mg EC tablet Take 1 tablet (75 mg total) by mouth 2 (two) times a day 60 tablet 5    Dulaglutide (Trulicity) 1 5 EM/0 2QK SOPN Inject 0 5 mL (1 5 mg total) under the skin once a week 2 mL 5    EPINEPHrine (EPIPEN) 0 3 mg/0 3 mL SOAJ Inject 0 3 mL (0 3 mg total) into the shoulder, thigh, or buttocks once for 1 dose 0 3 mL 2    glucose blood (Contour Next Test) test strip 1 each by Other route 2 (two) times a day as needed (As needed) Use as instructed 100 each 5    imiquimod (ALDARA) 5 % cream Apply 1 packet topically 2 (two) times a week 12 each 0    Lancet Device MISC by Does not apply route      lisinopril (ZESTRIL) 2 5 mg tablet take 1 tablet by mouth once daily 90 tablet 1    loratadine (CLARITIN) 10 mg tablet Take 1 tablet (10 mg total) by mouth daily 30 tablet 5    Microlet Lancets MISC Test twice daily 100 each 3    Multiple Vitamins-Minerals (MULTI ADULT GUMMIES PO) Take by mouth      pravastatin (PRAVACHOL) 20 mg tablet Take 1 tablet (20 mg total) by mouth daily 90 tablet 1    sitaGLIPtin (JANUVIA) 100 mg tablet Take 1 tablet (100 mg total) by mouth daily 30 tablet 5    venlafaxine (EFFEXOR-XR) 150 mg 24 hr capsule Take 150 mg by mouth daily        amoxicillin (AMOXIL) 500 mg capsule Take 1 capsule (500 mg total) by mouth every 8 (eight) hours for 10 days 30 capsule 0    omeprazole (PriLOSEC) 40 MG capsule Take 1 capsule (40 mg total) by mouth daily before breakfast 30 capsule 5     No current facility-administered medications for this visit  Review of Systems   Constitutional: Negative for chills and fever  HENT: Negative for congestion, ear pain, hearing loss, postnasal drip, rhinorrhea, sinus pressure, sinus pain, sore throat and trouble swallowing  Eyes: Negative for pain and visual disturbance  Respiratory: Negative for cough, chest tightness, shortness of breath and wheezing  Cardiovascular: Negative  Negative for chest pain, palpitations and leg swelling  Gastrointestinal: Negative for abdominal pain, blood in stool, constipation, diarrhea, nausea and vomiting  Endocrine: Negative for cold intolerance, heat intolerance, polydipsia, polyphagia and polyuria  Genitourinary: Negative for difficulty urinating, dysuria, flank pain and urgency  Musculoskeletal: Positive for neck pain  Negative for arthralgias, back pain, gait problem and myalgias  Skin: Negative for rash  Allergic/Immunologic: Negative  Neurological: Negative for dizziness, weakness, light-headedness and headaches  Hematological: Negative  Psychiatric/Behavioral: Negative for behavioral problems, dysphoric mood and sleep disturbance  The patient is not nervous/anxious            PHQ-9 Depression Screening PHQ-9:    Frequency of the following problems over the past two weeks:               Objective:  Vitals:    09/22/20 0955   BP: 108/70   Pulse: 102   Resp: 16   Temp: (!) 97 4 °F (36 3 °C)   TempSrc: Tympanic   SpO2: 98%   Weight: 97 5 kg (215 lb)   Height: 5' 2" (1 575 m)     Body mass index is 39 32 kg/m²  Physical Exam  Constitutional:       General: She is not in acute distress  Appearance: She is well-developed  She is not diaphoretic  HENT:      Head: Normocephalic and atraumatic  Right Ear: External ear normal       Left Ear: External ear normal       Nose: Nose normal       Mouth/Throat:      Pharynx: No oropharyngeal exudate  Eyes:      General: No scleral icterus  Right eye: No discharge  Left eye: No discharge  Conjunctiva/sclera: Conjunctivae normal       Pupils: Pupils are equal, round, and reactive to light  Neck:      Musculoskeletal: Normal range of motion and neck supple  Thyroid: No thyromegaly  Cardiovascular:      Rate and Rhythm: Normal rate and regular rhythm  Heart sounds: Normal heart sounds  No murmur  No friction rub  No gallop  Pulmonary:      Effort: Pulmonary effort is normal  No respiratory distress  Breath sounds: Normal breath sounds  No wheezing or rales  Abdominal:      General: Bowel sounds are normal  There is no distension  Palpations: Abdomen is soft  Tenderness: There is no abdominal tenderness  Musculoskeletal:         General: No deformity  Cervical back: She exhibits decreased range of motion, tenderness and pain  Skin:     General: Skin is warm and dry  Neurological:      Mental Status: She is alert and oriented to person, place, and time  Cranial Nerves: No cranial nerve deficit  Psychiatric:         Behavior: Behavior normal          Thought Content:  Thought content normal          Judgment: Judgment normal

## 2020-09-23 ENCOUNTER — APPOINTMENT (OUTPATIENT)
Dept: LAB | Facility: CLINIC | Age: 50
End: 2020-09-23
Payer: COMMERCIAL

## 2020-09-23 ENCOUNTER — TRANSCRIBE ORDERS (OUTPATIENT)
Dept: LAB | Facility: CLINIC | Age: 50
End: 2020-09-23

## 2020-09-23 ENCOUNTER — TELEPHONE (OUTPATIENT)
Dept: PULMONOLOGY | Facility: CLINIC | Age: 50
End: 2020-09-23

## 2020-09-23 DIAGNOSIS — Z79.899 ENCOUNTER FOR LONG-TERM (CURRENT) USE OF OTHER MEDICATIONS: Primary | ICD-10-CM

## 2020-09-23 DIAGNOSIS — E55.9 VITAMIN D DEFICIENCY: ICD-10-CM

## 2020-09-23 DIAGNOSIS — E78.00 HYPERCHOLESTEROLEMIA: ICD-10-CM

## 2020-09-23 DIAGNOSIS — Z79.899 ENCOUNTER FOR LONG-TERM (CURRENT) USE OF OTHER MEDICATIONS: ICD-10-CM

## 2020-09-23 DIAGNOSIS — Z13.0 SCREENING FOR DEFICIENCY ANEMIA: ICD-10-CM

## 2020-09-23 DIAGNOSIS — Z13.29 SCREENING FOR THYROID DISORDER: ICD-10-CM

## 2020-09-23 DIAGNOSIS — E11.65 TYPE 2 DIABETES MELLITUS WITH HYPERGLYCEMIA, WITHOUT LONG-TERM CURRENT USE OF INSULIN (HCC): ICD-10-CM

## 2020-09-23 LAB
25(OH)D3 SERPL-MCNC: 39.3 NG/ML (ref 30–100)
ALBUMIN SERPL BCP-MCNC: 3.9 G/DL (ref 3.5–5)
ALP SERPL-CCNC: 85 U/L (ref 46–116)
ALT SERPL W P-5'-P-CCNC: 63 U/L (ref 12–78)
ANION GAP SERPL CALCULATED.3IONS-SCNC: 7 MMOL/L (ref 4–13)
AST SERPL W P-5'-P-CCNC: 23 U/L (ref 5–45)
BASOPHILS # BLD AUTO: 0.02 THOUSANDS/ΜL (ref 0–0.1)
BASOPHILS NFR BLD AUTO: 0 % (ref 0–1)
BILIRUB SERPL-MCNC: 0.22 MG/DL (ref 0.2–1)
BUN SERPL-MCNC: 17 MG/DL (ref 5–25)
CALCIUM SERPL-MCNC: 9.2 MG/DL (ref 8.3–10.1)
CHLORIDE SERPL-SCNC: 106 MMOL/L (ref 100–108)
CHOLEST SERPL-MCNC: 192 MG/DL (ref 50–200)
CO2 SERPL-SCNC: 25 MMOL/L (ref 21–32)
CREAT SERPL-MCNC: 0.94 MG/DL (ref 0.6–1.3)
EOSINOPHIL # BLD AUTO: 0.09 THOUSAND/ΜL (ref 0–0.61)
EOSINOPHIL NFR BLD AUTO: 2 % (ref 0–6)
ERYTHROCYTE [DISTWIDTH] IN BLOOD BY AUTOMATED COUNT: 14.4 % (ref 11.6–15.1)
EST. AVERAGE GLUCOSE BLD GHB EST-MCNC: 151 MG/DL
GFR SERPL CREATININE-BSD FRML MDRD: 70 ML/MIN/1.73SQ M
GLUCOSE P FAST SERPL-MCNC: 129 MG/DL (ref 65–99)
HBA1C MFR BLD: 6.9 %
HCT VFR BLD AUTO: 44.6 % (ref 34.8–46.1)
HDLC SERPL-MCNC: 42 MG/DL
HGB BLD-MCNC: 14.2 G/DL (ref 11.5–15.4)
IMM GRANULOCYTES # BLD AUTO: 0.01 THOUSAND/UL (ref 0–0.2)
IMM GRANULOCYTES NFR BLD AUTO: 0 % (ref 0–2)
LDLC SERPL CALC-MCNC: 115 MG/DL (ref 0–100)
LYMPHOCYTES # BLD AUTO: 1.88 THOUSANDS/ΜL (ref 0.6–4.47)
LYMPHOCYTES NFR BLD AUTO: 31 % (ref 14–44)
MCH RBC QN AUTO: 29 PG (ref 26.8–34.3)
MCHC RBC AUTO-ENTMCNC: 31.8 G/DL (ref 31.4–37.4)
MCV RBC AUTO: 91 FL (ref 82–98)
MONOCYTES # BLD AUTO: 0.64 THOUSAND/ΜL (ref 0.17–1.22)
MONOCYTES NFR BLD AUTO: 11 % (ref 4–12)
NEUTROPHILS # BLD AUTO: 3.43 THOUSANDS/ΜL (ref 1.85–7.62)
NEUTS SEG NFR BLD AUTO: 56 % (ref 43–75)
NONHDLC SERPL-MCNC: 150 MG/DL
NRBC BLD AUTO-RTO: 0 /100 WBCS
PLATELET # BLD AUTO: 228 THOUSANDS/UL (ref 149–390)
PMV BLD AUTO: 10.9 FL (ref 8.9–12.7)
POTASSIUM SERPL-SCNC: 4.3 MMOL/L (ref 3.5–5.3)
PROT SERPL-MCNC: 7.3 G/DL (ref 6.4–8.2)
RBC # BLD AUTO: 4.9 MILLION/UL (ref 3.81–5.12)
SODIUM SERPL-SCNC: 138 MMOL/L (ref 136–145)
TRIGL SERPL-MCNC: 175 MG/DL
TSH SERPL DL<=0.05 MIU/L-ACNC: 2.52 UIU/ML (ref 0.36–3.74)
WBC # BLD AUTO: 6.07 THOUSAND/UL (ref 4.31–10.16)

## 2020-09-23 PROCEDURE — 84443 ASSAY THYROID STIM HORMONE: CPT

## 2020-09-23 PROCEDURE — 82306 VITAMIN D 25 HYDROXY: CPT

## 2020-09-23 PROCEDURE — 83036 HEMOGLOBIN GLYCOSYLATED A1C: CPT

## 2020-09-23 PROCEDURE — 80053 COMPREHEN METABOLIC PANEL: CPT

## 2020-09-23 PROCEDURE — 85025 COMPLETE CBC W/AUTO DIFF WBC: CPT

## 2020-09-23 PROCEDURE — 80061 LIPID PANEL: CPT

## 2020-09-23 PROCEDURE — 3044F HG A1C LEVEL LT 7.0%: CPT | Performed by: INTERNAL MEDICINE

## 2020-09-23 PROCEDURE — 36415 COLL VENOUS BLD VENIPUNCTURE: CPT

## 2020-09-24 LAB — SARS-COV-2 RNA SPEC QL NAA+PROBE: NOT DETECTED

## 2020-10-06 ENCOUNTER — HOSPITAL ENCOUNTER (OUTPATIENT)
Dept: SLEEP CENTER | Facility: HOSPITAL | Age: 50
Discharge: HOME | End: 2020-10-06
Attending: INTERNAL MEDICINE
Payer: COMMERCIAL

## 2020-10-06 DIAGNOSIS — G47.33 OSA (OBSTRUCTIVE SLEEP APNEA): ICD-10-CM

## 2020-10-06 PROCEDURE — 95811 POLYSOM 6/>YRS CPAP 4/> PARM: CPT

## 2020-10-06 PROCEDURE — 95811 POLYSOM 6/>YRS CPAP 4/> PARM: CPT | Performed by: INTERNAL MEDICINE

## 2020-10-07 ENCOUNTER — HOSPITAL ENCOUNTER (OUTPATIENT)
Dept: PULMONOLOGY | Facility: HOSPITAL | Age: 50
Discharge: HOME | End: 2020-10-07
Attending: INTERNAL MEDICINE
Payer: COMMERCIAL

## 2020-10-07 DIAGNOSIS — J45.30 MILD PERSISTENT ASTHMA WITHOUT COMPLICATION: ICD-10-CM

## 2020-10-07 PROBLEM — G47.33 OSA (OBSTRUCTIVE SLEEP APNEA): Status: ACTIVE | Noted: 2020-08-03

## 2020-10-07 PROCEDURE — 94060 EVALUATION OF WHEEZING: CPT | Performed by: INTERNAL MEDICINE

## 2020-10-07 PROCEDURE — 94727 GAS DIL/WSHOT DETER LNG VOL: CPT

## 2020-10-07 PROCEDURE — 94727 GAS DIL/WSHOT DETER LNG VOL: CPT | Performed by: INTERNAL MEDICINE

## 2020-10-07 PROCEDURE — 94760 N-INVAS EAR/PLS OXIMETRY 1: CPT

## 2020-10-07 PROCEDURE — 94729 DIFFUSING CAPACITY: CPT | Performed by: INTERNAL MEDICINE

## 2020-10-07 PROCEDURE — 94060 EVALUATION OF WHEEZING: CPT

## 2020-10-07 PROCEDURE — 94729 DIFFUSING CAPACITY: CPT

## 2020-10-09 DIAGNOSIS — G47.33 OSA (OBSTRUCTIVE SLEEP APNEA): Primary | ICD-10-CM

## 2020-10-14 ENCOUNTER — OFFICE VISIT (OUTPATIENT)
Dept: PULMONOLOGY | Facility: CLINIC | Age: 50
End: 2020-10-14
Payer: COMMERCIAL

## 2020-10-14 VITALS
WEIGHT: 213 LBS | DIASTOLIC BLOOD PRESSURE: 83 MMHG | HEIGHT: 62 IN | TEMPERATURE: 97.9 F | OXYGEN SATURATION: 96 % | HEART RATE: 101 BPM | SYSTOLIC BLOOD PRESSURE: 118 MMHG | BODY MASS INDEX: 39.2 KG/M2

## 2020-10-14 DIAGNOSIS — K21.9 GASTROESOPHAGEAL REFLUX DISEASE WITHOUT ESOPHAGITIS: ICD-10-CM

## 2020-10-14 DIAGNOSIS — R93.89 ABNORMAL CHEST CT: ICD-10-CM

## 2020-10-14 DIAGNOSIS — G47.33 OSA (OBSTRUCTIVE SLEEP APNEA): ICD-10-CM

## 2020-10-14 DIAGNOSIS — J45.30 MILD PERSISTENT ASTHMA WITHOUT COMPLICATION: Primary | ICD-10-CM

## 2020-10-14 DIAGNOSIS — E66.09 CLASS 2 OBESITY DUE TO EXCESS CALORIES WITH BODY MASS INDEX (BMI) OF 39.0 TO 39.9 IN ADULT, UNSPECIFIED WHETHER SERIOUS COMORBIDITY PRESENT: ICD-10-CM

## 2020-10-14 DIAGNOSIS — Z86.711 HISTORY OF PULMONARY EMBOLISM: ICD-10-CM

## 2020-10-14 PROCEDURE — 99215 OFFICE O/P EST HI 40 MIN: CPT | Performed by: INTERNAL MEDICINE

## 2020-10-14 PROCEDURE — 3074F SYST BP LT 130 MM HG: CPT | Performed by: INTERNAL MEDICINE

## 2020-10-14 PROCEDURE — 3079F DIAST BP 80-89 MM HG: CPT | Performed by: INTERNAL MEDICINE

## 2020-10-14 PROCEDURE — 1036F TOBACCO NON-USER: CPT | Performed by: INTERNAL MEDICINE

## 2020-10-21 DIAGNOSIS — M54.50 LUMBAR SPINE PAIN: ICD-10-CM

## 2020-10-21 RX ORDER — DICLOFENAC SODIUM 75 MG/1
TABLET, DELAYED RELEASE ORAL
Qty: 60 TABLET | Refills: 5 | Status: SHIPPED | OUTPATIENT
Start: 2020-10-21 | End: 2021-04-07

## 2020-11-23 DIAGNOSIS — E11.65 TYPE 2 DIABETES MELLITUS WITH HYPERGLYCEMIA, WITHOUT LONG-TERM CURRENT USE OF INSULIN (HCC): ICD-10-CM

## 2020-11-23 DIAGNOSIS — J30.9 ALLERGIC RHINITIS, UNSPECIFIED SEASONALITY, UNSPECIFIED TRIGGER: ICD-10-CM

## 2020-11-23 RX ORDER — DULAGLUTIDE 1.5 MG/.5ML
1.5 INJECTION, SOLUTION SUBCUTANEOUS WEEKLY
Qty: 2 ML | Refills: 5 | Status: SHIPPED | OUTPATIENT
Start: 2020-11-23 | End: 2021-05-14 | Stop reason: SDUPTHER

## 2020-11-23 RX ORDER — LORATADINE 10 MG/1
10 TABLET ORAL DAILY
Qty: 30 TABLET | Refills: 5 | Status: SHIPPED | OUTPATIENT
Start: 2020-11-23 | End: 2021-05-14 | Stop reason: SDUPTHER

## 2020-12-22 ENCOUNTER — OFFICE VISIT (OUTPATIENT)
Dept: INTERNAL MEDICINE CLINIC | Facility: CLINIC | Age: 50
End: 2020-12-22
Payer: COMMERCIAL

## 2020-12-22 ENCOUNTER — LAB (OUTPATIENT)
Dept: LAB | Facility: CLINIC | Age: 50
End: 2020-12-22
Payer: COMMERCIAL

## 2020-12-22 VITALS
HEIGHT: 62 IN | SYSTOLIC BLOOD PRESSURE: 122 MMHG | BODY MASS INDEX: 38.94 KG/M2 | WEIGHT: 211.6 LBS | RESPIRATION RATE: 18 BRPM | TEMPERATURE: 98.3 F | DIASTOLIC BLOOD PRESSURE: 78 MMHG | OXYGEN SATURATION: 98 % | HEART RATE: 123 BPM

## 2020-12-22 DIAGNOSIS — Z13.0 SCREENING FOR DEFICIENCY ANEMIA: ICD-10-CM

## 2020-12-22 DIAGNOSIS — E11.65 TYPE 2 DIABETES MELLITUS WITH HYPERGLYCEMIA, WITHOUT LONG-TERM CURRENT USE OF INSULIN (HCC): Primary | ICD-10-CM

## 2020-12-22 DIAGNOSIS — R25.2 LEG CRAMPING: ICD-10-CM

## 2020-12-22 DIAGNOSIS — G56.03 BILATERAL CARPAL TUNNEL SYNDROME: ICD-10-CM

## 2020-12-22 DIAGNOSIS — M54.32 SCIATICA OF LEFT SIDE: ICD-10-CM

## 2020-12-22 LAB
ANION GAP SERPL CALCULATED.3IONS-SCNC: 6 MMOL/L (ref 4–13)
BUN SERPL-MCNC: 17 MG/DL (ref 5–25)
CALCIUM SERPL-MCNC: 9.9 MG/DL (ref 8.3–10.1)
CHLORIDE SERPL-SCNC: 105 MMOL/L (ref 100–108)
CO2 SERPL-SCNC: 28 MMOL/L (ref 21–32)
CREAT SERPL-MCNC: 0.97 MG/DL (ref 0.6–1.3)
GFR SERPL CREATININE-BSD FRML MDRD: 67 ML/MIN/1.73SQ M
GLUCOSE SERPL-MCNC: 141 MG/DL (ref 65–140)
IRON SERPL-MCNC: 51 UG/DL (ref 50–170)
MAGNESIUM SERPL-MCNC: 2.3 MG/DL (ref 1.6–2.6)
POTASSIUM SERPL-SCNC: 4.2 MMOL/L (ref 3.5–5.3)
SL AMB POCT HEMOGLOBIN AIC: 6.7 (ref ?–6.5)
SODIUM SERPL-SCNC: 139 MMOL/L (ref 136–145)

## 2020-12-22 PROCEDURE — 83540 ASSAY OF IRON: CPT

## 2020-12-22 PROCEDURE — 83735 ASSAY OF MAGNESIUM: CPT

## 2020-12-22 PROCEDURE — 99214 OFFICE O/P EST MOD 30 MIN: CPT | Performed by: PHYSICIAN ASSISTANT

## 2020-12-22 PROCEDURE — 2028F FOOT EXAM PERFORMED: CPT | Performed by: PHYSICIAN ASSISTANT

## 2020-12-22 PROCEDURE — 3725F SCREEN DEPRESSION PERFORMED: CPT | Performed by: PHYSICIAN ASSISTANT

## 2020-12-22 PROCEDURE — 3078F DIAST BP <80 MM HG: CPT | Performed by: PHYSICIAN ASSISTANT

## 2020-12-22 PROCEDURE — 36415 COLL VENOUS BLD VENIPUNCTURE: CPT

## 2020-12-22 PROCEDURE — 3044F HG A1C LEVEL LT 7.0%: CPT | Performed by: PHYSICIAN ASSISTANT

## 2020-12-22 PROCEDURE — 1036F TOBACCO NON-USER: CPT | Performed by: PHYSICIAN ASSISTANT

## 2020-12-22 PROCEDURE — 80048 BASIC METABOLIC PNL TOTAL CA: CPT

## 2020-12-22 PROCEDURE — 3008F BODY MASS INDEX DOCD: CPT | Performed by: PHYSICIAN ASSISTANT

## 2020-12-22 PROCEDURE — 83036 HEMOGLOBIN GLYCOSYLATED A1C: CPT | Performed by: PHYSICIAN ASSISTANT

## 2020-12-22 PROCEDURE — 3074F SYST BP LT 130 MM HG: CPT | Performed by: PHYSICIAN ASSISTANT

## 2020-12-22 RX ORDER — GABAPENTIN 100 MG/1
100 CAPSULE ORAL 3 TIMES DAILY
Qty: 90 CAPSULE | Refills: 2 | Status: SHIPPED | OUTPATIENT
Start: 2020-12-22 | End: 2021-03-15

## 2021-01-15 ENCOUNTER — OFFICE VISIT (OUTPATIENT)
Dept: OBGYN CLINIC | Facility: CLINIC | Age: 51
End: 2021-01-15
Payer: COMMERCIAL

## 2021-01-15 VITALS
WEIGHT: 211.6 LBS | HEART RATE: 106 BPM | HEIGHT: 62 IN | SYSTOLIC BLOOD PRESSURE: 113 MMHG | BODY MASS INDEX: 38.94 KG/M2 | DIASTOLIC BLOOD PRESSURE: 78 MMHG

## 2021-01-15 DIAGNOSIS — M18.0 ARTHRITIS OF CARPOMETACARPAL (CMC) JOINT OF BOTH THUMBS: Primary | ICD-10-CM

## 2021-01-15 DIAGNOSIS — G56.03 BILATERAL CARPAL TUNNEL SYNDROME: ICD-10-CM

## 2021-01-15 PROCEDURE — 3078F DIAST BP <80 MM HG: CPT | Performed by: ORTHOPAEDIC SURGERY

## 2021-01-15 PROCEDURE — 99213 OFFICE O/P EST LOW 20 MIN: CPT | Performed by: ORTHOPAEDIC SURGERY

## 2021-01-15 PROCEDURE — 3074F SYST BP LT 130 MM HG: CPT | Performed by: ORTHOPAEDIC SURGERY

## 2021-01-15 PROCEDURE — 20600 DRAIN/INJ JOINT/BURSA W/O US: CPT | Performed by: ORTHOPAEDIC SURGERY

## 2021-01-15 RX ORDER — LIDOCAINE HYDROCHLORIDE 10 MG/ML
2.5 INJECTION, SOLUTION INFILTRATION; PERINEURAL
Status: COMPLETED | OUTPATIENT
Start: 2021-01-15 | End: 2021-01-15

## 2021-01-15 RX ORDER — TRIAMCINOLONE ACETONIDE 40 MG/ML
20 INJECTION, SUSPENSION INTRA-ARTICULAR; INTRAMUSCULAR
Status: COMPLETED | OUTPATIENT
Start: 2021-01-15 | End: 2021-01-15

## 2021-01-15 RX ADMIN — LIDOCAINE HYDROCHLORIDE 2.5 ML: 10 INJECTION, SOLUTION INFILTRATION; PERINEURAL at 09:07

## 2021-01-15 RX ADMIN — Medication 0.05 MEQ: at 09:07

## 2021-01-15 RX ADMIN — TRIAMCINOLONE ACETONIDE 20 MG: 40 INJECTION, SUSPENSION INTRA-ARTICULAR; INTRAMUSCULAR at 09:07

## 2021-01-15 NOTE — PROGRESS NOTES
CHIEF COMPLAINT:  Chief Complaint   Patient presents with    Left Hand - Follow-up    Right Hand - Follow-up       SUBJECTIVE:  Remedios Robledo is a 44y o  year old female who presents for follow-up regarding bilateral hand CMC arthritis  Patient was given a cortisone injection to the right thumb CMC joint in 2020  She states that the pain started to come back around November  She would like to try repeat cortisone injections for bilateral CMC arthritis  She denies any numbness or tingling        PAST MEDICAL HISTORY:  Past Medical History:   Diagnosis Date    Anxiety     Arthritis     Asthma     Cancer ([facility] )     renal,cervical    Depression     Diabetes mellitus ([facility] )     Diverticulitis     GERD (gastroesophageal reflux disease)     Hyperlipidemia associated with type 2 diabetes mellitus ([place] [address] )     Hypertension     Insulinoma     Non-recurrent acute suppurative otitis media of left ear without spontaneous rupture of tympanic membrane 2019    Pulmonary emboli (HCC)     Sleep apnea        PAST SURGICAL HISTORY:  Past Surgical History:   Procedure Laterality Date     SECTION      twice    CHOLECYSTECTOMY      KNEE ARTHROSCOPY Left     KNEE SURGERY      PANCREATECTOMY      Partial     AR COLONOSCOPY FLX DX W/COLLJ SPEC WHEN PFRMD N/A 2019    Procedure: COLONOSCOPY;  Surgeon: Cris Sheehan DO;  Location: [facility];  Service: Gastroenterology    AR LAP,CHOLECYSTECTOMY N/A 2018    Procedure: CHOLECYSTECTOMY LAPAROSCOPIC;  Surgeon: Worley Severs, DO;  Location: [facility];  Service: General       FAMILY HISTORY:  Family History   Problem Relation Age of Onset    Heart disease Mother     Hyperlipidemia Mother     Obesity Mother     Hypertension Mother     Other Mother         Bundle branch block    Tuberculosis Mother     Substance Abuse Father     Breast cancer Brother     Other Maternal Grandmother         ascending aortic aneurysm resection    Diabetes Maternal Grandmother     Diabetes Paternal Grandmother     Tuberculosis Maternal Aunt     Substance Abuse Family     Osteoporosis Family     No Known Problems Sister     No Known Problems Daughter     No Known Problems Sister     No Known Problems Half-Sister     No Known Problems Paternal Aunt     No Known Problems Paternal Aunt     No Known Problems Paternal Aunt     No Known Problems Paternal Aunt     No Known Problems Paternal Aunt        SOCIAL HISTORY:  Social History     Tobacco Use    Smoking status: Former Smoker    Smokeless tobacco: Never Used    Tobacco comment: quit 6 yrs ago   Substance Use Topics    Alcohol use: No    Drug use: No       MEDICATIONS:    Current Outpatient Medications:     albuterol (Ventolin HFA) 90 mcg/act inhaler, Inhale 2 puffs every 4 (four) hours as needed for shortness of breath, Disp: 3 Inhaler, Rfl: 1    ARIPiprazole (ABILIFY) 15 mg tablet, Take 1 tablet by mouth daily , Disp: , Rfl:     Blood Glucose Monitoring Suppl (CONTOUR NEXT MONITOR) w/Device KIT, 1 kit by Does not apply route 2 (two) times a day as needed (as needed), Disp: 1 kit, Rfl: 0    budesonide-formoterol (SYMBICORT) 80-4 5 MCG/ACT inhaler, Inhale 2 puffs 2 (two) times a day Rinse mouth after use , Disp: 1 Inhaler, Rfl: 5    Canagliflozin-metFORMIN HCl (Invokamet) 150-1000 MG TABS, Take 1 tablet by mouth 2 (two) times a day, Disp: 60 tablet, Rfl: 5    diclofenac (VOLTAREN) 75 mg EC tablet, take 1 tablet by mouth twice a day, Disp: 60 tablet, Rfl: 5    Dulaglutide (Trulicity) 1 5 EU/3 8KT SOPN, Inject 0 5 mL (1 5 mg total) under the skin once a week, Disp: 2 mL, Rfl: 5    gabapentin (NEURONTIN) 100 mg capsule, Take 1 capsule (100 mg total) by mouth 3 (three) times a day, Disp: 90 capsule, Rfl: 2    glucose blood (Contour Next Test) test strip, 1 each by Other route 2 (two) times a day as needed (As needed) Use as instructed, Disp: 100 each, Rfl: 5    Lancet Device MISC, by Does not apply route, Disp: , Rfl:     lisinopril (ZESTRIL) 2 5 mg tablet, take 1 tablet by mouth once daily, Disp: 90 tablet, Rfl: 1    loratadine (CLARITIN) 10 mg tablet, Take 1 tablet (10 mg total) by mouth daily, Disp: 30 tablet, Rfl: 5    Microlet Lancets MISC, Test twice daily, Disp: 100 each, Rfl: 3    Multiple Vitamins-Minerals (MULTI ADULT GUMMIES PO), Take by mouth, Disp: , Rfl:     omeprazole (PriLOSEC) 40 MG capsule, Take 1 capsule (40 mg total) by mouth daily before breakfast, Disp: 30 capsule, Rfl: 5    pravastatin (PRAVACHOL) 20 mg tablet, Take 1 tablet (20 mg total) by mouth daily, Disp: 90 tablet, Rfl: 1    sitaGLIPtin (JANUVIA) 100 mg tablet, Take 1 tablet (100 mg total) by mouth daily, Disp: 30 tablet, Rfl: 5    venlafaxine (EFFEXOR-XR) 150 mg 24 hr capsule, Take 150 mg by mouth daily  , Disp: , Rfl:     EPINEPHrine (EPIPEN) 0 3 mg/0 3 mL SOAJ, Inject 0 3 mL (0 3 mg total) into the shoulder, thigh, or buttocks once for 1 dose, Disp: 0 3 mL, Rfl: 2    ALLERGIES:  Allergies   Allergen Reactions    Iodine Anaphylaxis     IVP DYE     Nuts Anaphylaxis     Annotation - [id number]: Pine nuts       REVIEW OF SYSTEMS:  Review of Systems  ROS:   General: no fever, no chills  HEENT:  No loss of hearing or eyesight problems  Eyes:  No red eyes  Respiratory:  No coughing, shortness of breath or wheezing  Cardiovascular:  No chest pain, no palpitations  GI:  Abdomen soft nontender, denies nausea  Endocrine:  No muscle weakness, no frequent urination, no excessive thirst  Urinary:  No dysuria, no incontinence  Musculoskeletal: see HPI and PE  SKIN:  No skin rash, no dry skin  Neurological:  No headaches, no confusion  Psychiatric:  No suicide thoughts, no anxiety, no depression  Review of all other systems is negative    VITALS:  Vitals:    01/15/21 0842   BP: 113/78   Pulse: (!) 106       LABS:  HgA1c:   Lab Results   Component Value Date    HGBA1C 6 7 (A) 12/22/2020     BMP:   Lab Results   Component Value Date    GLUCOSE 116 11/19/2015    CALCIUM 9 9 12/22/2020     06/09/2018    K 4 2 12/22/2020    CO2 28 12/22/2020     12/22/2020    BUN 17 12/22/2020    CREATININE 0 97 12/22/2020       _____________________________________________________  PHYSICAL EXAMINATION:  General: well developed and well nourished, alert, oriented times 3 and appears comfortable  Psychiatric: Normal  HEENT: Trachea Midline, No torticollis  Pulmonary: No audible wheezing or respiratory distress   Skin: No masses, erythema, lacerations, fluctation, ulcerations  Neurovascular: Sensation Intact to the Median, Ulnar, Radial Nerve, Motor Intact to the Median, Ulnar, Radial Nerve and Pulses Intact    MUSCULOSKELETAL EXAMINATION:  Bilateral CMC Exam:  No adduction contracture  No hyperextension deformity of MCP joint  Positive localized tenderness over radial and dorsal aspect of thumb (CMC joint)  Grind test is Positive for pain and Positive for crepitus  No triggering or tenderness over the A1 pulley  No pain with Finkelsteins maneuver             ___________________________________________________  STUDIES REVIEWED:  No studies reviewed  PROCEDURES PERFORMED:  Small joint arthrocentesis: R thumb CMC  [place] Protocol:  Consent: Verbal consent obtained  Risks and benefits: risks, benefits and alternatives were discussed  Consent given by: patient  Time out: Immediately prior to procedure a "time out" was called to verify the correct patient, procedure, equipment, support staff and site/side marked as required    Patient understanding: patient states understanding of the procedure being performed  Patient consent: the patient's understanding of the procedure matches consent given  Site marked: the operative site was marked  Patient identity confirmed: verbally with patient    Supporting Documentation  Indications: pain   Procedure Details  Location: thumb - R thumb CMC  Preparation: Patient was prepped and draped in the usual sterile fashion  Needle size: 25 G  Approach: dorsal  Medications administered: 0 05 mEq sodium bicarbonate 8 4 %; 2 5 mL lidocaine 1 %; 20 mg triamcinolone acetonide 40 mg/mL    Patient tolerance: patient tolerated the procedure well with no immediate complications  Dressing:  Sterile dressing applied    Small joint arthrocentesis: L thumb CMC  [place] Protocol:  Consent: Verbal consent obtained  Risks and benefits: risks, benefits and alternatives were discussed  Consent given by: patient  Time out: Immediately prior to procedure a "time out" was called to verify the correct patient, procedure, equipment, support staff and site/side marked as required  Patient understanding: patient states understanding of the procedure being performed  Patient consent: the patient's understanding of the procedure matches consent given  Site marked: the operative site was marked  Patient identity confirmed: verbally with patient    Supporting Documentation  Indications: pain   Procedure Details  Location: thumb - L thumb CMC  Preparation: Patient was prepped and draped in the usual sterile fashion  Needle size: 25 G  Approach: dorsal  Medications administered: 0 05 mEq sodium bicarbonate 8 4 %; 2 5 mL lidocaine 1 %; 20 mg triamcinolone acetonide 40 mg/mL    Patient tolerance: patient tolerated the procedure well with no immediate complications  Dressing:  Sterile dressing applied             _____________________________________________________  ASSESSMENT/PLAN:    Bilateral CMC arthritis  - patient was given bilateral cortisone injections for CMC arthritis  She tolerated well  - she was advised we can repeat the cortisone injections every 3 months as needed  - she will follow-up with us as needed      Follow Up:  Return if symptoms worsen or fail to improve      Work/school status:  No restrictions    To Do Next Visit:  Re-evaluation of current issue        Scribe Attestation    I,:  Irish Balderas PA-C am acting as a scribe while in the presence of the attending physician :       I,:  Sindhu Hermosillo MD personally performed the services described in this documentation    as scribed in my presence :           Portions of the record may have been created with voice recognition software  Occasional wrong word or "sound a like" substitutions may have occurred due to the inherent limitations of voice recognition software  Read the chart carefully and recognize, using context, where substitutions have occurred

## 2021-01-19 ENCOUNTER — OFFICE VISIT (OUTPATIENT)
Dept: PULMONOLOGY | Facility: CLINIC | Age: 51
End: 2021-01-19
Payer: COMMERCIAL

## 2021-01-19 VITALS
HEIGHT: 62 IN | SYSTOLIC BLOOD PRESSURE: 114 MMHG | OXYGEN SATURATION: 95 % | DIASTOLIC BLOOD PRESSURE: 79 MMHG | WEIGHT: 214.6 LBS | TEMPERATURE: 97.9 F | BODY MASS INDEX: 39.49 KG/M2 | HEART RATE: 112 BPM

## 2021-01-19 DIAGNOSIS — J45.30 MILD PERSISTENT ASTHMA WITHOUT COMPLICATION: Primary | ICD-10-CM

## 2021-01-19 DIAGNOSIS — R93.89 ABNORMAL CHEST CT: ICD-10-CM

## 2021-01-19 DIAGNOSIS — K21.9 GASTROESOPHAGEAL REFLUX DISEASE WITHOUT ESOPHAGITIS: ICD-10-CM

## 2021-01-19 DIAGNOSIS — G47.33 OSA (OBSTRUCTIVE SLEEP APNEA): ICD-10-CM

## 2021-01-19 DIAGNOSIS — Z86.711 HISTORY OF PULMONARY EMBOLISM: ICD-10-CM

## 2021-01-19 PROBLEM — F17.211 CIGARETTE NICOTINE DEPENDENCE IN REMISSION: Status: ACTIVE | Noted: 2021-01-19

## 2021-01-19 PROCEDURE — 99214 OFFICE O/P EST MOD 30 MIN: CPT | Performed by: PHYSICIAN ASSISTANT

## 2021-01-19 PROCEDURE — 3008F BODY MASS INDEX DOCD: CPT | Performed by: PHYSICIAN ASSISTANT

## 2021-01-19 PROCEDURE — 1036F TOBACCO NON-USER: CPT | Performed by: PHYSICIAN ASSISTANT

## 2021-01-19 RX ORDER — BUDESONIDE AND FORMOTEROL FUMARATE DIHYDRATE 80; 4.5 UG/1; UG/1
2 AEROSOL RESPIRATORY (INHALATION) 2 TIMES DAILY
Qty: 1 INHALER | Refills: 5 | Status: SHIPPED | OUTPATIENT
Start: 2021-01-19 | End: 2021-06-02 | Stop reason: SDUPTHER

## 2021-01-19 NOTE — PROGRESS NOTES
Pulmonary Follow Up Note   Annie Samayoa 44 y o  female MRN: [id number]  1/19/2021      Assessment:    Mild persistent asthma without complication  Patient is doing well on current pulmonary regimen  Will continue Symbicort 80/4 5 mcg 2 puffs twice daily  Reminded to rinse mouth out after each use  Refills sent to pharmacy  Continue albuterol HFA 2 puffs q 6 hours p r n  Viraj Vaughan Given her stability and symptoms I did give her the option of deescalating therapies to use Symbicort on a as needed basis which she said she would try but I instructed her that if her symptoms worsen she should resume her regular regimen  She verbalized understanding and agrees to the plan  She is up-to-date on influenza vaccination  ANSHUL (obstructive sleep apnea)  Reports compliance on auto CPAP 9-20 cm H2O  Reports improvement in symptoms since initiating PAP therapy  Will continue auto BiPAP at current settings now  Gastroesophageal reflux disease  Continue omeprazole  Abnormal chest CT  Plan to repeat CT chest in August 2021  This can be ordered at her next follow-up the spring  Cigarette nicotine dependence in remission  Remains committed to abstinence  Patient will qualify for low-dose lung cancer screening CT at the age of 52  History of pulmonary embolism  Noted  Likely provoked in the setting of travel and her obesity as risk factors  She completed anticoagulation is no longer on it  No further interventions at this time  Plan:    Diagnoses and all orders for this visit:    Mild persistent asthma without complication  -     budesonide-formoterol (SYMBICORT) 80-4 5 MCG/ACT inhaler; Inhale 2 puffs 2 (two) times a day Rinse mouth after use  ANSHUL (obstructive sleep apnea)    Gastroesophageal reflux disease without esophagitis    Abnormal chest CT    History of pulmonary embolism        Return in about 4 months (around 5/19/2021)      History of Present Illness   HPI:  Annie Samayoa is a 44 y o  female who presents to the office today for routine follow-up  Patient's past medical history positive for mild persistent asthma, obstructive sleep apnea, history of pulmonary embolism diagnosed 3 years ago not on anticoagulation, GERD, and abnormal CT secondary to small cyst in lung nodule  Patient was last seen in our office in October by Dr Johnathan Poole meter  Since that time she has not required the use of systemic steroids, antibiotics or been hospitalized for respiratory related illness  She reports her respiratory symptoms are stable on Symbicort in fact she has not needed to use her rescue inhaler in the last 3 months at all  She denies shortness of breath at rest but endorses dyspnea on exertion when climbing stairs  Also reports a seldom coug that is nonproductive has no hemoptysis  Reports difficulty cough dependent on activity level and occasionally right before bed  Denies chest pain, pleurisy, palpitations  She reports some mild lower extremity edema that seems to be dependent on how long she spends on her feet throughout the day  She wears auto CPAP 9-20 cm H2O  Since initiating PAP therapy she reports improved sleeping and improved excessive daytime drowsiness  Patient is a former smoker with a quit date 8 years ago  Prior to that she had a 30 pack year history  Review of Systems   Constitutional: Negative for chills and fever  HENT: Negative  Eyes: Negative  Respiratory: Positive for cough (rare), shortness of breath (with exertion) and wheezing (rare)  Cardiovascular: Positive for leg swelling (depedent)  Negative for chest pain and palpitations  Gastrointestinal: Negative  Endocrine: Negative  Genitourinary: Negative  Musculoskeletal: Negative  Skin: Negative  Neurological: Negative  Hematological: Negative  Psychiatric/Behavioral: Negative  All other systems reviewed and are negative        Historical Information   Past Medical History:   Diagnosis Date    Anxiety     Arthritis     Asthma     Cancer ([facility] )     renal,cervical    Depression     Diabetes mellitus ([facility] )     Diverticulitis     GERD (gastroesophageal reflux disease)     Hyperlipidemia associated with type 2 diabetes mellitus ([facility] )     Hypertension     Insulinoma     Non-recurrent acute suppurative otitis media of left ear without spontaneous rupture of tympanic membrane 2019    Pulmonary emboli (HCC)     Sleep apnea      Past Surgical History:   Procedure Laterality Date     SECTION      twice    CHOLECYSTECTOMY      KNEE ARTHROSCOPY Left     KNEE SURGERY      PANCREATECTOMY      Partial     KS COLONOSCOPY FLX DX W/COLLJ SPEC WHEN PFRMD N/A 2019    Procedure: COLONOSCOPY;  Surgeon: dAarsh Jacob DO;  Location: [facility];  Service: Gastroenterology    KS LAP,CHOLECYSTECTOMY N/A 2018    Procedure: CHOLECYSTECTOMY LAPAROSCOPIC;  Surgeon: Evelyn Lopez DO;  Location: [facility];  Service: General     Family History   Problem Relation Age of Onset    Heart disease Mother     Hyperlipidemia Mother     Obesity Mother     Hypertension Mother    Cheryl Llanos Other Mother         Bundle branch block    Tuberculosis Mother     Substance Abuse Father     Breast cancer Brother     Other Maternal Grandmother         ascending aortic aneurysm resection    Diabetes Maternal Grandmother     Diabetes Paternal Grandmother     Tuberculosis Maternal Aunt     Substance Abuse Family     Osteoporosis Family     No Known Problems Sister     No Known Problems Daughter     No Known Problems Sister     No Known Problems Half-Sister     No Known Problems Paternal Aunt     No Known Problems Paternal Aunt     No Known Problems Paternal Aunt     No Known Problems Paternal Aunt     No Known Problems Paternal Aunt        Social History     Tobacco Use   Smoking Status Former Smoker   Smokeless Tobacco Never Used   Tobacco Comment    quit 6 yrs ago         Meds/Allergies     Current Outpatient Medications:     albuterol (Ventolin HFA) 90 mcg/act inhaler, Inhale 2 puffs every 4 (four) hours as needed for shortness of breath, Disp: 3 Inhaler, Rfl: 1    ARIPiprazole (ABILIFY) 15 mg tablet, Take 1 tablet by mouth daily , Disp: , Rfl:     budesonide-formoterol (SYMBICORT) 80-4 5 MCG/ACT inhaler, Inhale 2 puffs 2 (two) times a day Rinse mouth after use , Disp: 1 Inhaler, Rfl: 5    Canagliflozin-metFORMIN HCl (Invokamet) 150-1000 MG TABS, Take 1 tablet by mouth 2 (two) times a day, Disp: 60 tablet, Rfl: 5    diclofenac (VOLTAREN) 75 mg EC tablet, take 1 tablet by mouth twice a day, Disp: 60 tablet, Rfl: 5    Dulaglutide (Trulicity) 1 5 UL/5 9AP SOPN, Inject 0 5 mL (1 5 mg total) under the skin once a week, Disp: 2 mL, Rfl: 5    EPINEPHrine (EPIPEN) 0 3 mg/0 3 mL SOAJ, Inject 0 3 mL (0 3 mg total) into the shoulder, thigh, or buttocks once for 1 dose, Disp: 0 3 mL, Rfl: 2    gabapentin (NEURONTIN) 100 mg capsule, Take 1 capsule (100 mg total) by mouth 3 (three) times a day (Patient taking differently: Take 100 mg by mouth 2 (two) times a day ), Disp: 90 capsule, Rfl: 2    glucose blood (Contour Next Test) test strip, 1 each by Other route 2 (two) times a day as needed (As needed) Use as instructed, Disp: 100 each, Rfl: 5    Lancet Device MISC, by Does not apply route, Disp: , Rfl:     lisinopril (ZESTRIL) 2 5 mg tablet, take 1 tablet by mouth once daily, Disp: 90 tablet, Rfl: 1    loratadine (CLARITIN) 10 mg tablet, Take 1 tablet (10 mg total) by mouth daily, Disp: 30 tablet, Rfl: 5    Microlet Lancets MISC, Test twice daily, Disp: 100 each, Rfl: 3    Multiple Vitamins-Minerals (MULTI ADULT GUMMIES PO), Take by mouth, Disp: , Rfl:     omeprazole (PriLOSEC) 40 MG capsule, Take 1 capsule (40 mg total) by mouth daily before breakfast, Disp: 30 capsule, Rfl: 5    pravastatin (PRAVACHOL) 20 mg tablet, Take 1 tablet (20 mg total) by mouth daily, Disp: 90 tablet, Rfl: 1    sitaGLIPtin (JANUVIA) 100 mg tablet, Take 1 tablet (100 mg total) by mouth daily, Disp: 30 tablet, Rfl: 5    venlafaxine (EFFEXOR-XR) 150 mg 24 hr capsule, Take 150 mg by mouth daily  , Disp: , Rfl:     Blood Glucose Monitoring Suppl (CONTOUR NEXT MONITOR) w/Device KIT, 1 kit by Does not apply route 2 (two) times a day as needed (as needed), Disp: 1 kit, Rfl: 0  Allergies   Allergen Reactions    Iodine Anaphylaxis     IVP DYE     Nuts Anaphylaxis     [facility] - [id number]: Pine nuts       Vitals: Blood pressure 114/79, pulse (!) 112, temperature 97 9 °F (36 6 °C), temperature source Tympanic, height 5' 2" (1 575 m), weight 97 3 kg (214 lb 9 6 oz), SpO2 95 %  Body mass index is 39 25 kg/m²  Oxygen Therapy  SpO2: 95 %    Physical Exam  Physical Exam  Vitals signs reviewed  Constitutional:       Appearance: Normal appearance  She is well-developed  HENT:      Head: Normocephalic and atraumatic  Right Ear: External ear normal       Left Ear: External ear normal       Nose: Nose normal       Mouth/Throat:      Mouth: Mucous membranes are moist       Pharynx: Oropharynx is clear  Eyes:      Extraocular Movements: Extraocular movements intact  Pupils: Pupils are equal, round, and reactive to light  Neck:      Musculoskeletal: Normal range of motion and neck supple  Cardiovascular:      Rate and Rhythm: Normal rate and regular rhythm  Pulses: Normal pulses  Heart sounds: Normal heart sounds  No murmur  Pulmonary:      Effort: Pulmonary effort is normal  No respiratory distress  Breath sounds: Normal breath sounds  No stridor  No wheezing, rhonchi or rales  Abdominal:      Palpations: Abdomen is soft  Tenderness: There is no abdominal tenderness  Hernia: No hernia is present  Musculoskeletal: Normal range of motion  General: No swelling, tenderness or deformity  Skin:     General: Skin is warm and dry  Capillary Refill: Capillary refill takes less than 2 seconds  Neurological:      General: No focal deficit present  Mental Status: She is alert and oriented to person, place, and time  Mental status is at baseline  Psychiatric:         Behavior: Behavior normal          Thought Content: Thought content normal          Judgment: Judgment normal          Labs: I have personally reviewed pertinent lab results  , ABG: No results found for: PHART, KLA8KDD, PO2ART, GNR7IQY, Z3VFRWIF, BEART, SOURCE, BNP: No results found for: BNP, CBC: No results found for: WBC, HGB, HCT, MCV, PLT, ADJUSTEDWBC, MCH, MCHC, RDW, MPV, NRBC, CMP: No results found for: SODIUM, K, CL, CO2, ANIONGAP, BUN, CREATININE, GLUCOSE, CALCIUM, AST, ALT, ALKPHOS, PROT, BILITOT, EGFR, PT/INR: No results found for: PT, INR, Troponin: No results found for: TROPONINI  Lab Results   Component Value Date    WBC 6 07 09/23/2020    HGB 14 2 09/23/2020    HCT 44 6 09/23/2020    MCV 91 09/23/2020     09/23/2020     Lab Results   Component Value Date    GLUCOSE 116 11/19/2015    CALCIUM 9 9 12/22/2020     06/09/2018    K 4 2 12/22/2020    CO2 28 12/22/2020     12/22/2020    BUN 17 12/22/2020    CREATININE 0 97 12/22/2020     No results found for: IGE  Lab Results   Component Value Date    ALT 63 09/23/2020    AST 23 09/23/2020    ALKPHOS 85 09/23/2020    BILITOT 0 4 06/09/2018       Imaging and other studies: I have personally reviewed pertinent reports  and I have personally reviewed pertinent films in PACS     CTA chest PE study 06/19/2020  Suboptimal evaluation per peripheral pulmonary arteries however no obvious pulmonary embolism identified  Patchy ground-glass opacities bilaterally that are nonspecific and may be related to hypoventilatory changes      Pulmonary function testing:  Performed 10/07/2020   FEV1/FVC ratio 75 %   FEV1 84 % predicted  FVC 84 % predicted  No response to bronchodilators  TLC 86 % predicted   % predicted  DLCO corrected for hemoglobin 86 % predicted  No obstructive airflow defect numerically however FEV1 25-75% is significantly decreased which can be consistent with small airway disease  There is significant spots to bronchodilators  Normal lung volumes  Normal diffusion capacity

## 2021-01-19 NOTE — ASSESSMENT & PLAN NOTE
Reports compliance on auto CPAP 9-20 cm H2O  Reports improvement in symptoms since initiating PAP therapy  Will continue auto BiPAP at current settings now

## 2021-01-19 NOTE — ASSESSMENT & PLAN NOTE
Noted   Likely provoked in the setting of travel and her obesity as risk factors  She completed anticoagulation is no longer on it  No further interventions at this time

## 2021-01-19 NOTE — ASSESSMENT & PLAN NOTE
Remains committed to abstinence  Patient will qualify for low-dose lung cancer screening CT at the age of 52

## 2021-01-19 NOTE — ASSESSMENT & PLAN NOTE
Patient is doing well on current pulmonary regimen  Will continue Symbicort 80/4 5 mcg 2 puffs twice daily  Reminded to rinse mouth out after each use  Refills sent to pharmacy  Continue albuterol HFA 2 puffs q 6 hours p r n  Stephanie Pinon Given her stability and symptoms I did give her the option of deescalating therapies to use Symbicort on a as needed basis which she said she would try but I instructed her that if her symptoms worsen she should resume her regular regimen  She verbalized understanding and agrees to the plan  She is up-to-date on influenza vaccination

## 2021-02-16 DIAGNOSIS — E11.69 HYPERLIPIDEMIA ASSOCIATED WITH TYPE 2 DIABETES MELLITUS (HCC): ICD-10-CM

## 2021-02-16 DIAGNOSIS — E78.5 HYPERLIPIDEMIA ASSOCIATED WITH TYPE 2 DIABETES MELLITUS (HCC): ICD-10-CM

## 2021-02-16 DIAGNOSIS — I10 ESSENTIAL HYPERTENSION: ICD-10-CM

## 2021-02-16 PROCEDURE — 4010F ACE/ARB THERAPY RXD/TAKEN: CPT | Performed by: PHYSICIAN ASSISTANT

## 2021-02-16 RX ORDER — LISINOPRIL 2.5 MG/1
2.5 TABLET ORAL DAILY
Qty: 90 TABLET | Refills: 1 | Status: SHIPPED | OUTPATIENT
Start: 2021-02-16 | End: 2021-05-14 | Stop reason: SDUPTHER

## 2021-02-16 RX ORDER — PRAVASTATIN SODIUM 20 MG
20 TABLET ORAL DAILY
Qty: 90 TABLET | Refills: 1 | Status: SHIPPED | OUTPATIENT
Start: 2021-02-16 | End: 2021-05-14 | Stop reason: SDUPTHER

## 2021-03-09 DIAGNOSIS — K21.9 GASTROESOPHAGEAL REFLUX DISEASE WITHOUT ESOPHAGITIS: ICD-10-CM

## 2021-03-09 RX ORDER — OMEPRAZOLE 40 MG/1
CAPSULE, DELAYED RELEASE ORAL
Qty: 30 CAPSULE | Refills: 5 | Status: SHIPPED | OUTPATIENT
Start: 2021-03-09 | End: 2021-08-30

## 2021-03-15 DIAGNOSIS — M54.32 SCIATICA OF LEFT SIDE: ICD-10-CM

## 2021-03-15 RX ORDER — GABAPENTIN 100 MG/1
CAPSULE ORAL
Qty: 90 CAPSULE | Refills: 2 | Status: SHIPPED | OUTPATIENT
Start: 2021-03-15 | End: 2021-06-11

## 2021-03-17 ENCOUNTER — OFFICE VISIT (OUTPATIENT)
Dept: INTERNAL MEDICINE CLINIC | Facility: CLINIC | Age: 51
End: 2021-03-17
Payer: COMMERCIAL

## 2021-03-17 VITALS
OXYGEN SATURATION: 99 % | TEMPERATURE: 97.5 F | WEIGHT: 213 LBS | BODY MASS INDEX: 39.2 KG/M2 | DIASTOLIC BLOOD PRESSURE: 66 MMHG | SYSTOLIC BLOOD PRESSURE: 104 MMHG | HEIGHT: 62 IN | HEART RATE: 105 BPM

## 2021-03-17 DIAGNOSIS — E11.65 TYPE 2 DIABETES MELLITUS WITH HYPERGLYCEMIA, WITHOUT LONG-TERM CURRENT USE OF INSULIN (HCC): Primary | ICD-10-CM

## 2021-03-17 DIAGNOSIS — E11.9 DIABETIC EYE EXAM (HCC): ICD-10-CM

## 2021-03-17 DIAGNOSIS — Z01.00 DIABETIC EYE EXAM (HCC): ICD-10-CM

## 2021-03-17 PROBLEM — S02.5XXB OPEN FRACTURE OF TOOTH: Status: RESOLVED | Noted: 2020-09-22 | Resolved: 2021-03-17

## 2021-03-17 PROBLEM — R25.2 LEG CRAMPING: Status: RESOLVED | Noted: 2020-12-22 | Resolved: 2021-03-17

## 2021-03-17 PROBLEM — M54.2 NECK PAIN: Status: RESOLVED | Noted: 2020-09-22 | Resolved: 2021-03-17

## 2021-03-17 PROBLEM — R22.31 MASS OF FINGER OF RIGHT HAND: Status: RESOLVED | Noted: 2020-06-17 | Resolved: 2021-03-17

## 2021-03-17 PROBLEM — R10.2 PELVIC PAIN IN FEMALE: Status: RESOLVED | Noted: 2019-06-11 | Resolved: 2021-03-17

## 2021-03-17 LAB — SL AMB POCT HEMOGLOBIN AIC: 6.8 (ref ?–6.5)

## 2021-03-17 PROCEDURE — 3044F HG A1C LEVEL LT 7.0%: CPT | Performed by: PHYSICIAN ASSISTANT

## 2021-03-17 PROCEDURE — 3008F BODY MASS INDEX DOCD: CPT | Performed by: PHYSICIAN ASSISTANT

## 2021-03-17 PROCEDURE — 99213 OFFICE O/P EST LOW 20 MIN: CPT | Performed by: PHYSICIAN ASSISTANT

## 2021-03-17 PROCEDURE — 83036 HEMOGLOBIN GLYCOSYLATED A1C: CPT | Performed by: PHYSICIAN ASSISTANT

## 2021-03-17 NOTE — ASSESSMENT & PLAN NOTE
Lab Results   Component Value Date    HGBA1C 6 8 (A) 03/17/2021     Pts symptoms are stable with current regime  No changes at present  Update eye exam  Foot exam up to date  On ACE and statin

## 2021-03-17 NOTE — PROGRESS NOTES
Assessment/Plan:  Problem List Items Addressed This Visit        Endocrine    Type 2 diabetes mellitus ([address] )       Lab Results   Component Value Date    HGBA1C 6 8 (A) 03/17/2021     Pts symptoms are stable with current regime  No changes at present  Update eye exam  Foot exam up to date  On ACE and statin  Relevant Orders    Ambulatory referral to Ophthalmology    POCT hemoglobin A1c (Completed)      Other Visit Diagnoses     Diabetic eye exam [facility])    -  Primary    Relevant Orders    Ambulatory referral to Ophthalmology           Diagnoses and all orders for this visit:    Diabetic eye exam [facility])  -     Ambulatory referral to Ophthalmology; Future    Type 2 diabetes mellitus with hyperglycemia, without long-term current use of insulin ([facility] )  -     Ambulatory referral to Ophthalmology; Future  -     POCT hemoglobin A1c        Type 2 diabetes mellitus ([facility])    Lab Results   Component Value Date    HGBA1C 6 8 (A) 03/17/2021     Pts symptoms are stable with current regime  No changes at present  Update eye exam  Foot exam up to date  On ACE and statin  Subjective:      Patient ID: Tierney David is a 44 y o  female  Pt presents for routine visit  She is doing well overall  BP is nicely controlled  She is due for diabetic eye exam and A1C  She is up to date with mammogram, CRC screening, diabetic foot exam, and labs  A1C today is 6 8  Covid vaccine education provided  She denies any complaints or concerns  She had to decrease her gabapentin due to tiredness and is tolerating this better now         The following portions of the patient's history were reviewed and updated as appropriate:   She has a past medical history of Anxiety, Arthritis, Asthma, Cancer ([facility] ), Depression, Diabetes mellitus ([facility] ), Diverticulitis, GERD (gastroesophageal reflux disease), Hyperlipidemia associated with type 2 diabetes mellitus ([facility] ), Hypertension, Insulinoma, Non-recurrent acute suppurative otitis media of left ear without spontaneous rupture of tympanic membrane (2019), Pulmonary emboli ([address] ), and Sleep apnea  ,  does not have any pertinent problems on file  ,   has a past surgical history that includes Pancreatectomy;  section; Knee surgery; Knee arthroscopy (Left); pr lap,cholecystectomy (N/A, 2018); Cholecystectomy; and pr colonoscopy flx dx w/collj spec when pfrmd (N/A, 2019)  ,  family history includes Breast cancer in her brother; Diabetes in her maternal grandmother and paternal grandmother; Heart disease in her mother; Hyperlipidemia in her mother; Hypertension in her mother; No Known Problems in her daughter, half-sister, paternal aunt, paternal aunt, paternal aunt, paternal aunt, paternal aunt, sister, and sister; Obesity in her mother; Osteoporosis in her family; Other in her maternal grandmother and mother; Substance Abuse in her family and father; Tuberculosis in her maternal aunt and mother  ,   reports that she has quit smoking  She has never used smokeless tobacco  She reports that she does not drink alcohol or use drugs  ,  is allergic to iodine and nuts     Current Outpatient Medications   Medication Sig Dispense Refill    albuterol (Ventolin HFA) 90 mcg/act inhaler Inhale 2 puffs every 4 (four) hours as needed for shortness of breath 3 Inhaler 1    ARIPiprazole (ABILIFY) 15 mg tablet Take 1 tablet by mouth daily       budesonide-formoterol (SYMBICORT) 80-4 5 MCG/ACT inhaler Inhale 2 puffs 2 (two) times a day Rinse mouth after use   1 Inhaler 5    Canagliflozin-metFORMIN HCl (Invokamet) 150-1000 MG TABS Take 1 tablet by mouth 2 (two) times a day 60 tablet 5    diclofenac (VOLTAREN) 75 mg EC tablet take 1 tablet by mouth twice a day 60 tablet 5    Dulaglutide (Trulicity) 1 5 CU/7 6LK SOPN Inject 0 5 mL (1 5 mg total) under the skin once a week 2 mL 5    EPINEPHrine (EPIPEN) 0 3 mg/0 3 mL SOAJ Inject 0 3 mL (0 3 mg total) into the shoulder, thigh, or buttocks once for 1 dose 0 3 mL 2    gabapentin (NEURONTIN) 100 mg capsule take 1 capsule by mouth three times a day (Patient taking differently: Take 100 mg by mouth 2 (two) times a day ) 90 capsule 2    lisinopril (ZESTRIL) 2 5 mg tablet Take 1 tablet (2 5 mg total) by mouth daily 90 tablet 1    loratadine (CLARITIN) 10 mg tablet Take 1 tablet (10 mg total) by mouth daily 30 tablet 5    Multiple Vitamins-Minerals (MULTI ADULT GUMMIES PO) Take by mouth      omeprazole (PriLOSEC) 40 MG capsule take 1 capsule by mouth once daily before breakfast 30 capsule 5    pravastatin (PRAVACHOL) 20 mg tablet Take 1 tablet (20 mg total) by mouth daily 90 tablet 1    sitaGLIPtin (JANUVIA) 100 mg tablet Take 1 tablet (100 mg total) by mouth daily 30 tablet 5    venlafaxine (EFFEXOR-XR) 150 mg 24 hr capsule Take 150 mg by mouth daily        Blood Glucose Monitoring Suppl (CONTOUR NEXT MONITOR) w/Device KIT 1 kit by Does not apply route 2 (two) times a day as needed (as needed) 1 kit 0    glucose blood (Contour Next Test) test strip 1 each by Other route 2 (two) times a day as needed (As needed) Use as instructed 100 each 5    Lancet Device MISC by Does not apply route      Microlet Lancets MISC Test twice daily 100 each 3     No current facility-administered medications for this visit  Review of Systems   Constitutional: Negative for chills and fever  HENT: Negative for congestion, ear pain, hearing loss, postnasal drip, rhinorrhea, sinus pressure, sinus pain, sore throat and trouble swallowing  Eyes: Negative for pain and visual disturbance  Respiratory: Negative for cough, chest tightness, shortness of breath and wheezing  Cardiovascular: Negative  Negative for chest pain, palpitations and leg swelling  Gastrointestinal: Negative for abdominal pain, blood in stool, constipation, diarrhea, nausea and vomiting  Endocrine: Negative for cold intolerance, heat intolerance, polydipsia, polyphagia and polyuria     Genitourinary: Negative for difficulty urinating, dysuria, flank pain and urgency  Musculoskeletal: Negative for arthralgias, back pain, gait problem and myalgias  Skin: Negative for rash  Allergic/Immunologic: Negative  Neurological: Negative for dizziness, weakness, light-headedness and headaches  Hematological: Negative  Psychiatric/Behavioral: Negative for behavioral problems, dysphoric mood and sleep disturbance  The patient is not nervous/anxious  PHQ-9 Depression Screening    PHQ-9:   Frequency of the following problems over the past two weeks:              Objective:  Vitals:    03/17/21 1426   BP: 104/66   Pulse: 105   Temp: 97 5 °F (36 4 °C)   SpO2: 99%   Weight: 96 6 kg (213 lb)   Height: 5' 2" (1 575 m)     Body mass index is 38 96 kg/m²  Physical Exam  Constitutional:       General: She is not in acute distress  Appearance: She is well-developed  She is not diaphoretic  HENT:      Head: Normocephalic and atraumatic  Right Ear: External ear normal       Left Ear: External ear normal       Nose: Nose normal       Mouth/Throat:      Pharynx: No oropharyngeal exudate  Eyes:      General: No scleral icterus  Right eye: No discharge  Left eye: No discharge  Conjunctiva/sclera: Conjunctivae normal       Pupils: Pupils are equal, round, and reactive to light  Neck:      Musculoskeletal: Normal range of motion and neck supple  Thyroid: No thyromegaly  Cardiovascular:      Rate and Rhythm: Normal rate and regular rhythm  Heart sounds: Normal heart sounds  No murmur  No friction rub  No gallop  Pulmonary:      Effort: Pulmonary effort is normal  No respiratory distress  Breath sounds: Normal breath sounds  No wheezing or rales  Abdominal:      General: Bowel sounds are normal  There is no distension  Palpations: Abdomen is soft  Tenderness: There is no abdominal tenderness  Musculoskeletal: Normal range of motion           General: No tenderness or deformity  Skin:     General: Skin is warm and dry  Neurological:      Mental Status: She is alert and oriented to person, place, and time  Cranial Nerves: No cranial nerve deficit  Psychiatric:         Behavior: Behavior normal          Thought Content: Thought content normal          Judgment: Judgment normal        BMI Counseling: Body mass index is 38 96 kg/m²  The BMI is above normal  Nutrition recommendations include decreasing portion sizes, consuming healthier snacks and limiting drinks that contain sugar

## 2021-03-30 DIAGNOSIS — Z23 ENCOUNTER FOR IMMUNIZATION: ICD-10-CM

## 2021-04-01 ENCOUNTER — IMMUNIZATIONS (OUTPATIENT)
Dept: FAMILY MEDICINE CLINIC | Facility: HOSPITAL | Age: 51
End: 2021-04-01

## 2021-04-01 DIAGNOSIS — Z23 ENCOUNTER FOR IMMUNIZATION: Primary | ICD-10-CM

## 2021-04-01 PROCEDURE — 91301 SARS-COV-2 / COVID-19 MRNA VACCINE (MODERNA) 100 MCG: CPT

## 2021-04-01 PROCEDURE — 0011A SARS-COV-2 / COVID-19 MRNA VACCINE (MODERNA) 100 MCG: CPT

## 2021-04-07 DIAGNOSIS — E11.65 TYPE 2 DIABETES MELLITUS WITH HYPERGLYCEMIA, WITHOUT LONG-TERM CURRENT USE OF INSULIN (HCC): ICD-10-CM

## 2021-04-07 DIAGNOSIS — M54.50 LUMBAR SPINE PAIN: ICD-10-CM

## 2021-04-07 RX ORDER — DICLOFENAC SODIUM 75 MG/1
TABLET, DELAYED RELEASE ORAL
Qty: 60 TABLET | Refills: 5 | Status: SHIPPED | OUTPATIENT
Start: 2021-04-07 | End: 2021-09-27

## 2021-04-07 RX ORDER — SITAGLIPTIN 100 MG/1
TABLET, FILM COATED ORAL
Qty: 30 TABLET | Refills: 5 | Status: SHIPPED | OUTPATIENT
Start: 2021-04-07 | End: 2021-09-27

## 2021-04-07 RX ORDER — CANAGLIFLOZIN AND METFORMIN HYDROCHLORIDE 150; 1000 MG/1; MG/1
TABLET, FILM COATED ORAL
Qty: 60 TABLET | Refills: 5 | Status: SHIPPED | OUTPATIENT
Start: 2021-04-07 | End: 2021-09-27

## 2021-04-29 ENCOUNTER — IMMUNIZATIONS (OUTPATIENT)
Dept: FAMILY MEDICINE CLINIC | Facility: HOSPITAL | Age: 51
End: 2021-04-29

## 2021-04-29 DIAGNOSIS — Z23 ENCOUNTER FOR IMMUNIZATION: Primary | ICD-10-CM

## 2021-04-29 PROCEDURE — 0012A SARS-COV-2 / COVID-19 MRNA VACCINE (MODERNA) 100 MCG: CPT

## 2021-04-29 PROCEDURE — 91301 SARS-COV-2 / COVID-19 MRNA VACCINE (MODERNA) 100 MCG: CPT

## 2021-05-13 ENCOUNTER — TELEPHONE (OUTPATIENT)
Dept: INTERNAL MEDICINE CLINIC | Facility: CLINIC | Age: 51
End: 2021-05-13

## 2021-05-13 NOTE — TELEPHONE ENCOUNTER
Needs refills on trulicity 9 5JZ, lisinopril 2 5mg, pravachol 20mg, and claritin 10mg    Please send to [facility] on [address]

## 2021-05-14 DIAGNOSIS — E11.65 TYPE 2 DIABETES MELLITUS WITH HYPERGLYCEMIA, WITHOUT LONG-TERM CURRENT USE OF INSULIN (HCC): ICD-10-CM

## 2021-05-14 DIAGNOSIS — E11.69 HYPERLIPIDEMIA ASSOCIATED WITH TYPE 2 DIABETES MELLITUS (HCC): ICD-10-CM

## 2021-05-14 DIAGNOSIS — J30.9 ALLERGIC RHINITIS, UNSPECIFIED SEASONALITY, UNSPECIFIED TRIGGER: ICD-10-CM

## 2021-05-14 DIAGNOSIS — I10 ESSENTIAL HYPERTENSION: ICD-10-CM

## 2021-05-14 DIAGNOSIS — E78.5 HYPERLIPIDEMIA ASSOCIATED WITH TYPE 2 DIABETES MELLITUS (HCC): ICD-10-CM

## 2021-05-14 PROCEDURE — 4010F ACE/ARB THERAPY RXD/TAKEN: CPT | Performed by: PHYSICIAN ASSISTANT

## 2021-05-14 RX ORDER — DULAGLUTIDE 1.5 MG/.5ML
1.5 INJECTION, SOLUTION SUBCUTANEOUS WEEKLY
Qty: 2 ML | Refills: 5 | Status: SHIPPED | OUTPATIENT
Start: 2021-05-14 | End: 2021-09-09

## 2021-05-14 RX ORDER — LISINOPRIL 2.5 MG/1
2.5 TABLET ORAL DAILY
Qty: 90 TABLET | Refills: 1 | Status: SHIPPED | OUTPATIENT
Start: 2021-05-14 | End: 2022-02-02

## 2021-05-14 RX ORDER — PRAVASTATIN SODIUM 20 MG
20 TABLET ORAL DAILY
Qty: 90 TABLET | Refills: 1 | Status: SHIPPED | OUTPATIENT
Start: 2021-05-14 | End: 2022-02-02

## 2021-05-14 RX ORDER — LORATADINE 10 MG/1
10 TABLET ORAL DAILY
Qty: 90 TABLET | Refills: 1 | Status: SHIPPED | OUTPATIENT
Start: 2021-05-14 | End: 2021-09-17

## 2021-05-15 RX ORDER — LORATADINE 10 MG/1
TABLET ORAL
Qty: 30 TABLET | Refills: 5 | OUTPATIENT
Start: 2021-05-15

## 2021-05-15 RX ORDER — DULAGLUTIDE 1.5 MG/.5ML
INJECTION, SOLUTION SUBCUTANEOUS
Qty: 2 ML | Refills: 5 | OUTPATIENT
Start: 2021-05-15

## 2021-06-02 ENCOUNTER — OFFICE VISIT (OUTPATIENT)
Dept: PULMONOLOGY | Facility: CLINIC | Age: 51
End: 2021-06-02
Payer: COMMERCIAL

## 2021-06-02 VITALS
DIASTOLIC BLOOD PRESSURE: 69 MMHG | HEART RATE: 92 BPM | TEMPERATURE: 97.4 F | SYSTOLIC BLOOD PRESSURE: 123 MMHG | BODY MASS INDEX: 38.16 KG/M2 | WEIGHT: 207.4 LBS | HEIGHT: 62 IN | OXYGEN SATURATION: 94 %

## 2021-06-02 DIAGNOSIS — K21.9 GASTROESOPHAGEAL REFLUX DISEASE WITHOUT ESOPHAGITIS: ICD-10-CM

## 2021-06-02 DIAGNOSIS — R53.1 GENERALIZED WEAKNESS: ICD-10-CM

## 2021-06-02 DIAGNOSIS — J45.909 ASTHMA, UNSPECIFIED ASTHMA SEVERITY, UNSPECIFIED WHETHER COMPLICATED, UNSPECIFIED WHETHER PERSISTENT: ICD-10-CM

## 2021-06-02 DIAGNOSIS — Z86.711 HISTORY OF PULMONARY EMBOLISM: ICD-10-CM

## 2021-06-02 DIAGNOSIS — J45.30 MILD PERSISTENT ASTHMA WITHOUT COMPLICATION: Primary | ICD-10-CM

## 2021-06-02 DIAGNOSIS — Z12.2 ENCOUNTER FOR SCREENING FOR MALIGNANT NEOPLASM OF LUNG IN FORMER SMOKER WHO QUIT IN PAST 15 YEARS WITH 30 PACK YEAR HISTORY OR GREATER: ICD-10-CM

## 2021-06-02 DIAGNOSIS — Z87.891 ENCOUNTER FOR SCREENING FOR MALIGNANT NEOPLASM OF LUNG IN FORMER SMOKER WHO QUIT IN PAST 15 YEARS WITH 30 PACK YEAR HISTORY OR GREATER: ICD-10-CM

## 2021-06-02 DIAGNOSIS — G47.33 OSA (OBSTRUCTIVE SLEEP APNEA): ICD-10-CM

## 2021-06-02 DIAGNOSIS — E66.09 CLASS 2 OBESITY DUE TO EXCESS CALORIES WITH BODY MASS INDEX (BMI) OF 39.0 TO 39.9 IN ADULT, UNSPECIFIED WHETHER SERIOUS COMORBIDITY PRESENT: ICD-10-CM

## 2021-06-02 PROCEDURE — 99215 OFFICE O/P EST HI 40 MIN: CPT | Performed by: INTERNAL MEDICINE

## 2021-06-02 RX ORDER — ALBUTEROL SULFATE 90 UG/1
2 AEROSOL, METERED RESPIRATORY (INHALATION) EVERY 4 HOURS PRN
Qty: 8 G | Refills: 6 | Status: SHIPPED | OUTPATIENT
Start: 2021-06-02 | End: 2022-03-23 | Stop reason: SDUPTHER

## 2021-06-02 RX ORDER — BUDESONIDE AND FORMOTEROL FUMARATE DIHYDRATE 80; 4.5 UG/1; UG/1
2 AEROSOL RESPIRATORY (INHALATION) 2 TIMES DAILY
Qty: 10.2 G | Refills: 6 | Status: SHIPPED | OUTPATIENT
Start: 2021-06-02 | End: 2022-03-23 | Stop reason: SDUPTHER

## 2021-06-02 NOTE — ASSESSMENT & PLAN NOTE
Mainly in her lower extremities, not sure if related to her obesity, diabetes and poorly controlled sleep apnea but also to consider deficiencies given her history of pancreatic insufficiency but currently not on pancreatic enzymes    Will defer to family doctor for further workup/evaluation

## 2021-06-02 NOTE — ASSESSMENT & PLAN NOTE
Patient had tiny nodules less than 4 mm but given also her smoking history she qualifies for lung cancer screening for August 2021

## 2021-06-02 NOTE — ASSESSMENT & PLAN NOTE
Compliant but does not do enough hours every night, I encouraged her to pay attention to that and use it longer periods  I noticed on her CPAP report that there is some residual AHI still 10 3 with some central apnea events  I will refer patient to Sleep Clinic for evaluation

## 2021-06-02 NOTE — ASSESSMENT & PLAN NOTE
Her symptoms got worse when she stop Symbicort, will restart Symbicort 80/4 5, 2 puffs twice daily  I told patient to use p r n  Albuterol as needed  In the future if she is controlled we can probably cut down to 1 puff twice daily only

## 2021-06-10 LAB
LEFT EYE DIABETIC RETINOPATHY: NORMAL
RIGHT EYE DIABETIC RETINOPATHY: NORMAL
SEVERITY (EYE EXAM): NORMAL

## 2021-06-10 PROCEDURE — 2023F DILAT RTA XM W/O RTNOPTHY: CPT | Performed by: PHYSICIAN ASSISTANT

## 2021-06-11 DIAGNOSIS — M54.32 SCIATICA OF LEFT SIDE: ICD-10-CM

## 2021-06-11 RX ORDER — GABAPENTIN 100 MG/1
CAPSULE ORAL
Qty: 90 CAPSULE | Refills: 2 | Status: SHIPPED | OUTPATIENT
Start: 2021-06-11 | End: 2021-09-07

## 2021-06-17 ENCOUNTER — OFFICE VISIT (OUTPATIENT)
Dept: INTERNAL MEDICINE CLINIC | Facility: CLINIC | Age: 51
End: 2021-06-17
Payer: COMMERCIAL

## 2021-06-17 ENCOUNTER — APPOINTMENT (OUTPATIENT)
Dept: LAB | Facility: CLINIC | Age: 51
End: 2021-06-17
Payer: COMMERCIAL

## 2021-06-17 ENCOUNTER — TELEPHONE (OUTPATIENT)
Dept: ADMINISTRATIVE | Facility: OTHER | Age: 51
End: 2021-06-17

## 2021-06-17 VITALS
SYSTOLIC BLOOD PRESSURE: 116 MMHG | DIASTOLIC BLOOD PRESSURE: 64 MMHG | TEMPERATURE: 98.8 F | WEIGHT: 207.4 LBS | BODY MASS INDEX: 38.16 KG/M2 | OXYGEN SATURATION: 98 % | RESPIRATION RATE: 14 BRPM | HEART RATE: 97 BPM | HEIGHT: 62 IN

## 2021-06-17 DIAGNOSIS — E11.65 TYPE 2 DIABETES MELLITUS WITH HYPERGLYCEMIA, WITHOUT LONG-TERM CURRENT USE OF INSULIN (HCC): Primary | ICD-10-CM

## 2021-06-17 DIAGNOSIS — Z11.59 NEED FOR HEPATITIS C SCREENING TEST: ICD-10-CM

## 2021-06-17 DIAGNOSIS — H69.82 DYSFUNCTION OF LEFT EUSTACHIAN TUBE: ICD-10-CM

## 2021-06-17 DIAGNOSIS — Z12.31 ENCOUNTER FOR SCREENING MAMMOGRAM FOR MALIGNANT NEOPLASM OF BREAST: ICD-10-CM

## 2021-06-17 LAB
HCV AB SER QL: NORMAL
SL AMB POCT HEMOGLOBIN AIC: 6.8 (ref ?–6.5)

## 2021-06-17 PROCEDURE — 36415 COLL VENOUS BLD VENIPUNCTURE: CPT | Performed by: PHYSICIAN ASSISTANT

## 2021-06-17 PROCEDURE — 1036F TOBACCO NON-USER: CPT | Performed by: PHYSICIAN ASSISTANT

## 2021-06-17 PROCEDURE — 3725F SCREEN DEPRESSION PERFORMED: CPT | Performed by: PHYSICIAN ASSISTANT

## 2021-06-17 PROCEDURE — 83036 HEMOGLOBIN GLYCOSYLATED A1C: CPT | Performed by: PHYSICIAN ASSISTANT

## 2021-06-17 PROCEDURE — 3044F HG A1C LEVEL LT 7.0%: CPT | Performed by: PHYSICIAN ASSISTANT

## 2021-06-17 PROCEDURE — 99214 OFFICE O/P EST MOD 30 MIN: CPT | Performed by: PHYSICIAN ASSISTANT

## 2021-06-17 PROCEDURE — 3008F BODY MASS INDEX DOCD: CPT | Performed by: PHYSICIAN ASSISTANT

## 2021-06-17 PROCEDURE — 86803 HEPATITIS C AB TEST: CPT | Performed by: PHYSICIAN ASSISTANT

## 2021-06-17 NOTE — TELEPHONE ENCOUNTER
Upon review of the In Basket request and the patient's chart, initial outreach has been made via fax, please see Contacts section for details       Thank you  Earl Cavazos MA

## 2021-06-17 NOTE — PROGRESS NOTES
Assessment/Plan:  Problem List Items Addressed This Visit        Endocrine    Type 2 diabetes mellitus ([facility] ) - Primary       Lab Results   Component Value Date    HGBA1C 6 8 (A) 06/17/2021     Pts symptoms are stable with current regime  No changes at present  Relevant Orders    POCT hemoglobin A1c (Completed)       Nervous and Auditory    Dysfunction of left eustachian tube     Start sudafed  Directions for use and possible side effects discussed and patient verbalized understanding of these  Other Visit Diagnoses     Need for hepatitis C screening test        Relevant Orders    Hepatitis C antibody    Encounter for screening mammogram for malignant neoplasm of breast        Relevant Orders    Mammo screening bilateral w 3d & cad           Diagnoses and all orders for this visit:    Type 2 diabetes mellitus with hyperglycemia, without long-term current use of insulin (HCC)  -     POCT hemoglobin A1c    Need for hepatitis C screening test  -     Hepatitis C antibody    Encounter for screening mammogram for malignant neoplasm of breast  -     Mammo screening bilateral w 3d & cad; Future    Dysfunction of left eustachian tube        Type 2 diabetes mellitus ([facility] )    Lab Results   Component Value Date    HGBA1C 6 8 (A) 06/17/2021     Pts symptoms are stable with current regime  No changes at present  Dysfunction of left eustachian tube  Start sudafed  Directions for use and possible side effects discussed and patient verbalized understanding of these  Subjective:      Patient ID: Kenneth Raymond is a 44 y o  female  Pt presents for routine visit  She is up to date with labs, CRC screening, diabetic foot and eye exam and covid vaccination  She is due for mammogram  A1C today is stable at 6 8  She complains of some intermittent dizziness with position changes that lasts several seconds  She notes some pain into her posterior ear and some muffled hearing when this happens also   Denies CP, SOB, visual changes  Denies palpitations  The following portions of the patient's history were reviewed and updated as appropriate:   She has a past medical history of Anxiety, Arthritis, Asthma, Cancer ([facility] ), Depression, Diabetes mellitus ([facility] ), Diverticulitis, GERD (gastroesophageal reflux disease), Hyperlipidemia associated with type 2 diabetes mellitus ([facility] ), Hypertension, Insulinoma, Non-recurrent acute suppurative otitis media of left ear without spontaneous rupture of tympanic membrane (2019), Pulmonary emboli ([facility] ), and Sleep apnea  ,  does not have any pertinent problems on file  ,   has a past surgical history that includes Pancreatectomy;  section; Knee surgery; Knee arthroscopy (Left); pr lap,cholecystectomy (N/A, 2018); Cholecystectomy; and pr colonoscopy flx dx w/collj spec when pfrmd (N/A, 2019)  ,  family history includes Breast cancer in her brother; Diabetes in her maternal grandmother and paternal grandmother; Heart disease in her mother; Hyperlipidemia in her mother; Hypertension in her mother; No Known Problems in her daughter, half-sister, paternal aunt, paternal aunt, paternal aunt, paternal aunt, paternal aunt, sister, and sister; Obesity in her mother; Osteoporosis in her family; Other in her maternal grandmother and mother; Substance Abuse in her family and father; Tuberculosis in her maternal aunt and mother  ,   reports that she quit smoking about 8 years ago  Her smoking use included cigarettes  She has a 30 00 pack-year smoking history  She has never used smokeless tobacco  She reports that she does not drink alcohol and does not use drugs  ,  is allergic to iodine - food allergy and nuts - food allergy     Current Outpatient Medications   Medication Sig Dispense Refill    albuterol (Ventolin HFA) 90 mcg/act inhaler Inhale 2 puffs every 4 (four) hours as needed for shortness of breath 8 g 6    ARIPiprazole (ABILIFY) 15 mg tablet Take 1 tablet by mouth daily  Blood Glucose Monitoring Suppl (CONTOUR NEXT MONITOR) w/Device KIT 1 kit by Does not apply route 2 (two) times a day as needed (as needed) 1 kit 0    budesonide-formoterol (SYMBICORT) 80-4 5 MCG/ACT inhaler Inhale 2 puffs 2 (two) times a day Rinse mouth after use  10 2 g 6    diclofenac (VOLTAREN) 75 mg EC tablet take 1 tablet by mouth twice a day 60 tablet 5    Dulaglutide (Trulicity) 1 5 ON/5 8MS SOPN Inject 0 5 mL (1 5 mg total) under the skin once a week 2 mL 5    gabapentin (NEURONTIN) 100 mg capsule take 1 capsule by mouth three times a day (Patient taking differently: 2 (two) times a day ) 90 capsule 2    glucose blood (Contour Next Test) test strip 1 each by Other route 2 (two) times a day as needed (As needed) Use as instructed 100 each 5    Invokamet 150-1000 MG TABS take 1 tablet by mouth twice a day 60 tablet 5    Januvia 100 MG tablet take 1 tablet by mouth once daily 30 tablet 5    Lancet Device MISC by Does not apply route      lisinopril (ZESTRIL) 2 5 mg tablet Take 1 tablet (2 5 mg total) by mouth daily 90 tablet 1    loratadine (CLARITIN) 10 mg tablet Take 1 tablet (10 mg total) by mouth daily 90 tablet 1    Microlet Lancets MISC Test twice daily 100 each 3    Multiple Vitamins-Minerals (MULTI ADULT GUMMIES PO) Take by mouth      omeprazole (PriLOSEC) 40 MG capsule take 1 capsule by mouth once daily before breakfast 30 capsule 5    pravastatin (PRAVACHOL) 20 mg tablet Take 1 tablet (20 mg total) by mouth daily 90 tablet 1    venlafaxine (EFFEXOR-XR) 150 mg 24 hr capsule Take 150 mg by mouth daily        EPINEPHrine (EPIPEN) 0 3 mg/0 3 mL SOAJ Inject 0 3 mL (0 3 mg total) into the shoulder, thigh, or buttocks once for 1 dose 0 3 mL 2     No current facility-administered medications for this visit  Review of Systems   Constitutional: Negative for chills and fever     HENT: Negative for congestion, ear pain, hearing loss, postnasal drip, rhinorrhea, sinus pressure, sinus pain, sore throat and trouble swallowing  Eyes: Negative for pain and visual disturbance  Respiratory: Negative for cough, chest tightness, shortness of breath and wheezing  Cardiovascular: Negative  Negative for chest pain, palpitations and leg swelling  Gastrointestinal: Negative for abdominal pain, blood in stool, constipation, diarrhea, nausea and vomiting  Endocrine: Negative for cold intolerance, heat intolerance, polydipsia, polyphagia and polyuria  Genitourinary: Negative for difficulty urinating, dysuria, flank pain and urgency  Musculoskeletal: Negative for arthralgias, back pain, gait problem and myalgias  Skin: Negative for rash  Allergic/Immunologic: Negative  Neurological: Positive for dizziness  Negative for weakness, light-headedness and headaches  Hematological: Negative  Psychiatric/Behavioral: Negative for behavioral problems, dysphoric mood and sleep disturbance  The patient is not nervous/anxious  PHQ-9 Depression Screening    PHQ-9:   Frequency of the following problems over the past two weeks:      Little interest or pleasure in doing things: 0 - not at all  Feeling down, depressed, or hopeless: 0 - not at all  Trouble falling or staying asleep, or sleeping too much: 0 - not at all  Feeling tired or having little energy: 0 - not at all  Poor appetite or overeatin - not at all  Feeling bad about yourself - or that you are a failure or have let yourself or your family down: 0 - not at all  Trouble concentrating on things, such as reading the newspaper or watching television: 0 - not at all  Moving or speaking so slowly that other people could have noticed   Or the opposite - being so fidgety or restless that you have been moving around a lot more than usual: 0 - not at all  Thoughts that you would be better off dead, or of hurting yourself in some way: 0 - not at all  PHQ-2 Score: 0  PHQ-9 Score: 0          Objective:  Vitals:    21 1000   BP: 116/64   BP Location: Left arm   Patient Position: Sitting   Cuff Size: Large   Pulse: 97   Resp: 14   Temp: 98 8 °F (37 1 °C)   SpO2: 98%   Weight: 94 1 kg (207 lb 6 4 oz)   Height: 5' 2" (1 575 m)     Body mass index is 37 93 kg/m²  Physical Exam  Constitutional:       General: She is not in acute distress  Appearance: She is well-developed  She is not diaphoretic  HENT:      Head: Normocephalic and atraumatic  Right Ear: External ear normal       Left Ear: External ear normal       Nose: Nose normal       Mouth/Throat:      Pharynx: No oropharyngeal exudate  Eyes:      General: No scleral icterus  Right eye: No discharge  Left eye: No discharge  Conjunctiva/sclera: Conjunctivae normal       Pupils: Pupils are equal, round, and reactive to light  Neck:      Thyroid: No thyromegaly  Cardiovascular:      Rate and Rhythm: Normal rate and regular rhythm  Heart sounds: Normal heart sounds  No murmur heard  No friction rub  No gallop  Pulmonary:      Effort: Pulmonary effort is normal  No respiratory distress  Breath sounds: Normal breath sounds  No wheezing or rales  Abdominal:      General: Bowel sounds are normal  There is no distension  Palpations: Abdomen is soft  Tenderness: There is no abdominal tenderness  Musculoskeletal:         General: No tenderness or deformity  Normal range of motion  Cervical back: Normal range of motion and neck supple  Skin:     General: Skin is warm and dry  Neurological:      Mental Status: She is alert and oriented to person, place, and time  Cranial Nerves: No cranial nerve deficit  Psychiatric:         Behavior: Behavior normal          Thought Content:  Thought content normal          Judgment: Judgment normal

## 2021-06-17 NOTE — LETTER
Diabetic Eye Exam Form    Date Requested: 21  Patient: Liliya Dye  Patient : 1970   Referring Provider: Justo Garcia PA-C    Dilated Retinal Exam, Optomap-Iris Exam, or Fundus Photography Done         Yes ([place] one above)         No     Date of Diabetic Eye Exam ______________________________  Left Eye      Exam did show retinopathy    Exam did not show retinopathy         Mild       Moderate       None       Proliferative       Severe     Right Eye     Exam did show retinopathy    Exam did not show retinopathy         Mild       Moderate       None       Proliferative       Severe     Comments __________________________________________________________    Practice Providing Exam ______________________________________________    Exam Performed By (print name) _______________________________________      Provider Signature ___________________________________________________      These reports are needed for  compliance    Please fax this completed form and a copy of the Diabetic Eye Exam report to our office located at [place] [address] as soon as possible to [phone] lilly Stewart Chin: Phone [phone]    We thank you for your assistance in treating our mutual patient

## 2021-06-17 NOTE — ASSESSMENT & PLAN NOTE
Start sudafed  Directions for use and possible side effects discussed and patient verbalized understanding of these

## 2021-06-17 NOTE — ASSESSMENT & PLAN NOTE
Lab Results   Component Value Date    HGBA1C 6 8 (A) 06/17/2021     Pts symptoms are stable with current regime  No changes at present

## 2021-07-04 NOTE — PROGRESS NOTES
Patient is discharging home. His daughter is driving him. All education has been completed, printed and sent home with the patient.   Progress note - Pulmonary Medicine   Katie Dye 44 y o  female MRN: [id number]       Impression & Plan:     Mild persistent asthma without complication  Her symptoms got worse when she stop Symbicort, will restart Symbicort 80/4 5, 2 puffs twice daily  I told patient to use p r n  Albuterol as needed  In the future if she is controlled we can probably cut down to 1 puff twice daily only  ANSHUL (obstructive sleep apnea)  Compliant but does not do enough hours every night, I encouraged her to pay attention to that and use it longer periods  I noticed on her CPAP report that there is some residual AHI still 10 3 with some central apnea events  I will refer patient to Sleep Clinic for evaluation  Gastroesophageal reflux disease  Controlled with omeprazole, continue    Class 2 obesity due to excess calories in adult  I encouraged patient to decrease calorie intake and exercise to lose weight    History of pulmonary embolism  Provoked in the past, not on anticoagulation, no need for further intervention    Encounter for screening for malignant neoplasm of lung in former smoker who quit in past 15 years with 30 pack year history or greater  Patient had tiny nodules less than 4 mm but given also her smoking history she qualifies for lung cancer screening for August 2021    Generalized weakness  Mainly in her lower extremities, not sure if related to her obesity, diabetes and poorly controlled sleep apnea but also to consider deficiencies given her history of pancreatic insufficiency but currently not on pancreatic enzymes  Will defer to family doctor for further workup/evaluation      Diagnoses and all orders for this visit:    Mild persistent asthma without complication  -     budesonide-formoterol (SYMBICORT) 80-4 5 MCG/ACT inhaler; Inhale 2 puffs 2 (two) times a day Rinse mouth after use  ANSHUL (obstructive sleep apnea)  -     Ambulatory referral to Sleep Medicine;  Future    Gastroesophageal reflux disease without esophagitis    Class 2 obesity due to excess calories with body mass index (BMI) of 39 0 to 39 9 in adult, unspecified whether serious comorbidity present    Encounter for screening for malignant neoplasm of lung in former smoker who quit in past 15 years with 30 pack year history or greater  -     CT lung screening program; Future    Asthma, unspecified asthma severity, unspecified whether complicated, unspecified whether persistent  -     albuterol (Ventolin HFA) 90 mcg/act inhaler; Inhale 2 puffs every 4 (four) hours as needed for shortness of breath    History of pulmonary embolism    Generalized weakness    Patient received COVID-19 vaccine  ______________________________________________________________________    HPI:    Katie Dye presents today for follow-up of mild persistent asthma and sleep apnea  Patient had her asthma controlled with Symbicort 80/4 5 and p r n  Albuterol so last visit she was attempted on the escalation of treatment and she was told to stop the Symbicort and use it p r n , the patient tried that and now she feels more shortness of breath with minimal intermittent wheezing and cough at night, in general she feels fine but her symptoms got worse  Denies fever or chills night sweats, denies chest pain, denies orthopnea, sometimes she has minimal legs edema  Patient has GERD that is controlled with omeprazole, denies dysphagia  Patient has obstructive sleep apnea currently on CPAP using nasal mask  Patient complains of mild weakness in her lower extremity after walking with some aching in her shins bilaterally  Patient is a former smoker, she smoked 1 pack per day for at least 30 years and quit 7 years ago        Current Medications:    Current Outpatient Medications:     albuterol (Ventolin HFA) 90 mcg/act inhaler, Inhale 2 puffs every 4 (four) hours as needed for shortness of breath, Disp: 3 Inhaler, Rfl: 1    ARIPiprazole (ABILIFY) 15 mg tablet, Take 1 tablet by mouth daily , Disp: , Rfl:     Blood Glucose Monitoring Suppl (CONTOUR NEXT MONITOR) w/Device KIT, 1 kit by Does not apply route 2 (two) times a day as needed (as needed), Disp: 1 kit, Rfl: 0    budesonide-formoterol (SYMBICORT) 80-4 5 MCG/ACT inhaler, Inhale 2 puffs 2 (two) times a day Rinse mouth after use , Disp: 1 Inhaler, Rfl: 5    diclofenac (VOLTAREN) 75 mg EC tablet, take 1 tablet by mouth twice a day, Disp: 60 tablet, Rfl: 5    Dulaglutide (Trulicity) 1 5 UV/3 2SX SOPN, Inject 0 5 mL (1 5 mg total) under the skin once a week, Disp: 2 mL, Rfl: 5    EPINEPHrine (EPIPEN) 0 3 mg/0 3 mL SOAJ, Inject 0 3 mL (0 3 mg total) into the shoulder, thigh, or buttocks once for 1 dose, Disp: 0 3 mL, Rfl: 2    gabapentin (NEURONTIN) 100 mg capsule, take 1 capsule by mouth three times a day (Patient taking differently: Take 100 mg by mouth 2 (two) times a day ), Disp: 90 capsule, Rfl: 2    glucose blood (Contour Next Test) test strip, 1 each by Other route 2 (two) times a day as needed (As needed) Use as instructed, Disp: 100 each, Rfl: 5    Invokamet 150-1000 MG TABS, take 1 tablet by mouth twice a day, Disp: 60 tablet, Rfl: 5    Januvia 100 MG tablet, take 1 tablet by mouth once daily, Disp: 30 tablet, Rfl: 5    Lancet Device MISC, by Does not apply route, Disp: , Rfl:     lisinopril (ZESTRIL) 2 5 mg tablet, Take 1 tablet (2 5 mg total) by mouth daily, Disp: 90 tablet, Rfl: 1    loratadine (CLARITIN) 10 mg tablet, Take 1 tablet (10 mg total) by mouth daily, Disp: 90 tablet, Rfl: 1    Microlet Lancets MISC, Test twice daily, Disp: 100 each, Rfl: 3    Multiple Vitamins-Minerals (MULTI ADULT GUMMIES PO), Take by mouth, Disp: , Rfl:     omeprazole (PriLOSEC) 40 MG capsule, take 1 capsule by mouth once daily before breakfast, Disp: 30 capsule, Rfl: 5    pravastatin (PRAVACHOL) 20 mg tablet, Take 1 tablet (20 mg total) by mouth daily, Disp: 90 tablet, Rfl: 1    venlafaxine (EFFEXOR-XR) 150 mg 24 hr capsule, Take 150 mg by mouth daily  , Disp: , Rfl:     Review of Systems:  Review of Systems   Constitutional: Negative  HENT: Negative  Eyes: Negative  Respiratory: Positive for cough, shortness of breath and wheezing  Cardiovascular: Negative  Gastrointestinal: Negative  Endocrine: Negative  Genitourinary: Negative  Musculoskeletal:        Bilateral lower extremity weakness sometimes   Skin: Negative  Allergic/Immunologic: Negative  Neurological: Negative  Hematological: Negative  Psychiatric/Behavioral: Negative  Aside from what is mentioned in the HPI, the review of systems is otherwise negative    Past medical history, surgical history, and family history were reviewed and updated as appropriate    Social history updates:  Social History     Tobacco Use   Smoking Status Former Smoker   Smokeless Tobacco Never Used   Tobacco Comment    quit 6 yrs ago       PhysicalExamination:  Vitals:   /69   Pulse 92   Temp (!) 97 4 °F (36 3 °C) (Tympanic)   Ht 5' 2" (1 575 m)   Wt 94 1 kg (207 lb 6 4 oz)   SpO2 94%   BMI 37 93 kg/m²     General: alert, not in acute distress  HEENT: PERRL, no icteric sclera or cyanosis, no thrush  Neck:  Supple, no lymphadenopathy or thyromegaly, no JVD  Lungs:  Equal breath sounds and clear auscultations bilaterally, no wheezing or crackles  Heart: S1S2 regular, no murmures or gallops  Abdomen: soft, non-tender, bowel sounds  present  Extrimities: no edema, no clubbing or cyanosis  Neuro: Alert and oriented x 3, no focal neurodeficits   Skin: intact, no rashes    Diagnostic Data:  Labs:   I personally reviewed the most recent laboratory data pertinent to today's visit    Lab Results   Component Value Date    WBC 6 07 09/23/2020    HGB 14 2 09/23/2020    HCT 44 6 09/23/2020    MCV 91 09/23/2020     09/23/2020     Lab Results   Component Value Date    SODIUM 139 12/22/2020    K 4 2 12/22/2020    CO2 28 12/22/2020     12/22/2020    BUN 17 12/22/2020    CREATININE 0 97 12/22/2020    CALCIUM 9 9 12/22/2020       PFT results: The most recent pulmonary function tests were reviewed  · No obstructive airflow defect numerically, however FEF 25-75% is significantly decreased which can be consistent with small airways disease   Flow volume curve shape on exhalation also consistent with obstruction      · No significant response to the administration to bronchodilator per ATS Standards     · Normal Lung volumes     · Normal diffusion capacity     Imaging:  I personally reviewed the images on the [facility] system pertinent to today's visit  Chest CT scan reviewed on PACs:  Clear lungs parenchyma bilaterally, stable cyst in the right lung, stable 4 mm lung nodule in the right lung     Other studies:  Sleep Study: Mrs Zonia Mccallum had an increased number of sleep related respiratory events (AHI - 8 1) which is consistent with mild obstructive sleep apnea  There were also some central events noted (HECTOR - 3 4)  Therefore, I recommend an in lab titration to ensure treatment emergent central events dont occur  In addition, she had frequent limb movements (PLM index - 23 4)  I would check iron and magnesium studies  If  daytime sleepiness persists, I would trial Neurontin, Lyrica, Mirapex or Requip     CPAP titration:  IMPRESSION:  Mrs Zonia Mccallum underwent titration CPAP up to 9 cc of H2O pressure but was still having some respiratory events at the final pressure  Therefore, I recommend a trial of auto-titrating CPAP 9-20 cc of H2O pressure using a small Dreamwear nasal interface and heated humidification  Compliance should be evaluated in 1-2 months      Nuclear cardiac stress test:  IMPRESSIONS: Normal study after pharmacologic vasodilation  Myocardial perfusion imaging was normal at rest and with stress   Left ventricular systolic function was normal   LVEF 58%        CPAP compliance report:   Patient used the CPAP in 29/30 days, 97% usage, however only 53% usage more than 4 hours per night    Her average CPAP pressure was 10 4 cm H2O, apnea index noted 4 0 central, 3 8 obstructive, total AHI 10 3,      Jovanny Delgado MD

## 2021-07-05 ENCOUNTER — HOSPITAL ENCOUNTER (OUTPATIENT)
Dept: CT IMAGING | Facility: HOSPITAL | Age: 51
Discharge: HOME | End: 2021-07-05
Attending: INTERNAL MEDICINE
Payer: COMMERCIAL

## 2021-07-05 DIAGNOSIS — Z12.2 ENCOUNTER FOR SCREENING FOR MALIGNANT NEOPLASM OF LUNG IN FORMER SMOKER WHO QUIT IN PAST 15 YEARS WITH 30 PACK YEAR HISTORY OR GREATER: ICD-10-CM

## 2021-07-05 DIAGNOSIS — Z87.891 ENCOUNTER FOR SCREENING FOR MALIGNANT NEOPLASM OF LUNG IN FORMER SMOKER WHO QUIT IN PAST 15 YEARS WITH 30 PACK YEAR HISTORY OR GREATER: ICD-10-CM

## 2021-07-05 PROCEDURE — 71271 CT THORAX LUNG CANCER SCR C-: CPT

## 2021-07-20 ENCOUNTER — TELEPHONE (OUTPATIENT)
Dept: PULMONOLOGY | Facility: CLINIC | Age: 51
End: 2021-07-20

## 2021-07-21 ENCOUNTER — TELEPHONE (OUTPATIENT)
Dept: PULMONOLOGY | Facility: CLINIC | Age: 51
End: 2021-07-21

## 2021-07-21 ENCOUNTER — OFFICE VISIT (OUTPATIENT)
Dept: PULMONOLOGY | Facility: CLINIC | Age: 51
End: 2021-07-21
Payer: COMMERCIAL

## 2021-07-21 VITALS
BODY MASS INDEX: 38.24 KG/M2 | HEIGHT: 62 IN | DIASTOLIC BLOOD PRESSURE: 71 MMHG | HEART RATE: 98 BPM | TEMPERATURE: 98.6 F | SYSTOLIC BLOOD PRESSURE: 111 MMHG | OXYGEN SATURATION: 96 % | WEIGHT: 207.8 LBS

## 2021-07-21 DIAGNOSIS — G47.33 OSA (OBSTRUCTIVE SLEEP APNEA): Primary | ICD-10-CM

## 2021-07-21 DIAGNOSIS — G25.81 RESTLESS LEG SYNDROME: ICD-10-CM

## 2021-07-21 PROCEDURE — 99214 OFFICE O/P EST MOD 30 MIN: CPT | Performed by: INTERNAL MEDICINE

## 2021-07-21 PROCEDURE — 3008F BODY MASS INDEX DOCD: CPT | Performed by: INTERNAL MEDICINE

## 2021-07-21 PROCEDURE — 1036F TOBACCO NON-USER: CPT | Performed by: INTERNAL MEDICINE

## 2021-07-21 NOTE — LETTER
July 22, 2021     Luis Manuel Sousa PA-C  [address]    Patient: Anderson Peng   YOB: 1970   Date of Visit: 7/21/2021       Dear Dr Michael Chandler: Thank you for referring Katie Marnie to me for evaluation  Below are my notes for this consultation  If you have questions, please do not hesitate to call me  I look forward to following your patient along with you  Sincerely,        Jairo Botello DO        CC: No Recipients  Jairo Botello DO  7/22/2021  2:51 PM  Sign when Signing Visit  Progress Note - Sleep Medicine  Anderson Peng 44 y o  female MRN: [id number]       Impression & Plan:   1  Mild ANSHUL (AHI - 8 1) - on autoPAP 9-20  Now with treatment emergent central apena  Adequate compliance but suboptimal response  Residual AHI - 10 9, 5 9 are central events  (DME - robyn)      -  We will first trial to use some gabapentin as she has significant RLS  Perhaps some of the central events are related to arousals from PLMs  -  In a few weeks we will check overnight pulse ox to see if oxygenation is an issue  If so, will start oxygen as this may help resolve central events  -  I would prefer to have a titration study performed but due to the current recall this is not possible  I will order one and perform it in 3 months at which time we will hopefully be able to perform the study      -  She may need ASV machine so a repeat echocardiogram may be necessary      -  For now, continue autoPAP 9-20         -  She is aware ofthe risk of leaving sleep apnea untreated including hypertension, heart failure, arrhythmia, MI and stroke  2  Restless legs/Periodic limb movement (PLM index - 24) -       -  Treat ANSHUL as above       -  SHe has been using gabapentin 100mg TID  I asked that she increase the night time dose to 200mg qHS to see if this helps       -  I would also consider reducing Abilify as this is also likely contributing to PLMs  Cigarettes     Quit date:      Years since quittin 5    Smokeless tobacco: Never Used    Tobacco comment: quit 6 yrs ago   Vaping Use    Vaping Use: Never used   Substance and Sexual Activity    Alcohol use: No    Drug use: No    Sexual activity: Not Currently   Other Topics Concern    Not on file   Social History Narrative    Single-    Unemployed    Lives at home with mother     Caffeine use     Social Determinants of Health     Financial Resource Strain:     Difficulty of Paying Living Expenses:    Food Insecurity:     Worried About Running Out of Food in the Last Year:     Ran Out of Food in the Last Year:    Transportation Needs:     Lack of Transportation (Medical):      Lack of Transportation (Non-Medical):    Physical Activity:     Days of Exercise per Week:     Minutes of Exercise per Session:    Stress:     Feeling of Stress :    Social Connections:     Frequency of Communication with Friends and Family:     Frequency of Social Gatherings with Friends and Family:     Attends Catholic Services:     Active Member of Clubs or Organizations:     Attends Club or Organization Meetings:     Marital Status:    Intimate Partner Violence:     Fear of Current or Ex-Partner:     Emotionally Abused:     Physically Abused:     Sexually Abused:        PhysicalExamination:  Vitals:   /71   Pulse 98   Temp 98 6 °F (37 °C) (Tympanic)   Ht 5' 2" (1 575 m)   Wt 94 3 kg (207 lb 12 8 oz)   SpO2 96%   BMI 38 01 kg/m²   General: Pleasant, Awake alert and oriented x 3, conversant without conversational dyspnea, NAD, normal affect  HEENT:  PERRL, Sclera noninjected, nonicteric OU, Nares patent,  no craniofacial abnormalities, Mucous membranes, moist, no oral lesions, normal dentition, Mallampati class 3  NECK: Trachea midline, no accessory muscle use, no stridor, no cervical or supraclavicular adenopathy, JVP not elevated  CARDIAC: Reg, single s1/S2, no m/r/g  PULM: CTA bilaterally no wheezing, rhonchi or rales  ABD: Normoactive bowel sounds, soft nontender, nondistended, no rebound, no rigidity, no guarding  EXT: No cyanosis, no clubbing, no edema, normal capillary refill  NEURO: no focal neurologic deficits, AAOx3, moving all extremities appropriately      Diagnostic Data:  Labs: I personally reviewed the most recent laboratory data pertinent to today's visit  I have personally reviewed pertinent lab results  Lab Results   Component Value Date    WBC 6 07 09/23/2020    HGB 14 2 09/23/2020    HCT 44 6 09/23/2020    MCV 91 09/23/2020     09/23/2020     Lab Results   Component Value Date    GLUCOSE 116 11/19/2015    CALCIUM 9 9 12/22/2020     06/09/2018    K 4 2 12/22/2020    CO2 28 12/22/2020     12/22/2020    BUN 17 12/22/2020    CREATININE 0 97 12/22/2020     No results found for: IGE  Lab Results   Component Value Date    ALT 63 09/23/2020    AST 23 09/23/2020    ALKPHOS 85 09/23/2020    BILITOT 0 4 06/09/2018     Lab Results   Component Value Date    IRON 51 12/22/2020     Sleep studies:  Diagnostic: Mrs Ana Shafer had an increased number of sleep related respiratory events (AHI - 8 1) which is consistent with mild obstructive sleep apnea  There were also some central events noted (HECTOR - 3 4)  Therefore, I recommend an in lab titration to ensure treatment emergent central events don?t occur  In addition, she had frequent limb movements (PLM index - 23 4)  I would check iron and magnesium studies  If daytime sleepiness persists, I would trial Neurontin, Lyrica, Mirapex or Requip  Titration:  Mrs Ana Shafer underwent titration CPAP up to 9 cc of H2O pressure but was still having some respiratory events at the final pressure  Therefore, I recommend a trial of auto-titrating CPAP 9-20 cc of H2O pressure using a small Dreamwear nasal interface and heated humidification  Compliance should be evaluated in 1-2 months      Compliance Data:  6/20/21 - 7/19/21 Type of CPAP:  autoPAP 9-20, mean 10 2, max 13 1                                   Percent usage: 97%, 83%                                   Average time used: 6 hrs 8 mins                                  Time in large leak: 10 9, central 5 9                                   Residual AHI: 10 9, Central 5 9                                         Stephon Kaufman DO

## 2021-07-22 NOTE — PROGRESS NOTES
Progress Note - Sleep Medicine  Katie Dye 44 y o  female MRN: [id number]       Impression & Plan:   1  Mild ANSHUL (AHI - 8 1) - on autoPAP 9-20  Now with treatment emergent central apena  Adequate compliance but suboptimal response  Residual AHI - 10 9, 5 9 are central events  (DME - robyn)      -  We will first trial to use some gabapentin as she has significant RLS  Perhaps some of the central events are related to arousals from PLMs  -  In a few weeks we will check overnight pulse ox to see if oxygenation is an issue  If so, will start oxygen as this may help resolve central events  -  I would prefer to have a titration study performed but due to the current recall this is not possible  I will order one and perform it in 3 months at which time we will hopefully be able to perform the study      -  She may need ASV machine so a repeat echocardiogram may be necessary      -  For now, continue autoPAP 9-20         -  She is aware ofthe risk of leaving sleep apnea untreated including hypertension, heart failure, arrhythmia, MI and stroke  2  Restless legs/Periodic limb movement (PLM index - 24) -       -  Treat ANSHUL as above       -  SHe has been using gabapentin 100mg TID  I asked that she increase the night time dose to 200mg qHS to see if this helps       -  I would also consider reducing Abilify as this is also likely contributing to PLMs     ______________________________________________________________________    HPI:    Justin Lopez presents today for follow-up of for her ANSHUL  She states that she is wearing her CPAP but will get up around 2-3 am for unknown reason or to go to the bathroom  She does not feel her sleep is very refreshing  She does not some daytime sleepiness  She continues to have RLS symptoms but does not feel that it prevents her from sleeping  She is using her abilify at bedtime  She states that she uses that to help her sleep     She had previous been on Seroquel which did not work well for her  She does not have any issues with mask or pressure intolerance with her CPAP, she just does not feel better while using it  Review of Systems:  Review of Systems   Constitutional: Positive for fatigue  Negative for appetite change  HENT: Negative  Eyes: Negative  Respiratory: Negative for shortness of breath  Cardiovascular: Negative  Gastrointestinal: Negative  Endocrine: Negative  Genitourinary: Negative  Musculoskeletal: Negative  Allergic/Immunologic: Negative  Neurological: Negative  Hematological: Negative  Psychiatric/Behavioral: Positive for sleep disturbance  Negative for behavioral problems and dysphoric mood           Social history updates:  Social History     Tobacco Use   Smoking Status Former Smoker    Packs/day: 1 00    Years: 30 00    Pack years: 30 00    Types: Cigarettes    Quit date:     Years since quittin 5   Smokeless Tobacco Never Used   Tobacco Comment    quit 6 yrs ago     Social History     Socioeconomic History    Marital status:      Spouse name: Not on file    Number of children: Not on file    Years of education: Not on file    Highest education level: Not on file   Occupational History    Occupation: UNEMPLOYED   Tobacco Use    Smoking status: Former Smoker     Packs/day: 1 00     Years: 30 00     Pack years: 30 00     Types: Cigarettes     Quit date:      Years since quittin 5    Smokeless tobacco: Never Used    Tobacco comment: quit 6 yrs ago   Vaping Use    Vaping Use: Never used   Substance and Sexual Activity    Alcohol use: No    Drug use: No    Sexual activity: Not Currently   Other Topics Concern    Not on file   Social History Narrative    Single-    Unemployed    Lives at home with mother     Caffeine use     Social Determinants of Health     Financial Resource Strain:     Difficulty of Paying Living Expenses:    Food Insecurity:     Worried About [facility] in the Last Year:    [address] in the Last Year:    Transportation Needs:     Lack of Transportation (Medical):  Lack of Transportation (Non-Medical):    Physical Activity:     Days of Exercise per Week:     Minutes of Exercise per Session:    Stress:     Feeling of Stress :    Social Connections:     Frequency of Communication with Friends and Family:     Frequency of Social Gatherings with Friends and Family:     Attends Taoism Services:     Active Member of Clubs or Organizations:     Attends Club or Organization Meetings:     Marital Status:    Intimate Partner Violence:     Fear of Current or Ex-Partner:     Emotionally Abused:     Physically Abused:     Sexually Abused:        PhysicalExamination:  Vitals:   /71   Pulse 98   Temp 98 6 °F (37 °C) (Tympanic)   Ht 5' 2" (1 575 m)   Wt 94 3 kg (207 lb 12 8 oz)   SpO2 96%   BMI 38 01 kg/m²   General: Pleasant, Awake alert and oriented x 3, conversant without conversational dyspnea, NAD, normal affect  HEENT:  PERRL, Sclera noninjected, nonicteric OU, Nares patent,  no craniofacial abnormalities, Mucous membranes, moist, no oral lesions, normal dentition, Mallampati class 3  NECK: Trachea midline, no accessory muscle use, no stridor, no cervical or supraclavicular adenopathy, JVP not elevated  CARDIAC: Reg, single s1/S2, no m/r/g  PULM: CTA bilaterally no wheezing, rhonchi or rales  ABD: Normoactive bowel sounds, soft nontender, nondistended, no rebound, no rigidity, no guarding  EXT: No cyanosis, no clubbing, no edema, normal capillary refill  NEURO: no focal neurologic deficits, AAOx3, moving all extremities appropriately      Diagnostic Data:  Labs: I personally reviewed the most recent laboratory data pertinent to today's visit  I have personally reviewed pertinent lab results    Lab Results   Component Value Date    WBC 6 07 09/23/2020    HGB 14 2 09/23/2020    HCT 44 6 09/23/2020    MCV 91 09/23/2020     09/23/2020     Lab Results   Component Value Date    GLUCOSE 116 11/19/2015    CALCIUM 9 9 12/22/2020     06/09/2018    K 4 2 12/22/2020    CO2 28 12/22/2020     12/22/2020    BUN 17 12/22/2020    CREATININE 0 97 12/22/2020     No results found for: IGE  Lab Results   Component Value Date    ALT 63 09/23/2020    AST 23 09/23/2020    ALKPHOS 85 09/23/2020    BILITOT 0 4 06/09/2018     Lab Results   Component Value Date    IRON 51 12/22/2020     Sleep studies:  Diagnostic: Mrs Ana Shafer had an increased number of sleep related respiratory events (AHI - 8 1) which is consistent with mild obstructive sleep apnea  There were also some central events noted (HECTOR - 3 4)  Therefore, I recommend an in lab titration to ensure treatment emergent central events don?t occur  In addition, she had frequent limb movements (PLM index - 23 4)  I would check iron and magnesium studies  If daytime sleepiness persists, I would trial Neurontin, Lyrica, Mirapex or Requip  Titration:  Mrs Ana Shafer underwent titration CPAP up to 9 cc of H2O pressure but was still having some respiratory events at the final pressure  Therefore, I recommend a trial of auto-titrating CPAP 9-20 cc of H2O pressure using a small Dreamwear nasal interface and heated humidification  Compliance should be evaluated in 1-2 months      Compliance Data:  6/20/21 - 7/19/21                                  Type of CPAP:  autoPAP 9-20, mean 10 2, max 13 1                                   Percent usage: 97%, 83%                                   Average time used: 6 hrs 8 mins                                  Time in large leak: 10 9, central 5 9                                   Residual AHI: 10 9, Central 5 9                                         Liza Dejesus DO

## 2021-08-04 ENCOUNTER — HOSPITAL ENCOUNTER (OUTPATIENT)
Dept: MAMMOGRAPHY | Facility: HOSPITAL | Age: 51
Discharge: HOME | End: 2021-08-04
Payer: COMMERCIAL

## 2021-08-04 VITALS — HEIGHT: 62 IN | WEIGHT: 207 LBS | BODY MASS INDEX: 38.09 KG/M2

## 2021-08-04 DIAGNOSIS — Z12.31 ENCOUNTER FOR SCREENING MAMMOGRAM FOR MALIGNANT NEOPLASM OF BREAST: ICD-10-CM

## 2021-08-04 PROCEDURE — 77063 BREAST TOMOSYNTHESIS BI: CPT

## 2021-08-04 PROCEDURE — 77067 SCR MAMMO BI INCL CAD: CPT

## 2021-08-28 DIAGNOSIS — K21.9 GASTROESOPHAGEAL REFLUX DISEASE WITHOUT ESOPHAGITIS: ICD-10-CM

## 2021-08-30 RX ORDER — OMEPRAZOLE 40 MG/1
CAPSULE, DELAYED RELEASE ORAL
Qty: 30 CAPSULE | Refills: 5 | Status: SHIPPED | OUTPATIENT
Start: 2021-08-30 | End: 2022-02-14

## 2021-09-07 ENCOUNTER — TELEPHONE (OUTPATIENT)
Dept: INTERNAL MEDICINE CLINIC | Facility: CLINIC | Age: 51
End: 2021-09-07

## 2021-09-09 DIAGNOSIS — E11.65 TYPE 2 DIABETES MELLITUS WITH HYPERGLYCEMIA, WITHOUT LONG-TERM CURRENT USE OF INSULIN (HCC): Primary | ICD-10-CM

## 2021-09-13 ENCOUNTER — HOSPITAL ENCOUNTER (EMERGENCY)
Facility: HOSPITAL | Age: 51
Discharge: HOME | End: 2021-09-13
Attending: EMERGENCY MEDICINE | Admitting: EMERGENCY MEDICINE
Payer: COMMERCIAL

## 2021-09-13 VITALS
BODY MASS INDEX: 37.86 KG/M2 | DIASTOLIC BLOOD PRESSURE: 74 MMHG | TEMPERATURE: 98.5 F | RESPIRATION RATE: 18 BRPM | HEART RATE: 92 BPM | WEIGHT: 207 LBS | OXYGEN SATURATION: 99 % | SYSTOLIC BLOOD PRESSURE: 127 MMHG

## 2021-09-13 DIAGNOSIS — M62.838 TRAPEZIUS MUSCLE SPASM: Primary | ICD-10-CM

## 2021-09-13 LAB
ANION GAP SERPL CALCULATED.3IONS-SCNC: 8 MMOL/L (ref 4–13)
BASOPHILS # BLD AUTO: 0 THOUSANDS/ΜL (ref 0–0.1)
BASOPHILS NFR BLD AUTO: 1 % (ref 0–2)
BUN SERPL-MCNC: 18 MG/DL (ref 7–25)
CALCIUM SERPL-MCNC: 9.9 MG/DL (ref 8.6–10.5)
CHLORIDE SERPL-SCNC: 102 MMOL/L (ref 98–107)
CO2 SERPL-SCNC: 27 MMOL/L (ref 21–31)
CREAT SERPL-MCNC: 0.92 MG/DL (ref 0.6–1.2)
EOSINOPHIL # BLD AUTO: 0.1 THOUSAND/ΜL (ref 0–0.61)
EOSINOPHIL NFR BLD AUTO: 1 % (ref 0–5)
ERYTHROCYTE [DISTWIDTH] IN BLOOD BY AUTOMATED COUNT: 15.5 % (ref 11.5–14.5)
GFR SERPL CREATININE-BSD FRML MDRD: 72 ML/MIN/1.73SQ M
GLUCOSE SERPL-MCNC: 124 MG/DL (ref 65–99)
HCT VFR BLD AUTO: 41.8 % (ref 42–47)
HGB BLD-MCNC: 13.3 G/DL (ref 12–16)
LYMPHOCYTES # BLD AUTO: 1.7 THOUSANDS/ΜL (ref 0.6–4.47)
LYMPHOCYTES NFR BLD AUTO: 32 % (ref 21–51)
MCH RBC QN AUTO: 27.2 PG (ref 26–34)
MCHC RBC AUTO-ENTMCNC: 31.9 G/DL (ref 31–37)
MCV RBC AUTO: 86 FL (ref 81–99)
MONOCYTES # BLD AUTO: 0.6 THOUSAND/ΜL (ref 0.17–1.22)
MONOCYTES NFR BLD AUTO: 12 % (ref 2–12)
NEUTROPHILS # BLD AUTO: 2.9 THOUSANDS/ΜL (ref 1.4–6.5)
NEUTS SEG NFR BLD AUTO: 54 % (ref 42–75)
PLATELET # BLD AUTO: 238 THOUSANDS/UL (ref 149–390)
PMV BLD AUTO: 8.6 FL (ref 8.6–11.7)
POTASSIUM SERPL-SCNC: 4.3 MMOL/L (ref 3.5–5.5)
RBC # BLD AUTO: 4.89 MILLION/UL (ref 3.9–5.2)
SODIUM SERPL-SCNC: 137 MMOL/L (ref 134–143)
TROPONIN I SERPL-MCNC: <0.03 NG/ML
WBC # BLD AUTO: 5.4 THOUSAND/UL (ref 4.8–10.8)

## 2021-09-13 PROCEDURE — 80048 BASIC METABOLIC PNL TOTAL CA: CPT | Performed by: PHYSICIAN ASSISTANT

## 2021-09-13 PROCEDURE — 99285 EMERGENCY DEPT VISIT HI MDM: CPT | Performed by: PHYSICIAN ASSISTANT

## 2021-09-13 PROCEDURE — 93005 ELECTROCARDIOGRAM TRACING: CPT

## 2021-09-13 PROCEDURE — 36415 COLL VENOUS BLD VENIPUNCTURE: CPT | Performed by: PHYSICIAN ASSISTANT

## 2021-09-13 PROCEDURE — 84484 ASSAY OF TROPONIN QUANT: CPT | Performed by: PHYSICIAN ASSISTANT

## 2021-09-13 PROCEDURE — 99283 EMERGENCY DEPT VISIT LOW MDM: CPT

## 2021-09-13 PROCEDURE — 85025 COMPLETE CBC W/AUTO DIFF WBC: CPT | Performed by: PHYSICIAN ASSISTANT

## 2021-09-13 RX ORDER — METHOCARBAMOL 500 MG/1
500 TABLET, FILM COATED ORAL ONCE
Status: COMPLETED | OUTPATIENT
Start: 2021-09-13 | End: 2021-09-13

## 2021-09-13 RX ORDER — METHOCARBAMOL 500 MG/1
500 TABLET, FILM COATED ORAL 2 TIMES DAILY PRN
Qty: 20 TABLET | Refills: 0 | Status: SHIPPED | OUTPATIENT
Start: 2021-09-13 | End: 2022-01-13

## 2021-09-13 RX ADMIN — METHOCARBAMOL 500 MG: 500 TABLET ORAL at 12:21

## 2021-09-13 NOTE — ED PROVIDER NOTES
History  Chief Complaint   Patient presents with    Neck Pain    Ear Fullness     73-year-old female history of arthritis and anxiety presents complaining of neck tightness  Patient reports it started over the past 2 days and progressed last night into this morning  She reports that it is on the right side of her neck with radiation down her back that is worse with certain head movements  She also notes that the pain goes down her right arm slightly  She denies any chest pain but states that she does feel some tightness in her chest since early this morning  She denies any shortness of breath, nausea, sweats, fevers or chills or any recent trauma  Prior to Admission Medications   Prescriptions Last Dose Informant Patient Reported? Taking? ARIPiprazole (ABILIFY) 15 mg tablet  Self Yes No   Sig: Take 1 tablet by mouth daily    Blood Glucose Monitoring Suppl (CONTOUR NEXT MONITOR) w/Device KIT  Self No No   Si kit by Does not apply route 2 (two) times a day as needed (as needed)   EPINEPHrine (EPIPEN) 0 3 mg/0 3 mL SOAJ   No No   Sig: Inject 0 3 mL (0 3 mg total) into the shoulder, thigh, or buttocks once for 1 dose   Invokamet 150-1000 MG TABS   No No   Sig: take 1 tablet by mouth twice a day   Januvia 100 MG tablet   No No   Sig: take 1 tablet by mouth once daily   Lancet Device MISC  Self Yes No   Sig: by Does not apply route   Microlet Lancets MISC  Self No No   Sig: Test twice daily   Multiple Vitamins-Minerals (MULTI ADULT GUMMIES PO)  Self Yes No   Sig: Take by mouth   albuterol (Ventolin HFA) 90 mcg/act inhaler   No No   Sig: Inhale 2 puffs every 4 (four) hours as needed for shortness of breath   budesonide-formoterol (SYMBICORT) 80-4 5 MCG/ACT inhaler   No No   Sig: Inhale 2 puffs 2 (two) times a day Rinse mouth after use     diclofenac (VOLTAREN) 75 mg EC tablet 2021 at 0700  No Yes   Sig: take 1 tablet by mouth twice a day   gabapentin (NEURONTIN) 100 mg capsule   No No   Sig: take 1 capsule by mouth three times a day   glucose blood (Contour Next Test) test strip  Self No No   Si each by Other route 2 (two) times a day as needed (As needed) Use as instructed   liraglutide (VICTOZA) injection   No No   Sig: Inject 0 1 mL (0 6 mg total) under the skin daily   lisinopril (ZESTRIL) 2 5 mg tablet   No No   Sig: Take 1 tablet (2 5 mg total) by mouth daily   loratadine (CLARITIN) 10 mg tablet   No No   Sig: Take 1 tablet (10 mg total) by mouth daily   omeprazole (PriLOSEC) 40 MG capsule   No No   Sig: take 1 capsule by mouth once daily before breakfast   pravastatin (PRAVACHOL) 20 mg tablet   No No   Sig: Take 1 tablet (20 mg total) by mouth daily   venlafaxine (EFFEXOR-XR) 150 mg 24 hr capsule  Self Yes No   Sig: Take 150 mg by mouth daily        Facility-Administered Medications: None       Past Medical History:   Diagnosis Date    Anxiety     Arthritis     Asthma     Cancer ([place] [address] )     renal,cervical    Depression     Diabetes mellitus ([place] [address] )     Diverticulitis     GERD (gastroesophageal reflux disease)     Hyperlipidemia associated with type 2 diabetes mellitus ([place] [address] )     Hypertension     Insulinoma     Non-recurrent acute suppurative otitis media of left ear without spontaneous rupture of tympanic membrane 2019    Pulmonary emboli (HCC)     Sleep apnea     Sleep apnea, obstructive        Past Surgical History:   Procedure Laterality Date     SECTION      twice    CHOLECYSTECTOMY      KNEE ARTHROSCOPY Left     KNEE SURGERY      PANCREATECTOMY      Partial     IA COLONOSCOPY FLX DX W/COLLJ SPEC WHEN PFRMD N/A 2019    Procedure: COLONOSCOPY;  Surgeon: Crissy Sotelo DO;  Location: [facility];  Service: Gastroenterology    IA LAP,CHOLECYSTECTOMY N/A 2018    Procedure: CHOLECYSTECTOMY LAPAROSCOPIC;  Surgeon: Charlene Bullock DO;  Location: [facility];  Service: General       Family History   Problem Relation Age of Onset    Heart disease Mother    [facility] Hyperlipidemia Mother     Obesity Mother     Hypertension Mother    Lilian Lemon Other Mother         Bundle branch block    Tuberculosis Mother     Substance Abuse Father     No Known Problems Brother     Other Maternal Grandmother         ascending aortic aneurysm resection    Diabetes Maternal Grandmother     Diabetes Paternal Grandmother     Tuberculosis Maternal Aunt     Substance Abuse Family     Osteoporosis Family     No Known Problems Sister     No Known Problems Daughter     No Known Problems Sister     No Known Problems Half-Sister     No Known Problems Paternal Aunt     No Known Problems Paternal Aunt     No Known Problems Paternal Aunt     No Known Problems Paternal Aunt     No Known Problems Paternal Aunt      I have reviewed and agree with the history as documented  E-Cigarette/Vaping    E-Cigarette Use Never User      E-Cigarette/Vaping Substances    Nicotine No     THC No     CBD No     Flavoring No     Other No     Unknown No      Social History     Tobacco Use    Smoking status: Former Smoker     Packs/day: 1 00     Years: 30 00     Pack years: 30      Types: Cigarettes     Quit date:      Years since quittin 7    Smokeless tobacco: Never Used    Tobacco comment: quit 6 yrs ago   Vaping Use    Vaping Use: Never used   Substance Use Topics    Alcohol use: No    Drug use: No       Review of Systems   Constitutional: Negative for chills, fatigue and fever  HENT: Negative for ear pain and sore throat  Eyes: Negative for pain  Respiratory: Positive for chest tightness  Negative for cough, shortness of breath and wheezing  Cardiovascular: Negative for chest pain, palpitations and leg swelling  Gastrointestinal: Negative for abdominal pain, constipation, diarrhea, nausea and vomiting  Endocrine: Negative for polyuria  Genitourinary: Negative for dysuria and pelvic pain  Musculoskeletal: Positive for arthralgias and myalgias   Negative for neck pain and neck stiffness  Skin: Negative for rash  Neurological: Negative for dizziness, syncope, light-headedness and headaches  All other systems reviewed and are negative  Physical Exam  Physical Exam  Constitutional:       Appearance: She is well-developed  HENT:      Head: Normocephalic and atraumatic  Mouth/Throat:      Pharynx: No oropharyngeal exudate  Cardiovascular:      Rate and Rhythm: Normal rate and regular rhythm  Heart sounds: Normal heart sounds  Pulmonary:      Effort: Pulmonary effort is normal       Breath sounds: Normal breath sounds  Abdominal:      General: Bowel sounds are normal       Palpations: Abdomen is soft  Tenderness: There is no abdominal tenderness  Musculoskeletal:         General: Normal range of motion  Cervical back: Normal range of motion  Skin:     General: Skin is warm  Capillary Refill: Capillary refill takes less than 2 seconds  Neurological:      General: No focal deficit present  Mental Status: She is alert and oriented to person, place, and time           Vital Signs  ED Triage Vitals [09/13/21 1108]   Temperature Pulse Respirations Blood Pressure SpO2   98 5 °F (36 9 °C) 98 18 126/73 97 %      Temp Source Heart Rate Source Patient Position - Orthostatic VS BP Location FiO2 (%)   Tympanic Monitor Sitting Left arm --      Pain Score       8           Vitals:    09/13/21 1108 09/13/21 1240   BP: 126/73 127/74   Pulse: 98 92   Patient Position - Orthostatic VS: Sitting Sitting         Visual Acuity      ED Medications  Medications   methocarbamol (ROBAXIN) tablet 500 mg (500 mg Oral Given 9/13/21 1221)       Diagnostic Studies  Results Reviewed     Procedure Component Value Units Date/Time    Troponin I [[id number]]  (Normal) Collected: 09/13/21 1159    Lab Status: Final result Specimen: Blood from Arm, Right Updated: 09/13/21 1222     Troponin I <0 03 ng/mL     Basic metabolic panel [[id number]]  (Abnormal) Collected: 09/13/21 1159 Lab Status: Final result Specimen: Blood from Arm, Right Updated: 09/13/21 1220     Sodium 137 mmol/L      Potassium 4 3 mmol/L      Chloride 102 mmol/L      CO2 27 mmol/L      ANION GAP 8 mmol/L      BUN 18 mg/dL      Creatinine 0 92 mg/dL      Glucose 124 mg/dL      Calcium 9 9 mg/dL      eGFR 72 ml/min/1 73sq m     Narrative:      [place] guidelines for Chronic Kidney Disease (CKD):     Stage 1 with normal or high GFR (GFR > 90 mL/min/1 73 square meters)    Stage 2 Mild CKD (GFR = 60-89 mL/min/1 73 square meters)    Stage 3A Moderate CKD (GFR = 45-59 mL/min/1 73 square meters)    Stage 3B Moderate CKD (GFR = 30-44 mL/min/1 73 square meters)    Stage 4 Severe CKD (GFR = 15-29 mL/min/1 73 square meters)    Stage 5 End Stage CKD (GFR <15 mL/min/1 73 square meters)  Note: GFR calculation is accurate only with a steady state creatinine    CBC and differential [[id number]]  (Abnormal) Collected: 09/13/21 1159    Lab Status: Final result Specimen: Blood from Arm, Right Updated: 09/13/21 1213     WBC 5 40 Thousand/uL      RBC 4 89 Million/uL      Hemoglobin 13 3 g/dL      Hematocrit 41 8 %      MCV 86 fL      MCH 27 2 pg      MCHC 31 9 g/dL      RDW 15 5 %      MPV 8 6 fL      Platelets 279 Thousands/uL      Neutrophils Relative 54 %      Lymphocytes Relative 32 %      Monocytes Relative 12 %      Eosinophils Relative 1 %      Basophils Relative 1 %      Neutrophils Absolute 2 90 Thousands/µL      Lymphocytes Absolute 1 70 Thousands/µL      Monocytes Absolute 0 60 Thousand/µL      Eosinophils Absolute 0 10 Thousand/µL      Basophils Absolute 0 00 Thousands/µL                  No orders to display              Procedures  ECG 12 Lead Documentation Only    Date/Time: 9/13/2021 1:48 PM  Performed by: Mary Maria PA-C  Authorized by:  Mary Maria PA-C     ECG reviewed by me, the ED Provider: yes    Patient location:  ED  Previous ECG:     Previous ECG:  Compared to current Similarity:  No change    Comparison to cardiac monitor: Yes    Interpretation:     Interpretation: normal    Rate:     ECG rate:  92    ECG rate assessment: normal    Rhythm:     Rhythm: sinus rhythm    Ectopy:     Ectopy: none    QRS:     QRS axis:  Normal  Conduction:     Conduction: normal    ST segments:     ST segments:  Normal  T waves:     T waves: normal    Comments:      No evidence of acute cardiac ischemia             ED Course                                           MDM  Number of Diagnoses or Management Options  Trapezius muscle spasm  Diagnosis management comments: Patient presented complaining of neck tightness and some ear fullness  They appear unrelated as ear fullness has been going on for nearly a week and she has a history of seasonal allergies  Recommend continued antihistamine use and Mucinex D if her family doctor approves of decongestant  Labs were checked as patient had some right arm pain that I suspect is likely due to her trapezius muscle spasm the right side however as she stated she had some chest tightness, labs and EKG were checked  EKG was normal sinus with no signs of ischemia  Troponin negative  Symptoms greater than 6 hours therefore delta troponin not deemed necessary  Lengthy discussion with the patient in regards to specific signs and symptoms to watch out for that warrant prompt return to the ED  Recommend follow-up with family doctor soon as possible  Patient expressed understanding, all questions were answered she was agreeable to plan        Disposition  Final diagnoses:   Trapezius muscle spasm     Time reflects when diagnosis was documented in both MDM as applicable and the Disposition within this note     Time User Action Codes Description Comment    9/13/2021 12:33 PM Yumiko Smith Add [[id number]] Trapezius muscle spasm       ED Disposition     ED Disposition Condition Date/Time Comment    Discharge Stable Mon Sep 13, 2021 12:32 PM Katie Dye discharge to home/self care              Follow-up Information     Follow up With Specialties Details Why Contact Info    Rosalinda Canada PA-C Family Medicine, Internal Medicine, Physician Assistant Schedule an appointment as soon as possible for a visit   [address] [facility] [address]  [phone]            Discharge Medication List as of 9/13/2021 12:37 PM      START taking these medications    Details   methocarbamol (ROBAXIN) 500 mg tablet Take 1 tablet (500 mg total) by mouth 2 (two) times a day as needed for muscle spasms, Starting Mon 9/13/2021, Normal         CONTINUE these medications which have NOT CHANGED    Details   diclofenac (VOLTAREN) 75 mg EC tablet take 1 tablet by mouth twice a day, Normal      albuterol (Ventolin HFA) 90 mcg/act inhaler Inhale 2 puffs every 4 (four) hours as needed for shortness of breath, Starting Wed 6/2/2021, Normal      ARIPiprazole (ABILIFY) 15 mg tablet Take 1 tablet by mouth daily , Starting Tue 6/28/2011, Historical Med      Blood Glucose Monitoring Suppl (CONTOUR NEXT MONITOR) w/Device KIT 1 kit by Does not apply route 2 (two) times a day as needed (as needed), Starting Mon 4/13/2020, Normal      budesonide-formoterol (SYMBICORT) 80-4 5 MCG/ACT inhaler Inhale 2 puffs 2 (two) times a day Rinse mouth after use , Starting Wed 6/2/2021, Normal      EPINEPHrine (EPIPEN) 0 3 mg/0 3 mL SOAJ Inject 0 3 mL (0 3 mg total) into the shoulder, thigh, or buttocks once for 1 dose, Starting Fri 6/1/2018, Normal      gabapentin (NEURONTIN) 100 mg capsule take 1 capsule by mouth three times a day, Normal      glucose blood (Contour Next Test) test strip 1 each by Other route 2 (two) times a day as needed (As needed) Use as instructed, Starting Mon 4/13/2020, Normal      Invokamet 150-1000 MG TABS take 1 tablet by mouth twice a day, Normal      Januvia 100 MG tablet take 1 tablet by mouth once daily, Normal      Lancet Device MISC by Does not apply route, Historical Med      liraglutide (VICTOZA) injection Inject 0 1 mL (0 6 mg total) under the skin daily, Starting Thu 9/9/2021, Normal      lisinopril (ZESTRIL) 2 5 mg tablet Take 1 tablet (2 5 mg total) by mouth daily, Starting Fri 5/14/2021, Normal      loratadine (CLARITIN) 10 mg tablet Take 1 tablet (10 mg total) by mouth daily, Starting Fri 5/14/2021, Normal      Microlet Lancets MISC Test twice daily, Normal      Multiple Vitamins-Minerals (MULTI ADULT GUMMIES PO) Take by mouth, Historical Med      omeprazole (PriLOSEC) 40 MG capsule take 1 capsule by mouth once daily before breakfast, Normal      pravastatin (PRAVACHOL) 20 mg tablet Take 1 tablet (20 mg total) by mouth daily, Starting Fri 5/14/2021, Normal      venlafaxine (EFFEXOR-XR) 150 mg 24 hr capsule Take 150 mg by mouth daily  , Starting Fri 8/2/2013, Historical Med               PDMP Review     None          ED Provider  Electronically Signed by           Jonnie Zuniga PA-C  09/13/21 9278

## 2021-09-13 NOTE — DISCHARGE INSTRUCTIONS
Take Robaxin as for muscle spasms  You may continue taking diclofenac  You may also use Tylenol  Return to the ER with any chest pain or other significant change or worsening of your symptoms  Your ear fullness is likely due to allergies and not infection  Taking Claritin, Zyrtec or Allegra daily along with Mucinex D may help as well  Discussed with your family doctor

## 2021-09-14 LAB
ATRIAL RATE: 96 BPM
P AXIS: 57 DEGREES
PR INTERVAL: 150 MS
QRS AXIS: 39 DEGREES
QRSD INTERVAL: 92 MS
QT INTERVAL: 354 MS
QTC INTERVAL: 447 MS
T WAVE AXIS: 47 DEGREES
VENTRICULAR RATE: 96 BPM

## 2021-09-14 PROCEDURE — 93010 ELECTROCARDIOGRAM REPORT: CPT | Performed by: INTERNAL MEDICINE

## 2021-09-17 ENCOUNTER — OFFICE VISIT (OUTPATIENT)
Dept: INTERNAL MEDICINE CLINIC | Facility: CLINIC | Age: 51
End: 2021-09-17
Payer: COMMERCIAL

## 2021-09-17 VITALS
BODY MASS INDEX: 37.79 KG/M2 | TEMPERATURE: 98.5 F | WEIGHT: 205.38 LBS | HEART RATE: 102 BPM | OXYGEN SATURATION: 96 % | RESPIRATION RATE: 14 BRPM | HEIGHT: 62 IN

## 2021-09-17 DIAGNOSIS — E11.65 TYPE 2 DIABETES MELLITUS WITH HYPERGLYCEMIA, WITHOUT LONG-TERM CURRENT USE OF INSULIN (HCC): ICD-10-CM

## 2021-09-17 DIAGNOSIS — H69.82 DYSFUNCTION OF LEFT EUSTACHIAN TUBE: ICD-10-CM

## 2021-09-17 DIAGNOSIS — E78.00 HYPERCHOLESTEROLEMIA: Primary | ICD-10-CM

## 2021-09-17 DIAGNOSIS — Z13.29 SCREENING FOR THYROID DISORDER: ICD-10-CM

## 2021-09-17 DIAGNOSIS — Z23 NEEDS FLU SHOT: ICD-10-CM

## 2021-09-17 PROCEDURE — 90471 IMMUNIZATION ADMIN: CPT | Performed by: PHYSICIAN ASSISTANT

## 2021-09-17 PROCEDURE — 99214 OFFICE O/P EST MOD 30 MIN: CPT | Performed by: PHYSICIAN ASSISTANT

## 2021-09-17 PROCEDURE — 90682 RIV4 VACC RECOMBINANT DNA IM: CPT | Performed by: PHYSICIAN ASSISTANT

## 2021-09-17 RX ORDER — LORATADINE AND PSEUDOEPHEDRINE 10; 240 MG/1; MG/1
1 TABLET, EXTENDED RELEASE ORAL DAILY
Qty: 30 TABLET | Refills: 4 | Status: SHIPPED | OUTPATIENT
Start: 2021-09-17

## 2021-09-17 NOTE — PROGRESS NOTES
Assessment/Plan:  Problem List Items Addressed This Visit        Endocrine    Type 2 diabetes mellitus ([facility] )       Lab Results   Component Value Date    HGBA1C 6 8 (A) 06/17/2021     Pts symptoms are stable with current regime  No changes at present  Nervous and Auditory    Dysfunction of left eustachian tube     Change claritin to claritin d to better treat her symptom cluster  Relevant Medications    loratadine-pseudoephedrine (CLARITIN-D 24-HOUR)  mg per 24 hr tablet       Other    Hypercholesterolemia - Primary    Relevant Orders    Lipid Panel with Direct LDL reflex      Other Visit Diagnoses     Screening for thyroid disorder        Relevant Orders    TSH, 3rd generation with Free T4 reflex           Diagnoses and all orders for this visit:    Hypercholesterolemia  -     Lipid Panel with Direct LDL reflex; Future    Screening for thyroid disorder  -     TSH, 3rd generation with Free T4 reflex; Future    Dysfunction of left eustachian tube  -     loratadine-pseudoephedrine (CLARITIN-D 24-HOUR)  mg per 24 hr tablet; Take 1 tablet by mouth daily    Type 2 diabetes mellitus with hyperglycemia, without long-term current use of insulin ([facility])        Type 2 diabetes mellitus ([place] [address] )    Lab Results   Component Value Date    HGBA1C 6 8 (A) 06/17/2021     Pts symptoms are stable with current regime  No changes at present  Dysfunction of left eustachian tube  Change claritin to claritin d to better treat her symptom cluster  Subjective:      Patient ID: Jose Sparks is a 44 y o  female  Pt presents for routine visit  She was in the ER a few days ago for neck pain which has improved nicely with muscle relaxer use  She continues with ongoing allergy symptoms of sinus pressure and congestion and her ears feeling blocked  She has been taking plain claritin without much relief  She is due for lipid and tsh  A1C today is stable at 6 9 Flu vaccine given         The following portions of the patient's history were reviewed and updated as appropriate:   She has a past medical history of Anxiety, Arthritis, Asthma, Cancer ([facility] ), Depression, Diabetes mellitus ([facility] ), Diverticulitis, GERD (gastroesophageal reflux disease), Hyperlipidemia associated with type 2 diabetes mellitus ([facility] ), Hypertension, Insulinoma, Non-recurrent acute suppurative otitis media of left ear without spontaneous rupture of tympanic membrane (2019), Pulmonary emboli ([facility] ), Sleep apnea, and Sleep apnea, obstructive  ,  does not have any pertinent problems on file  ,   has a past surgical history that includes Pancreatectomy;  section; Knee surgery; Knee arthroscopy (Left); pr lap,cholecystectomy (N/A, 2018); Cholecystectomy; and pr colonoscopy flx dx w/collj spec when pfrmd (N/A, 2019)  ,  family history includes Diabetes in her maternal grandmother and paternal grandmother; Heart disease in her mother; Hyperlipidemia in her mother; Hypertension in her mother; No Known Problems in her brother, daughter, half-sister, paternal aunt, paternal aunt, paternal aunt, paternal aunt, paternal aunt, sister, and sister; Obesity in her mother; Osteoporosis in her family; Other in her maternal grandmother and mother; Substance Abuse in her family and father; Tuberculosis in her maternal aunt and mother  ,   reports that she quit smoking about 8 years ago  Her smoking use included cigarettes  She has a 30 00 pack-year smoking history  She has never used smokeless tobacco  She reports that she does not drink alcohol and does not use drugs  ,  is allergic to iodine - food allergy and nuts - food allergy     Current Outpatient Medications   Medication Sig Dispense Refill    albuterol (Ventolin HFA) 90 mcg/act inhaler Inhale 2 puffs every 4 (four) hours as needed for shortness of breath 8 g 6    ARIPiprazole (ABILIFY) 15 mg tablet Take 1 tablet by mouth daily       Blood Glucose Monitoring Suppl (CONTOUR NEXT MONITOR) w/Device KIT 1 kit by Does not apply route 2 (two) times a day as needed (as needed) 1 kit 0    budesonide-formoterol (SYMBICORT) 80-4 5 MCG/ACT inhaler Inhale 2 puffs 2 (two) times a day Rinse mouth after use  10 2 g 6    diclofenac (VOLTAREN) 75 mg EC tablet take 1 tablet by mouth twice a day 60 tablet 5    gabapentin (NEURONTIN) 100 mg capsule take 1 capsule by mouth three times a day 90 capsule 5    glucose blood (Contour Next Test) test strip 1 each by Other route 2 (two) times a day as needed (As needed) Use as instructed 100 each 5    Invokamet 150-1000 MG TABS take 1 tablet by mouth twice a day 60 tablet 5    Lancet Device MISC by Does not apply route      lisinopril (ZESTRIL) 2 5 mg tablet Take 1 tablet (2 5 mg total) by mouth daily 90 tablet 1    methocarbamol (ROBAXIN) 500 mg tablet Take 1 tablet (500 mg total) by mouth 2 (two) times a day as needed for muscle spasms 20 tablet 0    Microlet Lancets MISC Test twice daily 100 each 3    Multiple Vitamins-Minerals (MULTI ADULT GUMMIES PO) Take by mouth      omeprazole (PriLOSEC) 40 MG capsule take 1 capsule by mouth once daily before breakfast 30 capsule 5    pravastatin (PRAVACHOL) 20 mg tablet Take 1 tablet (20 mg total) by mouth daily 90 tablet 1    venlafaxine (EFFEXOR-XR) 150 mg 24 hr capsule Take 150 mg by mouth daily        EPINEPHrine (EPIPEN) 0 3 mg/0 3 mL SOAJ Inject 0 3 mL (0 3 mg total) into the shoulder, thigh, or buttocks once for 1 dose 0 3 mL 2    Januvia 100 MG tablet take 1 tablet by mouth once daily (Patient not taking: Reported on 9/17/2021) 30 tablet 5    liraglutide (VICTOZA) injection Inject 0 1 mL (0 6 mg total) under the skin daily (Patient not taking: Reported on 9/17/2021) 3 mL 2    loratadine-pseudoephedrine (CLARITIN-D 24-HOUR)  mg per 24 hr tablet Take 1 tablet by mouth daily 30 tablet 4     No current facility-administered medications for this visit         Review of Systems   Constitutional: Negative for chills and fever  HENT: Negative for congestion, ear pain, hearing loss, postnasal drip, rhinorrhea, sinus pressure, sinus pain, sore throat and trouble swallowing  Eyes: Negative for pain and visual disturbance  Respiratory: Negative for cough, chest tightness, shortness of breath and wheezing  Cardiovascular: Negative  Negative for chest pain, palpitations and leg swelling  Gastrointestinal: Negative for abdominal pain, blood in stool, constipation, diarrhea, nausea and vomiting  Endocrine: Negative for cold intolerance, heat intolerance, polydipsia, polyphagia and polyuria  Genitourinary: Negative for difficulty urinating, dysuria, flank pain and urgency  Musculoskeletal: Negative for arthralgias, back pain, gait problem and myalgias  Skin: Negative for rash  Allergic/Immunologic: Negative  Neurological: Negative for dizziness, weakness, light-headedness and headaches  Hematological: Negative  Psychiatric/Behavioral: Negative for behavioral problems, dysphoric mood and sleep disturbance  The patient is not nervous/anxious  PHQ-9 Depression Screening    PHQ-9:   Frequency of the following problems over the past two weeks:              Objective:  Vitals:    09/17/21 1036   Pulse: 102   Resp: 14   Temp: 98 5 °F (36 9 °C)   SpO2: 96%   Weight: 93 2 kg (205 lb 6 oz)   Height: 5' 2" (1 575 m)     Body mass index is 37 56 kg/m²  Physical Exam  Nursing note reviewed  Constitutional:       General: She is not in acute distress  Appearance: She is well-developed  She is not diaphoretic  HENT:      Head: Normocephalic and atraumatic  Right Ear: External ear normal       Left Ear: External ear normal       Nose: Nose normal       Mouth/Throat:      Pharynx: No oropharyngeal exudate  Eyes:      General: No scleral icterus  Right eye: No discharge  Left eye: No discharge        Conjunctiva/sclera: Conjunctivae normal       Pupils: Pupils are equal, round, and reactive to light  Neck:      Thyroid: No thyromegaly  Cardiovascular:      Rate and Rhythm: Normal rate and regular rhythm  Heart sounds: Normal heart sounds  No murmur heard  No friction rub  No gallop  Pulmonary:      Effort: Pulmonary effort is normal  No respiratory distress  Breath sounds: Normal breath sounds  No wheezing or rales  Abdominal:      General: Bowel sounds are normal  There is no distension  Palpations: Abdomen is soft  Tenderness: There is no abdominal tenderness  Musculoskeletal:         General: No tenderness or deformity  Normal range of motion  Cervical back: Normal range of motion and neck supple  Skin:     General: Skin is warm and dry  Neurological:      Mental Status: She is alert and oriented to person, place, and time  Cranial Nerves: No cranial nerve deficit  Psychiatric:         Behavior: Behavior normal          Thought Content:  Thought content normal          Judgment: Judgment normal

## 2021-09-18 ENCOUNTER — APPOINTMENT (OUTPATIENT)
Dept: LAB | Facility: HOSPITAL | Age: 51
End: 2021-09-18
Payer: COMMERCIAL

## 2021-09-18 DIAGNOSIS — Z13.29 SCREENING FOR THYROID DISORDER: ICD-10-CM

## 2021-09-18 DIAGNOSIS — E78.00 HYPERCHOLESTEROLEMIA: ICD-10-CM

## 2021-09-18 LAB
CHOLEST SERPL-MCNC: 175 MG/DL (ref 0–200)
HDLC SERPL-MCNC: 46 MG/DL
LDLC SERPL CALC-MCNC: 104 MG/DL (ref 0–100)
TRIGL SERPL-MCNC: 126 MG/DL (ref 44–166)
TSH SERPL DL<=0.05 MIU/L-ACNC: 2.1 UIU/ML (ref 0.45–5.33)

## 2021-09-18 PROCEDURE — 36415 COLL VENOUS BLD VENIPUNCTURE: CPT

## 2021-09-18 PROCEDURE — 80061 LIPID PANEL: CPT

## 2021-09-18 PROCEDURE — 84443 ASSAY THYROID STIM HORMONE: CPT

## 2021-09-26 DIAGNOSIS — M54.50 LUMBAR SPINE PAIN: ICD-10-CM

## 2021-09-26 DIAGNOSIS — E11.65 TYPE 2 DIABETES MELLITUS WITH HYPERGLYCEMIA, WITHOUT LONG-TERM CURRENT USE OF INSULIN (HCC): ICD-10-CM

## 2021-09-27 RX ORDER — CANAGLIFLOZIN AND METFORMIN HYDROCHLORIDE 150; 1000 MG/1; MG/1
TABLET, FILM COATED ORAL
Qty: 60 TABLET | Refills: 5 | Status: SHIPPED | OUTPATIENT
Start: 2021-09-27 | End: 2022-03-17

## 2021-09-27 RX ORDER — SITAGLIPTIN 100 MG/1
TABLET, FILM COATED ORAL
Qty: 30 TABLET | Refills: 5 | Status: SHIPPED | OUTPATIENT
Start: 2021-09-27 | End: 2022-01-13

## 2021-09-27 RX ORDER — DICLOFENAC SODIUM 75 MG/1
TABLET, DELAYED RELEASE ORAL
Qty: 60 TABLET | Refills: 5 | Status: SHIPPED | OUTPATIENT
Start: 2021-09-27 | End: 2022-03-17

## 2021-10-13 ENCOUNTER — APPOINTMENT (OUTPATIENT)
Dept: LAB | Facility: CLINIC | Age: 51
End: 2021-10-13
Payer: COMMERCIAL

## 2021-10-13 DIAGNOSIS — Z79.899 ENCOUNTER FOR LONG-TERM (CURRENT) USE OF OTHER MEDICATIONS: ICD-10-CM

## 2021-10-13 LAB
CHOLEST SERPL-MCNC: 192 MG/DL (ref 50–200)
EST. AVERAGE GLUCOSE BLD GHB EST-MCNC: 163 MG/DL
GLUCOSE P FAST SERPL-MCNC: 131 MG/DL (ref 65–99)
HBA1C MFR BLD: 7.3 %
HDLC SERPL-MCNC: 41 MG/DL
LDLC SERPL CALC-MCNC: 116 MG/DL (ref 0–100)
LDLC SERPL DIRECT ASSAY-MCNC: 118 MG/DL (ref 0–100)
NONHDLC SERPL-MCNC: 151 MG/DL
TRIGL SERPL-MCNC: 175 MG/DL

## 2021-10-13 PROCEDURE — 82947 ASSAY GLUCOSE BLOOD QUANT: CPT

## 2021-10-13 PROCEDURE — 80061 LIPID PANEL: CPT

## 2021-10-13 PROCEDURE — 83721 ASSAY OF BLOOD LIPOPROTEIN: CPT

## 2021-10-13 PROCEDURE — 3051F HG A1C>EQUAL 7.0%<8.0%: CPT | Performed by: PHYSICIAN ASSISTANT

## 2021-10-13 PROCEDURE — 36415 COLL VENOUS BLD VENIPUNCTURE: CPT

## 2021-10-13 PROCEDURE — 83036 HEMOGLOBIN GLYCOSYLATED A1C: CPT

## 2021-10-25 ENCOUNTER — APPOINTMENT (EMERGENCY)
Dept: RADIOLOGY | Facility: HOSPITAL | Age: 51
End: 2021-10-25
Payer: COMMERCIAL

## 2021-10-25 ENCOUNTER — HOSPITAL ENCOUNTER (EMERGENCY)
Facility: HOSPITAL | Age: 51
Discharge: HOME | End: 2021-10-25
Attending: INTERNAL MEDICINE
Payer: COMMERCIAL

## 2021-10-25 VITALS
SYSTOLIC BLOOD PRESSURE: 136 MMHG | DIASTOLIC BLOOD PRESSURE: 79 MMHG | OXYGEN SATURATION: 94 % | RESPIRATION RATE: 18 BRPM | HEART RATE: 90 BPM

## 2021-10-25 DIAGNOSIS — M54.2 NECK PAIN: Primary | ICD-10-CM

## 2021-10-25 DIAGNOSIS — M54.13 RADICULOPATHY OF CERVICOTHORACIC REGION: ICD-10-CM

## 2021-10-25 DIAGNOSIS — M79.602 LEFT ARM PAIN: ICD-10-CM

## 2021-10-25 LAB
BILIRUB UR QL STRIP: NEGATIVE
CLARITY UR: CLEAR
COLOR UR: YELLOW
GLUCOSE UR STRIP-MCNC: ABNORMAL MG/DL
HGB UR QL STRIP.AUTO: NEGATIVE
KETONES UR STRIP-MCNC: NEGATIVE MG/DL
LEUKOCYTE ESTERASE UR QL STRIP: NEGATIVE
NITRITE UR QL STRIP: NEGATIVE
PH UR STRIP.AUTO: 5.5 [PH]
PROT UR STRIP-MCNC: NEGATIVE MG/DL
SP GR UR STRIP.AUTO: 1.02 (ref 1–1.03)
UROBILINOGEN UR QL STRIP.AUTO: 0.2 E.U./DL

## 2021-10-25 PROCEDURE — 99284 EMERGENCY DEPT VISIT MOD MDM: CPT | Performed by: INTERNAL MEDICINE

## 2021-10-25 PROCEDURE — 96372 THER/PROPH/DIAG INJ SC/IM: CPT

## 2021-10-25 PROCEDURE — 73030 X-RAY EXAM OF SHOULDER: CPT

## 2021-10-25 PROCEDURE — 99283 EMERGENCY DEPT VISIT LOW MDM: CPT

## 2021-10-25 PROCEDURE — 72040 X-RAY EXAM NECK SPINE 2-3 VW: CPT

## 2021-10-25 PROCEDURE — 81003 URINALYSIS AUTO W/O SCOPE: CPT | Performed by: INTERNAL MEDICINE

## 2021-10-25 RX ORDER — DEXAMETHASONE SODIUM PHOSPHATE 4 MG/ML
8 INJECTION, SOLUTION INTRA-ARTICULAR; INTRALESIONAL; INTRAMUSCULAR; INTRAVENOUS; SOFT TISSUE ONCE
Status: COMPLETED | OUTPATIENT
Start: 2021-10-25 | End: 2021-10-25

## 2021-10-25 RX ORDER — KETOROLAC TROMETHAMINE 30 MG/ML
15 INJECTION, SOLUTION INTRAMUSCULAR; INTRAVENOUS ONCE
Status: COMPLETED | OUTPATIENT
Start: 2021-10-25 | End: 2021-10-25

## 2021-10-25 RX ORDER — GABAPENTIN 300 MG/1
300 CAPSULE ORAL
Qty: 14 CAPSULE | Refills: 0 | Status: SHIPPED | OUTPATIENT
Start: 2021-10-25 | End: 2021-10-28

## 2021-10-25 RX ORDER — CYCLOBENZAPRINE HCL 10 MG
10 TABLET ORAL ONCE
Status: COMPLETED | OUTPATIENT
Start: 2021-10-25 | End: 2021-10-25

## 2021-10-25 RX ORDER — DEXAMETHASONE SODIUM PHOSPHATE 4 MG/ML
8 INJECTION, SOLUTION INTRA-ARTICULAR; INTRALESIONAL; INTRAMUSCULAR; INTRAVENOUS; SOFT TISSUE ONCE
Status: DISCONTINUED | OUTPATIENT
Start: 2021-10-25 | End: 2021-10-25

## 2021-10-25 RX ADMIN — DEXAMETHASONE SODIUM PHOSPHATE 8 MG: 4 INJECTION, SOLUTION INTRAMUSCULAR; INTRAVENOUS at 22:01

## 2021-10-25 RX ADMIN — CYCLOBENZAPRINE HYDROCHLORIDE 10 MG: 10 TABLET, FILM COATED ORAL at 21:36

## 2021-10-25 RX ADMIN — KETOROLAC TROMETHAMINE 15 MG: 30 INJECTION, SOLUTION INTRAMUSCULAR at 20:37

## 2021-10-28 ENCOUNTER — OFFICE VISIT (OUTPATIENT)
Dept: INTERNAL MEDICINE CLINIC | Facility: CLINIC | Age: 51
End: 2021-10-28
Payer: COMMERCIAL

## 2021-10-28 ENCOUNTER — HOSPITAL ENCOUNTER (OUTPATIENT)
Dept: SLEEP CENTER | Facility: HOSPITAL | Age: 51
Discharge: HOME | End: 2021-10-28
Attending: INTERNAL MEDICINE
Payer: COMMERCIAL

## 2021-10-28 VITALS
BODY MASS INDEX: 38 KG/M2 | OXYGEN SATURATION: 98 % | HEART RATE: 90 BPM | DIASTOLIC BLOOD PRESSURE: 76 MMHG | TEMPERATURE: 98.2 F | WEIGHT: 206.5 LBS | HEIGHT: 62 IN | SYSTOLIC BLOOD PRESSURE: 112 MMHG

## 2021-10-28 DIAGNOSIS — M54.2 NECK PAIN: ICD-10-CM

## 2021-10-28 DIAGNOSIS — M54.13 RADICULOPATHY OF CERVICOTHORACIC REGION: Primary | ICD-10-CM

## 2021-10-28 DIAGNOSIS — M25.511 CHRONIC RIGHT SHOULDER PAIN: ICD-10-CM

## 2021-10-28 DIAGNOSIS — G47.33 OSA (OBSTRUCTIVE SLEEP APNEA): ICD-10-CM

## 2021-10-28 DIAGNOSIS — E11.65 TYPE 2 DIABETES MELLITUS WITH HYPERGLYCEMIA, WITHOUT LONG-TERM CURRENT USE OF INSULIN (HCC): ICD-10-CM

## 2021-10-28 DIAGNOSIS — G89.29 CHRONIC RIGHT SHOULDER PAIN: ICD-10-CM

## 2021-10-28 PROCEDURE — 2028F FOOT EXAM PERFORMED: CPT | Performed by: PHYSICIAN ASSISTANT

## 2021-10-28 PROCEDURE — 95811 POLYSOM 6/>YRS CPAP 4/> PARM: CPT | Performed by: INTERNAL MEDICINE

## 2021-10-28 PROCEDURE — 95811 POLYSOM 6/>YRS CPAP 4/> PARM: CPT

## 2021-10-28 PROCEDURE — 99214 OFFICE O/P EST MOD 30 MIN: CPT | Performed by: PHYSICIAN ASSISTANT

## 2021-10-28 RX ORDER — BLOOD SUGAR DIAGNOSTIC
1 STRIP MISCELLANEOUS 2 TIMES DAILY PRN
Qty: 100 EACH | Refills: 5 | Status: SHIPPED | OUTPATIENT
Start: 2021-10-28 | End: 2022-01-17

## 2021-10-28 RX ORDER — GABAPENTIN 300 MG/1
300 CAPSULE ORAL 2 TIMES DAILY
Qty: 60 CAPSULE | Refills: 0 | Status: SHIPPED | OUTPATIENT
Start: 2021-10-28 | End: 2021-12-29

## 2021-10-29 ENCOUNTER — TELEPHONE (OUTPATIENT)
Dept: INTERNAL MEDICINE CLINIC | Facility: CLINIC | Age: 51
End: 2021-10-29

## 2021-11-01 DIAGNOSIS — G47.33 OSA (OBSTRUCTIVE SLEEP APNEA): Primary | ICD-10-CM

## 2021-11-02 ENCOUNTER — TELEPHONE (OUTPATIENT)
Dept: SLEEP CENTER | Facility: CLINIC | Age: 51
End: 2021-11-02

## 2021-11-08 ENCOUNTER — EVALUATION (OUTPATIENT)
Dept: PHYSICAL THERAPY | Facility: CLINIC | Age: 51
End: 2021-11-08
Payer: COMMERCIAL

## 2021-11-08 DIAGNOSIS — M54.13 RADICULOPATHY OF CERVICOTHORACIC REGION: Primary | ICD-10-CM

## 2021-11-08 PROCEDURE — 97110 THERAPEUTIC EXERCISES: CPT

## 2021-11-08 PROCEDURE — 97162 PT EVAL MOD COMPLEX 30 MIN: CPT

## 2021-11-09 ENCOUNTER — APPOINTMENT (OUTPATIENT)
Dept: LAB | Facility: CLINIC | Age: 51
End: 2021-11-09
Payer: COMMERCIAL

## 2021-11-09 ENCOUNTER — TELEPHONE (OUTPATIENT)
Dept: PULMONOLOGY | Facility: CLINIC | Age: 51
End: 2021-11-09

## 2021-11-09 ENCOUNTER — OFFICE VISIT (OUTPATIENT)
Dept: PULMONOLOGY | Facility: CLINIC | Age: 51
End: 2021-11-09
Payer: COMMERCIAL

## 2021-11-09 VITALS
WEIGHT: 207.2 LBS | TEMPERATURE: 96.4 F | HEIGHT: 62 IN | OXYGEN SATURATION: 95 % | SYSTOLIC BLOOD PRESSURE: 110 MMHG | DIASTOLIC BLOOD PRESSURE: 66 MMHG | HEART RATE: 103 BPM | BODY MASS INDEX: 38.13 KG/M2

## 2021-11-09 DIAGNOSIS — J45.30 MILD PERSISTENT ASTHMA WITHOUT COMPLICATION: ICD-10-CM

## 2021-11-09 DIAGNOSIS — G47.61 PLMD (PERIODIC LIMB MOVEMENT DISORDER): ICD-10-CM

## 2021-11-09 DIAGNOSIS — G47.33 OSA (OBSTRUCTIVE SLEEP APNEA): Primary | ICD-10-CM

## 2021-11-09 DIAGNOSIS — F17.211 CIGARETTE NICOTINE DEPENDENCE IN REMISSION: ICD-10-CM

## 2021-11-09 LAB
FERRITIN SERPL-MCNC: 10 NG/ML (ref 8–388)
IRON SATN MFR SERPL: 12 % (ref 15–50)
IRON SERPL-MCNC: 46 UG/DL (ref 50–170)
MAGNESIUM SERPL-MCNC: 2.3 MG/DL (ref 1.6–2.6)
TIBC SERPL-MCNC: 380 UG/DL (ref 250–450)

## 2021-11-09 PROCEDURE — 3074F SYST BP LT 130 MM HG: CPT | Performed by: PHYSICIAN ASSISTANT

## 2021-11-09 PROCEDURE — 83735 ASSAY OF MAGNESIUM: CPT

## 2021-11-09 PROCEDURE — 3078F DIAST BP <80 MM HG: CPT | Performed by: PHYSICIAN ASSISTANT

## 2021-11-09 PROCEDURE — 83550 IRON BINDING TEST: CPT

## 2021-11-09 PROCEDURE — 36415 COLL VENOUS BLD VENIPUNCTURE: CPT

## 2021-11-09 PROCEDURE — 83540 ASSAY OF IRON: CPT

## 2021-11-09 PROCEDURE — 99214 OFFICE O/P EST MOD 30 MIN: CPT | Performed by: PHYSICIAN ASSISTANT

## 2021-11-09 PROCEDURE — 82728 ASSAY OF FERRITIN: CPT

## 2021-11-09 RX ORDER — ARIPIPRAZOLE 10 MG/1
10 TABLET ORAL DAILY
COMMUNITY
Start: 2021-10-31

## 2021-11-10 ENCOUNTER — OFFICE VISIT (OUTPATIENT)
Dept: PHYSICAL THERAPY | Facility: CLINIC | Age: 51
End: 2021-11-10
Payer: COMMERCIAL

## 2021-11-10 DIAGNOSIS — M54.13 RADICULOPATHY OF CERVICOTHORACIC REGION: Primary | ICD-10-CM

## 2021-11-10 PROCEDURE — 97110 THERAPEUTIC EXERCISES: CPT

## 2021-11-12 DIAGNOSIS — E61.1 IRON DEFICIENCY: Primary | ICD-10-CM

## 2021-11-12 RX ORDER — FERROUS SULFATE TAB EC 324 MG (65 MG FE EQUIVALENT) 324 (65 FE) MG
324 TABLET DELAYED RESPONSE ORAL
Qty: 30 TABLET | Refills: 2 | Status: SHIPPED | OUTPATIENT
Start: 2021-11-12 | End: 2022-03-23 | Stop reason: SDUPTHER

## 2021-11-16 ENCOUNTER — OFFICE VISIT (OUTPATIENT)
Dept: PHYSICAL THERAPY | Facility: CLINIC | Age: 51
End: 2021-11-16
Payer: COMMERCIAL

## 2021-11-16 DIAGNOSIS — M54.13 RADICULOPATHY OF CERVICOTHORACIC REGION: Primary | ICD-10-CM

## 2021-11-16 PROCEDURE — 97110 THERAPEUTIC EXERCISES: CPT

## 2021-11-18 ENCOUNTER — OFFICE VISIT (OUTPATIENT)
Dept: PHYSICAL THERAPY | Facility: CLINIC | Age: 51
End: 2021-11-18
Payer: COMMERCIAL

## 2021-11-18 DIAGNOSIS — M54.13 RADICULOPATHY OF CERVICOTHORACIC REGION: Primary | ICD-10-CM

## 2021-11-18 PROCEDURE — 97110 THERAPEUTIC EXERCISES: CPT

## 2021-11-23 ENCOUNTER — APPOINTMENT (OUTPATIENT)
Dept: PHYSICAL THERAPY | Facility: CLINIC | Age: 51
End: 2021-11-23
Payer: COMMERCIAL

## 2021-11-29 ENCOUNTER — APPOINTMENT (OUTPATIENT)
Dept: PHYSICAL THERAPY | Facility: CLINIC | Age: 51
End: 2021-11-29
Payer: COMMERCIAL

## 2021-11-30 ENCOUNTER — OFFICE VISIT (OUTPATIENT)
Dept: PHYSICAL THERAPY | Facility: CLINIC | Age: 51
End: 2021-11-30
Payer: COMMERCIAL

## 2021-11-30 DIAGNOSIS — M54.13 RADICULOPATHY OF CERVICOTHORACIC REGION: Primary | ICD-10-CM

## 2021-11-30 PROCEDURE — 97110 THERAPEUTIC EXERCISES: CPT

## 2021-12-01 ENCOUNTER — APPOINTMENT (OUTPATIENT)
Dept: PHYSICAL THERAPY | Facility: CLINIC | Age: 51
End: 2021-12-01
Payer: COMMERCIAL

## 2021-12-02 ENCOUNTER — OFFICE VISIT (OUTPATIENT)
Dept: PHYSICAL THERAPY | Facility: CLINIC | Age: 51
End: 2021-12-02
Payer: COMMERCIAL

## 2021-12-02 DIAGNOSIS — M54.13 RADICULOPATHY OF CERVICOTHORACIC REGION: Primary | ICD-10-CM

## 2021-12-02 PROCEDURE — 97110 THERAPEUTIC EXERCISES: CPT

## 2021-12-06 ENCOUNTER — OFFICE VISIT (OUTPATIENT)
Dept: PHYSICAL THERAPY | Facility: CLINIC | Age: 51
End: 2021-12-06
Payer: COMMERCIAL

## 2021-12-06 DIAGNOSIS — M54.13 RADICULOPATHY OF CERVICOTHORACIC REGION: Primary | ICD-10-CM

## 2021-12-06 PROCEDURE — 97110 THERAPEUTIC EXERCISES: CPT

## 2021-12-09 ENCOUNTER — OFFICE VISIT (OUTPATIENT)
Dept: PHYSICAL THERAPY | Facility: CLINIC | Age: 51
End: 2021-12-09
Payer: COMMERCIAL

## 2021-12-09 DIAGNOSIS — M54.13 RADICULOPATHY OF CERVICOTHORACIC REGION: Primary | ICD-10-CM

## 2021-12-09 PROCEDURE — 97110 THERAPEUTIC EXERCISES: CPT

## 2021-12-14 ENCOUNTER — APPOINTMENT (OUTPATIENT)
Dept: PHYSICAL THERAPY | Facility: CLINIC | Age: 51
End: 2021-12-14
Payer: COMMERCIAL

## 2021-12-16 ENCOUNTER — APPOINTMENT (OUTPATIENT)
Dept: PHYSICAL THERAPY | Facility: CLINIC | Age: 51
End: 2021-12-16
Payer: COMMERCIAL

## 2021-12-20 ENCOUNTER — APPOINTMENT (OUTPATIENT)
Dept: PHYSICAL THERAPY | Facility: CLINIC | Age: 51
End: 2021-12-20
Payer: COMMERCIAL

## 2021-12-23 ENCOUNTER — APPOINTMENT (OUTPATIENT)
Dept: PHYSICAL THERAPY | Facility: CLINIC | Age: 51
End: 2021-12-23
Payer: COMMERCIAL

## 2021-12-27 ENCOUNTER — APPOINTMENT (OUTPATIENT)
Dept: PHYSICAL THERAPY | Facility: CLINIC | Age: 51
End: 2021-12-27
Payer: COMMERCIAL

## 2021-12-29 DIAGNOSIS — G89.29 CHRONIC RIGHT SHOULDER PAIN: ICD-10-CM

## 2021-12-29 DIAGNOSIS — M25.511 CHRONIC RIGHT SHOULDER PAIN: ICD-10-CM

## 2021-12-29 DIAGNOSIS — M54.13 RADICULOPATHY OF CERVICOTHORACIC REGION: ICD-10-CM

## 2021-12-29 DIAGNOSIS — M54.2 NECK PAIN: ICD-10-CM

## 2021-12-29 RX ORDER — GABAPENTIN 300 MG/1
CAPSULE ORAL
Qty: 60 CAPSULE | Refills: 0 | Status: SHIPPED | OUTPATIENT
Start: 2021-12-29 | End: 2022-02-28 | Stop reason: SDUPTHER

## 2021-12-30 ENCOUNTER — APPOINTMENT (OUTPATIENT)
Dept: PHYSICAL THERAPY | Facility: CLINIC | Age: 51
End: 2021-12-30
Payer: COMMERCIAL

## 2022-01-13 ENCOUNTER — APPOINTMENT (OUTPATIENT)
Dept: RADIOLOGY | Facility: CLINIC | Age: 52
End: 2022-01-13
Payer: COMMERCIAL

## 2022-01-13 ENCOUNTER — APPOINTMENT (OUTPATIENT)
Dept: LAB | Facility: CLINIC | Age: 52
End: 2022-01-13
Payer: COMMERCIAL

## 2022-01-13 ENCOUNTER — OFFICE VISIT (OUTPATIENT)
Dept: INTERNAL MEDICINE CLINIC | Facility: CLINIC | Age: 52
End: 2022-01-13
Payer: COMMERCIAL

## 2022-01-13 VITALS
RESPIRATION RATE: 14 BRPM | DIASTOLIC BLOOD PRESSURE: 72 MMHG | SYSTOLIC BLOOD PRESSURE: 110 MMHG | HEART RATE: 98 BPM | HEIGHT: 62 IN | BODY MASS INDEX: 38.42 KG/M2 | WEIGHT: 208.8 LBS | TEMPERATURE: 97.7 F | OXYGEN SATURATION: 98 %

## 2022-01-13 DIAGNOSIS — M79.671 BILATERAL FOOT PAIN: Primary | ICD-10-CM

## 2022-01-13 DIAGNOSIS — M79.672 BILATERAL FOOT PAIN: ICD-10-CM

## 2022-01-13 DIAGNOSIS — M79.672 BILATERAL FOOT PAIN: Primary | ICD-10-CM

## 2022-01-13 DIAGNOSIS — E11.65 TYPE 2 DIABETES MELLITUS WITH HYPERGLYCEMIA, WITHOUT LONG-TERM CURRENT USE OF INSULIN (HCC): ICD-10-CM

## 2022-01-13 DIAGNOSIS — M79.671 BILATERAL FOOT PAIN: ICD-10-CM

## 2022-01-13 DIAGNOSIS — N95.2 VAGINAL ATROPHY: ICD-10-CM

## 2022-01-13 PROBLEM — B07.0 PLANTAR WART: Status: RESOLVED | Noted: 2020-06-17 | Resolved: 2022-01-13

## 2022-01-13 LAB
EST. AVERAGE GLUCOSE BLD GHB EST-MCNC: 174 MG/DL
HBA1C MFR BLD: 7.7 %

## 2022-01-13 PROCEDURE — 83036 HEMOGLOBIN GLYCOSYLATED A1C: CPT | Performed by: PHYSICIAN ASSISTANT

## 2022-01-13 PROCEDURE — 3074F SYST BP LT 130 MM HG: CPT | Performed by: PHYSICIAN ASSISTANT

## 2022-01-13 PROCEDURE — 73630 X-RAY EXAM OF FOOT: CPT

## 2022-01-13 PROCEDURE — 99214 OFFICE O/P EST MOD 30 MIN: CPT | Performed by: PHYSICIAN ASSISTANT

## 2022-01-13 PROCEDURE — 36415 COLL VENOUS BLD VENIPUNCTURE: CPT | Performed by: PHYSICIAN ASSISTANT

## 2022-01-13 PROCEDURE — 3078F DIAST BP <80 MM HG: CPT | Performed by: PHYSICIAN ASSISTANT

## 2022-01-13 PROCEDURE — 3051F HG A1C>EQUAL 7.0%<8.0%: CPT | Performed by: PHYSICIAN ASSISTANT

## 2022-01-13 RX ORDER — PEN NEEDLE, DIABETIC 31 GX5/16"
NEEDLE, DISPOSABLE MISCELLANEOUS
COMMUNITY
Start: 2021-12-29 | End: 2022-01-31

## 2022-01-13 RX ORDER — CONJUGATED ESTROGENS 0.62 MG/G
0.5 CREAM VAGINAL DAILY
Qty: 30 G | Refills: 2 | Status: SHIPPED | OUTPATIENT
Start: 2022-01-13 | End: 2022-04-13

## 2022-01-13 NOTE — ASSESSMENT & PLAN NOTE
Premarin vaginal cream ordered  Directions for use and possible side effects discussed and patient verbalized understanding of these

## 2022-01-13 NOTE — ASSESSMENT & PLAN NOTE
Will get xrays due to traumatic injury   Continue current OTC pain medications as they have been helpful

## 2022-01-13 NOTE — PROGRESS NOTES
Assessment/Plan:  Problem List Items Addressed This Visit        Endocrine    Type 2 diabetes mellitus ([facility] [address] )       Lab Results   Component Value Date    HGBA1C 7 3 (H) 10/13/2021     Pts symptoms are stable with current regime  No changes at present  Relevant Orders    HEMOGLOBIN A1C W/ EAG ESTIMATION       Genitourinary    Vaginal atrophy     Premarin vaginal cream ordered  Directions for use and possible side effects discussed and patient verbalized understanding of these  Relevant Medications    conjugated estrogens (Premarin) vaginal cream       Other    Bilateral foot pain - Primary     Will get xrays due to traumatic injury  Continue current OTC pain medications as they have been helpful         Relevant Orders    XR foot 3+ vw right (Completed)    XR foot 3+ vw left (Completed)           Diagnoses and all orders for this visit:    Bilateral foot pain  -     XR foot 3+ vw right; Future  -     XR foot 3+ vw left; Future    Type 2 diabetes mellitus with hyperglycemia, without long-term current use of insulin (HCC)  -     HEMOGLOBIN A1C W/ EAG ESTIMATION    Vaginal atrophy  -     conjugated estrogens (Premarin) vaginal cream; Insert 0 5 g into the vagina daily    Other orders  -     B-D UF III MINI PEN NEEDLES 31G X 5 MM MISC; use 1 PEN NEEDLE to inject MEDICATION subcutaneously once daily as directed        Vaginal atrophy  Premarin vaginal cream ordered  Directions for use and possible side effects discussed and patient verbalized understanding of these  Type 2 diabetes mellitus (HCC)    Lab Results   Component Value Date    HGBA1C 7 3 (H) 10/13/2021     Pts symptoms are stable with current regime  No changes at present  Bilateral foot pain  Will get xrays due to traumatic injury  Continue current OTC pain medications as they have been helpful      Subjective:      Patient ID: Brittni Dye is a 46 y o  female  Pt presents for routine visit  She is doing well overall   She is due for A1C   Labs, mammogram, CRC screening, eye exam and foot exam are all up to date  She notes her dog was carrying a branch in its mouth and dropped it on her feet and since then she has had pain along her toes with some bruising that has since improved  She is taking OTC pain relievers which help  She also relates issues with vaginal atrophy, dryness, and raw irritation causing scant bleeding while wiping  The following portions of the patient's history were reviewed and updated as appropriate:   She has a past medical history of Anxiety, Arthritis, Asthma, Cancer ([facility] [address] ), Depression, Diabetes mellitus ([facility] [address] ), Diverticulitis, GERD (gastroesophageal reflux disease), Hyperlipidemia associated with type 2 diabetes mellitus ([facility] ), Hypertension, Insulinoma, Non-recurrent acute suppurative otitis media of left ear without spontaneous rupture of tympanic membrane (2019), Pulmonary emboli ([facility] [address] ), Sleep apnea, and Sleep apnea, obstructive  ,  does not have any pertinent problems on file  ,   has a past surgical history that includes Pancreatectomy;  section; Knee surgery; Knee arthroscopy (Left); pr lap,cholecystectomy (N/A, 2018); Cholecystectomy; and pr colonoscopy flx dx w/collj spec when pfrmd (N/A, 2019)  ,  family history includes Diabetes in her maternal grandmother and paternal grandmother; Heart disease in her mother; Hyperlipidemia in her mother; Hypertension in her mother; No Known Problems in her brother, daughter, half-sister, paternal aunt, paternal aunt, paternal aunt, paternal aunt, paternal aunt, sister, and sister; Obesity in her mother; Osteoporosis in her family; Other in her maternal grandmother and mother; Substance Abuse in her family and father; Tuberculosis in her maternal aunt and mother  ,   reports that she quit smoking about 9 years ago  Her smoking use included cigarettes  She has a 30 00 pack-year smoking history   She has never used smokeless tobacco  She reports that she does not drink alcohol and does not use drugs  ,  is allergic to iodine - food allergy and nuts - food allergy     Current Outpatient Medications   Medication Sig Dispense Refill    albuterol (Ventolin HFA) 90 mcg/act inhaler Inhale 2 puffs every 4 (four) hours as needed for shortness of breath 8 g 6    ARIPiprazole (ABILIFY) 10 mg tablet Take 10 mg by mouth daily      Blood Glucose Monitoring Suppl (CONTOUR NEXT MONITOR) w/Device KIT 1 kit by Does not apply route 2 (two) times a day as needed (as needed) 1 kit 0    budesonide-formoterol (SYMBICORT) 80-4 5 MCG/ACT inhaler Inhale 2 puffs 2 (two) times a day Rinse mouth after use   10 2 g 6    diclofenac (VOLTAREN) 75 mg EC tablet take 1 tablet by mouth twice a day 60 tablet 5    EPINEPHrine (EPIPEN) 0 3 mg/0 3 mL SOAJ Inject 0 3 mL (0 3 mg total) into the shoulder, thigh, or buttocks once for 1 dose 0 3 mL 2    ferrous sulfate 324 (65 Fe) mg Take 1 tablet (324 mg total) by mouth daily before breakfast 30 tablet 2    gabapentin (NEURONTIN) 300 mg capsule take 1 capsule by mouth twice a day 60 capsule 0    glucose blood (Contour Next Test) test strip Use 1 each 2 (two) times a day as needed (As needed) Use as instructed 100 each 5    Invokamet 150-1000 MG TABS take 1 tablet by mouth twice a day 60 tablet 5    Lancet Device MISC by Does not apply route      liraglutide (VICTOZA) injection Inject 0 1 mL (0 6 mg total) under the skin daily 3 mL 2    lisinopril (ZESTRIL) 2 5 mg tablet Take 1 tablet (2 5 mg total) by mouth daily 90 tablet 1    loratadine-pseudoephedrine (CLARITIN-D 24-HOUR)  mg per 24 hr tablet Take 1 tablet by mouth daily 30 tablet 4    Microlet Lancets MISC Test twice daily 100 each 3    Multiple Vitamins-Minerals (MULTI ADULT GUMMIES PO) Take by mouth      omeprazole (PriLOSEC) 40 MG capsule take 1 capsule by mouth once daily before breakfast 30 capsule 5    pravastatin (PRAVACHOL) 20 mg tablet Take 1 tablet (20 mg total) by mouth daily 90 tablet 1    venlafaxine (EFFEXOR-XR) 150 mg 24 hr capsule Take 150 mg by mouth daily        B-D UF III MINI PEN NEEDLES 31G X 5 MM MISC use 1 PEN NEEDLE to inject MEDICATION subcutaneously once daily as directed      conjugated estrogens (Premarin) vaginal cream Insert 0 5 g into the vagina daily 30 g 2     No current facility-administered medications for this visit  Review of Systems   Constitutional: Negative for chills and fever  HENT: Negative for congestion, ear pain, hearing loss, postnasal drip, rhinorrhea, sinus pressure, sinus pain, sore throat and trouble swallowing  Eyes: Negative for pain and visual disturbance  Respiratory: Negative for cough, chest tightness, shortness of breath and wheezing  Cardiovascular: Negative  Negative for chest pain, palpitations and leg swelling  Gastrointestinal: Negative for abdominal pain, blood in stool, constipation, diarrhea, nausea and vomiting  Endocrine: Negative for cold intolerance, heat intolerance, polydipsia, polyphagia and polyuria  Genitourinary: Negative for difficulty urinating, dysuria, flank pain and urgency  Musculoskeletal: Positive for arthralgias  Negative for back pain, gait problem and myalgias  Skin: Negative for rash  Allergic/Immunologic: Negative  Neurological: Negative for dizziness, weakness, light-headedness and headaches  Hematological: Negative  Psychiatric/Behavioral: Negative for behavioral problems, dysphoric mood and sleep disturbance  The patient is not nervous/anxious  PHQ-2/9 Depression Screening            Objective:  Vitals:    01/13/22 1101   BP: 110/72   BP Location: Left arm   Patient Position: Sitting   Cuff Size: Large   Pulse: 98   Resp: 14   Temp: 97 7 °F (36 5 °C)   SpO2: 98%   Weight: 94 7 kg (208 lb 12 8 oz)   Height: 5' 2" (1 575 m)     Body mass index is 38 19 kg/m²  Physical Exam  Constitutional:       General: She is not in acute distress  Appearance: She is well-developed  She is not diaphoretic  HENT:      Head: Normocephalic and atraumatic  Right Ear: External ear normal       Left Ear: External ear normal       Nose: Nose normal       Mouth/Throat:      Pharynx: No oropharyngeal exudate  Eyes:      General: No scleral icterus  Right eye: No discharge  Left eye: No discharge  Conjunctiva/sclera: Conjunctivae normal       Pupils: Pupils are equal, round, and reactive to light  Neck:      Thyroid: No thyromegaly  Cardiovascular:      Rate and Rhythm: Normal rate and regular rhythm  Heart sounds: Normal heart sounds  No murmur heard  No friction rub  No gallop  Pulmonary:      Effort: Pulmonary effort is normal  No respiratory distress  Breath sounds: Normal breath sounds  No wheezing or rales  Abdominal:      General: Bowel sounds are normal  There is no distension  Palpations: Abdomen is soft  Tenderness: There is no abdominal tenderness  Musculoskeletal:         General: No tenderness or deformity  Normal range of motion  Cervical back: Normal range of motion and neck supple  Skin:     General: Skin is warm and dry  Neurological:      Mental Status: She is alert and oriented to person, place, and time  Cranial Nerves: No cranial nerve deficit  Psychiatric:         Behavior: Behavior normal          Thought Content: Thought content normal          Judgment: Judgment normal        BMI Counseling: Body mass index is 38 19 kg/m²  The BMI is above normal  Nutrition recommendations include decreasing portion sizes, consuming healthier snacks and limiting drinks that contain sugar  Rationale for BMI follow-up plan is due to patient being overweight or obese

## 2022-01-13 NOTE — ASSESSMENT & PLAN NOTE
Lab Results   Component Value Date    HGBA1C 7 3 (H) 10/13/2021     Pts symptoms are stable with current regime  No changes at present

## 2022-01-17 DIAGNOSIS — E11.65 TYPE 2 DIABETES MELLITUS WITH HYPERGLYCEMIA, WITHOUT LONG-TERM CURRENT USE OF INSULIN (HCC): Primary | ICD-10-CM

## 2022-01-17 RX ORDER — BLOOD-GLUCOSE METER
EACH MISCELLANEOUS
Qty: 1 KIT | Refills: 0 | Status: SHIPPED | OUTPATIENT
Start: 2022-01-17

## 2022-01-17 RX ORDER — BLOOD-GLUCOSE METER
EACH MISCELLANEOUS
COMMUNITY
End: 2022-01-17 | Stop reason: SDUPTHER

## 2022-02-02 DIAGNOSIS — I10 ESSENTIAL HYPERTENSION: ICD-10-CM

## 2022-02-02 DIAGNOSIS — E11.69 HYPERLIPIDEMIA ASSOCIATED WITH TYPE 2 DIABETES MELLITUS (HCC): ICD-10-CM

## 2022-02-02 DIAGNOSIS — E78.5 HYPERLIPIDEMIA ASSOCIATED WITH TYPE 2 DIABETES MELLITUS (HCC): ICD-10-CM

## 2022-02-02 RX ORDER — PRAVASTATIN SODIUM 20 MG
TABLET ORAL
Qty: 90 TABLET | Refills: 1 | Status: SHIPPED | OUTPATIENT
Start: 2022-02-02 | End: 2022-08-01

## 2022-02-02 RX ORDER — LISINOPRIL 2.5 MG/1
TABLET ORAL
Qty: 90 TABLET | Refills: 1 | Status: SHIPPED | OUTPATIENT
Start: 2022-02-02 | End: 2022-08-01

## 2022-02-13 DIAGNOSIS — K21.9 GASTROESOPHAGEAL REFLUX DISEASE WITHOUT ESOPHAGITIS: ICD-10-CM

## 2022-02-14 RX ORDER — OMEPRAZOLE 40 MG/1
CAPSULE, DELAYED RELEASE ORAL
Qty: 30 CAPSULE | Refills: 5 | Status: SHIPPED | OUTPATIENT
Start: 2022-02-14 | End: 2022-08-07

## 2022-02-28 DIAGNOSIS — M54.13 RADICULOPATHY OF CERVICOTHORACIC REGION: ICD-10-CM

## 2022-02-28 DIAGNOSIS — M54.2 NECK PAIN: ICD-10-CM

## 2022-02-28 DIAGNOSIS — G89.29 CHRONIC RIGHT SHOULDER PAIN: ICD-10-CM

## 2022-02-28 DIAGNOSIS — M25.511 CHRONIC RIGHT SHOULDER PAIN: ICD-10-CM

## 2022-02-28 RX ORDER — GABAPENTIN 300 MG/1
300 CAPSULE ORAL 2 TIMES DAILY
Qty: 60 CAPSULE | Refills: 0 | Status: SHIPPED | OUTPATIENT
Start: 2022-02-28 | End: 2022-04-13 | Stop reason: SDUPTHER

## 2022-02-28 NOTE — TELEPHONE ENCOUNTER
PT needs refill on gabapentin (NEURONTIN) 300 mg capsule please send to [facility] on [address]      Pt states she has been taking 2 100mg and 300 mg at night

## 2022-03-17 DIAGNOSIS — M54.50 LUMBAR SPINE PAIN: ICD-10-CM

## 2022-03-17 DIAGNOSIS — E11.65 TYPE 2 DIABETES MELLITUS WITH HYPERGLYCEMIA, WITHOUT LONG-TERM CURRENT USE OF INSULIN (HCC): ICD-10-CM

## 2022-03-17 RX ORDER — CANAGLIFLOZIN AND METFORMIN HYDROCHLORIDE 150; 1000 MG/1; MG/1
TABLET, FILM COATED ORAL
Qty: 60 TABLET | Refills: 5 | Status: SHIPPED | OUTPATIENT
Start: 2022-03-17

## 2022-03-17 RX ORDER — DICLOFENAC SODIUM 75 MG/1
TABLET, DELAYED RELEASE ORAL
Qty: 60 TABLET | Refills: 5 | Status: SHIPPED | OUTPATIENT
Start: 2022-03-17

## 2022-03-23 ENCOUNTER — OFFICE VISIT (OUTPATIENT)
Dept: PULMONOLOGY | Facility: CLINIC | Age: 52
End: 2022-03-23
Payer: COMMERCIAL

## 2022-03-23 VITALS
WEIGHT: 205.2 LBS | DIASTOLIC BLOOD PRESSURE: 65 MMHG | BODY MASS INDEX: 37.76 KG/M2 | OXYGEN SATURATION: 96 % | SYSTOLIC BLOOD PRESSURE: 98 MMHG | HEIGHT: 62 IN | HEART RATE: 98 BPM | TEMPERATURE: 97 F

## 2022-03-23 DIAGNOSIS — F17.211 CIGARETTE NICOTINE DEPENDENCE IN REMISSION: ICD-10-CM

## 2022-03-23 DIAGNOSIS — G47.33 OSA (OBSTRUCTIVE SLEEP APNEA): ICD-10-CM

## 2022-03-23 DIAGNOSIS — J30.2 SEASONAL ALLERGIC RHINITIS, UNSPECIFIED TRIGGER: ICD-10-CM

## 2022-03-23 DIAGNOSIS — J45.30 MILD PERSISTENT ASTHMA WITHOUT COMPLICATION: Primary | ICD-10-CM

## 2022-03-23 DIAGNOSIS — E61.1 IRON DEFICIENCY: ICD-10-CM

## 2022-03-23 PROCEDURE — 99214 OFFICE O/P EST MOD 30 MIN: CPT | Performed by: PHYSICIAN ASSISTANT

## 2022-03-23 PROCEDURE — 3078F DIAST BP <80 MM HG: CPT | Performed by: PHYSICIAN ASSISTANT

## 2022-03-23 PROCEDURE — 3074F SYST BP LT 130 MM HG: CPT | Performed by: PHYSICIAN ASSISTANT

## 2022-03-23 RX ORDER — ALBUTEROL SULFATE 90 UG/1
2 AEROSOL, METERED RESPIRATORY (INHALATION) EVERY 4 HOURS PRN
Qty: 8 G | Refills: 6 | Status: SHIPPED | OUTPATIENT
Start: 2022-03-23 | End: 2022-08-03

## 2022-03-23 RX ORDER — BUDESONIDE AND FORMOTEROL FUMARATE DIHYDRATE 80; 4.5 UG/1; UG/1
2 AEROSOL RESPIRATORY (INHALATION) 2 TIMES DAILY
Qty: 10.2 G | Refills: 6 | Status: SHIPPED | OUTPATIENT
Start: 2022-03-23

## 2022-03-23 RX ORDER — FLUTICASONE PROPIONATE 50 MCG
1 SPRAY, SUSPENSION (ML) NASAL DAILY
Qty: 16 G | Refills: 5 | Status: SHIPPED | OUTPATIENT
Start: 2022-03-23

## 2022-03-23 RX ORDER — FERROUS SULFATE TAB EC 324 MG (65 MG FE EQUIVALENT) 324 (65 FE) MG
324 TABLET DELAYED RESPONSE ORAL
Qty: 30 TABLET | Refills: 2 | Status: SHIPPED | OUTPATIENT
Start: 2022-03-23 | End: 2022-06-16

## 2022-03-23 NOTE — PROGRESS NOTES
Pulmonary Follow Up Note   Wilbur Dye 46 y o  female MRN: [id number]  3/24/2022      Assessment:    Mild persistent asthma without complication  Continue Symbicort 80/4 5 mcg 2 puffs twice daily  Continue albuterol HFA 2 puffs q 6 hours p r n  Maries Horvath Refills sent to pharmacy  Patient up-to-date on COVID and influenza vaccinations  ANSHUL (obstructive sleep apnea)  Reviewed compliance data made available to me today  Average AHI is 4 6  Patient reports improvement symptoms since initiating PAP therapy and will continue using auto CPAP 13-20 cm H2O during all hours of sleep  Seasonal allergic rhinitis  Trial Flonase 1 spray each nostril daily  Continue Claritin D 1 tablet daily p r n  Iron deficiency  Refilled on iron supplement per patient  Cigarette nicotine dependence in remission  Remains committed to smoking cessation/absence  Patient will be due for low-dose lung cancer screening CT in July 2022  Reviewed benefits of screening and patient was in agreement  Order placed  Plan:    Diagnoses and all orders for this visit:    Mild persistent asthma without complication  -     budesonide-formoterol (SYMBICORT) 80-4 5 MCG/ACT inhaler; Inhale 2 puffs 2 (two) times a day Rinse mouth after use  -     albuterol (Ventolin HFA) 90 mcg/act inhaler; Inhale 2 puffs every 4 (four) hours as needed for shortness of breath    Iron deficiency  -     ferrous sulfate 324 (65 Fe) mg; Take 1 tablet (324 mg total) by mouth daily before breakfast    Cigarette nicotine dependence in remission  -     CT lung screening program; Future    Seasonal allergic rhinitis, unspecified trigger  -     fluticasone (FLONASE) 50 mcg/act nasal spray; 1 spray into each nostril daily    ANSHUL (obstructive sleep apnea)        Return in about 6 months (around 9/23/2022)  History of Present Illness   HPI:  Katie Dye is a 46 y o  female who presents the office today for routine follow-up    She has a past medical history positive for mild persistent asthma, ANSHUL, periodically but movement disorder, nicotine dependence in remission with quit in   Patient was last seen in our office 2021  Since then she has been stable from an asthma perspective  She uses Symbicort 2 puffs twice daily without issue  Rarely uses her rescue  Denies worsening shortness or sputum hemoptysis or wheeze  Does have chronic nighttime feels that she has postnasal drip  Does feel like the change in season and increased allergies effect her breathing,     Review of Systems   Constitutional: Negative for fever  HENT: Positive for ear pain, postnasal drip, rhinorrhea, sneezing, sore throat and trouble swallowing (delayed swallow response)  Respiratory: Positive for cough (seldom), shortness of breath and wheezing (worse with activities)  Cardiovascular: Positive for chest pain (associated with cough, described as tight, self limited)  Musculoskeletal: Positive for myalgias  Neurological: Positive for headaches         Historical Information   Past Medical History:   Diagnosis Date    Anxiety     Arthritis     Asthma     Cancer ([facility] )     renal,cervical    Depression     Diabetes mellitus ([facility] )     Diverticulitis     GERD (gastroesophageal reflux disease)     Hyperlipidemia associated with type 2 diabetes mellitus ([facility] )     Hypertension     Insulinoma     Non-recurrent acute suppurative otitis media of left ear without spontaneous rupture of tympanic membrane 2019    Pulmonary emboli (HCC)     Sleep apnea     Sleep apnea, obstructive      Past Surgical History:   Procedure Laterality Date     SECTION      twice    CHOLECYSTECTOMY      KNEE ARTHROSCOPY Left     KNEE SURGERY      PANCREATECTOMY      Partial     IL COLONOSCOPY FLX DX W/COLLJ SPEC WHEN PFRMD N/A 2019    Procedure: COLONOSCOPY;  Surgeon: Shahnaz Villegas DO;  Location: [facility];  Service: Gastroenterology    IL LAP,CHOLECYSTECTOMY N/A 2018 Procedure: CHOLECYSTECTOMY LAPAROSCOPIC;  Surgeon: Burton Paiz DO;  Location: [facility];  Service: General     Family History   Problem Relation Age of Onset    Heart disease Mother     Hyperlipidemia Mother     Obesity Mother    Tashi Martinez Hypertension Mother     Other Mother         Bundle branch block    Tuberculosis Mother     Substance Abuse Father     No Known Problems Brother     Other Maternal Grandmother         ascending aortic aneurysm resection    Diabetes Maternal Grandmother     Diabetes Paternal Grandmother     Tuberculosis Maternal Aunt     Substance Abuse Family     Osteoporosis Family     No Known Problems Sister     No Known Problems Daughter     No Known Problems Sister     No Known Problems Half-Sister     No Known Problems Paternal Aunt     No Known Problems Paternal Aunt     No Known Problems Paternal Aunt     No Known Problems Paternal Aunt     No Known Problems Paternal Aunt        Social History     Tobacco Use   Smoking Status Former Smoker    Packs/day: 1 00    Years: 30 00    Pack years: 30 00    Types: Cigarettes    Quit date:     Years since quittin 2   Smokeless Tobacco Never Used         Meds/Allergies     Current Outpatient Medications:     albuterol (Ventolin HFA) 90 mcg/act inhaler, Inhale 2 puffs every 4 (four) hours as needed for shortness of breath, Disp: 8 g, Rfl: 6    ARIPiprazole (ABILIFY) 10 mg tablet, Take 10 mg by mouth daily, Disp: , Rfl:     B-D UF III MINI PEN NEEDLES 31G X 5 MM MISC, use 1 PEN NEEDLE to inject MEDICATION subcutaneously once daily as directed, Disp: 100 each, Rfl: 1    Blood Glucose Monitoring Suppl (OneTouch Verio) w/Device KIT, Use to test twice daily as needed  , Disp: 1 kit, Rfl: 0    budesonide-formoterol (SYMBICORT) 80-4 5 MCG/ACT inhaler, Inhale 2 puffs 2 (two) times a day Rinse mouth after use , Disp: 10 2 g, Rfl: 6    conjugated estrogens (Premarin) vaginal cream, Insert 0 5 g into the vagina daily, Disp: 30 g, Rfl: 2    diclofenac (VOLTAREN) 75 mg EC tablet, take 1 tablet by mouth twice a day, Disp: 60 tablet, Rfl: 5    EPINEPHrine (EPIPEN) 0 3 mg/0 3 mL SOAJ, Inject 0 3 mL (0 3 mg total) into the shoulder, thigh, or buttocks once for 1 dose, Disp: 0 3 mL, Rfl: 2    ferrous sulfate 324 (65 Fe) mg, Take 1 tablet (324 mg total) by mouth daily before breakfast, Disp: 30 tablet, Rfl: 2    gabapentin (NEURONTIN) 300 mg capsule, Take 1 capsule (300 mg total) by mouth 2 (two) times a day, Disp: 60 capsule, Rfl: 0    glucose blood test strip, Use 1 each 2 (two) times a day Use as instructed, Disp: 50 strip, Rfl: 3    Invokamet 150-1000 MG TABS, take 1 tablet by mouth twice a day, Disp: 60 tablet, Rfl: 5    Lancet Device MISC, by Does not apply route, Disp: , Rfl:     liraglutide (VICTOZA) injection, Inject 0 1 mL (0 6 mg total) under the skin daily, Disp: 3 mL, Rfl: 2    lisinopril (ZESTRIL) 2 5 mg tablet, take 1 tablet by mouth once daily, Disp: 90 tablet, Rfl: 1    loratadine-pseudoephedrine (CLARITIN-D 24-HOUR)  mg per 24 hr tablet, Take 1 tablet by mouth daily, Disp: 30 tablet, Rfl: 4    Microlet Lancets MISC, Test twice daily, Disp: 100 each, Rfl: 3    Multiple Vitamins-Minerals (MULTI ADULT GUMMIES PO), Take by mouth, Disp: , Rfl:     omeprazole (PriLOSEC) 40 MG capsule, take 1 capsule by mouth once daily before breakfast, Disp: 30 capsule, Rfl: 5    pravastatin (PRAVACHOL) 20 mg tablet, take 1 tablet by mouth once daily, Disp: 90 tablet, Rfl: 1    venlafaxine (EFFEXOR-XR) 150 mg 24 hr capsule, Take 150 mg by mouth daily  , Disp: , Rfl:     fluticasone (FLONASE) 50 mcg/act nasal spray, 1 spray into each nostril daily, Disp: 16 g, Rfl: 5  Allergies   Allergen Reactions    Iodine - Food Allergy Anaphylaxis     IVP DYE     Nuts - Food Allergy Anaphylaxis     [facility] - [id number]: Pine nuts       Vitals: Blood pressure 98/65, pulse 98, temperature (!) 97 °F (36 1 °C), temperature source Tympanic, height 5' 2" (1 575 m), weight 93 1 kg (205 lb 3 2 oz), last menstrual period 05/20/2019, SpO2 96 %  Body mass index is 37 53 kg/m²  Oxygen Therapy  SpO2: 96 %  Oxygen Therapy: None (Room air)    Physical Exam  Physical Exam    Labs: I have personally reviewed pertinent lab results  , ABG: No results found for: PHART, [id number], PO2ART, NUA2XOJ, Z5BNSTWM, BEART, SOURCE, BNP: No results found for: BNP, CBC: No results found for: WBC, HGB, HCT, MCV, PLT, ADJUSTEDWBC, MCH, MCHC, RDW, MPV, NRBC, CMP: No results found for: SODIUM, K, CL, CO2, ANIONGAP, BUN, CREATININE, GLUCOSE, CALCIUM, AST, ALT, ALKPHOS, PROT, BILITOT, EGFR, PT/INR: No results found for: PT, INR, Troponin: No results found for: TROPONINI  Lab Results   Component Value Date    WBC 5 40 09/13/2021    HGB 13 3 09/13/2021    HCT 41 8 (L) 09/13/2021    MCV 86 09/13/2021     09/13/2021     Lab Results   Component Value Date    GLUCOSE 116 11/19/2015    CALCIUM 9 9 09/13/2021     06/09/2018    K 4 3 09/13/2021    CO2 27 09/13/2021     09/13/2021    BUN 18 09/13/2021    CREATININE 0 92 09/13/2021     No results found for: IGE  Lab Results   Component Value Date    ALT 63 09/23/2020    AST 23 09/23/2020    ALKPHOS 85 09/23/2020    BILITOT 0 4 06/09/2018       Imaging and other studies: I have personally reviewed pertinent reports  and I have personally reviewed pertinent films in PACS     CT lung screening program 07/05/2021  Lungs are clear  Tiny bit of and interlobular lymph node along the right minor fissure  No tracheal or endobronchial lesion  No pleural effusion or pneumothorax      Pulmonary function testing:  Performed 10/7/2020  FEV1/FVC ratio 75%   FEV1 84% predicted  FVC 84% predicted  no response to bronchodilators  TLC 86 % predicted   % predicted  DLCO corrected for hemoglobin 86 % predicted  No obstructive airflow defect numerically however FEV1 25-75% is significantly decreased which could be consistent with small airway disease  Flow volume curve on exhalation is consistent with obstruction  No significant response to bronchodilators  Normal lung volumes  Normal diffusion capacity  Other Studies: I have personally reviewed pertinent reports  Other Studies: I have personally reviewed pertinent reports  CPAP titration 10/29/2021   IMPRESSION:   Mrs Lida Dunham underwent titration of CPAP and BIPAP  When switched to BIPAP she had frequent limb movements which were associated with post limb movement arousals and post arousal central events  She was then switched back to BIPAP with titration to 14 cc of H2O pressure  She seemed to do better with less arousals and limb movements at that pressure  Therefore, I recommend an adjustment of CPAP to 13-20 cc of H2O pressure   I would also optimize limb movement with pharmacologic therapy       Compliance report 2/20/22-3/21/22  Utilizing auto CPAP is 13-20 cm H2O  Days with device usage greater than or equal to 4 hours 27/30 (90%)  Average usage days used 7 hours 36 minutes  Median pressure 11 9, max pressure 14 7  Average AHI 4 6

## 2022-03-24 PROBLEM — J30.2 SEASONAL ALLERGIC RHINITIS: Status: ACTIVE | Noted: 2022-03-24

## 2022-03-24 PROBLEM — E61.1 IRON DEFICIENCY: Status: ACTIVE | Noted: 2022-03-24

## 2022-03-24 NOTE — ASSESSMENT & PLAN NOTE
Remains committed to smoking cessation/absence  Patient will be due for low-dose lung cancer screening CT in July 2022  Reviewed benefits of screening and patient was in agreement  Order placed

## 2022-03-24 NOTE — ASSESSMENT & PLAN NOTE
Continue Symbicort 80/4 5 mcg 2 puffs twice daily  Continue albuterol HFA 2 puffs q 6 hours p r n  David Lara Refills sent to pharmacy  Patient up-to-date on COVID and influenza vaccinations

## 2022-03-24 NOTE — ASSESSMENT & PLAN NOTE
Reviewed compliance data made available to me today  Average AHI is 4 6  Patient reports improvement symptoms since initiating PAP therapy and will continue using auto CPAP 13-20 cm H2O during all hours of sleep

## 2022-04-05 ENCOUNTER — APPOINTMENT (OUTPATIENT)
Dept: LAB | Facility: CLINIC | Age: 52
End: 2022-04-05
Payer: COMMERCIAL

## 2022-04-05 DIAGNOSIS — Z79.899 ENCOUNTER FOR LONG-TERM (CURRENT) USE OF OTHER MEDICATIONS: ICD-10-CM

## 2022-04-05 LAB
CHOLEST SERPL-MCNC: 176 MG/DL
EST. AVERAGE GLUCOSE BLD GHB EST-MCNC: 166 MG/DL
GLUCOSE P FAST SERPL-MCNC: 155 MG/DL (ref 65–99)
HBA1C MFR BLD: 7.4 %
HDLC SERPL-MCNC: 42 MG/DL
LDLC SERPL CALC-MCNC: 101 MG/DL (ref 0–100)
NONHDLC SERPL-MCNC: 134 MG/DL
TRIGL SERPL-MCNC: 166 MG/DL

## 2022-04-05 PROCEDURE — 3051F HG A1C>EQUAL 7.0%<8.0%: CPT | Performed by: PODIATRIST

## 2022-04-05 PROCEDURE — 82947 ASSAY GLUCOSE BLOOD QUANT: CPT

## 2022-04-05 PROCEDURE — 80061 LIPID PANEL: CPT

## 2022-04-05 PROCEDURE — 36415 COLL VENOUS BLD VENIPUNCTURE: CPT

## 2022-04-05 PROCEDURE — 83036 HEMOGLOBIN GLYCOSYLATED A1C: CPT

## 2022-04-13 ENCOUNTER — OFFICE VISIT (OUTPATIENT)
Dept: INTERNAL MEDICINE CLINIC | Facility: CLINIC | Age: 52
End: 2022-04-13
Payer: COMMERCIAL

## 2022-04-13 VITALS
TEMPERATURE: 97.8 F | WEIGHT: 202.25 LBS | HEART RATE: 86 BPM | SYSTOLIC BLOOD PRESSURE: 108 MMHG | HEIGHT: 62 IN | BODY MASS INDEX: 37.22 KG/M2 | OXYGEN SATURATION: 97 % | DIASTOLIC BLOOD PRESSURE: 60 MMHG

## 2022-04-13 DIAGNOSIS — M54.2 NECK PAIN: ICD-10-CM

## 2022-04-13 DIAGNOSIS — M54.13 RADICULOPATHY OF CERVICOTHORACIC REGION: ICD-10-CM

## 2022-04-13 DIAGNOSIS — M25.511 CHRONIC RIGHT SHOULDER PAIN: ICD-10-CM

## 2022-04-13 DIAGNOSIS — M18.0 ARTHRITIS OF CARPOMETACARPAL (CMC) JOINT OF BOTH THUMBS: Primary | ICD-10-CM

## 2022-04-13 DIAGNOSIS — E11.65 TYPE 2 DIABETES MELLITUS WITH HYPERGLYCEMIA, WITHOUT LONG-TERM CURRENT USE OF INSULIN (HCC): ICD-10-CM

## 2022-04-13 DIAGNOSIS — G89.29 CHRONIC RIGHT SHOULDER PAIN: ICD-10-CM

## 2022-04-13 PROBLEM — M79.672 BILATERAL FOOT PAIN: Status: RESOLVED | Noted: 2022-01-13 | Resolved: 2022-04-13

## 2022-04-13 PROBLEM — G56.03 BILATERAL CARPAL TUNNEL SYNDROME: Status: RESOLVED | Noted: 2020-12-22 | Resolved: 2022-04-13

## 2022-04-13 PROBLEM — M79.671 BILATERAL FOOT PAIN: Status: RESOLVED | Noted: 2022-01-13 | Resolved: 2022-04-13

## 2022-04-13 PROBLEM — R53.1 GENERALIZED WEAKNESS: Status: RESOLVED | Noted: 2021-06-02 | Resolved: 2022-04-13

## 2022-04-13 PROCEDURE — 99214 OFFICE O/P EST MOD 30 MIN: CPT | Performed by: PHYSICIAN ASSISTANT

## 2022-04-13 RX ORDER — LANCETS
EACH MISCELLANEOUS
Qty: 100 EACH | Refills: 3 | Status: SHIPPED | OUTPATIENT
Start: 2022-04-13

## 2022-04-13 RX ORDER — GABAPENTIN 300 MG/1
300 CAPSULE ORAL 2 TIMES DAILY
Qty: 60 CAPSULE | Refills: 2 | Status: SHIPPED | OUTPATIENT
Start: 2022-04-13

## 2022-04-13 NOTE — ASSESSMENT & PLAN NOTE
Ortho referral provided so that pt can get repeat steroid injections as these were previously beneficial  Continue diclofenac

## 2022-04-13 NOTE — PROGRESS NOTES
Assessment/Plan:  Problem List Items Addressed This Visit        Endocrine    Type 2 diabetes mellitus ([facility] [address] )       Lab Results   Component Value Date    HGBA1C 7 4 (H) 04/05/2022   Foot exam performed 10/28/21  Eye exam HN 6/2021  A1C 4/2022: 7 4  Pt takes ACE and statin  Podiatry referral placed for nail care per pt request         Relevant Medications    Microlet Lancets MISC    Other Relevant Orders    Ambulatory Referral to Podiatry       Nervous and Auditory    Radiculopathy of cervicothoracic region    Relevant Medications    gabapentin (NEURONTIN) 300 mg capsule       Musculoskeletal and Integument    Arthritis of carpometacarpal (CMC) joint of both thumbs - Primary     Ortho referral provided so that pt can get repeat steroid injections as these were previously beneficial  Continue diclofenac  Relevant Orders    Ambulatory Referral to Orthopedic Surgery      Other Visit Diagnoses     Neck pain        Relevant Medications    gabapentin (NEURONTIN) 300 mg capsule    Chronic right shoulder pain        Relevant Medications    gabapentin (NEURONTIN) 300 mg capsule           Diagnoses and all orders for this visit:    Arthritis of carpometacarpal [place]) joint of both thumbs  -     Ambulatory Referral to Orthopedic Surgery; Future    Neck pain  -     gabapentin (NEURONTIN) 300 mg capsule; Take 1 capsule (300 mg total) by mouth 2 (two) times a day    Radiculopathy of cervicothoracic region  -     gabapentin (NEURONTIN) 300 mg capsule; Take 1 capsule (300 mg total) by mouth 2 (two) times a day    Chronic right shoulder pain  -     gabapentin (NEURONTIN) 300 mg capsule; Take 1 capsule (300 mg total) by mouth 2 (two) times a day    Type 2 diabetes mellitus with hyperglycemia, without long-term current use of insulin (HCC)  -     Microlet Lancets MISC; Test twice daily  -     Ambulatory Referral to Podiatry;  Future        Type 2 diabetes mellitus (HCC)    Lab Results   Component Value Date    HGBA1C 7 4 (H) 2022   Foot exam performed 10/28/21  Eye exam HN 2021  A1C 2022: 7 4  Pt takes ACE and statin  Podiatry referral placed for nail care per pt request    Arthritis of carpometacarpal [place]) joint of both thumbs  Ortho referral provided so that pt can get repeat steroid injections as these were previously beneficial  Continue diclofenac  Subjective:      Patient ID: Geofm Hodgkins is a 46 y o  female  Pt presents for routine visit  She is up to date with mammogram, foot exam, CRC screening, labs and A1C  A1c is stable at 7 4  She desires podiatry evaluation for nail care  She has an eye exam scheduled for [facility] in   She notes ongoing bilateral thumb pain, right worse than left  She is right hand dominant  She has known arthritis and did receive local injections with relief but symptoms have recurred since her last injection 1 year ago  The following portions of the patient's history were reviewed and updated as appropriate:   She has a past medical history of Anxiety, Arthritis, Asthma, Cancer ([address] ), Depression, Diabetes mellitus ([address] ), Diverticulitis, GERD (gastroesophageal reflux disease), Hyperlipidemia associated with type 2 diabetes mellitus ([address] ), Hypertension, Insulinoma, Non-recurrent acute suppurative otitis media of left ear without spontaneous rupture of tympanic membrane (2019), Pulmonary emboli ([address] ), Sleep apnea, and Sleep apnea, obstructive  ,  does not have any pertinent problems on file  ,   has a past surgical history that includes Pancreatectomy;  section; Knee surgery; Knee arthroscopy (Left); pr lap,cholecystectomy (N/A, 2018); Cholecystectomy; and pr colonoscopy flx dx w/collj spec when pfrmd (N/A, 2019)  ,  family history includes Diabetes in her maternal grandmother and paternal grandmother; Heart disease in her mother; Hyperlipidemia in her mother; Hypertension in her mother; No Known Problems in her brother, daughter, half-sister, paternal aunt, paternal aunt, paternal aunt, paternal aunt, paternal aunt, sister, and sister; Obesity in her mother; Osteoporosis in her family; Other in her maternal grandmother and mother; Substance Abuse in her family and father; Tuberculosis in her maternal aunt and mother  ,   reports that she quit smoking about 9 years ago  Her smoking use included cigarettes  She has a 30 00 pack-year smoking history  She has never used smokeless tobacco  She reports that she does not drink alcohol and does not use drugs  ,  is allergic to iodine - food allergy and nuts - food allergy     Current Outpatient Medications   Medication Sig Dispense Refill    albuterol (Ventolin HFA) 90 mcg/act inhaler Inhale 2 puffs every 4 (four) hours as needed for shortness of breath 8 g 6    ARIPiprazole (ABILIFY) 10 mg tablet Take 10 mg by mouth daily      B-D UF III MINI PEN NEEDLES 31G X 5 MM MISC use 1 PEN NEEDLE to inject MEDICATION subcutaneously once daily as directed 100 each 1    Blood Glucose Monitoring Suppl (OneTouch Verio) w/Device KIT Use to test twice daily as needed  1 kit 0    budesonide-formoterol (SYMBICORT) 80-4 5 MCG/ACT inhaler Inhale 2 puffs 2 (two) times a day Rinse mouth after use   10 2 g 6    diclofenac (VOLTAREN) 75 mg EC tablet take 1 tablet by mouth twice a day 60 tablet 5    ferrous sulfate 324 (65 Fe) mg Take 1 tablet (324 mg total) by mouth daily before breakfast 30 tablet 2    fluticasone (FLONASE) 50 mcg/act nasal spray 1 spray into each nostril daily 16 g 5    gabapentin (NEURONTIN) 300 mg capsule Take 1 capsule (300 mg total) by mouth 2 (two) times a day 60 capsule 2    glucose blood test strip Use 1 each 2 (two) times a day Use as instructed 50 strip 3    Invokamet 150-1000 MG TABS take 1 tablet by mouth twice a day 60 tablet 5    Lancet Device MISC by Does not apply route      liraglutide (VICTOZA) injection Inject 0 1 mL (0 6 mg total) under the skin daily 3 mL 2    lisinopril (ZESTRIL) 2 5 mg tablet take 1 tablet by mouth once daily 90 tablet 1    loratadine-pseudoephedrine (CLARITIN-D 24-HOUR)  mg per 24 hr tablet Take 1 tablet by mouth daily 30 tablet 4    Microlet Lancets MISC Test twice daily 100 each 3    Multiple Vitamins-Minerals (MULTI ADULT GUMMIES PO) Take by mouth      omeprazole (PriLOSEC) 40 MG capsule take 1 capsule by mouth once daily before breakfast 30 capsule 5    pravastatin (PRAVACHOL) 20 mg tablet take 1 tablet by mouth once daily 90 tablet 1    venlafaxine (EFFEXOR-XR) 150 mg 24 hr capsule Take 150 mg by mouth daily        EPINEPHrine (EPIPEN) 0 3 mg/0 3 mL SOAJ Inject 0 3 mL (0 3 mg total) into the shoulder, thigh, or buttocks once for 1 dose 0 3 mL 2     No current facility-administered medications for this visit  Review of Systems   Constitutional: Negative for chills and fever  HENT: Negative for congestion, ear pain, hearing loss, postnasal drip, rhinorrhea, sinus pressure, sinus pain, sore throat and trouble swallowing  Eyes: Negative for pain and visual disturbance  Respiratory: Negative for cough, chest tightness, shortness of breath and wheezing  Cardiovascular: Negative  Negative for chest pain, palpitations and leg swelling  Gastrointestinal: Negative for abdominal pain, blood in stool, constipation, diarrhea, nausea and vomiting  Endocrine: Negative for cold intolerance, heat intolerance, polydipsia, polyphagia and polyuria  Genitourinary: Negative for difficulty urinating, dysuria, flank pain and urgency  Musculoskeletal: Positive for arthralgias  Negative for back pain, gait problem and myalgias  Skin: Negative for rash  Allergic/Immunologic: Negative  Neurological: Negative for dizziness, weakness, light-headedness and headaches  Hematological: Negative  Psychiatric/Behavioral: Negative for behavioral problems, dysphoric mood and sleep disturbance  The patient is not nervous/anxious            PHQ-2/9 Depression Screening            Objective:  Vitals:    04/13/22 1110   BP: 108/60   BP Location: Left arm   Patient Position: Sitting   Pulse: 86   Temp: 97 8 °F (36 6 °C)   SpO2: 97%   Weight: 91 7 kg (202 lb 4 oz)   Height: 5' 2" (1 575 m)     Body mass index is 36 99 kg/m²  Physical Exam  Constitutional:       General: She is not in acute distress  Appearance: She is well-developed  She is not diaphoretic  HENT:      Head: Normocephalic and atraumatic  Right Ear: External ear normal       Left Ear: External ear normal       Nose: Nose normal       Mouth/Throat:      Pharynx: No oropharyngeal exudate  Eyes:      General: No scleral icterus  Right eye: No discharge  Left eye: No discharge  Conjunctiva/sclera: Conjunctivae normal       Pupils: Pupils are equal, round, and reactive to light  Neck:      Thyroid: No thyromegaly  Cardiovascular:      Rate and Rhythm: Normal rate and regular rhythm  Heart sounds: Normal heart sounds  No murmur heard  No friction rub  No gallop  Pulmonary:      Effort: Pulmonary effort is normal  No respiratory distress  Breath sounds: Normal breath sounds  No wheezing or rales  Abdominal:      General: Bowel sounds are normal  There is no distension  Palpations: Abdomen is soft  Tenderness: There is no abdominal tenderness  Musculoskeletal:         General: No tenderness or deformity  Normal range of motion  Cervical back: Normal range of motion and neck supple  Skin:     General: Skin is warm and dry  Neurological:      Mental Status: She is alert and oriented to person, place, and time  Cranial Nerves: No cranial nerve deficit  Psychiatric:         Mood and Affect: Affect is flat  Behavior: Behavior normal          Thought Content:  Thought content normal          Judgment: Judgment normal

## 2022-04-13 NOTE — ASSESSMENT & PLAN NOTE
Lab Results   Component Value Date    HGBA1C 7 4 (H) 04/05/2022   Foot exam performed 10/28/21  Eye exam HN 6/2021  A1C 4/2022: 7 4  Pt takes ACE and statin     Podiatry referral placed for nail care per pt request

## 2022-04-26 ENCOUNTER — TELEPHONE (OUTPATIENT)
Dept: OBGYN CLINIC | Facility: HOSPITAL | Age: 52
End: 2022-04-26

## 2022-04-26 ENCOUNTER — TELEPHONE (OUTPATIENT)
Dept: PODIATRY | Facility: CLINIC | Age: 52
End: 2022-04-26

## 2022-04-26 ENCOUNTER — OFFICE VISIT (OUTPATIENT)
Dept: OBGYN CLINIC | Facility: CLINIC | Age: 52
End: 2022-04-26
Payer: COMMERCIAL

## 2022-04-26 VITALS
DIASTOLIC BLOOD PRESSURE: 69 MMHG | SYSTOLIC BLOOD PRESSURE: 102 MMHG | BODY MASS INDEX: 36.99 KG/M2 | HEART RATE: 103 BPM | HEIGHT: 62 IN

## 2022-04-26 DIAGNOSIS — M18.0 ARTHRITIS OF CARPOMETACARPAL (CMC) JOINT OF BOTH THUMBS: ICD-10-CM

## 2022-04-26 PROCEDURE — 20600 DRAIN/INJ JOINT/BURSA W/O US: CPT | Performed by: PHYSICIAN ASSISTANT

## 2022-04-26 PROCEDURE — 99214 OFFICE O/P EST MOD 30 MIN: CPT | Performed by: PHYSICIAN ASSISTANT

## 2022-04-26 RX ORDER — BUPIVACAINE HYDROCHLORIDE 2.5 MG/ML
0.5 INJECTION, SOLUTION INFILTRATION; PERINEURAL
Status: COMPLETED | OUTPATIENT
Start: 2022-04-26 | End: 2022-04-26

## 2022-04-26 RX ORDER — BETAMETHASONE SODIUM PHOSPHATE AND BETAMETHASONE ACETATE 3; 3 MG/ML; MG/ML
3 INJECTION, SUSPENSION INTRA-ARTICULAR; INTRALESIONAL; INTRAMUSCULAR; SOFT TISSUE
Status: COMPLETED | OUTPATIENT
Start: 2022-04-26 | End: 2022-04-26

## 2022-04-26 RX ADMIN — BUPIVACAINE HYDROCHLORIDE 0.5 ML: 2.5 INJECTION, SOLUTION INFILTRATION; PERINEURAL at 15:13

## 2022-04-26 RX ADMIN — BETAMETHASONE SODIUM PHOSPHATE AND BETAMETHASONE ACETATE 3 MG: 3; 3 INJECTION, SUSPENSION INTRA-ARTICULAR; INTRALESIONAL; INTRAMUSCULAR; SOFT TISSUE at 15:13

## 2022-04-26 NOTE — TELEPHONE ENCOUNTER
Called pt regarding her appt on 4/29 with Dr Sudha Santos had a sx added, so her appt will either have to be moved earlier in the day or later in the day  Dr Sudha Santos does NOT want this pt r/s to a different day  If pt calls back, please message me on teams so the call can be forwarded to me

## 2022-04-26 NOTE — PROGRESS NOTES
ASSESSMENT/PLAN:    Diagnoses and all orders for this visit:    Arthritis of carpometacarpal [place]) joint of both thumbs  -     Ambulatory Referral to Orthopedic Surgery    Other orders  -     Small joint arthrocentesis        The CMC joints of her bilateral thumbs were injected with Celestone and Marcaine  She tolerated the injections quite well  She will follow up with our office in 3 months  The patient is acceptable to this plan  Return in about 3 months (around 7/26/2022)  under aseptic technique, the bilateral CMC joints were injected with Celestone Marcaine  She tolerated procedures quite well  Return back in 3 months evaluation  Physical examination shows grinding along the CMC joints  No anatomic snuffbox tenderness  Negative Finkelstein test   Negative Tinel's  Tenderness with pinch      _____________________________________________________  CHIEF COMPLAINT:  Chief Complaint   Patient presents with    Left Thumb - Pain, Locking    Right Thumb - Pain, Locking         SUBJECTIVE:  Katie Dye is a 46 y o  female who presents to our office complaining of bilateral thumb pain  The patient has a history of CMC joint arthritis of both thumbs  In the past, she has received corticosteroid injections  She states the injections helped with her symptoms  She denies any numbness or tingling  She denies any fever or chills        The following portions of the patient's history were reviewed and updated as appropriate: allergies, current medications, past family history, past medical history, past social history, past surgical history and problem list     PAST MEDICAL HISTORY:  Past Medical History:   Diagnosis Date    Anxiety     Arthritis     Asthma     Cancer ([address] )     renal,cervical    Depression     Diabetes mellitus ([address] )     Diverticulitis     GERD (gastroesophageal reflux disease)     Hyperlipidemia associated with type 2 diabetes mellitus ([address] )     Hypertension     Insulinoma  Non-recurrent acute suppurative otitis media of left ear without spontaneous rupture of tympanic membrane 2019    Pulmonary emboli (HCC)     Sleep apnea     Sleep apnea, obstructive        PAST SURGICAL HISTORY:  Past Surgical History:   Procedure Laterality Date     SECTION      twice    CHOLECYSTECTOMY      KNEE ARTHROSCOPY Left     KNEE SURGERY      PANCREATECTOMY      Partial     SC COLONOSCOPY FLX DX W/COLLJ SPEC WHEN PFRMD N/A 2019    Procedure: COLONOSCOPY;  Surgeon: Miguel Ross DO;  Location: [facility];  Service: Gastroenterology    SC LAP,CHOLECYSTECTOMY N/A 2018    Procedure: CHOLECYSTECTOMY LAPAROSCOPIC;  Surgeon: Gurdeep Chowdary DO;  Location: [facility];  Service: General       FAMILY HISTORY:  Family History   Problem Relation Age of Onset    Heart disease Mother     Hyperlipidemia Mother     Obesity Mother    Valaria Reil Hypertension Mother     Other Mother         Bundle branch block    Tuberculosis Mother     Substance Abuse Father     No Known Problems Brother     Other Maternal Grandmother         ascending aortic aneurysm resection    Diabetes Maternal Grandmother     Diabetes Paternal Grandmother     Tuberculosis Maternal Aunt     Substance Abuse Family     Osteoporosis Family     No Known Problems Sister     No Known Problems Daughter     No Known Problems Sister     No Known Problems Half-Sister     No Known Problems Paternal Aunt     No Known Problems Paternal Aunt     No Known Problems Paternal Aunt     No Known Problems Paternal Aunt     No Known Problems Paternal Aunt        SOCIAL HISTORY:  Social History     Tobacco Use    Smoking status: Former Smoker     Packs/day: 1 00     Years: 30 00     Pack years: 30 00     Types: Cigarettes     Quit date:      Years since quittin 3    Smokeless tobacco: Never Used   Vaping Use    Vaping Use: Never used   Substance Use Topics    Alcohol use: No    Drug use:  No MEDICATIONS:    Current Outpatient Medications:     albuterol (Ventolin HFA) 90 mcg/act inhaler, Inhale 2 puffs every 4 (four) hours as needed for shortness of breath, Disp: 8 g, Rfl: 6    ARIPiprazole (ABILIFY) 10 mg tablet, Take 10 mg by mouth daily, Disp: , Rfl:     B-D UF III MINI PEN NEEDLES 31G X 5 MM MISC, use 1 PEN NEEDLE to inject MEDICATION subcutaneously once daily as directed, Disp: 100 each, Rfl: 1    Blood Glucose Monitoring Suppl (OneTouch Verio) w/Device KIT, Use to test twice daily as needed  , Disp: 1 kit, Rfl: 0    budesonide-formoterol (SYMBICORT) 80-4 5 MCG/ACT inhaler, Inhale 2 puffs 2 (two) times a day Rinse mouth after use , Disp: 10 2 g, Rfl: 6    diclofenac (VOLTAREN) 75 mg EC tablet, take 1 tablet by mouth twice a day, Disp: 60 tablet, Rfl: 5    ferrous sulfate 324 (65 Fe) mg, Take 1 tablet (324 mg total) by mouth daily before breakfast, Disp: 30 tablet, Rfl: 2    fluticasone (FLONASE) 50 mcg/act nasal spray, 1 spray into each nostril daily, Disp: 16 g, Rfl: 5    gabapentin (NEURONTIN) 300 mg capsule, Take 1 capsule (300 mg total) by mouth 2 (two) times a day, Disp: 60 capsule, Rfl: 2    glucose blood test strip, Use 1 each 2 (two) times a day Use as instructed, Disp: 50 strip, Rfl: 3    Invokamet 150-1000 MG TABS, take 1 tablet by mouth twice a day, Disp: 60 tablet, Rfl: 5    Lancet Device MISC, by Does not apply route, Disp: , Rfl:     liraglutide (VICTOZA) injection, Inject 0 1 mL (0 6 mg total) under the skin daily, Disp: 3 mL, Rfl: 2    lisinopril (ZESTRIL) 2 5 mg tablet, take 1 tablet by mouth once daily, Disp: 90 tablet, Rfl: 1    loratadine-pseudoephedrine (CLARITIN-D 24-HOUR)  mg per 24 hr tablet, Take 1 tablet by mouth daily, Disp: 30 tablet, Rfl: 4    Microlet Lancets MISC, Test twice daily, Disp: 100 each, Rfl: 3    Multiple Vitamins-Minerals (MULTI ADULT GUMMIES PO), Take by mouth, Disp: , Rfl:     omeprazole (PriLOSEC) 40 MG capsule, take 1 capsule by mouth once daily before breakfast, Disp: 30 capsule, Rfl: 5    pravastatin (PRAVACHOL) 20 mg tablet, take 1 tablet by mouth once daily, Disp: 90 tablet, Rfl: 1    venlafaxine (EFFEXOR-XR) 150 mg 24 hr capsule, Take 150 mg by mouth daily  , Disp: , Rfl:     EPINEPHrine (EPIPEN) 0 3 mg/0 3 mL SOAJ, Inject 0 3 mL (0 3 mg total) into the shoulder, thigh, or buttocks once for 1 dose, Disp: 0 3 mL, Rfl: 2    ALLERGIES:  Allergies   Allergen Reactions    Iodine - Food Allergy Anaphylaxis     IVP DYE     Nuts - Food Allergy Anaphylaxis     [facility] - [id number]: Pine nuts       ROS:  Review of Systems     Constitutional: Negative for fatigue, fever or loss of appetite  HENT: Negative  Respiratory: Negative for shortness of breath, dyspnea  Cardiovascular: Negative for chest pain/tightness  Gastrointestinal: Negative for abdominal pain, N/V  Endocrine: Negative for cold/heat intolerance, unexplained weight loss/gain  Genitourinary: Negative for flank pain, dysuria, hematuria  Musculoskeletal: Positive for arthralgia   Skin: Negative for rash  Neurological: Negative for numbness or tingling  Psychiatric/Behavioral: Negative for agitation  _____________________________________________________  PHYSICAL EXAMINATION:    Blood pressure 102/69, pulse 103, height 5' 2" (1 575 m), last menstrual period 05/20/2019  Constitutional: Oriented to person, place, and time  Appears well-developed and well-nourished  No distress  HENT:   Head: Normocephalic  Eyes: Conjunctivae are normal  Right eye exhibits no discharge  Left eye exhibits no discharge  No scleral icterus  Cardiovascular: Normal rate  Pulmonary/Chest: Effort normal    Neurological: Alert and oriented to person, place, and time  Skin: Skin is warm and dry  No rash noted  Not diaphoretic  No erythema  No pallor  Psychiatric: Normal mood and affect   Behavior is normal  Judgment and thought content normal       MUSCULOSKELETAL EXAMINATION:   Physical Exam  Ortho Exam    Bilateral upper extremities are neurovascularly intact  Fingers are pink and mobile  Compartments are soft  There is grinding and pain along the CMC joints of both thumbs  Good range of motion of bilateral wrist  Tenderness to palpation along the CMC joints of both thumbs  Brisk cap refill  Sensation intact    Objective:  BP Readings from Last 1 Encounters:   04/26/22 102/69      Wt Readings from Last 1 Encounters:   04/13/22 91 7 kg (202 lb 4 oz)        BMI:   Estimated body mass index is 36 99 kg/m² as calculated from the following:    Height as of this encounter: 5' 2" (1 575 m)  Weight as of 4/13/22: 91 7 kg (202 lb 4 oz)        PROCEDURES PERFORMED:  Small joint arthrocentesis: bilateral thumb CMC  Universal Protocol:  Risks and benefits: risks, benefits and alternatives were discussed  Consent given by: patient  Patient understanding: patient states understanding of the procedure being performed  Site marked: the operative site was marked  Supporting Documentation  Indications: pain   Procedure Details  Location: thumb - bilateral thumb CMC  Preparation: Patient was prepped and draped in the usual sterile fashion  Needle size: 27 G  Ultrasound guidance: no  Approach: dorsal    Medications (Right): 0 5 mL bupivacaine 0 25 %; 3 mg betamethasone acetate-betamethasone sodium phosphate 6 (3-3) mg/mLMedications (Left): 0 5 mL bupivacaine 0 25 %; 3 mg betamethasone acetate-betamethasone sodium phosphate 6 (3-3) mg/mL   Patient tolerance: patient tolerated the procedure well with no immediate complications  Dressing:  Sterile dressing applied            Scribe Attestation    I,:  Linda Calvert PA-C am acting as a scribe while in the presence of the attending physician :       I,:  Karlee Christie DO personally performed the services described in this documentation    as scribed in my presence :

## 2022-04-28 ENCOUNTER — TELEPHONE (OUTPATIENT)
Dept: INTERNAL MEDICINE CLINIC | Facility: CLINIC | Age: 52
End: 2022-04-28

## 2022-04-28 DIAGNOSIS — E11.65 TYPE 2 DIABETES MELLITUS WITH HYPERGLYCEMIA, WITHOUT LONG-TERM CURRENT USE OF INSULIN (HCC): ICD-10-CM

## 2022-04-28 RX ORDER — PEN NEEDLE, DIABETIC 31 GX5/16"
NEEDLE, DISPOSABLE MISCELLANEOUS DAILY
Qty: 100 EACH | Refills: 1 | Status: SHIPPED | OUTPATIENT
Start: 2022-04-28

## 2022-04-28 NOTE — TELEPHONE ENCOUNTER
Refill Liraglutide, B-D UF III Mini Pen [place]             Josee Bismark Lacey [facility] AFFILIATED WITH [facility]

## 2022-04-29 ENCOUNTER — OFFICE VISIT (OUTPATIENT)
Dept: PODIATRY | Facility: CLINIC | Age: 52
End: 2022-04-29
Payer: COMMERCIAL

## 2022-04-29 VITALS
BODY MASS INDEX: 37.17 KG/M2 | HEIGHT: 62 IN | OXYGEN SATURATION: 97 % | DIASTOLIC BLOOD PRESSURE: 69 MMHG | SYSTOLIC BLOOD PRESSURE: 102 MMHG | WEIGHT: 202 LBS | HEART RATE: 91 BPM

## 2022-04-29 DIAGNOSIS — M79.672 PAIN IN BOTH FEET: ICD-10-CM

## 2022-04-29 DIAGNOSIS — M79.671 PAIN IN BOTH FEET: ICD-10-CM

## 2022-04-29 DIAGNOSIS — E11.65 TYPE 2 DIABETES MELLITUS WITH HYPERGLYCEMIA, WITHOUT LONG-TERM CURRENT USE OF INSULIN (HCC): Primary | ICD-10-CM

## 2022-04-29 DIAGNOSIS — Q82.8 POROKERATOSIS: ICD-10-CM

## 2022-04-29 DIAGNOSIS — B35.1 ONYCHOMYCOSIS: ICD-10-CM

## 2022-04-29 PROCEDURE — 99203 OFFICE O/P NEW LOW 30 MIN: CPT | Performed by: PODIATRIST

## 2022-04-29 PROCEDURE — 3074F SYST BP LT 130 MM HG: CPT | Performed by: PODIATRIST

## 2022-04-29 PROCEDURE — 3078F DIAST BP <80 MM HG: CPT | Performed by: PODIATRIST

## 2022-04-29 NOTE — PATIENT INSTRUCTIONS
Diabetes and Exercise   WHAT YOU NEED TO KNOW:   Physical activity, such as exercise, can help keep your blood sugar level steady or improve insulin resistance  Activity can help decrease your risk for heart disease, and help you lose weight  Exercise can also help lower your A1c  Your diabetes care team will help you create an exercise plan  The plan will be based on the type of diabetes you have and your starting fitness level  DISCHARGE INSTRUCTIONS:   Call your local emergency number (911 in the [address] [facility] [facility]) if:   · You have chest pain or shortness of breath  Return to the emergency department if:   · You have a low blood sugar level and it does not improve with treatment  Symptoms are trouble thinking, a pounding heartbeat, and sweating  · Your blood sugar level is above 240 mg/dL and does not come down within 15 minutes of treatment  · You have blurred or double vision  · Your breath has a fruity, sweet smell, or your breathing is shallow  Call your doctor or diabetes care team if:   · You have ketones in your blood or urine  · You have a fever  · Your blood sugar levels are higher than your target goals  · You often have low blood sugar levels  · Your skin is red, dry, warm, or swollen  · You have a wound that does not heal     · You have trouble coping with diabetes, or you feel anxious or depressed  · You have questions or concerns about your condition or care  Tips to help you create and meet your exercise goals:   · Set a goal for 150 minutes (2 5 hours) of moderate to vigorous aerobic activity each week  Aerobic activity helps your heart stay strong  Aerobic activity includes walking, bicycling, dancing, swimming, and raking leaves  Spread aerobic activity over 3 to 5 days  Do not take more than 2 days off in a row  It is best to do at least 10 minutes at a time and 30 minutes each day  You can work up to these goals  Remember that any activity is better than no activity  Over time, you can make exercise more intense or last longer  You can also add more days of exercise as your fitness level improves  Your diabetes care team can help you make a step-by-step plan to achieve your goals  · Set a strength training goal of 2 to 3 times a week  Take at least 1 day off in between strength training sessions  Strength training helps you keep the muscles you have and build new muscles  Strength training includes lifting weights, climbing stairs, yoga, and rocio chi          · Older adults should include balance training 2 to 3 times each week  These include walking backwards, standing on one foot, and walking heel to toe in a straight line  Other healthy exercise tips:   · Stretch before and after you exercise to prevent injury  · Drink water or liquids that do not contain sugar before, during, and after exercise  Ask your dietitian or healthcare provider which liquids you should drink when you exercise  · Do not sit for longer than 30 minutes at a time during your day  If you cannot walk around, at least stand up  This will help you stay active and keep your blood circulating  Exercise and blood sugar levels:  Check your blood sugar level before and after exercise, if you use insulin  Healthcare providers may tell you to change the amount of insulin you take or food you eat  · If your blood sugar level is high, check your blood or urine for ketones before you exercise  Do not exercise if your blood sugar level is high and you have ketones  · If your blood sugar level is less than 100 mg/dL, have a carbohydrate snack before you exercise  Examples are 4 to 6 crackers, ½ banana, 8 ounces (1 cup) of milk, or 4 ounces (½ cup) of juice  Follow up with your doctor or diabetes care team as directed:  Write down your questions so you remember to ask them during your visits    © Copyright [employer] 2022 Information is for End User's use only and may not be sold, redistributed or otherwise used for commercial purposes  All illustrations and images included in CareNotes® are the copyrighted property of A D A M , Inc  or Jazmin Toribio  The above information is an  only  It is not intended as medical advice for individual conditions or treatments  Talk to your doctor, nurse or pharmacist before following any medical regimen to see if it is safe and effective for you

## 2022-04-29 NOTE — PROGRESS NOTES
Diabetic Foot Exam    Patient's shoes and socks removed  Right Foot/Ankle   Right Foot Inspection  Skin Exam: skin normal and skin intact  No dry skin, no warmth, no callus, no erythema, no maceration, no abnormal color, no pre-ulcer, no ulcer and no callus  Toe Exam: No tenderness    Sensory   Vibration: intact  Proprioception: intact  Monofilament testing: intact    Vascular  Capillary refills: < 3 seconds  The right DP pulse is 0  The right PT pulse is 0  Left Foot/Ankle  Left Foot Inspection  Skin Exam: skin normal and skin intact  No dry skin, no warmth, no erythema, no maceration, normal color, no pre-ulcer, no ulcer and no callus  Toe Exam: No tenderness  Sensory   Vibration: intact  Proprioception: intact  Monofilament testing: intact    Vascular  Capillary refills: < 3 seconds  The left DP pulse is 0  The left PT pulse is 0       Assign Risk Category  No deformity present  No loss of protective sensation  No weak pulses  Risk: 0      HISTORY AND PHYSICAL EXAM  - [facility]    PATIENT:  Katie Dye    1970      Assessment/Plan     Problem List Items Addressed This Visit        Endocrine    Type 2 diabetes mellitus ([facility] ) - Primary      Other Visit Diagnoses     Pain in both feet        Porokeratosis        Onychomycosis               Diagnoses and all orders for this visit:    Type 2 diabetes mellitus with hyperglycemia, without long-term current use of insulin ([address] )  -     Ambulatory Referral to Podiatry    Pain in both feet    Porokeratosis    Onychomycosis     -Rx Penlac for onychomycosis, advised on use she is to follow up in 1 year for assessment of her diabetes  -she low risk with no acute findings, nail and callus care was provided without acute findings  -advised to follow up with a pedicure location for nail and callus care, she does not justify for diabetic shoes no loss of protective sensation low risk diabetes   High risk  foot precautions reviewed with patient including the need to wear protective shoegear at all times when walking including in the home, the need to check feet all surfaces daily with a mirror and report and skin breaks to podiatry, the need to apply an emollient to skin of feet daily   Diabetic Foot Ulcer Risks    - Between 10-15% of diabetic foot ulcers do not heal [v]  - Of diabetic foot ulcers that do not heal, 25% will require amputation  [v]    iv Sweetie Duran V , Delgado Herbert and Jorje BULL , Foot Ulcers in the Diabetic Patient, Prevention and Treatment  Vascular Health Risk Management  2007 Feb; 3(1): 65-76  v RAIZA He , THELMA Begum , JORGITO Leggett , CRIS Garcia , RAIZA Guzman T , Risk factors for lower extremity amputation in patients with diabetic foot ulcers: a hospital-based case-control study  Diabetic Foot & Ankle, 2015  6:10 3402             History of Present Illness   Katie Dye is a 46 y o  female who presents with elongated painful toenails that she says she cannot bend down and cut herself, she is complaining of thickening of the nails on the 1st 2 toes bilaterally, and also painful calluses on bilateral feet she is diabetic for resolved however, her A1c was less than 8  Review of Systems   Constitutional: Negative for chills and fever  HENT: Negative for ear pain and sore throat  Eyes: Negative for pain and visual disturbance  Respiratory: Negative for cough and shortness of breath  Cardiovascular: Negative for chest pain and palpitations  Gastrointestinal: Negative for abdominal pain and vomiting  Genitourinary: Negative for dysuria and hematuria  Musculoskeletal: Negative for arthralgias and back pain  Skin: Negative for color change and rash  Neurological: Negative for seizures and syncope  All other systems reviewed and are negative        Historical Information   Past Medical History:   Diagnosis Date    Abnormal ECG     Anxiety     Arthritis     Asthma     Callus     Cancer ([facility] [address] )     renal,cervical    Depression     Diabetes mellitus ([facility] )     Difficulty walking     Diverticulitis     GERD (gastroesophageal reflux disease)     [facility] with [facility]     Hypertension     Insulinoma     Liver disease     Miscarriage     Non-recurrent acute suppurative otitis media of left ear without spontaneous rupture of tympanic membrane 2019    Peptic ulceration     Plantar fasciitis 4/15/22    Pulmonary emboli (HCC)     Sleep apnea     Sleep apnea, obstructive      Past Surgical History:   Procedure Laterality Date    ABDOMINAL SURGERY  1990    Pancreatectomy (partial)     SECTION      twice    CHOLECYSTECTOMY      KNEE ARTHROSCOPY Left     KNEE SURGERY      PANCREATECTOMY      Partial     AL COLONOSCOPY FLX DX W/COLLJ SPEC WHEN PFRMD N/A 2019    Procedure: COLONOSCOPY;  Surgeon: Duglas Palafox DO;  Location: [facility];  Service: Gastroenterology    AL LAP,CHOLECYSTECTOMY N/A 2018    Procedure: CHOLECYSTECTOMY LAPAROSCOPIC;  Surgeon: Isabela Mckeon DO;  Location: [facility];  Service: General     Social History   Social History     Substance and Sexual Activity   Alcohol Use No     Social History     Substance and Sexual Activity   Drug Use No     Social History     Tobacco Use   Smoking Status Former Smoker    Packs/day: 1 00    Years: 30 00    Pack years: 30 00    Types: Cigarettes    Quit date:     Years since quittin 3   Smokeless Tobacco Never Used   Tobacco Comment    Dont need at all     Family History: non-contributory    Meds/Allergies   all medications and allergies reviewed  Allergies   Allergen Reactions    Iodine - Food Allergy Anaphylaxis     IVP DYE     Nuts - Food Allergy Anaphylaxis     Annotation - [id number]: Pine nuts       Objective   Vitals: Blood pressure 102/69, pulse 91, height 5' 2" (1 575 m), weight 91 6 kg (202 lb), last menstrual period 2019, SpO2 97 % ,Body mass index is 36 95 kg/m²  Physical Exam  Vitals reviewed  Constitutional:       Appearance: Normal appearance  She is obese  HENT:      Head: Normocephalic and atraumatic  Nose: Nose normal    Eyes:      Pupils: Pupils are equal, round, and reactive to light  Cardiovascular:      Rate and Rhythm: Normal rate  Pulses: no weak pulses          Dorsalis pedis pulses are 0 on the right side and 0 on the left side  Posterior tibial pulses are 0 on the right side and 0 on the left side  Musculoskeletal:         General: Tenderness present  Cervical back: Normal range of motion  Comments: Nails 1, 2 bilaterally are thickened elongated with notable subungual debris, they are discolored in nature  Skin is within good repair, there is positive pedal hair growth, pulses are palpable capillary fill time is less than 2  No pitting edema, no paresthesias   Feet:      Right foot:      Skin integrity: No ulcer, skin breakdown, erythema, warmth, callus or dry skin  Left foot:      Skin integrity: No ulcer, skin breakdown, erythema, warmth, callus or dry skin  Neurological:      Mental Status: She is alert           Ortho Exam

## 2022-05-03 ENCOUNTER — TELEPHONE (OUTPATIENT)
Dept: ADMINISTRATIVE | Facility: OTHER | Age: 52
End: 2022-05-03

## 2022-05-03 NOTE — TELEPHONE ENCOUNTER
----- Message from Yvette Romero sent at 5/2/2022 12:51 PM EDT -----  05/02/22 12:51 PM    Hello, our patient Katie Dye has had Diabetic Eye Exam completed/performed  Please assist in updating the patient chart by pulling the document from the Media Tab  The date of service is 08/04/2021       Thank you,  Yvette Romero  [facility]

## 2022-05-03 NOTE — TELEPHONE ENCOUNTER
Upon review of the In Basket request we have found/obtained the documentation  After careful review of the document we are unable to complete this request for Diabetic Eye Exam because the documentation does not have the proper verbiage (wording) needed to close the requested care gap(s) and the documentation does not have the result(s) needed to close the requested care gap(s)  Any additional questions or concerns should be emailed to the Practice Liaisons via [email or web address] email, please do not reply via In Basket      Thank you  Sanjiv Emanuel MA

## 2022-06-16 DIAGNOSIS — E61.1 IRON DEFICIENCY: ICD-10-CM

## 2022-06-16 RX ORDER — FERROUS SULFATE 325(65) MG
TABLET ORAL
Qty: 30 TABLET | Refills: 2 | Status: SHIPPED | OUTPATIENT
Start: 2022-06-16

## 2022-07-15 ENCOUNTER — HOSPITAL ENCOUNTER (OUTPATIENT)
Dept: CT IMAGING | Facility: HOSPITAL | Age: 52
Discharge: HOME | End: 2022-07-15
Payer: COMMERCIAL

## 2022-07-15 ENCOUNTER — TELEPHONE (OUTPATIENT)
Dept: PULMONOLOGY | Facility: CLINIC | Age: 52
End: 2022-07-15

## 2022-07-15 DIAGNOSIS — F17.211 CIGARETTE NICOTINE DEPENDENCE IN REMISSION: ICD-10-CM

## 2022-07-15 PROCEDURE — 71271 CT THORAX LUNG CANCER SCR C-: CPT

## 2022-07-15 NOTE — TELEPHONE ENCOUNTER
Lm for pt to call office to rio follow up with [facility] in September at [facility]

## 2022-08-01 DIAGNOSIS — I10 ESSENTIAL HYPERTENSION: ICD-10-CM

## 2022-08-01 DIAGNOSIS — E11.69 HYPERLIPIDEMIA ASSOCIATED WITH TYPE 2 DIABETES MELLITUS (HCC): ICD-10-CM

## 2022-08-01 DIAGNOSIS — E78.5 HYPERLIPIDEMIA ASSOCIATED WITH TYPE 2 DIABETES MELLITUS (HCC): ICD-10-CM

## 2022-08-01 RX ORDER — LISINOPRIL 2.5 MG/1
TABLET ORAL
Qty: 90 TABLET | Refills: 1 | Status: SHIPPED | OUTPATIENT
Start: 2022-08-01

## 2022-08-01 RX ORDER — PRAVASTATIN SODIUM 20 MG
TABLET ORAL
Qty: 90 TABLET | Refills: 1 | Status: SHIPPED | OUTPATIENT
Start: 2022-08-01

## 2022-08-03 ENCOUNTER — OFFICE VISIT (OUTPATIENT)
Dept: INTERNAL MEDICINE CLINIC | Facility: CLINIC | Age: 52
End: 2022-08-03
Payer: COMMERCIAL

## 2022-08-03 VITALS
DIASTOLIC BLOOD PRESSURE: 64 MMHG | WEIGHT: 195.38 LBS | HEIGHT: 62 IN | OXYGEN SATURATION: 98 % | TEMPERATURE: 97.6 F | HEART RATE: 93 BPM | SYSTOLIC BLOOD PRESSURE: 126 MMHG | BODY MASS INDEX: 35.95 KG/M2

## 2022-08-03 DIAGNOSIS — Z13.29 SCREENING FOR THYROID DISORDER: ICD-10-CM

## 2022-08-03 DIAGNOSIS — E55.9 VITAMIN D DEFICIENCY: ICD-10-CM

## 2022-08-03 DIAGNOSIS — E11.65 TYPE 2 DIABETES MELLITUS WITH HYPERGLYCEMIA, WITHOUT LONG-TERM CURRENT USE OF INSULIN (HCC): Primary | ICD-10-CM

## 2022-08-03 DIAGNOSIS — Z13.0 SCREENING FOR DEFICIENCY ANEMIA: ICD-10-CM

## 2022-08-03 DIAGNOSIS — Z12.31 SCREENING MAMMOGRAM FOR BREAST CANCER: ICD-10-CM

## 2022-08-03 DIAGNOSIS — R42 DIZZINESS: ICD-10-CM

## 2022-08-03 DIAGNOSIS — E61.1 IRON DEFICIENCY: ICD-10-CM

## 2022-08-03 LAB — SL AMB POCT HEMOGLOBIN AIC: 6.8 (ref ?–6.5)

## 2022-08-03 PROCEDURE — 99214 OFFICE O/P EST MOD 30 MIN: CPT | Performed by: PHYSICIAN ASSISTANT

## 2022-08-03 PROCEDURE — 3074F SYST BP LT 130 MM HG: CPT | Performed by: PHYSICIAN ASSISTANT

## 2022-08-03 PROCEDURE — 3044F HG A1C LEVEL LT 7.0%: CPT | Performed by: PHYSICIAN ASSISTANT

## 2022-08-03 PROCEDURE — 3078F DIAST BP <80 MM HG: CPT | Performed by: PHYSICIAN ASSISTANT

## 2022-08-03 PROCEDURE — 83036 HEMOGLOBIN GLYCOSYLATED A1C: CPT | Performed by: PHYSICIAN ASSISTANT

## 2022-08-04 PROBLEM — R42 DIZZINESS: Status: ACTIVE | Noted: 2022-08-04

## 2022-08-04 NOTE — PROGRESS NOTES
Assessment/Plan:  Problem List Items Addressed This Visit        Endocrine    Type 2 diabetes mellitus ([facility] ) - Primary       Lab Results   Component Value Date    HGBA1C 6 8 (A) 08/03/2022   Foot exam performed 10/28/21  Eye exam HN 8/8/22  A1C 8/3/22: 6 8  Pt takes ACE and statin  Relevant Orders    Comprehensive metabolic panel    Microalbumin / creatinine urine ratio    POCT hemoglobin A1c (Completed)       Other    Iron deficiency    Relevant Orders    Iron Panel (Includes Ferritin, Iron Sat%, Iron, and TIBC)    Dizziness     Will get labs and holter monitor  Treat according to results  Consider stress test if symptoms persist         Relevant Orders    Holter monitor      Other Visit Diagnoses     Screening mammogram for breast cancer        Relevant Orders    Mammo screening bilateral w 3d & cad    Screening for thyroid disorder        Relevant Orders    TSH, 3rd generation    Vitamin D deficiency        Relevant Orders    Vitamin D 25 hydroxy    Screening for deficiency anemia        Relevant Orders    CBC and differential           Diagnoses and all orders for this visit:    Type 2 diabetes mellitus with hyperglycemia, without long-term current use of insulin ([facility] )  -     Comprehensive metabolic panel; Future  -     Microalbumin / creatinine urine ratio  -     POCT hemoglobin A1c    Screening mammogram for breast cancer  -     Mammo screening bilateral w 3d & cad; Future    Screening for thyroid disorder  -     TSH, 3rd generation; Future    Vitamin D deficiency  -     Vitamin D 25 hydroxy; Future    Screening for deficiency anemia  -     CBC and differential; Future    Iron deficiency  -     Iron Panel (Includes Ferritin, Iron Sat%, Iron, and TIBC); Future    Dizziness  -     Holter monitor; Future      Type 2 diabetes mellitus ([facility] )    Lab Results   Component Value Date    HGBA1C 6 8 (A) 08/03/2022   Foot exam performed 10/28/21  Eye exam HN 8/8/22  A1C 8/3/22: 6 8  Pt takes ACE and statin  Dizziness  Will get labs and holter monitor  Treat according to results  Consider stress test if symptoms persist      Subjective:      Patient ID: Hemalatha Dye is a 46 y o  female  Pt presents for routine visit  She is up to date with diabetic foot exam, CRC screening and lung CA screening  She has eye exam scheduled for   She is due for mammogram and labs  A1C remains well controlled at 6 8  She notes some increased fatigue  She also notes issues with feeling like she may pass out and gets lightheaded  At times she notes palpitations  The following portions of the patient's history were reviewed and updated as appropriate:   She has a past medical history of Abnormal ECG, Anxiety, Arthritis, Asthma, Callus, Cancer ([address] ), Depression, Diabetes mellitus ([address] ), Difficulty walking, Diverticulitis, GERD (gastroesophageal reflux disease), Hyperlipidemia associated with type 2 diabetes mellitus ([address] ), Hypertension, Insulinoma, Liver disease, Miscarriage (), Non-recurrent acute suppurative otitis media of left ear without spontaneous rupture of tympanic membrane (2019), Peptic ulceration, Plantar fasciitis (4/15/22), Pulmonary emboli ([address] ), Sleep apnea, and Sleep apnea, obstructive  ,  does not have any pertinent problems on file  ,   has a past surgical history that includes Pancreatectomy;  section; Knee surgery; Knee arthroscopy (Left); pr lap,cholecystectomy (N/A, 2018); Cholecystectomy; pr colonoscopy flx dx w/collj spec when pfrmd (N/A, 2019); and Abdominal surgery (1990)  ,  family history includes Arthritis in her mother; Cancer in her father; Diabetes in her maternal grandmother, mother, and paternal grandmother; Heart disease in her mother; Hyperlipidemia in her mother; Hypertension in her mother; No Known Problems in her brother, daughter, half-sister, paternal aunt, paternal aunt, paternal aunt, paternal aunt, paternal aunt, sister, and sister; Obesity in her mother; Osteoporosis in her family and mother; Other in her maternal grandmother and mother; Substance Abuse in her family and father; Tuberculosis in her maternal aunt and mother; Ulcerative colitis in her mother  ,   reports that she quit smoking about 9 years ago  Her smoking use included cigarettes  She has a 30 00 pack-year smoking history  She has never used smokeless tobacco  She reports that she does not drink alcohol and does not use drugs  ,  is allergic to iodine - food allergy and nuts - food allergy     Current Outpatient Medications   Medication Sig Dispense Refill    ARIPiprazole (ABILIFY) 10 mg tablet Take 10 mg by mouth daily      Blood Glucose Monitoring Suppl (OneTouch Verio) w/Device KIT Use to test twice daily as needed  1 kit 0    budesonide-formoterol (SYMBICORT) 80-4 5 MCG/ACT inhaler Inhale 2 puffs 2 (two) times a day Rinse mouth after use   10 2 g 6    ciclopirox (PENLAC) 8 % solution Apply topically daily at bedtime 6 6 mL 0    diclofenac (VOLTAREN) 75 mg EC tablet take 1 tablet by mouth twice a day 60 tablet 5    FeroSul 325 (65 Fe) MG tablet take 1 tablet by mouth once daily before breakfast 30 tablet 2    fluticasone (FLONASE) 50 mcg/act nasal spray 1 spray into each nostril daily 16 g 5    gabapentin (NEURONTIN) 300 mg capsule Take 1 capsule (300 mg total) by mouth 2 (two) times a day 60 capsule 2    glucose blood test strip Use 1 each 2 (two) times a day Use as instructed 50 strip 3    Insulin Pen Needle (B-D UF III MINI PEN NEEDLES) 31G X 5 MM MISC Use in the morning 100 each 1    Invokamet 150-1000 MG TABS take 1 tablet by mouth twice a day 60 tablet 5    Lancet Device MISC by Does not apply route      liraglutide (VICTOZA) injection Inject 0 1 mL (0 6 mg total) under the skin daily 3 mL 2    lisinopril (ZESTRIL) 2 5 mg tablet take 1 tablet by mouth once daily 90 tablet 1    loratadine-pseudoephedrine (CLARITIN-D 24-HOUR)  mg per 24 hr tablet Take 1 tablet by mouth daily 30 tablet 4    Microlet Lancets MISC Test twice daily 100 each 3    omeprazole (PriLOSEC) 40 MG capsule take 1 capsule by mouth once daily before breakfast 30 capsule 5    pravastatin (PRAVACHOL) 20 mg tablet take 1 tablet by mouth once daily 90 tablet 1    venlafaxine (EFFEXOR-XR) 150 mg 24 hr capsule Take 150 mg by mouth daily        EPINEPHrine (EPIPEN) 0 3 mg/0 3 mL SOAJ Inject 0 3 mL (0 3 mg total) into the shoulder, thigh, or buttocks once for 1 dose 0 3 mL 2     No current facility-administered medications for this visit  Review of Systems   Constitutional: Positive for fatigue  Negative for chills and fever  HENT: Negative for congestion, ear pain, hearing loss, postnasal drip, rhinorrhea, sinus pressure, sinus pain, sore throat and trouble swallowing  Eyes: Negative for pain and visual disturbance  Respiratory: Negative for cough, chest tightness, shortness of breath and wheezing  Cardiovascular: Positive for palpitations  Negative for chest pain and leg swelling  Gastrointestinal: Negative for abdominal pain, blood in stool, constipation, diarrhea, nausea and vomiting  Endocrine: Negative for cold intolerance, heat intolerance, polydipsia, polyphagia and polyuria  Genitourinary: Negative for difficulty urinating, dysuria, flank pain and urgency  Musculoskeletal: Negative for arthralgias, back pain, gait problem and myalgias  Skin: Negative for rash  Allergic/Immunologic: Negative  Neurological: Positive for dizziness and light-headedness  Negative for weakness and headaches  Hematological: Negative  Psychiatric/Behavioral: Negative for behavioral problems, dysphoric mood and sleep disturbance  The patient is not nervous/anxious            PHQ-2/9 Depression Screening            Objective:  Vitals:    08/03/22 1103   BP: 126/64   BP Location: Left arm   Patient Position: Sitting   Pulse: 93   Temp: 97 6 °F (36 4 °C)   SpO2: 98%   Weight: 88 6 kg (195 lb 6 oz) Height: 5' 2" (1 575 m)     Body mass index is 35 73 kg/m²  Physical Exam  Constitutional:       General: She is not in acute distress  Appearance: She is well-developed  She is not diaphoretic  HENT:      Head: Normocephalic and atraumatic  Right Ear: External ear normal       Left Ear: External ear normal       Nose: Nose normal       Mouth/Throat:      Pharynx: No oropharyngeal exudate  Eyes:      General: No scleral icterus  Right eye: No discharge  Left eye: No discharge  Conjunctiva/sclera: Conjunctivae normal       Pupils: Pupils are equal, round, and reactive to light  Neck:      Thyroid: No thyromegaly  Cardiovascular:      Rate and Rhythm: Normal rate and regular rhythm  Heart sounds: Normal heart sounds  No murmur heard  No friction rub  No gallop  Pulmonary:      Effort: Pulmonary effort is normal  No respiratory distress  Breath sounds: Normal breath sounds  No wheezing or rales  Abdominal:      General: Bowel sounds are normal  There is no distension  Palpations: Abdomen is soft  Tenderness: There is no abdominal tenderness  Musculoskeletal:         General: No tenderness or deformity  Normal range of motion  Cervical back: Normal range of motion and neck supple  Skin:     General: Skin is warm and dry  Neurological:      Mental Status: She is alert and oriented to person, place, and time  Cranial Nerves: No cranial nerve deficit  Psychiatric:         Behavior: Behavior normal          Thought Content:  Thought content normal          Judgment: Judgment normal

## 2022-08-04 NOTE — ASSESSMENT & PLAN NOTE
Will get labs and holter monitor  Treat according to results   Consider stress test if symptoms persist

## 2022-08-04 NOTE — ASSESSMENT & PLAN NOTE
Lab Results   Component Value Date    HGBA1C 6 8 (A) 08/03/2022   Foot exam performed 10/28/21  Eye exam HN 8/8/22  A1C 8/3/22: 6 8  Pt takes ACE and statin

## 2022-08-05 ENCOUNTER — TELEPHONE (OUTPATIENT)
Dept: SURGERY | Facility: CLINIC | Age: 52
End: 2022-08-05

## 2022-08-05 NOTE — TELEPHONE ENCOUNTER
----- [facility] sent at 8/4/2022  5:45 PM EDT -----  Please let patient know that there are no new or suspicious pulmonary nodules on lung cancer screening CT  Previous nodule and cyst are stable  Continue follow-up as previously planned

## 2022-08-07 DIAGNOSIS — K21.9 GASTROESOPHAGEAL REFLUX DISEASE WITHOUT ESOPHAGITIS: ICD-10-CM

## 2022-08-07 RX ORDER — OMEPRAZOLE 40 MG/1
CAPSULE, DELAYED RELEASE ORAL
Qty: 30 CAPSULE | Refills: 5 | Status: SHIPPED | OUTPATIENT
Start: 2022-08-07

## 2022-08-22 ENCOUNTER — HOSPITAL ENCOUNTER (OUTPATIENT)
Dept: MAMMOGRAPHY | Facility: HOSPITAL | Age: 52
Discharge: HOME | End: 2022-08-22
Payer: COMMERCIAL

## 2022-08-22 VITALS — HEIGHT: 62 IN | WEIGHT: 195 LBS | BODY MASS INDEX: 35.88 KG/M2

## 2022-08-22 DIAGNOSIS — Z12.31 SCREENING MAMMOGRAM FOR BREAST CANCER: ICD-10-CM

## 2022-08-22 PROCEDURE — 77067 SCR MAMMO BI INCL CAD: CPT

## 2022-08-22 PROCEDURE — 77063 BREAST TOMOSYNTHESIS BI: CPT

## 2022-09-07 DIAGNOSIS — E11.65 TYPE 2 DIABETES MELLITUS WITH HYPERGLYCEMIA, WITHOUT LONG-TERM CURRENT USE OF INSULIN (HCC): ICD-10-CM

## 2022-09-07 RX ORDER — CANAGLIFLOZIN AND METFORMIN HYDROCHLORIDE 150; 1000 MG/1; MG/1
TABLET, FILM COATED ORAL
Qty: 60 TABLET | Refills: 5 | Status: SHIPPED | OUTPATIENT
Start: 2022-09-07

## 2022-09-19 ENCOUNTER — OFFICE VISIT (OUTPATIENT)
Dept: PULMONOLOGY | Facility: CLINIC | Age: 52
End: 2022-09-19
Payer: COMMERCIAL

## 2022-09-19 ENCOUNTER — TELEPHONE (OUTPATIENT)
Dept: PULMONOLOGY | Facility: CLINIC | Age: 52
End: 2022-09-19

## 2022-09-19 VITALS
RESPIRATION RATE: 17 BRPM | HEART RATE: 89 BPM | WEIGHT: 199.6 LBS | TEMPERATURE: 97.1 F | DIASTOLIC BLOOD PRESSURE: 70 MMHG | SYSTOLIC BLOOD PRESSURE: 102 MMHG | OXYGEN SATURATION: 96 % | BODY MASS INDEX: 36.73 KG/M2 | HEIGHT: 62 IN

## 2022-09-19 DIAGNOSIS — R06.09 DOE (DYSPNEA ON EXERTION): ICD-10-CM

## 2022-09-19 DIAGNOSIS — G47.33 OSA (OBSTRUCTIVE SLEEP APNEA): ICD-10-CM

## 2022-09-19 DIAGNOSIS — E61.1 IRON DEFICIENCY: ICD-10-CM

## 2022-09-19 DIAGNOSIS — J45.40 MODERATE PERSISTENT ASTHMA WITHOUT COMPLICATION: Primary | ICD-10-CM

## 2022-09-19 PROBLEM — R06.00 DOE (DYSPNEA ON EXERTION): Status: ACTIVE | Noted: 2022-09-19

## 2022-09-19 PROCEDURE — 3078F DIAST BP <80 MM HG: CPT | Performed by: INTERNAL MEDICINE

## 2022-09-19 PROCEDURE — 99214 OFFICE O/P EST MOD 30 MIN: CPT | Performed by: INTERNAL MEDICINE

## 2022-09-19 PROCEDURE — 3074F SYST BP LT 130 MM HG: CPT | Performed by: INTERNAL MEDICINE

## 2022-09-19 RX ORDER — FERROUS SULFATE 325(65) MG
TABLET ORAL
Qty: 30 TABLET | Refills: 2 | Status: SHIPPED | OUTPATIENT
Start: 2022-09-19

## 2022-09-19 NOTE — PROGRESS NOTES
Pulmonary Follow Up Note   Shreealexsandererrol Dye 46 y o  female MRN: [id number]  9/19/2022    Assessment:  Moderate persistent asthma   · Not well controlled, asthma control test score today of 16/25   · Not using the maintenance inhalers on regularly basis  · Suspect contribution to the symptoms from morbid obesity/deconditioning S   · No history of frequent attacks/exacerbations    Plan:   · Advised to use Symbicort 80 q  12 hours on regularly basis until seen next  · Continue p r n  albuterol, encouraged to use it when having symptoms  · Continue Flonase p r n  for allergic rhinitis    Former tobacco abuse   · About 30 PY, quit in 2013   · No suspicious lesion on lung cancer screening CT chest done in 07/2022   · Next due for LDCT in 07/2023    Morbid obesity   · Counseled extensively for about 4 minutes   · She would like to continue trying to cut calories intake, regular exercise   · Not interested in referral to weight management program    ANSHUL   · Requested compliance report from the company  Not available at this time given some technical difficulties they have      Return in about 3 months (around 12/19/2022)  History of Present Illness     Follow up for:  Mild persistent asthma    Background:  46 y o  female with a h/o seasonal allergies, DM2, asthma, ANSHUL, PLMD, class 2 obesity, pulmonary embolism in 2017 treated for provoked/after was on the Dr  trip to [place], reflux, depression, tobacco abuse former/quit in 2013 30 PY  Known to have asthma since age of 13, known triggers of exposure to paint, cleaning detergents/chemical, dust/garbage  No history of severe attack    03/2022 visit-continued on low-dose Symbicort, compliance report showed AHI of 4 6  Iron supplements for and deficiency anemia  Lung cancer screening CT chest to be done in 07/2022    Interval History  Since last seen, no major events or illness  Continued to be active, independent at baseline    Still with dyspnea on moderate to severe exertion such as going uphill/up 1 flight of stairs  No problems walking on the surface level  Admits to not using the Symbicort every day, probably few times per week  No frequent use of p r n  albuterol  Noted nocturnal symptoms 2-3 times per week  Asthma control test score today 16/25      Review of Systems  As per hpi, all other systems reviewed and were negative    Studies:    Imaging and other studies: I have personally reviewed pertinent films in PACS  The CT 07/15/2022-3 mm RML perifissural nodule/suspect intrapulmonary lymph node  RLL cyst/unchanged  No suspicious lesions  Pulmonary function testing:   PFT 10/07/2020-ratio 75%, FEV1 1 92 L/84%, FVC 2 55 L/84%  TLC 86%, %  DLCO 86%    EKG, Pathology, and Other Studies: I have personally reviewed pertinent reports  Past medical, surgical, social and family histories reviewed  Medications/Allergies: Reviewed      Vitals: Blood pressure 102/70, pulse 89, temperature (!) 97 1 °F (36 2 °C), resp  rate 17, height 5' 2" (1 575 m), weight 90 5 kg (199 lb 9 6 oz), last menstrual period 05/20/2019, SpO2 96 %  Body mass index is 36 51 kg/m²  Oxygen Therapy  SpO2: 96 %  Oxygen Therapy: None (Room air)      Physical Exam  Body mass index is 36 51 kg/m²     Gen: not in acute distress, morbidly obese  Neck/Eyes: supple, no adenopathy, PERRL  Ear: normal appearance, no significant hearing impairment  Nose:  normal nasal mucosa, no drainage  Mouth:  unremarkable/normal appearance of lips, teeth and gums  Oropharynx: mucosa is moist, no focal lesions or erythema  Salivary glands: soft nontender  Chest: normal respiratory efforts, clear breath sounds bilaterally  CV: RRR, no murmurs appreciated, LE lymphedema  Abdomen: soft, non tender  Extremities:  No observed deformity, no digital clubbing   Skin: unremarkable  Neuro: AAO X3, no focal motor deficit        Labs:  Lab Results   Component Value Date    WBC 5 40 09/13/2021    HGB 13 3 09/13/2021 HCT 41 8 (L) 09/13/2021    MCV 86 09/13/2021     09/13/2021     Lab Results   Component Value Date    GLUCOSE 116 11/19/2015    CALCIUM 9 9 09/13/2021     06/09/2018    K 4 3 09/13/2021    CO2 27 09/13/2021     09/13/2021    BUN 18 09/13/2021    CREATININE 0 92 09/13/2021     No results found for: IGE  Lab Results   Component Value Date    ALT 63 09/23/2020    AST 23 09/23/2020    ALKPHOS 85 09/23/2020    BILITOT 0 4 06/09/2018           Portions of the record may have been created with voice recognition software  Occasional wrong word or "sound a like" substitutions may have occurred due to the inherent limitations of voice recognition software  Read the chart carefully and recognize, using context, where substitutions have occurred    [facility]

## 2022-09-19 NOTE — TELEPHONE ENCOUNTER
Called [facility] for compliance report  There system is currently down  They will fax once it is up and running  Please then notify Dr Sadiq Conde

## 2022-09-21 ENCOUNTER — PROCEDURE VISIT (OUTPATIENT)
Dept: INTERNAL MEDICINE CLINIC | Facility: CLINIC | Age: 52
End: 2022-09-21
Payer: COMMERCIAL

## 2022-09-21 VITALS
DIASTOLIC BLOOD PRESSURE: 64 MMHG | SYSTOLIC BLOOD PRESSURE: 134 MMHG | TEMPERATURE: 98 F | BODY MASS INDEX: 36.62 KG/M2 | HEIGHT: 62 IN | WEIGHT: 199 LBS | OXYGEN SATURATION: 98 % | HEART RATE: 96 BPM

## 2022-09-21 DIAGNOSIS — Z01.419 ENCOUNTER FOR GYNECOLOGICAL EXAMINATION WITHOUT ABNORMAL FINDING: Primary | ICD-10-CM

## 2022-09-21 DIAGNOSIS — Z12.4 SCREENING FOR CERVICAL CANCER: ICD-10-CM

## 2022-09-21 PROCEDURE — 99213 OFFICE O/P EST LOW 20 MIN: CPT | Performed by: PHYSICIAN ASSISTANT

## 2022-09-21 PROCEDURE — G0145 SCR C/V CYTO,THINLAYER,RESCR: HCPCS | Performed by: PATHOLOGY

## 2022-09-21 PROCEDURE — 0503F POSTPARTUM CARE VISIT: CPT | Performed by: PHYSICIAN ASSISTANT

## 2022-09-21 PROCEDURE — S0612 ANNUAL GYNECOLOGICAL EXAMINA: HCPCS | Performed by: PHYSICIAN ASSISTANT

## 2022-09-21 PROCEDURE — 99396 PREV VISIT EST AGE 40-64: CPT | Performed by: PHYSICIAN ASSISTANT

## 2022-09-21 NOTE — PROGRESS NOTES
Assessment     Healthy female exam       Plan     Follow up in: 3 years  Subjective     Katie Dye is a 46 y o  female here for a routine exam   Current complaints: none  Gynecologic History  Patient's last menstrual period was 2019  Menses Regular:no  Contraception: post menopausal status  Last Pap:   Results were: normal  Last mammogram: 2022  Results were: normal  BSE: yes  Last DEXA:N/A  Results were: not examined    Obstetric History  OB History    Para Term  AB Living   3 3 3     3   SAB IAB Ectopic Multiple Live Births           3      # Outcome Date GA Lbr [place]/2nd Weight Sex Delivery Anes PTL Lv   3 Term      CS-LTranv   CASH   2 Term      Vag-Spont   CASH   1 Term      CS-LTranv   CASH         The following portions of the patient's history were reviewed and updated as appropriate:   She  has a past medical history of Abnormal ECG, Anxiety, Arthritis, Asthma, Callus, Cancer (HCC), Depression, Diabetes mellitus ([address] ), Difficulty walking, Diverticulitis, GERD (gastroesophageal reflux disease), Hyperlipidemia associated with type 2 diabetes mellitus ([address] ), Hypertension, Insulinoma, Liver disease, Miscarriage (), Non-recurrent acute suppurative otitis media of left ear without spontaneous rupture of tympanic membrane (2019), Peptic ulceration, Plantar fasciitis (4/15/22), Pulmonary emboli ([address] ), Sleep apnea, and Sleep apnea, obstructive    She   Patient Active Problem List    Diagnosis Date Noted    DALE (dyspnea on exertion) 2022    Dizziness 2022    Arthritis of carpometacarpal [place]) joint of both thumbs 2022    Iron deficiency 2022    Seasonal allergic rhinitis 2022    Vaginal atrophy 2022    PLMD (periodic limb movement disorder) 2021    Radiculopathy of cervicothoracic region 10/28/2021    Encounter for screening for malignant neoplasm of lung in former smoker who quit in past 15 years with 30 pack year history or greater 2021    Cigarette nicotine dependence in remission 2021    Sciatica of left side 2020    Mild persistent asthma without complication     Class 2 obesity due to excess calories in adult 2020    ANSHUL (obstructive sleep apnea) 2020    History of pulmonary embolism 2020    Abnormal chest CT 2020    Lumbar spine pain 2019    Pancreatic insufficiency 2019    Actinic keratosis 2019    Dysfunction of left eustachian tube 2019    Encounter for gynecological examination without abnormal finding 2019    Gastroesophageal reflux disease 2015    Insomnia 2013    Type 2 diabetes mellitus ([facility] [address] ) 2013    Depression 2013    Hypercholesterolemia 2013     She  has a past surgical history that includes Pancreatectomy;  section; Knee surgery; Knee arthroscopy (Left); pr lap,cholecystectomy (N/A, 2018); Cholecystectomy; pr colonoscopy flx dx w/collj spec when pfrmd (N/A, 2019); and Abdominal surgery (1990)  Her family history includes Arthritis in her mother; Breast cancer (age of onset: 80) in her other; Cancer in her father; Diabetes in her maternal grandmother, mother, and paternal grandmother; Heart disease in her mother; Hyperlipidemia in her mother; Hypertension in her mother; No Known Problems in her brother, daughter, half-sister, paternal aunt, paternal aunt, paternal aunt, paternal aunt, paternal aunt, sister, and sister; Obesity in her mother; Osteoporosis in her family and mother; Other in her maternal grandmother and mother; Substance Abuse in her family and father; Tuberculosis in her maternal aunt and mother; Ulcerative colitis in her mother  She  reports that she quit smoking about 9 years ago  Her smoking use included cigarettes  She has a 30 00 pack-year smoking history   She has never used smokeless tobacco  She reports that she does not drink alcohol and does not use drugs  Current Outpatient Medications   Medication Sig Dispense Refill    ARIPiprazole (ABILIFY) 10 mg tablet Take 10 mg by mouth daily      Blood Glucose Monitoring Suppl (OneTouch Verio) w/Device KIT Use to test twice daily as needed  1 kit 0    budesonide-formoterol (SYMBICORT) 80-4 5 MCG/ACT inhaler Inhale 2 puffs 2 (two) times a day Rinse mouth after use   10 2 g 6    diclofenac (VOLTAREN) 75 mg EC tablet take 1 tablet by mouth twice a day 60 tablet 5    FeroSul 325 (65 Fe) MG tablet take 1 tablet by mouth once daily before breakfast 30 tablet 2    fluticasone (FLONASE) 50 mcg/act nasal spray 1 spray into each nostril daily 16 g 5    glucose blood test strip Use 1 each 2 (two) times a day Use as instructed 50 strip 3    Insulin Pen Needle (B-D UF III MINI PEN NEEDLES) 31G X 5 MM MISC Use in the morning 100 each 1    Invokamet 150-1000 MG TABS take 1 tablet by mouth twice a day 60 tablet 5    Lancet Device MISC by Does not apply route      liraglutide (VICTOZA) injection Inject 0 1 mL (0 6 mg total) under the skin daily 3 mL 2    lisinopril (ZESTRIL) 2 5 mg tablet take 1 tablet by mouth once daily 90 tablet 1    Microlet Lancets MISC Test twice daily 100 each 3    omeprazole (PriLOSEC) 40 MG capsule take 1 capsule place once daily before breakfast 30 capsule 5    pravastatin (PRAVACHOL) 20 mg tablet take 1 tablet by mouth once daily 90 tablet 1    venlafaxine (EFFEXOR-XR) 150 mg 24 hr capsule Take 150 mg by mouth daily        ciclopirox (PENLAC) 8 % solution Apply topically daily at bedtime (Patient not taking: Reported on 9/21/2022) 6 6 mL 0    EPINEPHrine (EPIPEN) 0 3 mg/0 3 mL SOAJ Inject 0 3 mL (0 3 mg total) into the shoulder, thigh, or buttocks once for 1 dose 0 3 mL 2    gabapentin (NEURONTIN) 300 mg capsule Take 1 capsule (300 mg total) by mouth 2 (two) times a day (Patient not taking: Reported on 9/21/2022) 60 capsule 2    loratadine-pseudoephedrine (CLARITIN-D 24-HOUR)  mg per 24 hr tablet Take 1 tablet by mouth daily (Patient not taking: Reported on 9/21/2022) 30 tablet 4     No current facility-administered medications for this visit  Current Outpatient Medications on File Prior to Visit   Medication Sig    ARIPiprazole (ABILIFY) 10 mg tablet Take 10 mg by mouth daily    Blood Glucose Monitoring Suppl (OneTouch Verio) w/Device KIT Use to test twice daily as needed   budesonide-formoterol (SYMBICORT) 80-4 5 MCG/ACT inhaler Inhale 2 puffs 2 (two) times a day Rinse mouth after use      diclofenac (VOLTAREN) 75 mg EC tablet take 1 tablet by mouth twice a day    FeroSul 325 (65 Fe) MG tablet take 1 tablet by mouth once daily before breakfast    fluticasone (FLONASE) 50 mcg/act nasal spray 1 spray into each nostril daily    glucose blood test strip Use 1 each 2 (two) times a day Use as instructed    Insulin Pen Needle (B-D UF III MINI PEN NEEDLES) 31G X 5 MM MISC Use in the morning    Invokamet 150-1000 MG TABS take 1 tablet by mouth twice a day    Lancet Device MISC by Does not apply route    liraglutide (VICTOZA) injection Inject 0 1 mL (0 6 mg total) under the skin daily    lisinopril (ZESTRIL) 2 5 mg tablet take 1 tablet by mouth once daily    Microlet Lancets MISC Test twice daily    omeprazole (PriLOSEC) 40 MG capsule take 1 capsule place once daily before breakfast    pravastatin (PRAVACHOL) 20 mg tablet take 1 tablet by mouth once daily    venlafaxine (EFFEXOR-XR) 150 mg 24 hr capsule Take 150 mg by mouth daily      ciclopirox (PENLAC) 8 % solution Apply topically daily at bedtime (Patient not taking: Reported on 9/21/2022)    EPINEPHrine (EPIPEN) 0 3 mg/0 3 mL SOAJ Inject 0 3 mL (0 3 mg total) into the shoulder, thigh, or buttocks once for 1 dose    gabapentin (NEURONTIN) 300 mg capsule Take 1 capsule (300 mg total) by mouth 2 (two) times a day (Patient not taking: Reported on 9/21/2022)    loratadine-pseudoephedrine (CLARITIN-D 24-HOUR)  mg per 24 hr tablet Take 1 tablet by mouth daily (Patient not taking: Reported on 9/21/2022)     No current facility-administered medications on file prior to visit  She is allergic to iodine - food allergy and nuts - food allergy       Review of Systems  Constitutional: negative  Ears, nose, mouth, throat, and face: negative  Cardiovascular: negative  Gastrointestinal: negative  Integument/breast: negative  Hematologic/lymphatic: negative  Musculoskeletal:negative  Behavioral/Psych: negative      Objective  General Appearance: Alert, appropriate appearance for age  No acute distress, HEENT Exam: Grossly normal , Pelvic Exam Female: Vulva and vagina appear normal  Cervix appears normal  Bimanual exam reveals normal uterus and adnexa  Clinical staff offered to be present for exam: PA Student present , Skin: no rash or abnormalities, Neurologic Exam: Normal gait and speech, no tremor  and Psychiatric Exam: Alert and oriented, appropriate affect

## 2022-09-27 ENCOUNTER — HOSPITAL ENCOUNTER (OUTPATIENT)
Dept: NON INVASIVE DIAGNOSTICS | Facility: CLINIC | Age: 52
Discharge: HOME | End: 2022-09-27
Payer: COMMERCIAL

## 2022-09-27 VITALS
SYSTOLIC BLOOD PRESSURE: 134 MMHG | HEIGHT: 62 IN | BODY MASS INDEX: 36.62 KG/M2 | HEART RATE: 82 BPM | DIASTOLIC BLOOD PRESSURE: 64 MMHG | WEIGHT: 199 LBS

## 2022-09-27 DIAGNOSIS — R06.09 DOE (DYSPNEA ON EXERTION): ICD-10-CM

## 2022-09-27 LAB
AORTIC ROOT: 3.4 CM
APICAL FOUR CHAMBER EJECTION FRACTION: 61 %
ASCENDING AORTA: 2.9 CM
E WAVE DECELERATION TIME: 218 MS
FRACTIONAL SHORTENING: 30 % (ref 28–44)
INTERVENTRICULAR SEPTUM IN DIASTOLE (PARASTERNAL SHORT AXIS VIEW): 0.9 CM
INTERVENTRICULAR SEPTUM: 0.9 CM (ref 0.6–1.1)
LAAS-AP2: 12.1 CM2
LAAS-AP4: 12.8 CM2
LEFT ATRIUM SIZE: 2.5 CM
LEFT INTERNAL DIMENSION IN SYSTOLE: 3 CM (ref 2.1–4)
LEFT VENTRICULAR INTERNAL DIMENSION IN DIASTOLE: 4.3 CM (ref 3.5–6)
LEFT VENTRICULAR POSTERIOR WALL IN END DIASTOLE: 0.9 CM
LEFT VENTRICULAR STROKE VOLUME: 47 ML
LVSV (TEICH): 47 ML
MV E'TISSUE VEL-SEP: 10 CM/S
MV PEAK A VEL: 1.06 M/S
MV PEAK E VEL: 75 CM/S
MV STENOSIS PRESSURE HALF TIME: 63 MS
MV VALVE AREA P 1/2 METHOD: 3.49 CM2
RIGHT ATRIUM AREA SYSTOLE A4C: 10.9 CM2
RIGHT VENTRICLE ID DIMENSION: 3.4 CM
SL CV LEFT ATRIUM LENGTH A2C: 4.2 CM
SL CV LV EF: 55
SL CV PED ECHO LEFT VENTRICLE DIASTOLIC VOLUME (MOD BIPLANE) 2D: 81 ML
SL CV PED ECHO LEFT VENTRICLE SYSTOLIC VOLUME (MOD BIPLANE) 2D: 34 ML
TR MAX PG: 15 MMHG
TR PEAK VELOCITY: 1.9 M/S
TRICUSPID VALVE PEAK REGURGITATION VELOCITY: 1.91 M/S

## 2022-09-27 PROCEDURE — 93306 TTE W/DOPPLER COMPLETE: CPT

## 2022-09-27 PROCEDURE — 93306 TTE W/DOPPLER COMPLETE: CPT | Performed by: INTERNAL MEDICINE

## 2022-10-03 LAB
LAB AP GYN PRIMARY INTERPRETATION: ABNORMAL
Lab: ABNORMAL
PATH INTERP SPEC-IMP: ABNORMAL

## 2022-10-03 PROCEDURE — 88141 CYTOPATH C/V INTERPRET: CPT | Performed by: PATHOLOGY

## 2022-10-03 PROCEDURE — 87624 HPV HI-RISK TYP POOLED RSLT: CPT | Performed by: PHYSICIAN ASSISTANT

## 2022-10-05 LAB
HPV HR 12 DNA CVX QL NAA+PROBE: NEGATIVE
HPV16 DNA CVX QL NAA+PROBE: NEGATIVE
HPV18 DNA CVX QL NAA+PROBE: NEGATIVE

## 2022-10-17 ENCOUNTER — TELEPHONE (OUTPATIENT)
Dept: PULMONOLOGY | Facility: CLINIC | Age: 52
End: 2022-10-17

## 2022-10-17 NOTE — TELEPHONE ENCOUNTER
Called patient reviewed results of echocardiogram   Informed her of grade 1 diastolic dysfunction, trace mitral regurgitation, trace tricuspid regurgitation, trace pulmonic regurgitation  Answered all questions  Recommended following up as previously planned

## 2022-10-17 NOTE — TELEPHONE ENCOUNTER
Pt called re: she would like a call back to discuss her Echo results with the doctor    Please advise: [phone]

## 2022-11-01 ENCOUNTER — OFFICE VISIT (OUTPATIENT)
Dept: OBGYN CLINIC | Facility: CLINIC | Age: 52
End: 2022-11-01

## 2022-11-01 VITALS
BODY MASS INDEX: 36.07 KG/M2 | WEIGHT: 196 LBS | HEIGHT: 62 IN | DIASTOLIC BLOOD PRESSURE: 74 MMHG | OXYGEN SATURATION: 97 % | SYSTOLIC BLOOD PRESSURE: 114 MMHG | HEART RATE: 116 BPM

## 2022-11-01 DIAGNOSIS — M18.0 ARTHRITIS OF CARPOMETACARPAL (CMC) JOINT OF BOTH THUMBS: Primary | ICD-10-CM

## 2022-11-01 RX ORDER — BETAMETHASONE SODIUM PHOSPHATE AND BETAMETHASONE ACETATE 3; 3 MG/ML; MG/ML
3 INJECTION, SUSPENSION INTRA-ARTICULAR; INTRALESIONAL; INTRAMUSCULAR; SOFT TISSUE
Status: COMPLETED | OUTPATIENT
Start: 2022-11-01 | End: 2022-11-01

## 2022-11-01 RX ORDER — BUPIVACAINE HYDROCHLORIDE 2.5 MG/ML
0.5 INJECTION, SOLUTION INFILTRATION; PERINEURAL
Status: COMPLETED | OUTPATIENT
Start: 2022-11-01 | End: 2022-11-01

## 2022-11-01 RX ADMIN — BUPIVACAINE HYDROCHLORIDE 0.5 ML: 2.5 INJECTION, SOLUTION INFILTRATION; PERINEURAL at 13:25

## 2022-11-01 RX ADMIN — BETAMETHASONE SODIUM PHOSPHATE AND BETAMETHASONE ACETATE 3 MG: 3; 3 INJECTION, SUSPENSION INTRA-ARTICULAR; INTRALESIONAL; INTRAMUSCULAR; SOFT TISSUE at 13:25

## 2022-11-01 NOTE — PROGRESS NOTES
Assessment/Plan:   Diagnoses and all orders for this visit:    Arthritis of carpometacarpal [place]) joint of both thumbs         Discussed with patient that today's physical exam is consistent with flare-up of primary osteoarthritis of the bilateral thumb CMC joints  Patient was offered, and accepted, his own and Marcaine injection(s) to the bilateral 1st CMC joints for relief of pain and inflammation  Patient tolerated treatment(s) well  She will be seen in 3 months for re-evaluation and consideration for repeat injections as necessary  Patient expresses understanding and is in agreement with this treatment plan  The patient has bilateral basilar thumb arthritis  Both CMC joints were injected with Celestone Marcaine  She tolerated procedures quite well  Return back in 3 months evaluation  If her condition changes, she will not hesitate to let us know    Subjective:   Patient ID: Barrington Dye  1970     HPI  Patient is a 45 y o  female who presents for follow-up evaluation of primary osteoarthritis of the bilateral 1st CMC joints  She was last seen in regards to this issue on 4/26/2022, at which time she was provided with CS injections  Patient states that she did get significant relief following the injections, unfortunately her symptoms began to return after only about 2 months  On today's presentation she reports basilar thumb pain in the bilateral hands with heavy gripping motions  She reports mild swelling, but denies any associated bruising, numbness, tingling, or feelings of instability      The following portions of the patient's history were reviewed and updated as appropriate:  Past medical history, past surgical history, Family history, social history, current medications and allergies    Past Medical History:   Diagnosis Date   • Abnormal ECG    • Anxiety    • Arthritis    • Asthma    • Callus    • Cancer ([facility] )     renal,cervical   • Depression    • Diabetes mellitus ([facility] )    • Difficulty walking    • Diverticulitis    • GERD (gastroesophageal reflux disease)    • Hyperlipidemia associated with type 2 diabetes mellitus ([facility] )    • Hypertension    • Insulinoma    • Liver disease    • Miscarriage    • Non-recurrent acute suppurative otitis media of left ear without spontaneous rupture of tympanic membrane 2019   • Peptic ulceration    • Plantar fasciitis 4/15/22   • Pulmonary emboli (HCC)    • Sleep apnea    • Sleep apnea, obstructive        Past Surgical History:   Procedure Laterality Date   • ABDOMINAL SURGERY  1990    Pancreatectomy (partial)   •  SECTION      twice   • CHOLECYSTECTOMY     • KNEE ARTHROSCOPY Left    • KNEE SURGERY     • PANCREATECTOMY      Partial    • MA COLONOSCOPY FLX DX W/COLLJ SPEC WHEN PFRMD N/A 2019    Procedure: COLONOSCOPY;  Surgeon: Prateek Strong DO;  Location: [facility];  Service: Gastroenterology   • MA LAP,CHOLECYSTECTOMY N/A 2018    Procedure: CHOLECYSTECTOMY LAPAROSCOPIC;  Surgeon: Christine Loza DO;  Location: [facility];  Service: General       Family History   Problem Relation Age of Onset   • Heart disease Mother    • Hyperlipidemia Mother    • Obesity Mother    • Hypertension Mother    • Other Mother         Bundle branch block   • Tuberculosis Mother    • Arthritis Mother    • Diabetes Mother    • Osteoporosis Mother    • Ulcerative colitis Mother    • Substance Abuse Father    • Cancer Father    • No Known Problems Sister    • No Known Problems Sister    • No Known Problems Daughter    • Other Maternal Grandmother         ascending aortic aneurysm resection   • Diabetes Maternal Grandmother    • Diabetes Paternal Grandmother    • No Known Problems Brother    • Tuberculosis Maternal Aunt    • No Known Problems Paternal Aunt    • No Known Problems Paternal Aunt    • No Known Problems Paternal Aunt    • No Known Problems Paternal Aunt    • No Known Problems Paternal Aunt    • Breast cancer Other 80   • Substance Abuse Family    • Osteoporosis Family    • No Known Problems Half-Sister        Social History     Socioeconomic History   • Marital status:      Spouse name: None   • Number of children: None   • Years of education: None   • Highest education level: None   Occupational History   • Occupation: UNEMPLOYED   Tobacco Use   • Smoking status: Former Smoker     Packs/day: 1 00     Years: 30 00     Pack years: 30 00     Types: Cigarettes     Quit date:      Years since quittin 8   • Smokeless tobacco: Never Used   • Tobacco comment: Dont need at all   Vaping Use   • Vaping Use: Never used   Substance and Sexual Activity   • Alcohol use: No   • Drug use: No   • Sexual activity: Not Currently     Partners: Male     Birth control/protection: Abstinence, Post-menopausal   Other Topics Concern   • None   Social History Narrative    Single-    Unemployed    Lives at home with mother     Caffeine use     Social Determinants of Health     Financial Resource Strain: Not on file   Food Insecurity: Not on file   Transportation Needs: Not on file   Physical Activity: Not on file   Stress: Not on file   Social Connections: Not on file   Intimate Partner Violence: Not on file   Housing Stability: Not on file         Current Outpatient Medications:   •  ARIPiprazole (ABILIFY) 10 mg tablet, Take 10 mg by mouth daily, Disp: , Rfl:   •  Blood Glucose Monitoring Suppl (OneTouch Verio) w/Device KIT, Use to test twice daily as needed  , Disp: 1 kit, Rfl: 0  •  budesonide-formoterol (SYMBICORT) 80-4 5 MCG/ACT inhaler, Inhale 2 puffs 2 (two) times a day Rinse mouth after use , Disp: 10 2 g, Rfl: 6  •  diclofenac (VOLTAREN) 75 mg EC tablet, take 1 tablet by mouth twice a day, Disp: 60 tablet, Rfl: 5  •  FeroSul 325 (65 Fe) MG tablet, take 1 tablet by mouth once daily before breakfast, Disp: 30 tablet, Rfl: 2  •  fluticasone (FLONASE) 50 mcg/act nasal spray, 1 spray into each nostril daily, Disp: 16 g, Rfl: 5  •  glucose blood test strip, Use 1 each 2 (two) times a day Use as instructed, Disp: 50 strip, Rfl: 3  •  Insulin Pen Needle (B-D UF III MINI PEN NEEDLES) 31G X 5 MM MISC, Use in the morning, Disp: 100 each, Rfl: 1  •  Invokamet 150-1000 MG TABS, take 1 tablet by mouth twice a day, Disp: 60 tablet, Rfl: 5  •  Lancet Device MISC, by Does not apply route, Disp: , Rfl:   •  liraglutide (VICTOZA) injection, Inject 0 1 mL (0 6 mg total) under the skin daily, Disp: 3 mL, Rfl: 2  •  lisinopril (ZESTRIL) 2 5 mg tablet, take 1 tablet by mouth once daily, Disp: 90 tablet, Rfl: 1  •  Microlet Lancets MISC, Test twice daily, Disp: 100 each, Rfl: 3  •  omeprazole (PriLOSEC) 40 MG capsule, take 1 capsule place once daily before breakfast, Disp: 30 capsule, Rfl: 5  •  pravastatin (PRAVACHOL) 20 mg tablet, take 1 tablet by mouth once daily, Disp: 90 tablet, Rfl: 1  •  venlafaxine (EFFEXOR-XR) 150 mg 24 hr capsule, Take 150 mg by mouth daily  , Disp: , Rfl:   •  ciclopirox (PENLAC) 8 % solution, Apply topically daily at bedtime (Patient not taking: No sig reported), Disp: 6 6 mL, Rfl: 0  •  EPINEPHrine (EPIPEN) 0 3 mg/0 3 mL SOAJ, Inject 0 3 mL (0 3 mg total) into the shoulder, thigh, or buttocks once for 1 dose, Disp: 0 3 mL, Rfl: 2  •  gabapentin (NEURONTIN) 300 mg capsule, Take 1 capsule (300 mg total) by mouth 2 (two) times a day (Patient not taking: No sig reported), Disp: 60 capsule, Rfl: 2  •  loratadine-pseudoephedrine (CLARITIN-D 24-HOUR)  mg per 24 hr tablet, Take 1 tablet by mouth daily (Patient not taking: No sig reported), Disp: 30 tablet, Rfl: 4    Allergies   Allergen Reactions   • Iodine - Food Allergy Anaphylaxis     IVP DYE    • Nuts - Food Allergy Anaphylaxis     [facility] - [id number]: Pine nuts       Review of Systems   Constitutional: Negative for chills, fever and unexpected weight change  HENT: Negative for hearing loss, nosebleeds and sore throat  Eyes: Negative for pain, redness and visual disturbance  Respiratory: Negative for cough, shortness of breath and wheezing  Cardiovascular: Negative for chest pain, palpitations and leg swelling  Gastrointestinal: Negative for abdominal pain, nausea and vomiting  Endocrine: Negative for polydipsia and polyuria  Genitourinary: Negative for dysuria and hematuria  Musculoskeletal:        As noted in HPI   Skin: Negative for rash and wound  Neurological: Negative for dizziness, numbness and headaches  Psychiatric/Behavioral: Negative for decreased concentration and suicidal ideas  The patient is not nervous/anxious  Objective:  /74 (BP Location: Left arm, Patient Position: Sitting, Cuff Size: Standard)   Pulse (!) 116   Ht 5' 2" (1 575 m)   Wt 88 9 kg (196 lb)   LMP 05/20/2019   SpO2 97%   BMI 35 85 kg/m²     Ortho Exam  Bilateral thumbs -   Patient presents with no obvious anatomical deformity  Skin is warm and dry to touch with no signs of erythema, ecchymosis, infection  No soft tissue swelling or effusion noted   Full EPB, APB, and FPL  TTP over 1st CMC joints  + CMC grind  + load and shift  Demonstrates normal wrist, elbow, and shoulder motion  Forearm compartments are soft and supple  2+ distal radial pulse with brisk capillary refill to the fingers  Radial, median, and ulnar motor and sensory distributions intact  Sensation light touch intact distally    Physical Exam  Vitals and nursing note reviewed  Constitutional:       General: She is not in acute distress  Appearance: She is well-developed  HENT:      Head: Normocephalic and atraumatic  Eyes:      Conjunctiva/sclera: Conjunctivae normal    Cardiovascular:      Rate and Rhythm: Normal rate  Pulmonary:      Effort: Pulmonary effort is normal    Musculoskeletal:      Cervical back: Neck supple  Skin:     General: Skin is warm and dry  Capillary Refill: Capillary refill takes less than 2 seconds     Neurological:      Mental Status: She is alert and oriented to person, place, and time  Psychiatric:         Mood and Affect: Mood normal          Behavior: Behavior normal           Diagnostic Test Review:  No new imaging reviewed this visit    Small joint arthrocentesis: R thumb CMC  [place] Protocol:  Procedure performed by: Cyndia Holter, LAT, ATC)  Consent: Verbal consent obtained  Consent given by: patient  Timeout called at: 11/1/2022 1:14 PM   Patient understanding: patient states understanding of the procedure being performed  Patient identity confirmed: verbally with patient    Supporting Documentation  Indications: pain and joint swelling   Procedure Details  Location: thumb - R thumb CMC  Needle size: 25 G  Ultrasound guidance: no  Approach: radial  Medications administered: 0 5 mL bupivacaine 0 25 %; 3 mg betamethasone acetate-betamethasone sodium phosphate 6 (3-3) mg/mL    Patient tolerance: patient tolerated the procedure well with no immediate complications  Dressing:  Sterile dressing applied    Small joint arthrocentesis: L thumb CMC  [place] Protocol:  Procedure performed by: Cyndia Holter, LAT, ATC)  Consent: Verbal consent obtained    Consent given by: patient  Timeout called at: 11/1/2022 1:14 PM   Patient understanding: patient states understanding of the procedure being performed  Patient identity confirmed: verbally with patient    Supporting Documentation  Indications: pain and joint swelling   Procedure Details  Location: thumb - L thumb CMC  Needle size: 25 G  Ultrasound guidance: no  Approach: radial  Medications administered: 0 5 mL bupivacaine 0 25 %; 3 mg betamethasone acetate-betamethasone sodium phosphate 6 (3-3) mg/mL    Patient tolerance: patient tolerated the procedure well with no immediate complications  Dressing:  Sterile dressing applied          Scribe Attestation    I,:  Cyndia Holter am acting as a scribe while in the presence of the attending physician :       I,:  Francis Arellano DO personally performed the services described in this documentation    as scribed in my presence :

## 2022-12-05 ENCOUNTER — OFFICE VISIT (OUTPATIENT)
Dept: INTERNAL MEDICINE CLINIC | Facility: CLINIC | Age: 52
End: 2022-12-05

## 2022-12-05 VITALS
WEIGHT: 197 LBS | HEIGHT: 62 IN | SYSTOLIC BLOOD PRESSURE: 122 MMHG | HEART RATE: 100 BPM | BODY MASS INDEX: 36.25 KG/M2 | OXYGEN SATURATION: 98 % | DIASTOLIC BLOOD PRESSURE: 60 MMHG | TEMPERATURE: 97.6 F

## 2022-12-05 DIAGNOSIS — I73.9 CLAUDICATION (HCC): ICD-10-CM

## 2022-12-05 DIAGNOSIS — M54.2 NECK PAIN: ICD-10-CM

## 2022-12-05 DIAGNOSIS — G89.29 CHRONIC RIGHT SHOULDER PAIN: ICD-10-CM

## 2022-12-05 DIAGNOSIS — M25.511 CHRONIC RIGHT SHOULDER PAIN: ICD-10-CM

## 2022-12-05 DIAGNOSIS — Z23 NEED FOR VACCINATION: ICD-10-CM

## 2022-12-05 DIAGNOSIS — E11.65 TYPE 2 DIABETES MELLITUS WITH HYPERGLYCEMIA, WITHOUT LONG-TERM CURRENT USE OF INSULIN (HCC): Primary | ICD-10-CM

## 2022-12-05 DIAGNOSIS — M54.13 RADICULOPATHY OF CERVICOTHORACIC REGION: ICD-10-CM

## 2022-12-05 LAB — SL AMB POCT HEMOGLOBIN AIC: 7.5 (ref ?–6.5)

## 2022-12-06 PROBLEM — I73.9 CLAUDICATION (HCC): Status: ACTIVE | Noted: 2022-12-06

## 2022-12-06 RX ORDER — GABAPENTIN 300 MG/1
300 CAPSULE ORAL 2 TIMES DAILY
Qty: 60 CAPSULE | Refills: 2 | Status: SHIPPED | OUTPATIENT
Start: 2022-12-06

## 2022-12-06 NOTE — PROGRESS NOTES
Name: Ruben Nguyen      : 1970      MRN: [id number]  Encounter Provider: Madeline Cordero PA-C  Encounter Date: 2022   Encounter department: [address] [place] [address]     1  Type 2 diabetes mellitus with hyperglycemia, without long-term current use of insulin [facility])  Assessment & Plan:    Lab Results   Component Value Date    HGBA1C 7 5 (A) 2022     Pts symptoms are stable with current regime  No changes at present  Orders:  -     POCT hemoglobin A1c    2  Need for vaccination  -     influenza vaccine, quadrivalent, recombinant, PF, 0 5 mL, for patients 18 yr+ (FLUBLOK)    3  Claudication ([facility] [address] )  Assessment & Plan: Will get arterial doppler of bilateral lower extremity to better evaluate for PAD  Orders:  -     VAS lower limb arterial duplex, complete bilateral; Future; Expected date: 2022    4  Neck pain  -     gabapentin (NEURONTIN) 300 mg capsule; Take 1 capsule (300 mg total) by mouth 2 (two) times a day    5  Radiculopathy of cervicothoracic region  -     gabapentin (NEURONTIN) 300 mg capsule; Take 1 capsule (300 mg total) by mouth 2 (two) times a day    6  Chronic right shoulder pain  -     gabapentin (NEURONTIN) 300 mg capsule; Take 1 capsule (300 mg total) by mouth 2 (two) times a day           Subjective      Pt presents for routine visit  She is up to date with mammogram, diabetic foot exam and CRC screening  She had her eye exam with marry yudelka  A1C is 7 5 which is increased compared to previous of 6 8  She is due for labs and desires flu vaccine  She is noting issues with intermittent pain in her feet  She notes some discoloration as well  Pulses are slightly decreased  She notes some numbness as well  Review of Systems   Constitutional: Negative for chills and fever  HENT: Negative for congestion, ear pain, hearing loss, postnasal drip, rhinorrhea, sinus pressure, sinus pain, sore throat and trouble swallowing      Eyes: Negative for pain and visual disturbance  Respiratory: Negative for cough, chest tightness, shortness of breath and wheezing  Cardiovascular: Negative  Negative for chest pain, palpitations and leg swelling  Gastrointestinal: Negative for abdominal pain, blood in stool, constipation, diarrhea, nausea and vomiting  Endocrine: Negative for cold intolerance, heat intolerance, polydipsia, polyphagia and polyuria  Genitourinary: Negative for difficulty urinating, dysuria, flank pain and urgency  Musculoskeletal: Positive for arthralgias and myalgias  Negative for back pain and gait problem  Skin: Negative for rash  Allergic/Immunologic: Negative  Neurological: Positive for numbness  Negative for dizziness, weakness, light-headedness and headaches  Hematological: Negative  Psychiatric/Behavioral: Negative for behavioral problems, dysphoric mood and sleep disturbance  The patient is not nervous/anxious  Current Outpatient Medications on File Prior to Visit   Medication Sig   • ARIPiprazole (ABILIFY) 10 mg tablet Take 10 mg by mouth daily   • budesonide-formoterol (SYMBICORT) 80-4 5 MCG/ACT inhaler Inhale 2 puffs 2 (two) times a day Rinse mouth after use     • ciclopirox (PENLAC) 8 % solution Apply topically daily at bedtime   • FeroSul 325 (65 Fe) MG tablet take 1 tablet by mouth once daily before breakfast   • fluticasone (FLONASE) 50 mcg/act nasal spray 1 spray into each nostril daily   • Insulin Pen Needle (B-D UF III MINI PEN NEEDLES) 31G X 5 MM MISC Use in the morning   • Invokamet 150-1000 MG TABS take 1 tablet by mouth twice a day   • liraglutide (VICTOZA) injection Inject 0 1 mL (0 6 mg total) under the skin daily   • lisinopril (ZESTRIL) 2 5 mg tablet take 1 tablet by mouth once daily   • omeprazole (PriLOSEC) 40 MG capsule take 1 capsule place once daily before breakfast   • pravastatin (PRAVACHOL) 20 mg tablet take 1 tablet by mouth once daily   • venlafaxine (EFFEXOR-XR) 150 mg 24 hr capsule Take 150 mg by mouth daily     • EPINEPHrine (EPIPEN) 0 3 mg/0 3 mL SOAJ Inject 0 3 mL (0 3 mg total) into the shoulder, thigh, or buttocks once for 1 dose   • loratadine-pseudoephedrine (CLARITIN-D 24-HOUR)  mg per 24 hr tablet Take 1 tablet by mouth daily (Patient not taking: Reported on 9/21/2022)   • Microlet Lancets MISC Test twice daily (Patient not taking: Reported on 12/5/2022)       Objective     /60 (BP Location: Left arm, Patient Position: Sitting)   Pulse 100   Temp 97 6 °F (36 4 °C)   Ht 5' 2" (1 575 m)   Wt 89 4 kg (197 lb)   LMP 05/20/2019   SpO2 98%   BMI 36 03 kg/m²     Physical Exam  Constitutional:       General: She is not in acute distress  Appearance: She is well-developed and well-nourished  She is not diaphoretic  HENT:      Head: Normocephalic and atraumatic  Right Ear: External ear normal       Left Ear: External ear normal       Nose: Nose normal       Mouth/Throat:      Mouth: Oropharynx is clear and moist       Pharynx: No oropharyngeal exudate  Eyes:      General: No scleral icterus  Right eye: No discharge  Left eye: No discharge  Extraocular Movements: EOM normal       Conjunctiva/sclera: Conjunctivae normal       Pupils: Pupils are equal, round, and reactive to light  Neck:      Thyroid: No thyromegaly  Cardiovascular:      Rate and Rhythm: Normal rate and regular rhythm  Pulses: Pulses are weak  Dorsalis pedis pulses are 2+ on the right side and 2+ on the left side  Posterior tibial pulses are 1+ on the right side and 1+ on the left side  Heart sounds: Normal heart sounds  No murmur heard  No friction rub  No gallop  Pulmonary:      Effort: Pulmonary effort is normal  No respiratory distress  Breath sounds: Normal breath sounds  No wheezing or rales  Abdominal:      General: Bowel sounds are normal  There is no distension  Palpations: Abdomen is soft  Tenderness:  There is no abdominal tenderness  Musculoskeletal:         General: No tenderness, deformity or edema  Normal range of motion  Cervical back: Normal range of motion and neck supple  Feet:      Right foot:      Skin integrity: No ulcer, skin breakdown, erythema, warmth, callus or dry skin  Left foot:      Skin integrity: No ulcer, skin breakdown, erythema, warmth, callus or dry skin  Skin:     General: Skin is warm and dry  Neurological:      Mental Status: She is alert and oriented to person, place, and time  Cranial Nerves: No cranial nerve deficit  Psychiatric:         Mood and Affect: Mood and affect normal          Behavior: Behavior normal          Thought Content: Thought content normal          Judgment: Judgment normal        Patient's shoes and socks removed  Right Foot/Ankle   Right Foot Inspection  Skin Exam: skin normal and skin intact  No dry skin, no warmth, no callus, no erythema, no maceration, no abnormal color, no pre-ulcer, no ulcer and no callus  Toe Exam: ROM and strength within normal limits  Sensory   Vibration: intact  Proprioception: intact  Monofilament testing: intact    Vascular  Capillary refills: < 3 seconds  The right DP pulse is 2+  The right PT pulse is 1+  Left Foot/Ankle  Left Foot Inspection  Skin Exam: skin normal and skin intact  No dry skin, no warmth, no erythema, no maceration, normal color, no pre-ulcer, no ulcer and no callus  Toe Exam: ROM and strength within normal limits  Sensory   Vibration: intact  Proprioception: intact  Monofilament testing: intact    Vascular  Capillary refills: < 3 seconds  The left DP pulse is 2+  The left PT pulse is 1+       Assign Risk Category  No deformity present  No loss of protective sensation  Weak pulses  Risk: 1      Gertrudis Obrien PA-C

## 2022-12-06 NOTE — ASSESSMENT & PLAN NOTE
Lab Results   Component Value Date    HGBA1C 7 5 (A) 12/05/2022     Pts symptoms are stable with current regime  No changes at present

## 2022-12-13 ENCOUNTER — APPOINTMENT (OUTPATIENT)
Dept: LAB | Facility: HOSPITAL | Age: 52
End: 2022-12-13

## 2022-12-13 ENCOUNTER — HOSPITAL ENCOUNTER (OUTPATIENT)
Dept: NON INVASIVE DIAGNOSTICS | Facility: HOSPITAL | Age: 52
Discharge: HOME | End: 2022-12-13

## 2022-12-13 DIAGNOSIS — E11.65 TYPE 2 DIABETES MELLITUS WITH HYPERGLYCEMIA, WITHOUT LONG-TERM CURRENT USE OF INSULIN (HCC): ICD-10-CM

## 2022-12-13 DIAGNOSIS — I73.9 CLAUDICATION (HCC): ICD-10-CM

## 2022-12-13 DIAGNOSIS — E55.9 VITAMIN D DEFICIENCY: ICD-10-CM

## 2022-12-13 DIAGNOSIS — E61.1 IRON DEFICIENCY: ICD-10-CM

## 2022-12-13 DIAGNOSIS — Z13.29 SCREENING FOR THYROID DISORDER: ICD-10-CM

## 2022-12-13 DIAGNOSIS — Z13.0 SCREENING FOR DEFICIENCY ANEMIA: ICD-10-CM

## 2022-12-13 LAB
25(OH)D3 SERPL-MCNC: 38.7 NG/ML (ref 30–100)
ALBUMIN SERPL BCP-MCNC: 4.3 G/DL (ref 3.5–5)
ALP SERPL-CCNC: 89 U/L (ref 34–104)
ALT SERPL W P-5'-P-CCNC: 14 U/L (ref 7–52)
ANION GAP SERPL CALCULATED.3IONS-SCNC: 9 MMOL/L (ref 4–13)
AST SERPL W P-5'-P-CCNC: 11 U/L (ref 13–39)
BASOPHILS # BLD AUTO: 0.02 THOUSANDS/ÂΜL (ref 0–0.1)
BASOPHILS NFR BLD AUTO: 0 % (ref 0–1)
BILIRUB SERPL-MCNC: 0.27 MG/DL (ref 0.2–1)
BUN SERPL-MCNC: 18 MG/DL (ref 5–25)
CALCIUM SERPL-MCNC: 9.9 MG/DL (ref 8.4–10.2)
CHLORIDE SERPL-SCNC: 99 MMOL/L (ref 96–108)
CO2 SERPL-SCNC: 28 MMOL/L (ref 21–32)
CREAT SERPL-MCNC: 0.85 MG/DL (ref 0.6–1.3)
CREAT UR-MCNC: <13 MG/DL
EOSINOPHIL # BLD AUTO: 0.06 THOUSAND/ÂΜL (ref 0–0.61)
EOSINOPHIL NFR BLD AUTO: 1 % (ref 0–6)
ERYTHROCYTE [DISTWIDTH] IN BLOOD BY AUTOMATED COUNT: 13.7 % (ref 11.6–15.1)
FERRITIN SERPL-MCNC: 18 NG/ML (ref 8–388)
GFR SERPL CREATININE-BSD FRML MDRD: 77 ML/MIN/1.73SQ M
GLUCOSE SERPL-MCNC: 90 MG/DL (ref 65–140)
HCT VFR BLD AUTO: 45.2 % (ref 34.8–46.1)
HGB BLD-MCNC: 14.4 G/DL (ref 11.5–15.4)
IMM GRANULOCYTES # BLD AUTO: 0.02 THOUSAND/UL (ref 0–0.2)
IMM GRANULOCYTES NFR BLD AUTO: 0 % (ref 0–2)
IRON SATN MFR SERPL: 15 % (ref 15–50)
IRON SERPL-MCNC: 53 UG/DL (ref 50–170)
LYMPHOCYTES # BLD AUTO: 2.46 THOUSANDS/ÂΜL (ref 0.6–4.47)
LYMPHOCYTES NFR BLD AUTO: 39 % (ref 14–44)
MCH RBC QN AUTO: 29.1 PG (ref 26.8–34.3)
MCHC RBC AUTO-ENTMCNC: 31.9 G/DL (ref 31.4–37.4)
MCV RBC AUTO: 92 FL (ref 82–98)
MICROALBUMIN UR-MCNC: <5 MG/L (ref 0–20)
MONOCYTES # BLD AUTO: 0.55 THOUSAND/ÂΜL (ref 0.17–1.22)
MONOCYTES NFR BLD AUTO: 9 % (ref 4–12)
NEUTROPHILS # BLD AUTO: 3.26 THOUSANDS/ÂΜL (ref 1.85–7.62)
NEUTS SEG NFR BLD AUTO: 51 % (ref 43–75)
NRBC BLD AUTO-RTO: 0 /100 WBCS
PLATELET # BLD AUTO: 230 THOUSANDS/UL (ref 149–390)
PMV BLD AUTO: 10.3 FL (ref 8.9–12.7)
POTASSIUM SERPL-SCNC: 4.4 MMOL/L (ref 3.5–5.3)
PROT SERPL-MCNC: 6.8 G/DL (ref 6.4–8.4)
RBC # BLD AUTO: 4.94 MILLION/UL (ref 3.81–5.12)
SODIUM SERPL-SCNC: 136 MMOL/L (ref 135–147)
TIBC SERPL-MCNC: 347 UG/DL (ref 250–450)
TSH SERPL DL<=0.05 MIU/L-ACNC: 2.64 UIU/ML (ref 0.45–4.5)
WBC # BLD AUTO: 6.37 THOUSAND/UL (ref 4.31–10.16)

## 2022-12-13 RX ORDER — LIRAGLUTIDE 6 MG/ML
INJECTION SUBCUTANEOUS
Qty: 6 ML | Refills: 0 | Status: SHIPPED | OUTPATIENT
Start: 2022-12-13 | End: 2022-12-19 | Stop reason: SDUPTHER

## 2022-12-19 DIAGNOSIS — E11.65 TYPE 2 DIABETES MELLITUS WITH HYPERGLYCEMIA, WITHOUT LONG-TERM CURRENT USE OF INSULIN (HCC): ICD-10-CM

## 2022-12-20 DIAGNOSIS — E11.65 TYPE 2 DIABETES MELLITUS WITH HYPERGLYCEMIA, WITHOUT LONG-TERM CURRENT USE OF INSULIN (HCC): ICD-10-CM

## 2022-12-20 DIAGNOSIS — J45.30 MILD PERSISTENT ASTHMA WITHOUT COMPLICATION: ICD-10-CM

## 2022-12-20 RX ORDER — LIRAGLUTIDE 6 MG/ML
0.6 INJECTION SUBCUTANEOUS DAILY
Qty: 6 ML | Refills: 0 | Status: SHIPPED | OUTPATIENT
Start: 2022-12-20

## 2022-12-20 RX ORDER — LIRAGLUTIDE 6 MG/ML
0.6 INJECTION SUBCUTANEOUS DAILY
Qty: 6 ML | Refills: 0 | Status: SHIPPED | OUTPATIENT
Start: 2022-12-20 | End: 2022-12-20 | Stop reason: SDUPTHER

## 2022-12-21 RX ORDER — BUDESONIDE AND FORMOTEROL FUMARATE DIHYDRATE 80; 4.5 UG/1; UG/1
2 AEROSOL RESPIRATORY (INHALATION) 2 TIMES DAILY
Qty: 10.2 G | Refills: 1 | Status: SHIPPED | OUTPATIENT
Start: 2022-12-21

## 2022-12-24 DIAGNOSIS — E11.65 TYPE 2 DIABETES MELLITUS WITH HYPERGLYCEMIA, WITHOUT LONG-TERM CURRENT USE OF INSULIN (HCC): ICD-10-CM

## 2022-12-24 RX ORDER — PEN NEEDLE, DIABETIC 31 GX5/16"
NEEDLE, DISPOSABLE MISCELLANEOUS
Qty: 100 EACH | Refills: 1 | Status: SHIPPED | OUTPATIENT
Start: 2022-12-24

## 2022-12-28 ENCOUNTER — HOSPITAL ENCOUNTER (EMERGENCY)
Facility: HOSPITAL | Age: 52
Discharge: HOME | End: 2022-12-28
Attending: EMERGENCY MEDICINE

## 2022-12-28 VITALS
SYSTOLIC BLOOD PRESSURE: 118 MMHG | TEMPERATURE: 98 F | OXYGEN SATURATION: 97 % | DIASTOLIC BLOOD PRESSURE: 72 MMHG | WEIGHT: 197 LBS | HEART RATE: 92 BPM | BODY MASS INDEX: 36.03 KG/M2 | RESPIRATION RATE: 18 BRPM

## 2022-12-28 DIAGNOSIS — M62.838 TRAPEZIUS MUSCLE SPASM: Primary | ICD-10-CM

## 2022-12-28 DIAGNOSIS — S16.1XXA ACUTE STRAIN OF NECK MUSCLE, INITIAL ENCOUNTER: ICD-10-CM

## 2022-12-28 RX ORDER — METHOCARBAMOL 500 MG/1
500 TABLET, FILM COATED ORAL 2 TIMES DAILY
Qty: 20 TABLET | Refills: 0 | Status: SHIPPED | OUTPATIENT
Start: 2022-12-28

## 2022-12-28 RX ORDER — LIDOCAINE 50 MG/G
1 PATCH TOPICAL ONCE
Status: DISCONTINUED | OUTPATIENT
Start: 2022-12-28 | End: 2022-12-28 | Stop reason: HOSPADM

## 2022-12-28 RX ORDER — KETOROLAC TROMETHAMINE 30 MG/ML
15 INJECTION, SOLUTION INTRAMUSCULAR; INTRAVENOUS ONCE
Status: COMPLETED | OUTPATIENT
Start: 2022-12-28 | End: 2022-12-28

## 2022-12-28 RX ORDER — ACETAMINOPHEN 325 MG/1
975 TABLET ORAL ONCE
Status: COMPLETED | OUTPATIENT
Start: 2022-12-28 | End: 2022-12-28

## 2022-12-28 RX ADMIN — KETOROLAC TROMETHAMINE 15 MG: 30 INJECTION, SOLUTION INTRAMUSCULAR; INTRAVENOUS at 15:18

## 2022-12-28 RX ADMIN — ACETAMINOPHEN 975 MG: 325 TABLET ORAL at 15:16

## 2022-12-28 RX ADMIN — LIDOCAINE 5% 1 PATCH: 700 PATCH TOPICAL at 15:17

## 2022-12-28 NOTE — ED PROVIDER NOTES
History  Chief Complaint   Patient presents with   • Neck Pain     Pt reports neck pain since yesterday after waking up with it  Pt denies recent injury      Patient is a 49-year-old female who presents for evaluation of right paraspinal/trapezius pain  Patient says she has had the symptoms since yesterday after waking up with that  She has not taken anything for the pain  She says that the pain is worse when she turns her head to the right  She denies any numbness or tingling or weakness in her right upper extremity  She denies any trauma to her neck  Patient says that she "had some blood come out of her ear" and felt some fullness  She says that she put a Q-tip in her ear with a little bit of blood  She denies any ear pain, fevers, sore throat  Prior to Admission Medications   Prescriptions Last Dose Informant Patient Reported? Taking? ARIPiprazole (ABILIFY) 10 mg tablet   Yes No   Sig: Take 10 mg by mouth daily   B-D UF III MINI PEN NEEDLES 31G X 5 MM MISC   No No   Sig: use once daily IN THE MORNING   EPINEPHrine (EPIPEN) 0 3 mg/0 3 mL SOAJ   No No   Sig: Inject 0 3 mL (0 3 mg total) into the shoulder, thigh, or buttocks once for 1 dose   FeroSul 325 (65 Fe) MG tablet   No No   Sig: take 1 tablet by mouth once daily before breakfast   Invokamet 150-1000 MG TABS   No No   Sig: take 1 tablet by mouth twice a day   Microlet Lancets MISC   No No   Sig: Test twice daily   Patient not taking: Reported on 2022   budesonide-formoterol (SYMBICORT) 80-4 5 MCG/ACT inhaler   No No   Sig: Inhale 2 puffs 2 (two) times a day Rinse mouth after use     ciclopirox (PENLAC) 8 % solution   No No   Sig: Apply topically daily at bedtime   fluticasone (FLONASE) 50 mcg/act nasal spray   No No   Si spray into each nostril daily   gabapentin (NEURONTIN) 300 mg capsule   No No   Sig: Take 1 capsule (300 mg total) by mouth 2 (two) times a day   liraglutide (Victoza) injection   No No   Sig: Inject 0 1 mL (0 6 mg total) under the skin daily   lisinopril (ZESTRIL) 2 5 mg tablet   No No   Sig: take 1 tablet by mouth once daily   loratadine-pseudoephedrine (CLARITIN-D 24-HOUR)  mg per 24 hr tablet   No No   Sig: Take 1 tablet by mouth daily   Patient not taking: Reported on 2022   omeprazole (PriLOSEC) 40 MG capsule   No No   Sig: take 1 capsule place once daily before breakfast   pravastatin (PRAVACHOL) 20 mg tablet   No No   Sig: take 1 tablet by mouth once daily   venlafaxine (EFFEXOR-XR) 150 mg 24 hr capsule   Yes No   Sig: Take 150 mg by mouth daily        Facility-Administered Medications: None       Past Medical History:   Diagnosis Date   • Abnormal ECG    • Anxiety    • Arthritis    • Asthma    • Callus    • Cancer (HCC)     renal,cervical   • Depression    • Diabetes mellitus ([facility] [address] )    • Difficulty walking    • Diverticulitis    • GERD (gastroesophageal reflux disease)    • Hyperlipidemia associated with type 2 diabetes mellitus [facility] )    • Hypertension    • Insulinoma    • Liver disease    • Miscarriage    • Non-recurrent acute suppurative otitis media of left ear without spontaneous rupture of tympanic membrane 2019   • Peptic ulceration    • Plantar fasciitis 4/15/22   • Pulmonary emboli (HCC)    • Sleep apnea    • Sleep apnea, obstructive        Past Surgical History:   Procedure Laterality Date   • ABDOMINAL SURGERY  1990    Pancreatectomy (partial)   •  SECTION      twice   • CHOLECYSTECTOMY     • KNEE ARTHROSCOPY Left    • KNEE SURGERY     • PANCREATECTOMY      Partial    • SC COLONOSCOPY FLX DX W/COLLJ SPEC WHEN PFRMD N/A 2019    Procedure: COLONOSCOPY;  Surgeon: Ross Johnson DO;  Location: [facility];  Service: Gastroenterology   • SC LAPAROSCOPY SURG CHOLECYSTECTOMY N/A 2018    Procedure: CHOLECYSTECTOMY LAPAROSCOPIC;  Surgeon: Duncan Ramirez DO;  Location: [facility];  Service: General       Family History   Problem Relation Age of Onset   • Heart disease Mother • Hyperlipidemia Mother    • Obesity Mother    • Hypertension Mother    • Other Mother         Bundle branch block   • Tuberculosis Mother    • Arthritis Mother    • Diabetes Mother    • Osteoporosis Mother    • Ulcerative colitis Mother    • Substance Abuse Father    • Cancer Father    • No Known Problems Sister    • No Known Problems Sister    • No Known Problems Daughter    • Other Maternal Grandmother         ascending aortic aneurysm resection   • Diabetes Maternal Grandmother    • Diabetes Paternal Grandmother    • No Known Problems Brother    • Tuberculosis Maternal Aunt    • No Known Problems Paternal Aunt    • No Known Problems Paternal Aunt    • No Known Problems Paternal Aunt    • No Known Problems Paternal Aunt    • No Known Problems Paternal Aunt    • Breast cancer Other 80   • Substance Abuse Family    • Osteoporosis Family    • No Known Problems Half-Sister      I have reviewed and agree with the history as documented  E-Cigarette/Vaping   • E-Cigarette Use Never User      E-Cigarette/Vaping Substances   • Nicotine No    • THC No    • CBD No    • Flavoring No    • Other No    • Unknown No      Social History     Tobacco Use   • Smoking status: Former     Packs/day: 1 00     Years: 30 00     Pack years: 30 00     Types: Cigarettes     Quit date:      Years since quittin 9   • Smokeless tobacco: Never   • Tobacco comments:     Dont need at all   Vaping Use   • Vaping Use: Never used   Substance Use Topics   • Alcohol use: No   • Drug use: No       Review of Systems   Constitutional: Negative for fever and unexpected weight change  HENT: Positive for ear discharge  Negative for congestion, ear pain, sore throat and trouble swallowing  Eyes: Negative for pain and redness  Respiratory: Negative for cough, chest tightness and shortness of breath  Cardiovascular: Negative for chest pain and leg swelling     Gastrointestinal: Negative for abdominal distention, abdominal pain, diarrhea and vomiting  Endocrine: Negative for polyuria  Genitourinary: Negative for dysuria, hematuria, pelvic pain and vaginal bleeding  Musculoskeletal: Positive for neck pain  Negative for back pain and myalgias  Skin: Negative for rash  Neurological: Negative for dizziness, syncope, weakness, light-headedness and headaches  Physical Exam  Physical Exam  Vitals and nursing note reviewed  Constitutional:       General: She is not in acute distress  Appearance: She is well-developed  HENT:      Head: Normocephalic and atraumatic  Right Ear: Tympanic membrane and external ear normal       Left Ear: Tympanic membrane and external ear normal       Nose: Nose normal       Mouth/Throat:      Mouth: Mucous membranes are moist       Pharynx: No oropharyngeal exudate or posterior oropharyngeal erythema  Eyes:      Conjunctiva/sclera: Conjunctivae normal       Pupils: Pupils are equal, round, and reactive to light  Neck:     Cardiovascular:      Rate and Rhythm: Normal rate and regular rhythm  Heart sounds: Normal heart sounds  No murmur heard  No friction rub  No gallop  Pulmonary:      Effort: Pulmonary effort is normal  No respiratory distress  Breath sounds: Normal breath sounds  No wheezing or rales  Abdominal:      General: There is no distension  Palpations: Abdomen is soft  Tenderness: There is no abdominal tenderness  There is no guarding  Musculoskeletal:         General: No swelling, tenderness or deformity  Normal range of motion  Cervical back: Normal range of motion and neck supple  No edema, erythema, signs of trauma or rigidity  Pain with movement present  No spinous process tenderness  Lymphadenopathy:      Cervical: No cervical adenopathy  Skin:     General: Skin is warm and dry  Neurological:      General: No focal deficit present  Mental Status: She is alert and oriented to person, place, and time  Mental status is at baseline  Cranial Nerves: No cranial nerve deficit  Sensory: No sensory deficit  Motor: No weakness or abnormal muscle tone  Coordination: Coordination normal          Vital Signs  ED Triage Vitals [12/28/22 1435]   Temperature Pulse Respirations Blood Pressure SpO2   98 °F (36 7 °C) 92 18 118/72 97 %      Temp Source Heart Rate Source Patient Position - Orthostatic VS BP Location FiO2 (%)   Temporal -- -- Left arm --      Pain Score       8           Vitals:    12/28/22 1435   BP: 118/72   Pulse: 92         Visual Acuity      ED Medications  Medications   ketorolac (TORADOL) injection 15 mg (15 mg Intramuscular Given 12/28/22 1518)   acetaminophen (TYLENOL) tablet 975 mg (975 mg Oral Given 12/28/22 1516)       Diagnostic Studies  Results Reviewed     None                 No orders to display              Procedures  Procedures         ED Course                                             MDM  Number of Diagnoses or Management Options  Diagnosis management comments: 68-year-old female presenting for paraspinal right neck/upper trap pain  Since yesterday after waking up  Has not taken anything  Has point tenderness to the paracervical spinal musculature and trapezius  No C-spine tenderness  Mitts to some blood from ear  TM is here and intact  There is no TM redness or edema  Ear canals within normal limits      Disposition  Final diagnoses:   Trapezius muscle spasm   Acute strain of neck muscle, initial encounter     Time reflects when diagnosis was documented in both MDM as applicable and the Disposition within this note     Time User Action Codes Description Comment    12/28/2022  3:42 PM Adarsh Pérez Add [[id number] [id number]] Trapezius muscle spasm     12/28/2022  3:42 PM Adarsh Pérez Add [S16  1XXA] Acute strain of neck muscle, initial encounter       ED Disposition     ED Disposition   Discharge    Condition   Stable    Date/Time   Wed Dec 28, 2022  3:42 PM    Comment   Katie Dye discharge to home/self care                Follow-up Information     Follow up With Specialties Details Why Contact Info    Ju Mosley PA-C Family Medicine, Internal Medicine, Physician Assistant Schedule an appointment as soon as possible for a visit  For follow up of symptoms [address] [place]  [phone]            Discharge Medication List as of 12/28/2022  3:48 PM      START taking these medications    Details   methocarbamol (ROBAXIN) 500 mg tablet Take 1 tablet (500 mg total) by mouth 2 (two) times a day, Starting Wed 12/28/2022, Normal         CONTINUE these medications which have NOT CHANGED    Details   budesonide-formoterol (SYMBICORT) 80-4 5 MCG/ACT inhaler Inhale 2 puffs 2 (two) times a day Rinse mouth after use , Starting Wed 12/21/2022, Normal      ARIPiprazole (ABILIFY) 10 mg tablet Take 10 mg by mouth daily, Starting Sun 10/31/2021, Historical Med      B-D UF III MINI PEN NEEDLES 31G X 5 MM MISC use once daily IN THE MORNING, Normal      ciclopirox (PENLAC) 8 % solution Apply topically daily at bedtime, Starting Fri 4/29/2022, Normal      EPINEPHrine (EPIPEN) 0 3 mg/0 3 mL SOAJ Inject 0 3 mL (0 3 mg total) into the shoulder, thigh, or buttocks once for 1 dose, Starting Fri 6/1/2018, Normal      FeroSul 325 (65 Fe) MG tablet take 1 tablet by mouth once daily before breakfast, Normal      fluticasone (FLONASE) 50 mcg/act nasal spray 1 spray into each nostril daily, Starting Wed 3/23/2022, Normal      gabapentin (NEURONTIN) 300 mg capsule Take 1 capsule (300 mg total) by mouth 2 (two) times a day, Starting Tue 12/6/2022, Normal      Invokamet 150-1000 MG TABS take 1 tablet by mouth twice a day, Normal      liraglutide (Victoza) injection Inject 0 1 mL (0 6 mg total) under the skin daily, Starting Tue 12/20/2022, Normal      lisinopril (ZESTRIL) 2 5 mg tablet take 1 tablet by mouth once daily, Normal      loratadine-pseudoephedrine (CLARITIN-D 24-HOUR)  mg per 24 hr tablet Take 1 tablet by mouth daily, Starting Fri 9/17/2021, Normal      Microlet Lancets MISC Test twice daily, Normal      omeprazole (PriLOSEC) 40 MG capsule take 1 capsule place once daily before breakfast, Normal      pravastatin (PRAVACHOL) 20 mg tablet take 1 tablet by mouth once daily, Normal      venlafaxine (EFFEXOR-XR) 150 mg 24 hr capsule Take 150 mg by mouth daily  , Starting Fri 8/2/2013, Historical Med             No discharge procedures on file      PDMP Review     None          ED Provider  Electronically Signed by           Mallika Cintron DO  12/29/22 9529

## 2022-12-30 DIAGNOSIS — E61.1 IRON DEFICIENCY: ICD-10-CM

## 2022-12-30 RX ORDER — FERROUS SULFATE 325(65) MG
325 TABLET ORAL 2 TIMES DAILY WITH MEALS
Qty: 30 TABLET | Refills: 2 | Status: SHIPPED | OUTPATIENT
Start: 2022-12-30

## 2023-01-14 NOTE — PROGRESS NOTES
Pulmonary Follow Up Note   Katie Dye 45 y o  female MRN: [id number]  1/16/2023    Assessment:  Moderate persistent asthma  · Still does not seem to be very well controlled however patient does not feel the need to be on inhalers consistently  · Asthma control test discussed today 17/25  · No history of frequent attacks/exacerbation or steroid use    Plan:  · Continue Symbicort 80 every 12, encouraged to use on daily basis if felt worse symptoms  · Continue PRN albuterol  · Counseled about avoiding allergens    Former tobacco abuse  · For 30-pack-year quit in 2013  · Next due for lung cancer screening in 7/2023 ordered  · Remains abstinent    Morbid obesity  · Counseled extensively  · Not interested in weight management referral    ANSHUL  · Last compliance report at last visit showed usual 76% of the times about 4 hours  · Residual AHI 5 7  · Change PAP pressures to 15 to 20 cm water  · Requested a compliance report      Return in about 6 months (around 7/16/2023)  History of Present Illness     Follow up for:  Mild persistent asthma     Background:  45 y o  female with a h/o seasonal allergies, DM2, asthma, ANSHUL, PLMD, class 2 obesity, pulmonary embolism in 2017 treated for provoked/after was on the Dr  trip to [place], reflux, depression, tobacco abuse former/quit in 2013 30 PY      Known to have asthma since age of 13, known triggers of exposure to paint, cleaning detergents/chemical, dust/garbage  No history of severe attack     03/2022 visit-continued on low-dose Symbicort, compliance report showed AHI of 4 6  Iron supplements for and deficiency anemia  Lung cancer screening CT chest to be done in 07/2022 9/2022 visit-use Symbicort 8o on regularly basis, TTE  Not interested in weight management referral   Compliance report 78% usage above 4 hours residual AHI 5 7 on pressure 13 to 20 cm water    Interval History  Since last seen, no major events    Continue to use Symbicort intermittently about 20% of the time, did not have to use PRN albuterol at all  Admits to having intermittent coughing spells about twice a week but did not feel the need to have any of the inhalers  Still using the CPAP consistently every night, admits to having a m  headache  Last compliance report showed residual AHI 5 7 on pressure 13-20 centimeters water  Review of Systems  As per hpi, all other systems reviewed and were negative    Studies:     Imaging and other studies: I have personally reviewed pertinent films in PACS  CT 07/15/2022-3 mm RML perifissural nodule/suspect intrapulmonary lymph node  RLL cyst/unchanged  No suspicious lesions         Pulmonary function testing:   PFT 10/07/2020-ratio 75%, FEV1 1 92 L/84%, FVC 2 55 L/84%  TLC 86%, %  DLCO 86%     EKG, Pathology, and Other Studies: I have personally reviewed pertinent reports  TTE 7/35/5963-YB 91%, systolic function and normal   Grade 1 diastolic dysfunction  TAPSE 1 7      Past medical, surgical, social and family histories reviewed  Medications/Allergies: Reviewed      Vitals: Blood pressure 110/72, pulse 94, temperature 97 9 °F (36 6 °C), temperature source Temporal, height 5' 2" (1 575 m), weight 91 9 kg (202 lb 9 6 oz), last menstrual period 05/20/2019, SpO2 96 %  Body mass index is 37 06 kg/m²  Oxygen Therapy  SpO2: 96 %  Oxygen Therapy: None (Room air)      Physical Exam  Body mass index is 37 06 kg/m²     Gen: not in acute distress,   Neck/Eyes: supple, no adenopathy, PERRL  Ear: normal appearance, no significant hearing impairment  Nose:  normal nasal mucosa, no drainage  Mouth:  unremarkable/normal appearance of lips, teeth and gums  Oropharynx: mucosa is moist, no focal lesions or erythema  Salivary glands: soft nontender  Chest: normal respiratory efforts, clear breath sounds bilaterally  CV: RRR, no murmurs appreciated, no edema  Abdomen: soft, non tender  Extremities:  No observed deformity   Skin: unremarkable  Neuro: AAO X3, no focal motor deficit        Labs:  Lab Results   Component Value Date    WBC 6 37 12/13/2022    HGB 14 4 12/13/2022    HCT 45 2 12/13/2022    MCV 92 12/13/2022     12/13/2022     Lab Results   Component Value Date    GLUCOSE 116 11/19/2015    CALCIUM 9 9 12/13/2022     06/09/2018    K 4 4 12/13/2022    CO2 28 12/13/2022    CL 99 12/13/2022    BUN 18 12/13/2022    CREATININE 0 85 12/13/2022     No results found for: IGE  Lab Results   Component Value Date    ALT 14 12/13/2022    AST 11 (L) 12/13/2022    ALKPHOS 89 12/13/2022    BILITOT 0 4 06/09/2018           Portions of the record may have been created with voice recognition software  Occasional wrong word or "sound a like" substitutions may have occurred due to the inherent limitations of voice recognition software  Read the chart carefully and recognize, using context, where substitutions have occurred    [place], M D [facility]

## 2023-01-16 ENCOUNTER — OFFICE VISIT (OUTPATIENT)
Dept: PULMONOLOGY | Facility: CLINIC | Age: 53
End: 2023-01-16

## 2023-01-16 VITALS
SYSTOLIC BLOOD PRESSURE: 110 MMHG | BODY MASS INDEX: 37.28 KG/M2 | TEMPERATURE: 97.9 F | HEIGHT: 62 IN | OXYGEN SATURATION: 96 % | HEART RATE: 94 BPM | DIASTOLIC BLOOD PRESSURE: 72 MMHG | WEIGHT: 202.6 LBS

## 2023-01-16 DIAGNOSIS — G47.33 OSA (OBSTRUCTIVE SLEEP APNEA): ICD-10-CM

## 2023-01-16 DIAGNOSIS — J30.2 SEASONAL ALLERGIC RHINITIS, UNSPECIFIED TRIGGER: Primary | ICD-10-CM

## 2023-01-16 DIAGNOSIS — F17.211 CIGARETTE NICOTINE DEPENDENCE IN REMISSION: ICD-10-CM

## 2023-01-16 LAB
DME PARACHUTE DELIVERY DATE REQUESTED: NORMAL
DME PARACHUTE ITEM DESCRIPTION: NORMAL
DME PARACHUTE ORDER STATUS: NORMAL
DME PARACHUTE SUPPLIER NAME: NORMAL
DME PARACHUTE SUPPLIER PHONE: NORMAL

## 2023-02-05 DIAGNOSIS — E78.5 HYPERLIPIDEMIA ASSOCIATED WITH TYPE 2 DIABETES MELLITUS (HCC): ICD-10-CM

## 2023-02-05 DIAGNOSIS — E11.69 HYPERLIPIDEMIA ASSOCIATED WITH TYPE 2 DIABETES MELLITUS (HCC): ICD-10-CM

## 2023-02-05 RX ORDER — PRAVASTATIN SODIUM 20 MG
TABLET ORAL
Qty: 90 TABLET | Refills: 1 | Status: SHIPPED | OUTPATIENT
Start: 2023-02-05

## 2023-02-07 ENCOUNTER — OFFICE VISIT (OUTPATIENT)
Dept: OBGYN CLINIC | Facility: CLINIC | Age: 53
End: 2023-02-07

## 2023-02-07 VITALS
WEIGHT: 202 LBS | DIASTOLIC BLOOD PRESSURE: 73 MMHG | SYSTOLIC BLOOD PRESSURE: 108 MMHG | BODY MASS INDEX: 37.17 KG/M2 | HEART RATE: 118 BPM | HEIGHT: 62 IN

## 2023-02-07 DIAGNOSIS — M18.0 ARTHRITIS OF CARPOMETACARPAL (CMC) JOINT OF BOTH THUMBS: Primary | ICD-10-CM

## 2023-02-07 NOTE — PROGRESS NOTES
ASSESSMENT/PLAN:    Diagnoses and all orders for this visit:    Arthritis of carpometacarpal [place]) joint of both thumbs  -     Small joint arthrocentesis: bilateral thumb CMC      The patient was offered a corticosteroid injection for their bilateral thumbs due to receiving benefit from a corticosteroid injection in the past   They tolerated the procedure well  The patient was educated they may have some irritation in the next few days and should rest, ice, elevate and perform gentle range of motion exercises  They were advised the medicine should begin to work in a few days time  They were informed their blood sugar levels can temporarily elevate and should reach out to their PCP if this becomes an issue  I will see the patient back in approximately 3  months for repeat evaluation and possible repeat corticosteroid injection  Return in about 3 months (around 5/7/2023) for Recheck  Patient has Aia 16 arthritis of her bilateral thumbs  Under aseptic technique, both thumbs were injected with Celestone and Marcaine  She tolerated the procedures quite well  Return back in 3 months evaluation  If her condition changes, she would not hesitate to let us know      _____________________________________________________  CHIEF COMPLAINT:  Chief Complaint   Patient presents with   • Right Thumb - Follow-up   • Left Thumb - Follow-up         SUBJECTIVE:  Katie Dye is a 45 y o  female who presents for follow-up of arthritis in bilateral carpometacarpal joints  The patient was last seen in the office on 11/1/23 where she received corticosteroid injections to bilateral thumbs  She had about a month of relief in symptoms from her previous corticosteroid injection  She is requesting a repeat injection today as her pain is not as intense as it was prior to her injection         The following portions of the patient's history were reviewed and updated as appropriate: allergies, current medications, past family history, past medical history, past social history, past surgical history and problem list     PAST MEDICAL HISTORY:  Past Medical History:   Diagnosis Date   • Abnormal ECG    • Anxiety    • Arthritis    • Asthma    • Callus    • Cancer ([place] [address] )     renal,cervical   • Depression    • Diabetes mellitus ([place] [address] )    • Difficulty walking    • Diverticulitis    • GERD (gastroesophageal reflux disease)    • Hyperlipidemia associated with type 2 diabetes mellitus ([place] [address] )    • Hypertension    • Insulinoma    • Liver disease    • Miscarriage    • Non-recurrent acute suppurative otitis media of left ear without spontaneous rupture of tympanic membrane 2019   • Peptic ulceration    • Plantar fasciitis 4/15/22   • Pulmonary emboli ([facility])    • Sleep apnea    • Sleep apnea, obstructive        PAST SURGICAL HISTORY:  Past Surgical History:   Procedure Laterality Date   • ABDOMINAL SURGERY  1990    Pancreatectomy (partial)   •  SECTION      twice   • CHOLECYSTECTOMY     • KNEE ARTHROSCOPY Left    • KNEE SURGERY     • PANCREATECTOMY      Partial    • SD COLONOSCOPY FLX DX W/COLLJ SPEC WHEN PFRMD N/A 2019    Procedure: COLONOSCOPY;  Surgeon: Sumanth Bucio DO;  Location: [facility];  Service: Gastroenterology   • SD LAPAROSCOPY SURG CHOLECYSTECTOMY N/A 2018    Procedure: CHOLECYSTECTOMY LAPAROSCOPIC;  Surgeon: Fouzia Lama DO;  Location: [facility];  Service: General       FAMILY HISTORY:  Family History   Problem Relation Age of Onset   • Heart disease Mother    • Hyperlipidemia Mother    • Obesity Mother    • Hypertension Mother    • Other Mother         Bundle branch block   • Tuberculosis Mother    • Arthritis Mother    • Diabetes Mother    • Osteoporosis Mother    • Ulcerative colitis Mother    • Substance Abuse Father    • Cancer Father    • No Known Problems Sister    • No Known Problems Sister    • No Known Problems Daughter    • Other Maternal Grandmother         ascending aortic aneurysm resection   • Diabetes Maternal Grandmother    • Diabetes Paternal Grandmother    • No Known Problems Brother    • Tuberculosis Maternal Aunt    • No Known Problems Paternal Aunt    • No Known Problems Paternal Aunt    • No Known Problems Paternal Aunt    • No Known Problems Paternal Aunt    • No Known Problems Paternal Aunt    • Breast cancer Other 80   • Substance Abuse Family    • Osteoporosis Family    • No Known Problems Half-Sister        SOCIAL HISTORY:  Social History     Tobacco Use   • Smoking status: Former     Packs/day: 1 00     Years: 30 00     Pack years: 30 00     Types: Cigarettes     Quit date: 2013     Years since quitting: 10 1   • Smokeless tobacco: Never   • Tobacco comments:     Dont need at all   Vaping Use   • Vaping Use: Never used   Substance Use Topics   • Alcohol use: No   • Drug use: No       MEDICATIONS:    Current Outpatient Medications:   •  ARIPiprazole (ABILIFY) 10 mg tablet, Take 10 mg by mouth daily, Disp: , Rfl:   •  B-D UF III MINI PEN NEEDLES 31G X 5 MM MISC, use once daily IN THE MORNING, Disp: 100 each, Rfl: 1  •  budesonide-formoterol (SYMBICORT) 80-4 5 MCG/ACT inhaler, Inhale 2 puffs 2 (two) times a day Rinse mouth after use , Disp: 10 2 g, Rfl: 1  •  ciclopirox (PENLAC) 8 % solution, Apply topically daily at bedtime, Disp: 6 6 mL, Rfl: 0  •  ferrous sulfate (FeroSul) 325 (65 Fe) mg tablet, Take 1 tablet (325 mg total) by mouth 2 (two) times a day with meals, Disp: 30 tablet, Rfl: 2  •  fluticasone (FLONASE) 50 mcg/act nasal spray, 1 spray into each nostril daily, Disp: 16 g, Rfl: 5  •  gabapentin (NEURONTIN) 300 mg capsule, Take 1 capsule (300 mg total) by mouth 2 (two) times a day, Disp: 60 capsule, Rfl: 2  •  Invokamet 150-1000 MG TABS, take 1 tablet by mouth twice a day, Disp: 60 tablet, Rfl: 5  •  liraglutide (Victoza) injection, Inject 0 1 mL (0 6 mg total) under the skin daily, Disp: 6 mL, Rfl: 0  •  lisinopril (ZESTRIL) 2 5 mg tablet, take 1 tablet by mouth once daily, Disp: 90 tablet, Rfl: 1  •  loratadine-pseudoephedrine (CLARITIN-D 24-HOUR)  mg per 24 hr tablet, Take 1 tablet by mouth daily, Disp: 30 tablet, Rfl: 4  •  Microlet Lancets MISC, Test twice daily, Disp: 100 each, Rfl: 3  •  omeprazole (PriLOSEC) 40 MG capsule, take 1 capsule place once daily before breakfast, Disp: 30 capsule, Rfl: 5  •  pravastatin (PRAVACHOL) 20 mg tablet, take 1 tablet by mouth once daily, Disp: 90 tablet, Rfl: 1  •  venlafaxine (EFFEXOR-XR) 150 mg 24 hr capsule, Take 150 mg by mouth daily  , Disp: , Rfl:   •  EPINEPHrine (EPIPEN) 0 3 mg/0 3 mL SOAJ, Inject 0 3 mL (0 3 mg total) into the shoulder, thigh, or buttocks once for 1 dose, Disp: 0 3 mL, Rfl: 2  •  methocarbamol (ROBAXIN) 500 mg tablet, Take 1 tablet (500 mg total) by mouth 2 (two) times a day, Disp: 20 tablet, Rfl: 0    ALLERGIES:  Allergies   Allergen Reactions   • Iodine - Food Allergy Anaphylaxis     IVP DYE    • Nuts - Food Allergy Anaphylaxis     Annotation - [id number]: Pine nuts       ROS:  Review of Systems   Constitutional: Negative for appetite change and unexpected weight change  HENT: Negative for congestion and trouble swallowing  Eyes: Negative for visual disturbance  Respiratory: Negative for cough and shortness of breath  Cardiovascular: Negative for chest pain and palpitations  Gastrointestinal: Negative for nausea and vomiting  Endocrine: Negative for cold intolerance and heat intolerance  Musculoskeletal: Positive for arthralgias  Negative for joint swelling and myalgias  Skin: Negative for rash  Neurological: Negative for numbness  Constitutional: Negative for fatigue, fever or loss of appetite  HENT: Negative  Respiratory: Negative for shortness of breath, dyspnea  Cardiovascular: Negative for chest pain/tightness  Gastrointestinal: Negative for abdominal pain, N/V  Endocrine: Negative for cold/heat intolerance, unexplained weight loss/gain     Genitourinary: Negative for flank pain, dysuria, hematuria  Musculoskeletal: Positive for arthralgia   Skin: Negative for rash  Neurological: Negative for numbness or tingling  Psychiatric/Behavioral: Negative for agitation  _____________________________________________________  PHYSICAL EXAMINATION:    Blood pressure 108/73, pulse (!) 118, height 5' 2" (1 575 m), weight 91 6 kg (202 lb), last menstrual period 05/20/2019  Constitutional: Oriented to person, place, and time  Appears well-developed and well-nourished  No distress  HENT:   Head: Normocephalic  Eyes: Conjunctivae are normal  Right eye exhibits no discharge  Left eye exhibits no discharge  No scleral icterus  Cardiovascular: Normal rate  Pulmonary/Chest: Effort normal    Neurological: Alert and oriented to person, place, and time  Skin: Skin is warm and dry  No rash noted  Not diaphoretic  No erythema  No pallor  Psychiatric: Normal mood and affect  Behavior is normal  Judgment and thought content normal       MUSCULOSKELETAL EXAMINATION:   Physical Exam  Vitals and nursing note reviewed  HENT:      Head: Normocephalic and atraumatic  Eyes:      General: No scleral icterus  Conjunctiva/sclera: Conjunctivae normal    Cardiovascular:      Rate and Rhythm: Normal rate  Pulmonary:      Effort: Pulmonary effort is normal  No respiratory distress  Musculoskeletal:      Cervical back: Normal range of motion and neck supple  Skin:     General: Skin is warm and dry  Neurological:      Mental Status: She is alert and oriented to person, place, and time     Psychiatric:         Behavior: Behavior normal        Ortho Exam    Bilateral thumbs -   No obvious anatomical deformity  Skin is warm and dry to touch with no signs of erythema, ecchymosis, infection  No soft tissue swelling or effusion noted   Full EPB, APB, and FPL  TTP over 1st CMC joints  + CMC grind  + load and shift  Demonstrates normal wrist, elbow, and shoulder motion  Forearm compartments are soft and supple  2+ distal radial pulse with brisk capillary refill to the fingers  Radial, median, and ulnar motor and sensory distributions intact  Sensation light touch intact distally    Objective:  BP Readings from Last 1 Encounters:   02/07/23 108/73      Wt Readings from Last 1 Encounters:   02/07/23 91 6 kg (202 lb)        BMI:   Estimated body mass index is 36 95 kg/m² as calculated from the following:    Height as of this encounter: 5' 2" (1 575 m)  Weight as of this encounter: 91 6 kg (202 lb)  PROCEDURES PERFORMED:  Small joint arthrocentesis: bilateral thumb CMC  Universal Protocol:  Consent: Verbal consent obtained  Risks and benefits: risks, benefits and alternatives were discussed  Consent given by: patient  Time out: Immediately prior to procedure a "time out" was called to verify the correct patient, procedure, equipment, support staff and site/side marked as required    Timeout called at: 2/7/2023 1:24 PM   Patient understanding: patient states understanding of the procedure being performed  Site marked: the operative site was marked  Patient identity confirmed: verbally with patient    Supporting Documentation  Indications: pain   Procedure Details  Location: thumb - bilateral thumb CMC  Preparation: Patient was prepped and draped in the usual sterile fashion  Needle size: 25 G  Ultrasound guidance: no    Medications (Right): 0 5 mL bupivacaine 0 25 %; 3 mg betamethasone acetate-betamethasone sodium phosphate 6 (3-3) mg/mLMedications (Left): 0 5 mL bupivacaine 0 25 %; 3 mg betamethasone acetate-betamethasone sodium phosphate 6 (3-3) mg/mL   Patient tolerance: patient tolerated the procedure well with no immediate complications  Dressing:  Sterile dressing applied            Scribe Attestation    I,:  Zacarias Lewis am acting as a scribe while in the presence of the attending physician :       I,:  Sarthak Napier DO personally performed the services described in this documentation    as scribed in my presence :

## 2023-02-08 DIAGNOSIS — E11.65 TYPE 2 DIABETES MELLITUS WITH HYPERGLYCEMIA, WITHOUT LONG-TERM CURRENT USE OF INSULIN (HCC): ICD-10-CM

## 2023-02-08 DIAGNOSIS — I10 ESSENTIAL HYPERTENSION: ICD-10-CM

## 2023-02-08 DIAGNOSIS — Z91.030 BEE STING ALLERGY: ICD-10-CM

## 2023-02-08 DIAGNOSIS — K21.9 GASTROESOPHAGEAL REFLUX DISEASE WITHOUT ESOPHAGITIS: ICD-10-CM

## 2023-02-08 RX ORDER — BETAMETHASONE SODIUM PHOSPHATE AND BETAMETHASONE ACETATE 3; 3 MG/ML; MG/ML
3 INJECTION, SUSPENSION INTRA-ARTICULAR; INTRALESIONAL; INTRAMUSCULAR; SOFT TISSUE
Status: SHIPPED | OUTPATIENT
Start: 2023-02-08

## 2023-02-08 RX ORDER — BUPIVACAINE HYDROCHLORIDE 2.5 MG/ML
0.5 INJECTION, SOLUTION INFILTRATION; PERINEURAL
Status: SHIPPED | OUTPATIENT
Start: 2023-02-08

## 2023-02-08 RX ADMIN — BETAMETHASONE SODIUM PHOSPHATE AND BETAMETHASONE ACETATE 3 MG: 3; 3 INJECTION, SUSPENSION INTRA-ARTICULAR; INTRALESIONAL; INTRAMUSCULAR; SOFT TISSUE at 10:47

## 2023-02-08 RX ADMIN — BUPIVACAINE HYDROCHLORIDE 0.5 ML: 2.5 INJECTION, SOLUTION INFILTRATION; PERINEURAL at 10:47

## 2023-02-09 RX ORDER — OMEPRAZOLE 40 MG/1
40 CAPSULE, DELAYED RELEASE ORAL DAILY
Qty: 30 CAPSULE | Refills: 5 | Status: SHIPPED | OUTPATIENT
Start: 2023-02-09

## 2023-02-09 RX ORDER — LISINOPRIL 2.5 MG/1
2.5 TABLET ORAL DAILY
Qty: 90 TABLET | Refills: 1 | Status: SHIPPED | OUTPATIENT
Start: 2023-02-09

## 2023-02-09 RX ORDER — LIRAGLUTIDE 6 MG/ML
0.6 INJECTION SUBCUTANEOUS DAILY
Qty: 3 ML | Refills: 5 | Status: SHIPPED | OUTPATIENT
Start: 2023-02-09

## 2023-02-09 RX ORDER — EPINEPHRINE 0.3 MG/.3ML
0.3 INJECTION SUBCUTANEOUS ONCE
Qty: 0.3 ML | Refills: 0 | Status: SHIPPED | OUTPATIENT
Start: 2023-02-09 | End: 2023-02-09

## 2023-02-17 DIAGNOSIS — E61.1 IRON DEFICIENCY: ICD-10-CM

## 2023-02-17 RX ORDER — FERROUS SULFATE 325(65) MG
TABLET ORAL
Qty: 30 TABLET | Refills: 2 | Status: SHIPPED | OUTPATIENT
Start: 2023-02-17

## 2023-03-04 ENCOUNTER — HOSPITAL ENCOUNTER (EMERGENCY)
Facility: HOSPITAL | Age: 53
Discharge: HOME | End: 2023-03-05
Attending: INTERNAL MEDICINE

## 2023-03-04 VITALS
DIASTOLIC BLOOD PRESSURE: 69 MMHG | TEMPERATURE: 97.8 F | OXYGEN SATURATION: 93 % | RESPIRATION RATE: 20 BRPM | HEART RATE: 110 BPM | SYSTOLIC BLOOD PRESSURE: 125 MMHG

## 2023-03-04 DIAGNOSIS — M25.511 CHRONIC RIGHT SHOULDER PAIN: ICD-10-CM

## 2023-03-04 DIAGNOSIS — G89.29 CHRONIC RIGHT SHOULDER PAIN: ICD-10-CM

## 2023-03-04 DIAGNOSIS — M54.13 RADICULOPATHY OF CERVICOTHORACIC REGION: ICD-10-CM

## 2023-03-04 DIAGNOSIS — M54.2 NECK PAIN: ICD-10-CM

## 2023-03-04 DIAGNOSIS — J40 BRONCHITIS: Primary | ICD-10-CM

## 2023-03-04 LAB
FLUAV RNA RESP QL NAA+PROBE: NEGATIVE
FLUBV RNA RESP QL NAA+PROBE: NEGATIVE
RSV RNA RESP QL NAA+PROBE: NEGATIVE
SARS-COV-2 RNA RESP QL NAA+PROBE: NEGATIVE

## 2023-03-04 RX ORDER — DOXYCYCLINE HYCLATE 100 MG/1
100 CAPSULE ORAL ONCE
Status: COMPLETED | OUTPATIENT
Start: 2023-03-04 | End: 2023-03-04

## 2023-03-04 RX ORDER — AZITHROMYCIN 250 MG/1
TABLET, FILM COATED ORAL
Qty: 6 TABLET | Refills: 0 | Status: SHIPPED | OUTPATIENT
Start: 2023-03-04 | End: 2023-03-08

## 2023-03-04 RX ORDER — DEXAMETHASONE SODIUM PHOSPHATE 4 MG/ML
8 INJECTION, SOLUTION INTRA-ARTICULAR; INTRALESIONAL; INTRAMUSCULAR; INTRAVENOUS; SOFT TISSUE ONCE
Status: COMPLETED | OUTPATIENT
Start: 2023-03-04 | End: 2023-03-04

## 2023-03-04 RX ORDER — GABAPENTIN 300 MG/1
CAPSULE ORAL
Qty: 60 CAPSULE | Refills: 2 | Status: SHIPPED | OUTPATIENT
Start: 2023-03-04

## 2023-03-04 RX ORDER — METHYLPREDNISOLONE 4 MG/1
TABLET ORAL
Qty: 21 TABLET | Refills: 0 | Status: SHIPPED | OUTPATIENT
Start: 2023-03-04

## 2023-03-04 RX ADMIN — DEXAMETHASONE SODIUM PHOSPHATE 8 MG: 4 INJECTION, SOLUTION INTRAMUSCULAR; INTRAVENOUS at 22:54

## 2023-03-04 RX ADMIN — ALBUTEROL SULFATE 2.5 MG: 2.5 SOLUTION RESPIRATORY (INHALATION) at 22:55

## 2023-03-04 RX ADMIN — DOXYCYCLINE HYCLATE 100 MG: 100 CAPSULE ORAL at 22:54

## 2023-03-05 NOTE — ED PROVIDER NOTES
History  Chief Complaint   Patient presents with   • Cough     Pt reports cough that started a week ago and mild fever  Was around someone who was sick     61-year-old female presents emergency room chief complaint of cough, postnasal drip, sore throat and low-grade fever  Patient states that her symptoms been going on for several days and not improving despite taking over-the-counter medications such as Mucinex Delsym   Patient is getting some relief from her nebulizer at home  She states she feels as if she has a low-grade fever  Members of her family have been sick, her mother was just intubated due to shortness of breath and exacerbation of COPD  Patient has a history of diabetes, hypertension, hyperlipidemia, obesity  She is a former tobacco user she quit smoking 12 years ago about the age of 41 has a better 20-25-pack-year history of tobacco use  She has had no chest pain pressure heaviness tightness on the chest or shortness of breath  He does note wheezing occasionally especially at nighttime over the past couple of nights  Prior to Admission Medications   Prescriptions Last Dose Informant Patient Reported? Taking? ARIPiprazole (ABILIFY) 10 mg tablet   Yes No   Sig: Take 10 mg by mouth daily   B-D UF III MINI PEN NEEDLES 31G X 5 MM MISC   No No   Sig: use once daily IN THE MORNING   EPINEPHrine (EPIPEN) 0 3 mg/0 3 mL SOAJ   No No   Sig: Inject 0 3 mL (0 3 mg total) into a muscle once for 1 dose   FeroSul 325 (65 Fe) MG tablet   No No   Sig: take 1 tablet by mouth twice a day with meals   Invokamet 150-1000 MG TABS   No No   Sig: take 1 tablet by mouth twice a day   Microlet Lancets MISC   No No   Sig: Test twice daily   budesonide-formoterol (SYMBICORT) 80-4 5 MCG/ACT inhaler   No No   Sig: Inhale 2 puffs 2 (two) times a day Rinse mouth after use     ciclopirox (PENLAC) 8 % solution   No No   Sig: Apply topically daily at bedtime   fluticasone (FLONASE) 50 mcg/act nasal spray   No No   Sig: 1 spray into each nostril daily   gabapentin (NEURONTIN) 300 mg capsule   No No   Sig: take 1 capsule orally twice a day   liraglutide (Victoza) injection   No No   Sig: Inject 0 1 mL (0 6 mg total) under the skin daily   lisinopril (ZESTRIL) 2 5 mg tablet   No No   Sig: Take 1 tablet (2 5 mg total) by mouth daily   loratadine-pseudoephedrine (CLARITIN-D 24-HOUR)  mg per 24 hr tablet   No No   Sig: Take 1 tablet by mouth daily   methocarbamol (ROBAXIN) 500 mg tablet   No No   Sig: Take 1 tablet (500 mg total) by mouth 2 (two) times a day   omeprazole (PriLOSEC) 40 MG capsule   No No   Sig: Take 1 capsule (40 mg total) by mouth daily   pravastatin (PRAVACHOL) 20 mg tablet   No No   Sig: take 1 tablet by mouth once daily   venlafaxine (EFFEXOR-XR) 150 mg 24 hr capsule   Yes No   Sig: Take 150 mg by mouth daily        Facility-Administered Medications Last Administration Doses Remaining   betamethasone acetate-betamethasone sodium phosphate (CELESTONE) injection 3 mg 2/8/2023 10:47 AM    betamethasone acetate-betamethasone sodium phosphate (CELESTONE) injection 3 mg 2/8/2023 10:47 AM    bupivacaine (MARCAINE) 0 25 % injection 0 5 mL 2/8/2023 10:47 AM    bupivacaine (MARCAINE) 0 25 % injection 0 5 mL 2/8/2023 10:47 AM           Past Medical History:   Diagnosis Date   • Abnormal ECG    • Anxiety    • Arthritis    • Asthma    • Callus    • Cancer (HCC)     renal,cervical   • Depression    • Diabetes mellitus ([facility] [address] )    • Difficulty walking    • Diverticulitis    • GERD (gastroesophageal reflux disease)    • Hyperlipidemia associated with type 2 diabetes mellitus (HCC)    • Hypertension    • Insulinoma    • Liver disease    • Miscarriage 1989   • Non-recurrent acute suppurative otitis media of left ear without spontaneous rupture of tympanic membrane 8/13/2019   • Peptic ulceration    • Plantar fasciitis 4/15/22   • Pulmonary emboli (HCC)    • Sleep apnea    • Sleep apnea, obstructive        Past Surgical History: Procedure Laterality Date   • ABDOMINAL SURGERY  1990    Pancreatectomy (partial)   •  SECTION      twice   • CHOLECYSTECTOMY     • KNEE ARTHROSCOPY Left    • KNEE SURGERY     • PANCREATECTOMY      Partial    • MT COLONOSCOPY FLX DX W/COLLJ SPEC WHEN PFRMD N/A 2019    Procedure: COLONOSCOPY;  Surgeon: Ross Johnson DO;  Location: [facility];  Service: Gastroenterology   • MT LAPAROSCOPY SURG CHOLECYSTECTOMY N/A 2018    Procedure: CHOLECYSTECTOMY LAPAROSCOPIC;  Surgeon: Duncan Ramirez DO;  Location: [facility];  Service: General       Family History   Problem Relation Age of Onset   • Heart disease Mother    • Hyperlipidemia Mother    • Obesity Mother    • Hypertension Mother    • Other Mother         Bundle branch block   • Tuberculosis Mother    • Arthritis Mother    • Diabetes Mother    • Osteoporosis Mother    • Ulcerative colitis Mother    • Substance Abuse Father    • Cancer Father    • No Known Problems Sister    • No Known Problems Sister    • No Known Problems Daughter    • Other Maternal Grandmother         ascending aortic aneurysm resection   • Diabetes Maternal Grandmother    • Diabetes Paternal Grandmother    • No Known Problems Brother    • Tuberculosis Maternal Aunt    • No Known Problems Paternal Aunt    • No Known Problems Paternal Aunt    • No Known Problems Paternal Aunt    • No Known Problems Paternal Aunt    • No Known Problems Paternal Aunt    • Breast cancer Other 80   • Substance Abuse Family    • Osteoporosis Family    • No Known Problems Half-Sister      I have reviewed and agree with the history as documented      E-Cigarette/Vaping   • E-Cigarette Use Never User      E-Cigarette/Vaping Substances   • Nicotine No    • THC No    • CBD No    • Flavoring No    • Other No    • Unknown No      Social History     Tobacco Use   • Smoking status: Former     Packs/day: 1 00     Years: 30 00     Pack years: 30 00     Types: Cigarettes     Quit date:      Years since quitting: 10 1   • Smokeless tobacco: Never   • Tobacco comments:     Dont need at all   Vaping Use   • Vaping Use: Never used   Substance Use Topics   • Alcohol use: No   • Drug use: No       Review of Systems   Constitutional: Positive for fever  HENT: Positive for congestion, postnasal drip and sore throat  Sinus congestion with postnasal drip   Respiratory: Positive for cough  Negative for shortness of breath and wheezing  Cardiovascular: Negative  Gastrointestinal: Negative  Musculoskeletal: Negative  Neurological: Negative  Hematological: Negative  Physical Exam  Physical Exam  Vitals and nursing note reviewed  Constitutional:       General: She is not in acute distress  Appearance: Normal appearance  She is obese  She is not ill-appearing  HENT:      Head: Normocephalic and atraumatic  Right Ear: Tympanic membrane normal       Left Ear: Tympanic membrane normal       Nose: Nose normal       Mouth/Throat:      Pharynx: No oropharyngeal exudate or posterior oropharyngeal erythema  Eyes:      Extraocular Movements: Extraocular movements intact  Conjunctiva/sclera: Conjunctivae normal    Cardiovascular:      Rate and Rhythm: Normal rate and regular rhythm  Pulses: Normal pulses  Heart sounds: Normal heart sounds  Pulmonary:      Effort: Pulmonary effort is normal  No respiratory distress  Breath sounds: No stridor  Rhonchi present  Abdominal:      General: Abdomen is flat  Musculoskeletal:         General: Normal range of motion  Cervical back: Normal range of motion and neck supple  Skin:     Capillary Refill: Capillary refill takes less than 2 seconds  Neurological:      General: No focal deficit present  Mental Status: She is alert and oriented to person, place, and time  Psychiatric:         Mood and Affect: Mood normal          Behavior: Behavior normal          Thought Content:  Thought content normal          Judgment: Judgment normal          Vital Signs  ED Triage Vitals [03/04/23 2220]   Temperature Pulse Respirations Blood Pressure SpO2   97 8 °F (36 6 °C) (!) 110 20 125/69 93 %      Temp Source Heart Rate Source Patient Position - Orthostatic VS BP Location FiO2 (%)   Oral Monitor Lying Left arm --      Pain Score       No Pain           Vitals:    03/04/23 2220   BP: 125/69   Pulse: (!) 110   Patient Position - Orthostatic VS: Lying         Visual Acuity      ED Medications  Medications   albuterol inhalation solution 2 5 mg (2 5 mg Nebulization Given 3/4/23 2255)   dexamethasone (DECADRON) injection 8 mg (8 mg Intramuscular Given 3/4/23 2254)   doxycycline hyclate (VIBRAMYCIN) capsule 100 mg (100 mg Oral Given 3/4/23 2254)       Diagnostic Studies  Results Reviewed     Procedure Component Value Units Date/Time    FLU/RSV/COVID - if FLU/RSV clinically relevant [[id number]]  (Normal) Collected: 03/04/23 2255    Lab Status: Final result Specimen: Nares from Nose Updated: 03/04/23 2340     SARS-CoV-2 Negative     INFLUENZA A PCR Negative     INFLUENZA B PCR Negative     RSV PCR Negative    Narrative:      FOR PEDIATRIC PATIENTS - copy/paste COVID Guidelines URL to browser: [email or web address]  [employer]    SARS-CoV-2 assay is a Nucleic Acid Amplification assay intended for the  qualitative detection of nucleic acid from SARS-CoV-2 in nasopharyngeal  swabs  Results are for the presumptive identification of SARS-CoV-2 RNA  Positive results are indicative of infection with SARS-CoV-2, the virus  causing COVID-19, but do not rule out bacterial infection or co-infection  with other viruses  Laboratories within the [place] and its  territories are required to report all positive results to the appropriate  public health authorities  Negative results do not preclude SARS-CoV-2  infection and should not be used as the sole basis for treatment or other  patient management decisions  Negative results must be combined with  clinical observations, patient history, and epidemiological information  This test has not been FDA cleared or approved  This test has been authorized by FDA under an Emergency Use Authorization  (EUA)  This test is only authorized for the duration of time the  declaration that circumstances exist justifying the authorization of the  emergency use of an in vitro diagnostic tests for detection of SARS-CoV-2  virus and/or diagnosis of COVID-19 infection under section 564(b)(1) of  the Act, 21 U  S C  688CBK-9(Y)(2), unless the authorization is terminated  or revoked sooner  The test has been validated but independent review by FDA  and CLIA is pending  Test performed using [employer]: This RT-PCR assay targets N2,  a region unique to SARS-CoV-2  A conserved region in the E-gene was chosen  for pan-Sarbecovirus detection which includes SARS-CoV-2  According to CMS-2020-01-R, this platform meets the definition of high-throughput technology  No orders to display              Procedures  ECG 12 Lead Documentation Only    Date/Time: 3/4/2023 11:48 PM  Performed by: Diane Hernandez MD  Authorized by: Diane Hernandez MD     Indications / Diagnosis:  Cough  ECG reviewed by me, the ED Provider: yes    Patient location:  ED  Interpretation:     Interpretation: normal    Rate:     ECG rate:  80    ECG rate assessment: normal    Rhythm:     Rhythm: sinus rhythm    Ectopy:     Ectopy: PVCs    QRS:     QRS axis:  Normal  Conduction:     Conduction: normal    ST segments:     ST segments:  Normal  T waves:     T waves: normal               ED Course                               SBIRT 22yo+    Flowsheet Row Most Recent Value   SBIRT (23 yo +)    In order to provide better care to our patients, we are screening all of our patients for alcohol and drug use  Would it be okay to ask you these screening questions?  Unable to answer at this time Filed at: 03/04/2023 [address]  Patient presented with dry nonproductive cough sinus congestion and possibly low-grade fever the past 2 to 3 days  No respiratory distress  Patient improved with the nebulizer treatment in the emergency room  Will be discharged home with antibiotics and steroids  Recommend monitoring her glucose following her with her PCP in 48 to 72 hours  Risk  Prescription drug management  Disposition  Final diagnoses:   Bronchitis     Time reflects when diagnosis was documented in both MDM as applicable and the Disposition within this note     Time User Action Codes Description Comment    3/4/2023 11:52 PM Facundo Hardy Add [J40] Bronchitis       ED Disposition     ED Disposition   Discharge    Condition   Stable    Date/Time   Sat Mar 4, 2023 11:55 PM    Comment   Katie Dye discharge to home/self care  Follow-up Information     Follow up With Specialties Details Why Contact Info    Yvette Pastrana PA-C Family Medicine, Internal Medicine, Physician Assistant In 2 days As needed, If symptoms worsen [address] [facility] [address] [facility]      Yvette Pastrana, [address] [facility], Internal Medicine, Physician Assistant   [address] [address]  [phone]            Patient's Medications   Discharge Prescriptions    AZITHROMYCIN (ZITHROMAX) 250 MG TABLET    Take 2 tablets today then 1 tablet daily x 4 days       Start Date: 3/4/2023  End Date: 3/8/2023       Order Dose: --       Quantity: 6 tablet    Refills: 0    METHYLPREDNISOLONE 4 MG TABLET THERAPY PACK    Use as directed on package       Start Date: 3/4/2023  End Date: --       Order Dose: --       Quantity: 21 tablet    Refills: 0       No discharge procedures on file      PDMP Review     None          ED Provider  Electronically Signed by           Diana Ni MD  03/04/23 5006       Diana Ni MD  03/04/23 6570

## 2023-03-06 ENCOUNTER — OFFICE VISIT (OUTPATIENT)
Dept: INTERNAL MEDICINE CLINIC | Facility: CLINIC | Age: 53
End: 2023-03-06

## 2023-03-06 VITALS
TEMPERATURE: 98 F | WEIGHT: 196.5 LBS | SYSTOLIC BLOOD PRESSURE: 118 MMHG | HEIGHT: 62 IN | DIASTOLIC BLOOD PRESSURE: 62 MMHG | BODY MASS INDEX: 36.16 KG/M2 | HEART RATE: 92 BPM | OXYGEN SATURATION: 94 %

## 2023-03-06 DIAGNOSIS — E11.65 TYPE 2 DIABETES MELLITUS WITH HYPERGLYCEMIA, WITHOUT LONG-TERM CURRENT USE OF INSULIN (HCC): Primary | ICD-10-CM

## 2023-03-06 DIAGNOSIS — R05.1 ACUTE COUGH: ICD-10-CM

## 2023-03-06 DIAGNOSIS — E11.65 TYPE 2 DIABETES MELLITUS WITH HYPERGLYCEMIA, WITHOUT LONG-TERM CURRENT USE OF INSULIN (HCC): ICD-10-CM

## 2023-03-06 PROBLEM — R42 DIZZINESS: Status: RESOLVED | Noted: 2022-08-04 | Resolved: 2023-03-06

## 2023-03-06 LAB — SL AMB POCT HEMOGLOBIN AIC: 7.7 (ref ?–6.5)

## 2023-03-06 RX ORDER — LIRAGLUTIDE 6 MG/ML
1.2 INJECTION SUBCUTANEOUS DAILY
Qty: 3 ML | Refills: 5 | Status: SHIPPED | OUTPATIENT
Start: 2023-03-06

## 2023-03-06 RX ORDER — BENZONATATE 200 MG/1
200 CAPSULE ORAL 3 TIMES DAILY PRN
Qty: 20 CAPSULE | Refills: 0 | Status: SHIPPED | OUTPATIENT
Start: 2023-03-06

## 2023-03-06 RX ORDER — CANAGLIFLOZIN AND METFORMIN HYDROCHLORIDE 150; 1000 MG/1; MG/1
1 TABLET, FILM COATED ORAL 2 TIMES DAILY
Qty: 60 TABLET | Refills: 5 | Status: SHIPPED | OUTPATIENT
Start: 2023-03-06

## 2023-03-06 NOTE — PROGRESS NOTES
Name: Wan Blanco      : 1970      MRN: [id number]  Encounter Provider: Nidia Young PA-C  Encounter Date: 3/6/2023   Encounter department: [address] [place] [address]  Type 2 diabetes mellitus with hyperglycemia, without long-term current use of insulin [facility])  Assessment & Plan:    Lab Results   Component Value Date    HGBA1C 7 7 (A) 2023     Eye exam scheduled for next week  Foot exam up to date  A1C 7 7  Will increase victoza to 1 2mg  Continue invokamet  Orders:  -     POCT hemoglobin A1c  -     liraglutide (Victoza) injection; Inject 0 2 mL (1 2 mg total) under the skin daily    2  Acute cough  Assessment & Plan:  Continue zithromax and steroid taper  Will order tessalon perrles to help with cough as well  Orders:  -     benzonatate (TESSALON) 200 MG capsule; Take 1 capsule (200 mg total) by mouth 3 (three) times a day as needed for cough      BMI Counseling: Body mass index is 35 94 kg/m²  The BMI is above normal  Nutrition recommendations include decreasing portion sizes, consuming healthier snacks and limiting drinks that contain sugar  Rationale for BMI follow-up plan is due to patient being overweight or obese  Subjective      Pt presents for routine visit  She was recently in the ER for URI symptoms  She was negative for covid/flu/rsv  She is on zithromax and prednisone  She continues with symptoms especially a cough  She is up to date with diabetic foot exam, mammogram, and CRC screening  She is on ACE and statin therapy  Lung screening scheduled for July  She has eye exam scheduled for next week  A1C today is increased at 7 7 with previous being 7 5  Review of Systems   Constitutional: Negative for chills and fever  HENT: Negative for congestion, ear pain, hearing loss, postnasal drip, rhinorrhea, sinus pressure, sinus pain, sore throat and trouble swallowing  Eyes: Negative for pain and visual disturbance     Respiratory: Positive for cough, shortness of breath and wheezing  Negative for chest tightness  Cardiovascular: Negative  Negative for chest pain, palpitations and leg swelling  Gastrointestinal: Negative for abdominal pain, blood in stool, constipation, diarrhea, nausea and vomiting  Endocrine: Negative for cold intolerance, heat intolerance, polydipsia, polyphagia and polyuria  Genitourinary: Negative for difficulty urinating, dysuria, flank pain and urgency  Musculoskeletal: Negative for arthralgias, back pain, gait problem and myalgias  Skin: Negative for rash  Allergic/Immunologic: Negative  Neurological: Negative for dizziness, weakness, light-headedness and headaches  Hematological: Negative  Psychiatric/Behavioral: Negative for behavioral problems, dysphoric mood and sleep disturbance  The patient is not nervous/anxious  Current Outpatient Medications on File Prior to Visit   Medication Sig   • ARIPiprazole (ABILIFY) 10 mg tablet Take 10 mg by mouth daily   • azithromycin (ZITHROMAX) 250 mg tablet Take 2 tablets today then 1 tablet daily x 4 days   • B-D UF III MINI PEN NEEDLES 31G X 5 MM MISC use once daily IN THE MORNING   • budesonide-formoterol (SYMBICORT) 80-4 5 MCG/ACT inhaler Inhale 2 puffs 2 (two) times a day Rinse mouth after use  (Patient taking differently: Inhale 2 puffs 2 (two) times a day Rinse mouth after use    Pt has been forgetting to use 3/6)   • ciclopirox (PENLAC) 8 % solution Apply topically daily at bedtime   • FeroSul 325 (65 Fe) MG tablet take 1 tablet by mouth twice a day with meals (Patient taking differently: Pt taking every other day)   • fluticasone (FLONASE) 50 mcg/act nasal spray 1 spray into each nostril daily   • gabapentin (NEURONTIN) 300 mg capsule take 1 capsule orally twice a day   • lisinopril (ZESTRIL) 2 5 mg tablet Take 1 tablet (2 5 mg total) by mouth daily   • methylPREDNISolone 4 MG tablet therapy pack Use as directed on package   • Microlet Lancets MISC Test twice daily   • omeprazole (PriLOSEC) 40 MG capsule Take 1 capsule (40 mg total) by mouth daily   • pravastatin (PRAVACHOL) 20 mg tablet take 1 tablet by mouth once daily   • venlafaxine (EFFEXOR-XR) 150 mg 24 hr capsule Take 150 mg by mouth daily     • [DISCONTINUED] liraglutide (Victoza) injection Inject 0 1 mL (0 6 mg total) under the skin daily   • EPINEPHrine (EPIPEN) 0 3 mg/0 3 mL SOAJ Inject 0 3 mL (0 3 mg total) into a muscle once for 1 dose   • methocarbamol (ROBAXIN) 500 mg tablet Take 1 tablet (500 mg total) by mouth 2 (two) times a day   • [DISCONTINUED] Invokamet 150-1000 MG TABS take 1 tablet by mouth twice a day   • [DISCONTINUED] loratadine-pseudoephedrine (CLARITIN-D 24-HOUR)  mg per 24 hr tablet Take 1 tablet by mouth daily (Patient not taking: Reported on 3/6/2023)       Objective     /62 (BP Location: Left arm, Patient Position: Sitting)   Pulse 92   Temp 98 °F (36 7 °C)   Ht 5' 2" (1 575 m)   Wt 89 1 kg (196 lb 8 oz)   LMP 05/20/2019   SpO2 94%   BMI 35 94 kg/m²     Physical Exam  Vitals and nursing note reviewed  Constitutional:       General: She is not in acute distress  Appearance: She is well-developed  She is not diaphoretic  HENT:      Head: Normocephalic and atraumatic  Right Ear: External ear normal       Left Ear: External ear normal       Nose: Nose normal       Mouth/Throat:      Pharynx: No oropharyngeal exudate  Eyes:      General: No scleral icterus  Right eye: No discharge  Left eye: No discharge  Conjunctiva/sclera: Conjunctivae normal       Pupils: Pupils are equal, round, and reactive to light  Neck:      Thyroid: No thyromegaly  Cardiovascular:      Rate and Rhythm: Normal rate and regular rhythm  Heart sounds: Normal heart sounds  No murmur heard  No friction rub  No gallop  Pulmonary:      Effort: Pulmonary effort is normal  No respiratory distress        Breath sounds: Examination of the right-upper field reveals wheezing  Examination of the right-middle field reveals wheezing  Examination of the right-lower field reveals wheezing  Wheezing present  No rales  Abdominal:      General: Bowel sounds are normal  There is no distension  Palpations: Abdomen is soft  Tenderness: There is no abdominal tenderness  Musculoskeletal:         General: No tenderness or deformity  Normal range of motion  Cervical back: Normal range of motion and neck supple  Skin:     General: Skin is warm and dry  Neurological:      Mental Status: She is alert and oriented to person, place, and time  Cranial Nerves: No cranial nerve deficit  Psychiatric:         Behavior: Behavior normal          Thought Content:  Thought content normal          Judgment: Judgment normal        Gertrudis Obrien PA-C

## 2023-03-06 NOTE — ASSESSMENT & PLAN NOTE
Lab Results   Component Value Date    HGBA1C 7 7 (A) 03/06/2023     Eye exam scheduled for next week  Foot exam up to date  A1C 7 7  Will increase victoza to 1 2mg  Continue invokamet

## 2023-03-15 LAB
LEFT EYE DIABETIC RETINOPATHY: NORMAL
RIGHT EYE DIABETIC RETINOPATHY: NORMAL

## 2023-03-15 PROCEDURE — 2023F DILAT RTA XM W/O RTNOPTHY: CPT | Performed by: PHYSICIAN ASSISTANT

## 2023-04-04 ENCOUNTER — PROCEDURE VISIT (OUTPATIENT)
Dept: INTERNAL MEDICINE CLINIC | Facility: CLINIC | Age: 53
End: 2023-04-04

## 2023-04-04 VITALS
HEART RATE: 92 BPM | BODY MASS INDEX: 36.25 KG/M2 | WEIGHT: 197 LBS | DIASTOLIC BLOOD PRESSURE: 64 MMHG | OXYGEN SATURATION: 95 % | TEMPERATURE: 98.6 F | SYSTOLIC BLOOD PRESSURE: 120 MMHG | HEIGHT: 62 IN

## 2023-04-04 DIAGNOSIS — Z12.4 SCREENING FOR CERVICAL CANCER: Primary | ICD-10-CM

## 2023-04-04 DIAGNOSIS — R87.629 ABNORMAL PAP SMEAR OF VAGINA: ICD-10-CM

## 2023-04-04 DIAGNOSIS — B37.31 VAGINA, CANDIDIASIS: ICD-10-CM

## 2023-04-04 RX ORDER — FLUCONAZOLE 150 MG/1
150 TABLET ORAL ONCE
Qty: 1 TABLET | Refills: 0 | Status: SHIPPED | OUTPATIENT
Start: 2023-04-04 | End: 2023-04-04

## 2023-04-04 NOTE — PROGRESS NOTES
Assessment     Healthy female exam       Plan     Follow up in: 3 years  Subjective     Katie Dye is a 45 y o  female here for a re-pap exam as last pap 6 months ago had ASCUS results  Current complaints: yeast infection symptoms since being on antibiotics recently  Gynecologic History  Patient's last menstrual period was 2019  Menses Regular:no  Contraception: abstinence  Last Pap:   Results were: abnormal  Last mammogram: 2022  Results were: normal  BSE: yes  Last DEXA:N/A  Results were: not examined    Obstetric History  OB History    Para Term  AB Living   3 3 3     3   SAB IAB Ectopic Multiple Live Births           3      # Outcome Date GA Lbr [place]/2nd Weight Sex Delivery Anes PTL Lv   3 Term      CS-LTranv   CASH   2 Term      Vag-Spont   CASH   1 Term      CS-LTranv   CASH         The following portions of the patient's history were reviewed and updated as appropriate:   She  has a past medical history of Abnormal ECG, Anxiety, Arthritis, Asthma, Callus, Cancer ([facility]), Depression, Diabetes mellitus ([address] ), Difficulty walking, Diverticulitis, GERD (gastroesophageal reflux disease), Hyperlipidemia associated with type 2 diabetes mellitus ([address] ), Hypertension, Insulinoma, Liver disease, Miscarriage (), Non-recurrent acute suppurative otitis media of left ear without spontaneous rupture of tympanic membrane (2019), Peptic ulceration, Plantar fasciitis (4/15/22), Pulmonary emboli ([address] ), Sleep apnea, and Sleep apnea, obstructive    She   Patient Active Problem List    Diagnosis Date Noted   • Vagina, candidiasis 2023   • Screening for cervical cancer 2023   • Abnormal Pap smear of vagina 2023   • Acute cough 2023   • Claudication ([address] ) 2022   • DALE (dyspnea on exertion) 2022   • Arthritis of carpometacarpal [place]) joint of both thumbs 2022   • Iron deficiency 2022   • Seasonal allergic rhinitis 2022   • Vaginal atrophy 2022   • PLMD (periodic limb movement disorder) 2021   • Radiculopathy of cervicothoracic region 10/28/2021   • Encounter for screening for malignant neoplasm of lung in former smoker who quit in past 15 years with 30 pack year history or greater 2021   • Cigarette nicotine dependence in remission 2021   • Sciatica of left side 2020   • Mild persistent asthma without complication    • Class 2 obesity due to excess calories in adult 2020   • ANSHUL (obstructive sleep apnea) 2020   • History of pulmonary embolism 2020   • Abnormal chest CT 2020   • Lumbar spine pain 2019   • Pancreatic insufficiency 2019   • Actinic keratosis 2019   • Dysfunction of left eustachian tube 2019   • Encounter for gynecological examination without abnormal finding 2019   • Gastroesophageal reflux disease 2015   • Insomnia 2013   • Type 2 diabetes mellitus ([facility] ) 2013   • Depression 2013   • Hypercholesterolemia 2013     She  has a past surgical history that includes Pancreatectomy;  section; Knee surgery; Knee arthroscopy (Left); pr laparoscopy surg cholecystectomy (N/A, 2018); Cholecystectomy; pr colonoscopy flx dx w/collj spec when pfrmd (N/A, 2019); and Abdominal surgery (1990)  Her family history includes Arthritis in her mother; Breast cancer (age of onset: 80) in her other; Cancer in her father; Diabetes in her maternal grandmother, mother, and paternal grandmother; Heart disease in her mother; Hyperlipidemia in her mother; Hypertension in her mother; No Known Problems in her brother, daughter, half-sister, paternal aunt, paternal aunt, paternal aunt, paternal aunt, paternal aunt, sister, and sister; Obesity in her mother; Osteoporosis in her family and mother;  Other in her maternal grandmother and mother; Substance Abuse in her family and father; Tuberculosis in her maternal aunt and mother; Ulcerative colitis in her mother  She  reports that she quit smoking about 10 years ago  Her smoking use included cigarettes  She has a 30 00 pack-year smoking history  She has never used smokeless tobacco  She reports that she does not drink alcohol and does not use drugs  Current Outpatient Medications   Medication Sig Dispense Refill   • ARIPiprazole (ABILIFY) 10 mg tablet Take 10 mg by mouth daily     • B-D UF III MINI PEN NEEDLES 31G X 5 MM MISC use once daily IN THE MORNING 100 each 1   • Canagliflozin-metFORMIN HCl (Invokamet) 150-1000 MG TABS Take 1 tablet by mouth 2 (two) times a day 60 tablet 5   • EPINEPHrine (EPIPEN) 0 3 mg/0 3 mL SOAJ Inject 0 3 mL (0 3 mg total) into a muscle once for 1 dose 0 3 mL 0   • FeroSul 325 (65 Fe) MG tablet take 1 tablet by mouth twice a day with meals (Patient taking differently: Pt taking every other day) 30 tablet 2   • fluconazole (DIFLUCAN) 150 mg tablet Take 1 tablet (150 mg total) by mouth once for 1 dose 1 tablet 0   • gabapentin (NEURONTIN) 300 mg capsule take 1 capsule orally twice a day 60 capsule 2   • liraglutide (Victoza) injection Inject 0 2 mL (1 2 mg total) under the skin daily 3 mL 5   • lisinopril (ZESTRIL) 2 5 mg tablet Take 1 tablet (2 5 mg total) by mouth daily 90 tablet 1   • Microlet Lancets MISC Test twice daily 100 each 3   • omeprazole (PriLOSEC) 40 MG capsule Take 1 capsule (40 mg total) by mouth daily 30 capsule 5   • pravastatin (PRAVACHOL) 20 mg tablet take 1 tablet by mouth once daily 90 tablet 1   • venlafaxine (EFFEXOR-XR) 150 mg 24 hr capsule Take 150 mg by mouth daily       • benzonatate (TESSALON) 200 MG capsule Take 1 capsule (200 mg total) by mouth 3 (three) times a day as needed for cough (Patient not taking: Reported on 4/4/2023) 20 capsule 0   • budesonide-formoterol (SYMBICORT) 80-4 5 MCG/ACT inhaler Inhale 2 puffs 2 (two) times a day Rinse mouth after use   (Patient not taking: Reported on 4/4/2023) 10 2 g 1   • ciclopirox (PENLAC) 8 % solution Apply topically daily at bedtime (Patient not taking: Reported on 4/4/2023) 6 6 mL 0   • fluticasone (FLONASE) 50 mcg/act nasal spray 1 spray into each nostril daily (Patient not taking: Reported on 4/4/2023) 16 g 5   • methocarbamol (ROBAXIN) 500 mg tablet Take 1 tablet (500 mg total) by mouth 2 (two) times a day 20 tablet 0   • methylPREDNISolone 4 MG tablet therapy pack Use as directed on package (Patient not taking: Reported on 4/4/2023) 21 tablet 0     No current facility-administered medications for this visit  Current Outpatient Medications on File Prior to Visit   Medication Sig   • ARIPiprazole (ABILIFY) 10 mg tablet Take 10 mg by mouth daily   • B-D UF III MINI PEN NEEDLES 31G X 5 MM MISC use once daily IN THE MORNING   • Canagliflozin-metFORMIN HCl (Invokamet) 150-1000 MG TABS Take 1 tablet by mouth 2 (two) times a day   • EPINEPHrine (EPIPEN) 0 3 mg/0 3 mL SOAJ Inject 0 3 mL (0 3 mg total) into a muscle once for 1 dose   • FeroSul 325 (65 Fe) MG tablet take 1 tablet by mouth twice a day with meals (Patient taking differently: Pt taking every other day)   • gabapentin (NEURONTIN) 300 mg capsule take 1 capsule orally twice a day   • liraglutide (Victoza) injection Inject 0 2 mL (1 2 mg total) under the skin daily   • lisinopril (ZESTRIL) 2 5 mg tablet Take 1 tablet (2 5 mg total) by mouth daily   • Microlet Lancets MISC Test twice daily   • omeprazole (PriLOSEC) 40 MG capsule Take 1 capsule (40 mg total) by mouth daily   • pravastatin (PRAVACHOL) 20 mg tablet take 1 tablet by mouth once daily   • venlafaxine (EFFEXOR-XR) 150 mg 24 hr capsule Take 150 mg by mouth daily     • benzonatate (TESSALON) 200 MG capsule Take 1 capsule (200 mg total) by mouth 3 (three) times a day as needed for cough (Patient not taking: Reported on 4/4/2023)   • budesonide-formoterol (SYMBICORT) 80-4 5 MCG/ACT inhaler Inhale 2 puffs 2 (two) times a day Rinse mouth after use   (Patient not taking: Reported on 4/4/2023)   • ciclopirox (PENLAC) 8 % solution Apply topically daily at bedtime (Patient not taking: Reported on 4/4/2023)   • fluticasone (FLONASE) 50 mcg/act nasal spray 1 spray into each nostril daily (Patient not taking: Reported on 4/4/2023)   • methocarbamol (ROBAXIN) 500 mg tablet Take 1 tablet (500 mg total) by mouth 2 (two) times a day   • methylPREDNISolone 4 MG tablet therapy pack Use as directed on package (Patient not taking: Reported on 4/4/2023)     No current facility-administered medications on file prior to visit  She is allergic to iodine - food allergy and nuts - food allergy       Review of Systems  Constitutional: negative  Ears, nose, mouth, throat, and face: negative  Respiratory: negative  Cardiovascular: negative  Genitourinary:positive for vaginal discharge and itch  Integument/breast: negative  Hematologic/lymphatic: negative  Musculoskeletal:negative      Objective  General Appearance: Alert, appropriate appearance for age  No acute distress, Cardiovascular Exam: Regular rate and rhythm  S1, S2, no murmur, click, gallop, or rubs  , Pelvic Exam Female: Vulva and vagina appear normal  Bimanual exam reveals normal uterus and adnexa  Cervix appears normal  Irritation of labia and vaginal introitus noted   , Musculoskeletal Exam: Back is straight and non-tender, full ROM of upper and lower extremities  , Skin: no rash or abnormalities, Neurologic Exam: Normal gait and speech, no tremor  and Psychiatric Exam: Alert and oriented, appropriate affect

## 2023-04-05 DIAGNOSIS — E61.1 IRON DEFICIENCY: ICD-10-CM

## 2023-04-05 RX ORDER — FERROUS SULFATE 325(65) MG
TABLET ORAL
Qty: 30 TABLET | Refills: 2 | Status: SHIPPED | OUTPATIENT
Start: 2023-04-05

## 2023-05-05 PROBLEM — R05.1 ACUTE COUGH: Status: RESOLVED | Noted: 2023-03-06 | Resolved: 2023-05-05

## 2023-05-09 ENCOUNTER — OFFICE VISIT (OUTPATIENT)
Dept: OBGYN CLINIC | Facility: CLINIC | Age: 53
End: 2023-05-09

## 2023-05-09 VITALS
HEIGHT: 62 IN | BODY MASS INDEX: 36.03 KG/M2 | HEART RATE: 96 BPM | SYSTOLIC BLOOD PRESSURE: 112 MMHG | DIASTOLIC BLOOD PRESSURE: 74 MMHG

## 2023-05-09 DIAGNOSIS — M18.0 ARTHRITIS OF CARPOMETACARPAL (CMC) JOINT OF BOTH THUMBS: Primary | ICD-10-CM

## 2023-05-09 RX ORDER — BETAMETHASONE SODIUM PHOSPHATE AND BETAMETHASONE ACETATE 3; 3 MG/ML; MG/ML
3 INJECTION, SUSPENSION INTRA-ARTICULAR; INTRALESIONAL; INTRAMUSCULAR; SOFT TISSUE
Status: COMPLETED | OUTPATIENT
Start: 2023-05-09 | End: 2023-05-09

## 2023-05-09 RX ORDER — BUPIVACAINE HYDROCHLORIDE 2.5 MG/ML
0.5 INJECTION, SOLUTION INFILTRATION; PERINEURAL
Status: COMPLETED | OUTPATIENT
Start: 2023-05-09 | End: 2023-05-09

## 2023-05-09 RX ADMIN — BETAMETHASONE SODIUM PHOSPHATE AND BETAMETHASONE ACETATE 3 MG: 3; 3 INJECTION, SUSPENSION INTRA-ARTICULAR; INTRALESIONAL; INTRAMUSCULAR; SOFT TISSUE at 14:01

## 2023-05-09 RX ADMIN — BUPIVACAINE HYDROCHLORIDE 0.5 ML: 2.5 INJECTION, SOLUTION INFILTRATION; PERINEURAL at 14:01

## 2023-05-09 NOTE — PROGRESS NOTES
Assessment/Plan:   Diagnoses and all orders for this visit:    Arthritis of carpometacarpal (CMC) joint of both thumbs  -     Small joint arthrocentesis: R thumb CMC  -     Small joint arthrocentesis: L thumb CMC  Discussed with patient that today's physical exam is consistent with flareup of primary osteoarthritis of the bilateral thumb CMC joints  Patient was offered, and accepted, Marcaine and betamethasone injection(s) to the bilateral thumb CMC joints for relief of pain and inflammation  Patient tolerated treatment(s) well  She will be seen in 3 weeks for re-evaluation and consideration for repeat injections as necessary  Patient expresses understanding and is in agreement with this treatment plan  Under aseptic technique, both thumbs were injected with Kenalog and Marcaine  She tolerated procedures quite well  Return back in 3 months evaluation  If her condition changes, she would not hesitate to let us know    Subjective:   Patient ID: Celena Dye  1970     HPI  Patient is a 45 y o  female who presents for follow-up evaluation of bilateral thumb CMC joint osteoarthritis  She was last seen regresses issue on 2/7/2023, at which time she received bilateral corticosteroid injections  She states that she got significant relief following the injections, and her symptoms only began to return approximately 2 weeks ago  On today's presentation she rates her pain at 6-7/10, and is exacerbated with heavy gripping motions  She denies any associated numbness or tingling  She reports occasional feelings of grinding in the thumb with strong       The following portions of the patient's history were reviewed and updated as appropriate:  Past medical history, past surgical history, Family history, social history, current medications and allergies    Past Medical History:   Diagnosis Date   • Abnormal ECG    • Anxiety    • Arthritis    • Asthma    • Callus    • Cancer ([facility] )     renal,cervical   • Depression • [facility] )    • Difficulty walking    • Diverticulitis    • GERD (gastroesophageal reflux disease)    • Hyperlipidemia associated with type 2 diabetes mellitus ([facility] )    • Hypertension    • Insulinoma    • Liver disease    • Miscarriage    • Non-recurrent acute suppurative otitis media of left ear without spontaneous rupture of tympanic membrane 2019   • Peptic ulceration    • Plantar fasciitis 4/15/22   • Pulmonary emboli ([facility])    • Sleep apnea    • Sleep apnea, obstructive        Past Surgical History:   Procedure Laterality Date   • ABDOMINAL SURGERY  1990    Pancreatectomy (partial)   •  SECTION      twice   • CHOLECYSTECTOMY     • KNEE ARTHROSCOPY Left    • KNEE SURGERY     • PANCREATECTOMY      Partial    • NE COLONOSCOPY FLX DX W/COLLJ SPEC WHEN PFRMD N/A 2019    Procedure: COLONOSCOPY;  Surgeon: Karen Espinoza DO;  Location: [facility];  Service: Gastroenterology   • NE LAPAROSCOPY SURG CHOLECYSTECTOMY N/A 2018    Procedure: CHOLECYSTECTOMY LAPAROSCOPIC;  Surgeon: Paul Mims DO;  Location: [facility];  Service: General       Family History   Problem Relation Age of Onset   • Heart disease Mother    • Hyperlipidemia Mother    • Obesity Mother    • Hypertension Mother    • Other Mother         Bundle branch block   • Tuberculosis Mother    • Arthritis Mother    • Diabetes Mother    • Osteoporosis Mother    • Ulcerative colitis Mother    • Substance Abuse Father    • Cancer Father    • No Known Problems Sister    • No Known Problems Sister    • No Known Problems Daughter    • Other Maternal Grandmother         ascending aortic aneurysm resection   • Diabetes Maternal Grandmother    • Diabetes Paternal Grandmother    • No Known Problems Brother    • Tuberculosis Maternal Aunt    • No Known Problems Paternal Aunt    • No Known Problems Paternal Aunt    • No Known Problems Paternal Aunt    • No Known Problems Paternal Aunt    • No Known Problems Paternal Aunt • Breast cancer Other 80   • Substance Abuse Family    • Osteoporosis Family    • No Known Problems Half-Sister        Social History     Socioeconomic History   • Marital status:      Spouse name: None   • Number of children: None   • Years of education: None   • Highest education level: None   Occupational History   • Occupation: UNEMPLOYED   Tobacco Use   • Smoking status: Former     Packs/day: 1 00     Years: 30 00     Pack years: 30 00     Types: Cigarettes     Quit date: 2013     Years since quitting: 10 3   • Smokeless tobacco: Never   Vaping Use   • Vaping Use: Never used   Substance and Sexual Activity   • Alcohol use: No   • Drug use: No   • Sexual activity: Not Currently     Partners: Male     Birth control/protection: Abstinence, Post-menopausal   Other Topics Concern   • None   Social History Narrative    Single-    Unemployed    Lives at home with mother     Caffeine use     Social Determinants of Health     Financial Resource Strain: Not on file   Food Insecurity: Not on file   Transportation Needs: Not on file   Physical Activity: Not on file   Stress: Not on file   Social Connections: Not on file   Intimate Partner Violence: Not on file   Housing Stability: Not on file         Current Outpatient Medications:   •  ARIPiprazole (ABILIFY) 10 mg tablet, Take 10 mg by mouth daily, Disp: , Rfl:   •  B-D UF III MINI PEN NEEDLES 31G X 5 MM MISC, use once daily IN THE MORNING, Disp: 100 each, Rfl: 1  •  Canagliflozin-metFORMIN HCl (Invokamet) 150-1000 MG TABS, Take 1 tablet by mouth 2 (two) times a day, Disp: 60 tablet, Rfl: 5  •  FeroSul 325 (65 Fe) MG tablet, take 1 tablet by mouth twice a day with meals, Disp: 30 tablet, Rfl: 2  •  gabapentin (NEURONTIN) 300 mg capsule, take 1 capsule orally twice a day, Disp: 60 capsule, Rfl: 2  •  liraglutide (Victoza) injection, Inject 0 2 mL (1 2 mg total) under the skin daily, Disp: 3 mL, Rfl: 5  •  lisinopril (ZESTRIL) 2 5 mg tablet, Take 1 tablet (2 5 mg total) by mouth daily, Disp: 90 tablet, Rfl: 1  •  Microlet Lancets MISC, Test twice daily, Disp: 100 each, Rfl: 3  •  omeprazole (PriLOSEC) 40 MG capsule, Take 1 capsule (40 mg total) by mouth daily, Disp: 30 capsule, Rfl: 5  •  pravastatin (PRAVACHOL) 20 mg tablet, take 1 tablet by mouth once daily, Disp: 90 tablet, Rfl: 1  •  venlafaxine (EFFEXOR-XR) 150 mg 24 hr capsule, Take 150 mg by mouth daily  , Disp: , Rfl:   •  benzonatate (TESSALON) 200 MG capsule, Take 1 capsule (200 mg total) by mouth 3 (three) times a day as needed for cough (Patient not taking: Reported on 4/4/2023), Disp: 20 capsule, Rfl: 0  •  budesonide-formoterol (SYMBICORT) 80-4 5 MCG/ACT inhaler, Inhale 2 puffs 2 (two) times a day Rinse mouth after use  (Patient not taking: Reported on 4/4/2023), Disp: 10 2 g, Rfl: 1  •  ciclopirox (PENLAC) 8 % solution, Apply topically daily at bedtime (Patient not taking: Reported on 4/4/2023), Disp: 6 6 mL, Rfl: 0  •  EPINEPHrine (EPIPEN) 0 3 mg/0 3 mL SOAJ, Inject 0 3 mL (0 3 mg total) into a muscle once for 1 dose, Disp: 0 3 mL, Rfl: 0  •  fluticasone (FLONASE) 50 mcg/act nasal spray, 1 spray into each nostril daily (Patient not taking: Reported on 4/4/2023), Disp: 16 g, Rfl: 5  •  methocarbamol (ROBAXIN) 500 mg tablet, Take 1 tablet (500 mg total) by mouth 2 (two) times a day, Disp: 20 tablet, Rfl: 0  •  methylPREDNISolone 4 MG tablet therapy pack, Use as directed on package (Patient not taking: Reported on 4/4/2023), Disp: 21 tablet, Rfl: 0    Allergies   Allergen Reactions   • Iodine - Food Allergy Anaphylaxis     IVP DYE    • Nuts - Food Allergy Anaphylaxis     Annotation - [id number]: Pine nuts       Review of Systems   Constitutional: Negative for chills, fever and unexpected weight change  HENT: Negative for hearing loss, nosebleeds and sore throat  Eyes: Negative for pain, redness and visual disturbance  Respiratory: Negative for cough, shortness of breath and wheezing  "  Cardiovascular: Negative for chest pain, palpitations and leg swelling  Gastrointestinal: Negative for abdominal pain, nausea and vomiting  Endocrine: Negative for polydipsia and polyuria  Genitourinary: Negative for dysuria and hematuria  Musculoskeletal:        As noted in HPI   Skin: Negative for rash and wound  Neurological: Negative for dizziness, numbness and headaches  Psychiatric/Behavioral: Negative for decreased concentration and suicidal ideas  The patient is not nervous/anxious  Objective:  /74 (BP Location: Left arm, Patient Position: Sitting, Cuff Size: Large)   Pulse 96   Ht 5' 2\" (1 575 m)   LMP 05/20/2019   BMI 36 03 kg/m²     Ortho Exam  Bilateral hands/thumbs -   Patient presents with no obvious anatomical deformity  Skin is warm and dry to touch with no signs of erythema, ecchymosis, infection  No soft tissue swelling or effusion noted   Full FDS, FDP, extensor mechanisms are intact   No rotational deformity with composite finger flexion  TTP over bilateral CMC joints  + CMC load-and-shift  + CMC grind  Demonstrates normal wrist, elbow, and shoulder motion  Forearm compartments are soft and supple  2+ distal radial pulse with brisk capillary refill to the fingers  Radial, median, and ulnar motor and sensory distributions intact  Sensation light touch intact distally    Physical Exam  Vitals and nursing note reviewed  Constitutional:       General: She is not in acute distress  Appearance: She is well-developed  HENT:      Head: Normocephalic and atraumatic  Eyes:      Conjunctiva/sclera: Conjunctivae normal    Cardiovascular:      Rate and Rhythm: Normal rate  Pulmonary:      Effort: Pulmonary effort is normal    Musculoskeletal:      Cervical back: Neck supple  Skin:     General: Skin is warm and dry  Capillary Refill: Capillary refill takes less than 2 seconds     Neurological:      Mental Status: She is alert and oriented to person, place, and " time    Psychiatric:         Mood and Affect: Mood normal          Behavior: Behavior normal         Diagnostic Test Review:  No new imaging reviewed this visit    Small joint arthrocentesis: R thumb CMC  [place] Protocol:  Procedure performed by: SYED Ba ATC)  Consent: Verbal consent obtained  Consent given by: patient  Timeout called at: 5/9/2023 1:51 PM   Patient understanding: patient states understanding of the procedure being performed  Site marked: the operative site was marked  Patient identity confirmed: verbally with patient    Supporting Documentation  Indications: pain and joint swelling   Procedure Details  Location: thumb - R thumb CMC  Needle size: 25 G  Ultrasound guidance: no  Approach: radial  Medications administered: 0 5 mL bupivacaine 0 25 %; 3 mg betamethasone acetate-betamethasone sodium phosphate 6 (3-3) mg/mL    Patient tolerance: patient tolerated the procedure well with no immediate complications  Dressing:  Sterile dressing applied    Small joint arthrocentesis: L thumb CMC  [place] Protocol:  Procedure performed by: SYED Ba ATC)  Consent: Verbal consent obtained    Consent given by: patient  Timeout called at: 5/9/2023 1:51 PM   Patient understanding: patient states understanding of the procedure being performed  Site marked: the operative site was marked  Patient identity confirmed: verbally with patient    Supporting Documentation  Indications: pain and joint swelling   Procedure Details  Location: thumb - L thumb CMC  Needle size: 25 G  Ultrasound guidance: no  Approach: radial  Medications administered: 0 5 mL bupivacaine 0 25 %; 3 mg betamethasone acetate-betamethasone sodium phosphate 6 (3-3) mg/mL    Patient tolerance: patient tolerated the procedure well with no immediate complications  Dressing:  Sterile dressing applied         Scribe Attestation    I,:  Asia Ochoa am acting as a scribe while in the presence of the attending physician :       I,: Aleksander Chen DO personally performed the services described in this documentation    as scribed in my presence : no

## 2023-05-10 ENCOUNTER — OFFICE VISIT (OUTPATIENT)
Dept: PODIATRY | Facility: CLINIC | Age: 53
End: 2023-05-10

## 2023-05-10 VITALS — HEIGHT: 62 IN | BODY MASS INDEX: 36.25 KG/M2 | WEIGHT: 197 LBS

## 2023-05-10 DIAGNOSIS — E11.65 TYPE 2 DIABETES MELLITUS WITH HYPERGLYCEMIA, WITHOUT LONG-TERM CURRENT USE OF INSULIN (HCC): Primary | ICD-10-CM

## 2023-05-10 NOTE — PROGRESS NOTES
Diabetic Foot Exam    Patient's shoes and socks removed  Right Foot/Ankle   Right Foot Inspection  Skin Exam: skin normal and skin intact  No dry skin, no warmth, no callus, no erythema, no maceration, no abnormal color, no pre-ulcer, no ulcer and no callus  Sensory   Vibration: intact  Proprioception: intact  Monofilament testing: intact    Vascular  Capillary refills: < 3 seconds  The right DP pulse is 2+  The right PT pulse is 2+  Left Foot/Ankle  Left Foot Inspection  Skin Exam: skin normal and skin intact  No dry skin, no warmth, no erythema, no maceration, normal color, no pre-ulcer, no ulcer and no callus  Sensory   Vibration: intact  Proprioception: intact  Monofilament testing: intact    Vascular  Capillary refills: < 3 seconds  The left DP pulse is 2+  The left PT pulse is 2+  Assign Risk Category  No deformity present  No loss of protective sensation  No weak pulses  Risk: 0          PATIENT:  Katie Dye    1970    ASSESSMENT:     1  Type 2 diabetes mellitus with hyperglycemia, without long-term current use of insulin ([facility])              PLAN:  1  Patient was counseled on the condition and diagnosis  2   Educated disease prevention and risks related to diabetes  3   Educated proper daily foot care and exam   Instructed proper skin care / protection and footwear  Instructed to identify any signs of infection and related foot problem  4   The recent blood work was reviewed / discussed and the last HbA1c was 7 7  Discussed proper blood glucose control with diet and exercise      5   The patient will return in 12 months for diabetic foot exam     - remains risk category of 0  -Advised on callus care advised urea cream, pumice stone  - She is having increased tripod affect    • High risk  foot precautions reviewed with patient including the need to wear protective shoegear at all times when walking including in the home, the need to check feet all surfaces daily with a mirror and report and skin breaks to podiatry, the need to apply an emollient to skin of feet daily  • Diabetic Foot Ulcer Risks    - Between 10-15% of diabetic foot ulcers do not heal [v]  - Of diabetic foot ulcers that do not heal, 25% will require amputation  [v]    iv Sweetie Marques V , Alayna Rodrigez and Jorje BULL , Foot Ulcers in the Diabetic Patient, Prevention and Treatment  Vascular Health Risk Management  2007 Feb; 3(1): 65-76  v RAIZA He , THELMA Gray , JORGITO Leggett , CRIS Garcia , RAIZA Guzman T , Risk factors for lower extremity amputation in patients with diabetic foot ulcers: a hospital-based case-control study  Diabetic Foot & Ankle, 2015  6:10 3402        Imaging: I have personally reviewed pertinent films in PACS  Labs, pathology, and Other Studies: I have personally reviewed pertinent reports  Subjective:          HPI  The patient presents for diabetic foot evaluation  The patient has diabetes for multiple years  The blood glucose is under control and the last HbA1c was 7 7  The patient denied any history of diabetic foot ulcer, related foot infection, or amputation  No significant numbness or paresthesia  Denied weakness or significant functional deficit  The following portions of the patient's history were reviewed and updated as appropriate: allergies, current medications, past family history, past medical history, past social history, past surgical history and problem list   All pertinent labs and images were reviewed      Past Medical History  Past Medical History:   Diagnosis Date   • Abnormal ECG    • Anemia 1982   • Anxiety    • Arthritis    • Asthma    • Callus    • Cancer ([facility] )     renal,cervical   • Chronic kidney disease 1989   • COPD (chronic obstructive pulmonary disease) ([place] [address] ) 2005   • Depression    • Diabetes mellitus ([facility] )    • Difficulty walking    • Diverticulitis    • GERD (gastroesophageal reflux disease)    • Hyperlipidemia associated with type 2 diabetes mellitus ([facility] )    • Hypertension    • Insulinoma    • Liver disease    • Lung disease    • Miscarriage    • Myocardial infarction [facility])    • Non-recurrent acute suppurative otitis media of left ear without spontaneous rupture of tympanic membrane 2019   • Peptic ulceration    • Plantar fasciitis 04/15/2022   • Pulmonary emboli (HCC)    • Sleep apnea    • Sleep apnea, obstructive    • Stroke [facility])        Past Surgical History  Past Surgical History:   Procedure Laterality Date   • ABDOMINAL SURGERY  1990    Pancreatectomy (partial)   •  SECTION      twice   • CHOLECYSTECTOMY     • KNEE ARTHROSCOPY Left    • KNEE SURGERY     • PANCREATECTOMY      Partial    • MT COLONOSCOPY FLX DX W/COLLJ SPEC WHEN PFRMD N/A 2019    Procedure: COLONOSCOPY;  Surgeon: Gisela Cates DO;  Location: [facility];  Service: Gastroenterology   • MT LAPAROSCOPY SURG CHOLECYSTECTOMY N/A 2018    Procedure: CHOLECYSTECTOMY LAPAROSCOPIC;  Surgeon: Earl Leonardo DO;  Location: [facility];  Service: General        Allergies:  Iodine - food allergy and Nuts - food allergy    Medications:  Current Outpatient Medications   Medication Sig Dispense Refill   • ARIPiprazole (ABILIFY) 10 mg tablet Take 10 mg by mouth daily     • B-D UF III MINI PEN NEEDLES 31G X 5 MM MISC use once daily IN THE MORNING 100 each 1   • Canagliflozin-metFORMIN HCl (Invokamet) 150-1000 MG TABS Take 1 tablet by mouth 2 (two) times a day 60 tablet 5   • FeroSul 325 (65 Fe) MG tablet take 1 tablet by mouth twice a day with meals 30 tablet 2   • gabapentin (NEURONTIN) 300 mg capsule take 1 capsule orally twice a day 60 capsule 2   • liraglutide (Victoza) injection Inject 0 2 mL (1 2 mg total) under the skin daily 3 mL 5   • lisinopril (ZESTRIL) 2 5 mg tablet Take 1 tablet (2 5 mg total) by mouth daily 90 tablet 1   • Microlet Lancets MISC Test twice daily 100 each 3   • omeprazole (PriLOSEC) 40 MG capsule Take 1 capsule (40 mg total) by mouth daily 30 capsule 5   • pravastatin (PRAVACHOL) 20 mg tablet take 1 tablet by mouth once daily 90 tablet 1   • venlafaxine (EFFEXOR-XR) 150 mg 24 hr capsule Take 150 mg by mouth daily       • benzonatate (TESSALON) 200 MG capsule Take 1 capsule (200 mg total) by mouth 3 (three) times a day as needed for cough (Patient not taking: Reported on 4/4/2023) 20 capsule 0   • budesonide-formoterol (SYMBICORT) 80-4 5 MCG/ACT inhaler Inhale 2 puffs 2 (two) times a day Rinse mouth after use  (Patient not taking: Reported on 4/4/2023) 10 2 g 1   • ciclopirox (PENLAC) 8 % solution Apply topically daily at bedtime (Patient not taking: Reported on 4/4/2023) 6 6 mL 0   • EPINEPHrine (EPIPEN) 0 3 mg/0 3 mL SOAJ Inject 0 3 mL (0 3 mg total) into a muscle once for 1 dose 0 3 mL 0   • fluticasone (FLONASE) 50 mcg/act nasal spray 1 spray into each nostril daily (Patient not taking: Reported on 4/4/2023) 16 g 5   • methocarbamol (ROBAXIN) 500 mg tablet Take 1 tablet (500 mg total) by mouth 2 (two) times a day 20 tablet 0   • methylPREDNISolone 4 MG tablet therapy pack Use as directed on package (Patient not taking: Reported on 4/4/2023) 21 tablet 0     No current facility-administered medications for this visit         Social History:  Social History     Socioeconomic History   • Marital status:      Spouse name: None   • Number of children: None   • Years of education: None   • Highest education level: None   Occupational History   • Occupation: UNEMPLOYED   Tobacco Use   • Smoking status: Former     Packs/day: 1 00     Years: 30 00     Pack years: 30 00     Types: Cigarettes     Quit date: 1/1/2013     Years since quitting: 10 3   • Smokeless tobacco: Never   • Tobacco comments:     Dont need at all   Vaping Use   • Vaping Use: Never used   Substance and Sexual Activity   • Alcohol use: Not Currently   • Drug use: No   • Sexual activity: Not Currently     Partners: Male     Birth control/protection: Abstinence, Post-menopausal "  Other Topics Concern   • None   Social History Narrative    Single-    Unemployed    Lives at home with mother     Caffeine use     Social Determinants of Health     Financial Resource Strain: Not on file   Food Insecurity: Not on file   Transportation Needs: Not on file   Physical Activity: Not on file   Stress: Not on file   Social Connections: Not on file   Intimate Partner Violence: Not on file   Housing Stability: Not on file        Review of Systems   Constitutional: Negative for chills and fever  HENT: Negative for ear pain and sore throat  Eyes: Negative for pain and visual disturbance  Respiratory: Negative for cough and shortness of breath  Cardiovascular: Negative for chest pain and palpitations  Gastrointestinal: Negative for abdominal pain and vomiting  Genitourinary: Negative for dysuria and hematuria  Musculoskeletal: Negative for arthralgias and back pain  Skin: Negative for color change and rash  Neurological: Negative for seizures and syncope  All other systems reviewed and are negative  Objective:      Ht 5' 2\" (1 575 m)   Wt 89 4 kg (197 lb)   LMP 05/20/2019   BMI 36 03 kg/m²          Physical Exam  Vitals reviewed  Constitutional:       Appearance: Normal appearance  She is obese  HENT:      Head: Normocephalic and atraumatic  Nose: Nose normal       Mouth/Throat:      Mouth: Mucous membranes are moist    Eyes:      Pupils: Pupils are equal, round, and reactive to light  Cardiovascular:      Pulses: no weak pulses          Dorsalis pedis pulses are 2+ on the right side and 2+ on the left side  Posterior tibial pulses are 2+ on the right side and 2+ on the left side  Pulmonary:      Effort: Pulmonary effort is normal    Musculoskeletal:         General: No deformity  Feet:      Right foot:      Skin integrity: No ulcer, skin breakdown, erythema, warmth, callus or dry skin        Left foot:      Skin integrity: No ulcer, skin " breakdown, erythema, warmth, callus or dry skin  Skin:     Capillary Refill: Capillary refill takes less than 2 seconds  Neurological:      General: No focal deficit present  Mental Status: She is alert and oriented to person, place, and time  Mental status is at baseline

## 2023-05-11 ENCOUNTER — OFFICE VISIT (OUTPATIENT)
Dept: PULMONOLOGY | Facility: CLINIC | Age: 53
End: 2023-05-11

## 2023-05-11 VITALS
TEMPERATURE: 97.8 F | OXYGEN SATURATION: 98 % | SYSTOLIC BLOOD PRESSURE: 112 MMHG | RESPIRATION RATE: 18 BRPM | HEIGHT: 62 IN | BODY MASS INDEX: 36.66 KG/M2 | DIASTOLIC BLOOD PRESSURE: 66 MMHG | HEART RATE: 79 BPM | WEIGHT: 199.2 LBS

## 2023-05-11 DIAGNOSIS — J45.30 MILD PERSISTENT ASTHMA WITHOUT COMPLICATION: Primary | ICD-10-CM

## 2023-05-11 DIAGNOSIS — J30.2 SEASONAL ALLERGIC RHINITIS, UNSPECIFIED TRIGGER: ICD-10-CM

## 2023-05-11 DIAGNOSIS — G47.33 OSA (OBSTRUCTIVE SLEEP APNEA): ICD-10-CM

## 2023-05-11 RX ORDER — BUDESONIDE AND FORMOTEROL FUMARATE DIHYDRATE 80; 4.5 UG/1; UG/1
2 AEROSOL RESPIRATORY (INHALATION) 2 TIMES DAILY
Qty: 10.2 G | Refills: 1 | Status: SHIPPED | OUTPATIENT
Start: 2023-05-11

## 2023-05-11 NOTE — PROGRESS NOTES
Pulmonary Follow Up Note   Katie Dye 45 y o  female MRN: [id number]  5/11/2023    Assessment:  Mild intermittent asthma  · 1 exacerbation/ED visit for URI symptoms triggered this was in 3/2023  · Treated with steroid taper/nebulized treatment  · Back to baseline, no active symptoms or exacerbation    Plan:  · Continue Symbicort 80, okay to use it as PRN  · Counseled about avoiding allergens, states that she sleeps close to the dog ask to make sure they have cleaning sheets/avoid direct exposure to the dander    Former tobacco abuse  · About 30-pack-year quit in 2013  · Remains abstinent  · Due for lung cancer screening CT in 7/2023/ordered    Obstructive sleep apnea  · Compliance report for 2/10 until 5/10/2023: Usage>= 4 hours 76% of the times  · Residual AHI 6 7, this is on pressures of 20 to 13 cm water  · Continues on gabapentin 300 mg twice daily  · We will check with the sleep medicine team, could require repeat titration study given the high residual AHI    Morbid obesity  · Counseled extensively  · Not interested in weight management program referral      Return in about 6 months (around 11/11/2023)  History of Present Illness     Follow up for: asthma     Background:  45 y  o  female with a h/o seasonal allergies, DM2, asthma, ANSHUL, PLMD, class 2 obesity, pulmonary embolism in 2017 treated for provoked/after was on the Dr  trip to [place], reflux, depression, tobacco abuse former/quit in 2013 [facility]      Known to have asthma since age of [address], known triggers of exposure to paint, cleaning detergents/chemical, dust/garbage   No history of severe attack     03/2022 visit-continued on low-dose Symbicort, compliance report showed AHI of 4 6   Iron supplements for and deficiency anemia   Lung cancer screening CT chest to be done in 07/2022 9/2022 visit-use Symbicort 80 on regularly basis, TTE    Not interested in weight management referral   Compliance report 78% usage above 4 hours residual AHI 5 7 on "pressure 13 to 20 cm water    3/2023-seen in the ED for bronchitis-like symptoms  Interval History  Since last discharge from the ED, feeling better, back to baseline  Using Symbicort on PRN bases may be twice a week  States that she had positive exposure to sick contact, one of the children in the house had URI-like symptoms  Still using the CPAP consistently, better sleep efficiency with the current pressures 5-20  Review of Systems  As per hpi, all other systems reviewed and were negative    Studies:       Imaging and other studies: I have personally reviewed pertinent films in PACS  CT 07/15/2022-3 mm RML perifissural nodule/suspect intrapulmonary lymph node   RLL cyst/unchanged   No suspicious lesions         Pulmonary function testing:   PFT 10/07/2020-ratio 75%, FEV1 1 92 L/84%, FVC 2 55 L/84%   TLC 86%, %   DLCO 86%     EKG, Pathology, and Other Studies: I have personally reviewed pertinent reports        TTE 1/81/6067-UL 00%, systolic function and normal   Grade 1 diastolic dysfunction  TAPSE 1 7        Past medical, surgical, social and family histories reviewed  Medications/Allergies: Reviewed      Vitals: Blood pressure 112/66, pulse 79, temperature 97 8 °F (36 6 °C), temperature source Temporal, resp  rate 18, height 5' 2\" (1 575 m), weight 90 4 kg (199 lb 3 2 oz), last menstrual period 05/20/2019, SpO2 98 %  Body mass index is 36 43 kg/m²  Oxygen Therapy  SpO2: 98 %  Oxygen Therapy: None (Room air)      Physical Exam  Body mass index is 36 43 kg/m²     Gen: not in acute distress, morbidly obese  Neck/Eyes: supple, no adenopathy, PERRL  Ear: normal appearance, no significant hearing impairment  Nose:  normal nasal mucosa, no drainage  Mouth:  unremarkable/normal appearance of lips, teeth and gums  Oropharynx: mucosa is moist, no focal lesions or erythema  Salivary glands: soft nontender  Chest: normal respiratory efforts, clear breath sounds bilaterally  CV: RRR, no murmurs " "appreciated, no edema  Abdomen: soft, non tender  Extremities:  No observed deformity   Skin: unremarkable  Neuro: AAO X3, no focal motor deficit        Labs:  Lab Results   Component Value Date    WBC 6 37 12/13/2022    HGB 14 4 12/13/2022    HCT 45 2 12/13/2022    MCV 92 12/13/2022     12/13/2022     Lab Results   Component Value Date    GLUCOSE 116 11/19/2015    CALCIUM 9 9 12/13/2022     06/09/2018    K 4 4 12/13/2022    CO2 28 12/13/2022    CL 99 12/13/2022    BUN 18 12/13/2022    CREATININE 0 85 12/13/2022     No results found for: IGE  Lab Results   Component Value Date    ALT 14 12/13/2022    AST 11 (L) 12/13/2022    ALKPHOS 89 12/13/2022    BILITOT 0 4 06/09/2018           Portions of the record may have been created with voice recognition software  Occasional wrong word or \"sound a like\" substitutions may have occurred due to the inherent limitations of voice recognition software  Read the chart carefully and recognize, using context, where substitutions have occurred    DAMON Alvarez    [facility]  " no concerns

## 2023-05-23 DIAGNOSIS — E61.1 IRON DEFICIENCY: ICD-10-CM

## 2023-05-23 RX ORDER — FERROUS SULFATE 325(65) MG
TABLET ORAL
Qty: 30 TABLET | Refills: 2 | Status: SHIPPED | OUTPATIENT
Start: 2023-05-23

## 2023-05-26 ENCOUNTER — TELEPHONE (OUTPATIENT)
Dept: INTERNAL MEDICINE CLINIC | Facility: CLINIC | Age: 53
End: 2023-05-26

## 2023-06-03 PROBLEM — Z12.4 SCREENING FOR CERVICAL CANCER: Status: RESOLVED | Noted: 2023-04-04 | Resolved: 2023-06-03

## 2023-06-06 ENCOUNTER — OFFICE VISIT (OUTPATIENT)
Dept: INTERNAL MEDICINE CLINIC | Facility: CLINIC | Age: 53
End: 2023-06-06
Payer: COMMERCIAL

## 2023-06-06 VITALS
TEMPERATURE: 96.2 F | OXYGEN SATURATION: 95 % | SYSTOLIC BLOOD PRESSURE: 114 MMHG | DIASTOLIC BLOOD PRESSURE: 64 MMHG | HEIGHT: 62 IN | BODY MASS INDEX: 36.51 KG/M2 | WEIGHT: 198.4 LBS | HEART RATE: 76 BPM

## 2023-06-06 DIAGNOSIS — Z12.31 VISIT FOR SCREENING MAMMOGRAM: ICD-10-CM

## 2023-06-06 DIAGNOSIS — M54.13 RADICULOPATHY OF CERVICOTHORACIC REGION: ICD-10-CM

## 2023-06-06 DIAGNOSIS — J45.30 MILD PERSISTENT ASTHMA WITHOUT COMPLICATION: ICD-10-CM

## 2023-06-06 DIAGNOSIS — M54.2 NECK PAIN: ICD-10-CM

## 2023-06-06 DIAGNOSIS — G89.29 CHRONIC RIGHT SHOULDER PAIN: ICD-10-CM

## 2023-06-06 DIAGNOSIS — E11.65 TYPE 2 DIABETES MELLITUS WITH HYPERGLYCEMIA, WITHOUT LONG-TERM CURRENT USE OF INSULIN (HCC): ICD-10-CM

## 2023-06-06 DIAGNOSIS — M54.12 CERVICAL RADICULITIS: Primary | ICD-10-CM

## 2023-06-06 DIAGNOSIS — M25.511 CHRONIC RIGHT SHOULDER PAIN: ICD-10-CM

## 2023-06-06 LAB — SL AMB POCT HEMOGLOBIN AIC: 7.6 (ref ?–6.5)

## 2023-06-06 PROCEDURE — 99214 OFFICE O/P EST MOD 30 MIN: CPT | Performed by: PHYSICIAN ASSISTANT

## 2023-06-06 PROCEDURE — 83036 HEMOGLOBIN GLYCOSYLATED A1C: CPT | Performed by: PHYSICIAN ASSISTANT

## 2023-06-06 RX ORDER — GABAPENTIN 300 MG/1
300 CAPSULE ORAL 3 TIMES DAILY
Qty: 60 CAPSULE | Refills: 2 | Status: SHIPPED | OUTPATIENT
Start: 2023-06-06

## 2023-06-06 NOTE — ASSESSMENT & PLAN NOTE
[address] [facility] 
 
 
 [address] [place] [address] [facility] 
[phone] Patient: Klaudia Cadena MRN: [id number] PXU:[birth year] Visit Information Date & Time Provider Department Dept. Phone Encounter #  
 2/13/2018  8:00 AM Michelle Rodriguez MD [facility] [id number] Your Appointments 5/7/2018  8:45 AM  
ROUTINE CARE with Michelle Rodriguez MD  
[facility] [place] [facility]) Appt Note: MyChart- Routine follow up // 11/13  bw  
 [address]  
  
   
 [phone]  
  
    
 7/25/2018  9:00 AM  
ESTABLISHED PATIENT with Alyssa Zimmer, NP  
[phone] ([address]) Appt Note: EP 6 month f/u  7/ll/l6 Lap Gastric Bypass w/EGD  
 [address] [facility] [address]  
[address] [place] [address] Upcoming Health Maintenance Date Due  
 FOOT EXAM Q1 5/31/1979 MICROALBUMIN Q1 10/13/2016 EYE EXAM RETINAL OR DILATED Q1 2/8/2017 HEMOGLOBIN A1C Q6M 5/2/2017 LIPID PANEL Q1 6/27/2017 DTaP/Tdap/Td series (2 - Td) 11/12/2023 Allergies as of 2/13/2018  Review Complete On: 2/13/2018 By: Ethelene Im, LPN Severity Noted Reaction Type Reactions Sulfa (Sulfonamide Antibiotics) High 03/04/2011   Side Effect Anaphylaxis, Other (comments) Difficulty breathing/high fever/delusional    
 Aleve [Naproxen Sodium]  09/30/2013    Nausea Only Codeine  03/04/2011   Side Effect Nausea Only Ibuprofen  03/04/2011   Side Effect Hives Lactose  02/23/2016   Intolerance Other (comments) Gas Penicillin G  03/04/2011   Side Effect Hives Has had cephalexin without issue. Current Immunizations  Reviewed on 7/11/2016 Name Date Influenza Vaccine 11/12/2013 Will get xray c-spine  Continue gabapentin but increase to 3 capsules daily  Call in 7-10 days with update  Influenza Vaccine (Quad) PF 11/6/2017, 10/13/2015 Influenza Vaccine (Whole Virus) 3/1/2013 Influenza Vaccine PF 10/23/2014 Influenza Vaccine Split 11/2/2011 Pneumococcal Polysaccharide (PPSV-23) 3/11/2015 Tdap 11/12/2013 Not reviewed this visit You Were Diagnosed With   
  
 Codes Comments Fibromyalgia    -  Primary ICD-10-CM: M79.7 ICD-9-CM: 729.1 It band syndrome, unspecified laterality     ICD-10-CM: M76.30 ICD-9-CM: 728.89 Vitals BP Pulse Temp Resp Height(growth percentile) Weight(growth percentile) (!) 146/92 (BP 1 Location: Left arm, BP Patient Position: Sitting) 65 97.5 °F (36.4 °C) (Oral) 18 5' 3\" (1.6 m) 169 lb 6 oz (76.8 kg) LMP SpO2 BMI OB Status Smoking Status 01/29/2011 98% 30 kg/m2 Hysterectomy Former Smoker Vitals History BMI and BSA Data Body Mass Index Body Surface Area  
 30 kg/m 2 1.85 m 2 Preferred Pharmacy Pharmacy Name Phone [id number] [place], [address] [phone] Your Updated Medication List  
  
   
This list is accurate as of: 2/13/18  8:24 AM.  Always use your most recent med list.  
  
  
  
  
 ascorbic acid (vitamin C) 250 mg tablet Commonly known as:  VITAMIN C Take 500 mg by mouth two (2) times a day. Benefiber Clear 3 gram/3.5 gram Powd Generic drug:  wheat dextrin Take  by mouth daily. 2 Teaspoons daily AT 7AM AND 5PM  
  
 buPROPion  mg SR tablet Commonly known as:  WELLBUTRIN SR  
TAKE 1 TABLET BY MOUTH EVERY DAY  
  
 busPIRone 5 mg tablet Commonly known as:  BUSPAR Take 5 mg by mouth as needed. butalbital-acetaminophen-caffeine -40 mg per tablet Commonly known as:  Vernona Sober Take 1 Tab by mouth every six (6) hours as needed for Pain. calcium citrate-vitamin d3 315-200 mg-unit Tab Commonly known as:  CITRACAL+D  
 Take 2 Tabs by mouth two (2) times daily (with meals). Indications: Pt states, \"medication has been change to take at noon and dinner. \"  
  
 cetirizine 10 mg tablet Commonly known as:  ZYRTEC Take  by mouth nightly. CULTURELLE PO Take 1 Tab by mouth daily. cyanocobalamin 1,000 mcg tablet Take 5,000 mcg by mouth daily. cyclobenzaprine 10 mg tablet Commonly known as:  FLEXERIL  
TAKE 1 TABLET BY MOUTH 3 TIMES A DAY AS NEEDED MUSCLE SPASM  
  
 escitalopram oxalate 10 mg tablet Commonly known as:  Veola Fujita Take 1 Tab by mouth daily. famotidine 40 mg tablet Commonly known as:  PEPCID  
TAKE 1 TAB BY MOUTH TWO (2) TIMES A DAY. INDICATIONS: GASTROESOPHAGEAL REFLUX DISEASE  Indications: gastroesophageal reflux disease FLINTSTONES COMPLETE Chew Generic drug:  pediatric multivitamin no.76 Take 1 Tab by mouth two (2) times a day. FLONASE 50 mcg/actuation nasal spray Generic drug:  fluticasone 2 Sprays by Both Nostrils route nightly as needed for Rhinitis (as needed). gabapentin 300 mg capsule Commonly known as:  NEURONTIN  
TAKE ONE CAPSULE BY MOUTH IN THE MORNING, 1 CAPSULE AT NOON, AND 2 CAPSULES AT BEDTIME  
  
 hyoscyamine SL 0.125 mg SL tablet Commonly known as:  LEVSIN/SL TAKE 1 TAB BY SUBLINGUAL ROUTE EVERY FOUR (4) HOURS AS NEEDED FOR CRAMPING.  
  
 ketoconazole 2 % topical cream  
Commonly known as:  NIZORAL Apply  to affected area daily. magnesium 250 mg Tab Take 500 mg by mouth daily. MIRALAX 17 gram packet Generic drug:  polyethylene glycol Take 17 g by mouth daily. ondansetron 4 mg disintegrating tablet Commonly known as:  ZOFRAN ODT  
TAKE 1 TABLET BY MOUTH EVERY 8 HOURS AS NEEDED FOR NAUSEA  
  
 OTHER  
C-PAP Machine  
  
 rizatriptan 10 mg disintegrating tablet Commonly known as:  MAXALT-MLT Take 1 Tab by mouth once as needed for Migraine for up to 1 dose. SYNTHROID 175 mcg tablet Generic drug:  levothyroxine TAKE 1 TABLET BY MOUTH EVERY DAY  
  
 SYSTANE (PROPYLENE GLYCOL) 0.4-0.3 % Drop Generic drug:  peg 400-propylene glycol Administer 1 Drop to both eyes as needed. traMADol 50 mg tablet Commonly known as:  ULTRAM  
Take 1 Tab by mouth every six (6) hours as needed for Pain. Max Daily Amount: 200 mg. VITAMIN D3 5,000 unit Tab tablet Generic drug:  cholecalciferol (VITAMIN D3) Take 5,000 Units by mouth two (2) times a day. LIQUID SOFT GEL We Performed the Following AMB POC HEMOGLOBIN A1C [42488 CPT(R)] REFERRAL TO ACUPUNCTURE [REF1 Custom] Referral Information Referral ID Referred By Referred To  
  
 [id number] Obduliomyles Sam Not Available Visits Status Start Date End Date 1 New Request 2/13/18 2/13/19 If your referral has a status of pending review or denied, additional information will be sent to support the outcome of this decision. Introducing [facility]! Dear Natividad Diane: 
Thank you for requesting a [facility] account. Our records indicate that you already have an active [facility] account. You can access your account anytime at [email or web address] Did you know that you can access your hospital and ER discharge instructions at any time in [facility]? You can also review all of your test results from your hospital stay or ER visit. Additional Information If you have questions, please visit the Frequently Asked Questions section of the [facility] website at [email or web address] Remember, [facility] is NOT to be used for urgent needs. For medical emergencies, dial 911. Now available from your iPhone and Android! Please provide this summary of care documentation to your next provider. Your primary care clinician is listed as Manav Rashid. If you have any questions after today's visit, please call [phone].

## 2023-06-06 NOTE — ASSESSMENT & PLAN NOTE
Lab Results   Component Value Date    HGBA1C 7 6 (A) 06/06/2023   Foot exam performed 12/5/22  Eye exam HN 8/8/22  A1C 6/6/23: 7 6  Pt takes ACE and statin

## 2023-06-06 NOTE — PROGRESS NOTES
Name: Marilu Becerra      : 1970      MRN: [id number]  Encounter Provider: Jenifer Kent PA-C  Encounter Date: 2023   Encounter department: [address] [place] [address]  Cervical radiculitis  Assessment & Plan: Will get xray c-spine  Continue gabapentin but increase to 3 capsules daily  Call in 7-10 days with update  Orders:  -     XR spine cervical complete 4 or 5 vw non injury; Future; Expected date: 2023    2  Mild persistent asthma without complication    3  Type 2 diabetes mellitus with hyperglycemia, without long-term current use of insulin [facility])  Assessment & Plan:    Lab Results   Component Value Date    HGBA1C 7 6 (A) 2023   Foot exam performed 22  Eye exam HN 22  A1C 23: 7 6  Pt takes ACE and statin  Orders:  -     POCT hemoglobin A1c    4  Visit for screening mammogram  -     Mammo screening bilateral w 3d & cad; Future; Expected date: 2023    5  Neck pain  -     gabapentin (NEURONTIN) 300 mg capsule; Take 1 capsule (300 mg total) by mouth 3 (three) times a day    6  Radiculopathy of cervicothoracic region  -     gabapentin (NEURONTIN) 300 mg capsule; Take 1 capsule (300 mg total) by mouth 3 (three) times a day    7  Chronic right shoulder pain  -     gabapentin (NEURONTIN) 300 mg capsule; Take 1 capsule (300 mg total) by mouth 3 (three) times a day           Subjective      Pt presents for routine visit  She is up to date with labs, diabetic eye exam, and CRC screening  She is due for mammogram in August  A1C is 7 6  She is noting issues with numbness and tingling and some decreased  strength in her left arm  She notes that the numbness goes from her neck to her finger tips  She denies pain in her arm or chest      Review of Systems   Constitutional: Negative for chills and fever     HENT: Negative for congestion, ear pain, hearing loss, postnasal drip, rhinorrhea, sinus pressure, sinus pain, sore throat and trouble swallowing  Eyes: Negative for pain and visual disturbance  Respiratory: Negative for cough, chest tightness, shortness of breath and wheezing  Cardiovascular: Negative  Negative for chest pain, palpitations and leg swelling  Gastrointestinal: Negative for abdominal pain, blood in stool, constipation, diarrhea, nausea and vomiting  Endocrine: Negative for cold intolerance, heat intolerance, polydipsia, polyphagia and polyuria  Genitourinary: Negative for difficulty urinating, dysuria, flank pain and urgency  Musculoskeletal: Negative for arthralgias, back pain, gait problem and myalgias  Skin: Negative for rash  Allergic/Immunologic: Negative  Neurological: Positive for numbness  Negative for dizziness, weakness, light-headedness and headaches  Hematological: Negative  Psychiatric/Behavioral: Negative for behavioral problems, dysphoric mood and sleep disturbance  The patient is not nervous/anxious  Current Outpatient Medications on File Prior to Visit   Medication Sig   • ARIPiprazole (ABILIFY) 10 mg tablet Take 10 mg by mouth daily   • B-D UF III MINI PEN NEEDLES 31G X 5 MM MISC use once daily IN THE MORNING   • budesonide-formoterol (SYMBICORT) 80-4 5 MCG/ACT inhaler Inhale 2 puffs 2 (two) times a day Rinse mouth after use     • Canagliflozin-metFORMIN HCl (Invokamet) 150-1000 MG TABS Take 1 tablet by mouth 2 (two) times a day   • ciclopirox (PENLAC) 8 % solution Apply topically daily at bedtime   • EPINEPHrine (EPIPEN) 0 3 mg/0 3 mL SOAJ Inject 0 3 mL (0 3 mg total) into a muscle once for 1 dose   • liraglutide (Victoza) injection Inject 0 2 mL (1 2 mg total) under the skin daily   • lisinopril (ZESTRIL) 2 5 mg tablet Take 1 tablet (2 5 mg total) by mouth daily   • Microlet Lancets MISC Test twice daily   • omeprazole (PriLOSEC) 40 MG capsule Take 1 capsule (40 mg total) by mouth daily   • pravastatin (PRAVACHOL) 20 mg tablet take 1 tablet by mouth once daily   • venlafaxine "(EFFEXOR-XR) 150 mg 24 hr capsule Take 150 mg by mouth daily     • [DISCONTINUED] FeroSul 325 (65 Fe) MG tablet take 1 tablet by mouth twice a day with meals (Patient taking differently: Take 325 mg by mouth daily with dinner)   • [DISCONTINUED] gabapentin (NEURONTIN) 300 mg capsule take 1 capsule orally twice a day   • [DISCONTINUED] benzonatate (TESSALON) 200 MG capsule Take 1 capsule (200 mg total) by mouth 3 (three) times a day as needed for cough (Patient not taking: Reported on 4/4/2023)   • [DISCONTINUED] fluticasone (FLONASE) 50 mcg/act nasal spray 1 spray into each nostril daily (Patient not taking: Reported on 4/4/2023)   • [DISCONTINUED] methocarbamol (ROBAXIN) 500 mg tablet Take 1 tablet (500 mg total) by mouth 2 (two) times a day       Objective     /64 (BP Location: Left arm, Patient Position: Sitting, Cuff Size: Standard)   Pulse 76   Temp (!) 96 2 °F (35 7 °C)   Ht 5' 2\" (1 575 m)   Wt 90 kg (198 lb 6 4 oz)   LMP 05/20/2019   SpO2 95%   BMI 36 29 kg/m²     Physical Exam  Vitals and nursing note reviewed  Constitutional:       General: She is not in acute distress  Appearance: She is well-developed  She is not diaphoretic  HENT:      Head: Normocephalic and atraumatic  Right Ear: External ear normal       Left Ear: External ear normal       Nose: Nose normal       Mouth/Throat:      Pharynx: No oropharyngeal exudate  Eyes:      General: No scleral icterus  Right eye: No discharge  Left eye: No discharge  Conjunctiva/sclera: Conjunctivae normal       Pupils: Pupils are equal, round, and reactive to light  Neck:      Thyroid: No thyromegaly  Cardiovascular:      Rate and Rhythm: Normal rate and regular rhythm  Heart sounds: Normal heart sounds  No murmur heard  No friction rub  No gallop  Pulmonary:      Effort: Pulmonary effort is normal  No respiratory distress  Breath sounds: Normal breath sounds  No wheezing or rales     Abdominal: " General: Bowel sounds are normal  There is no distension  Palpations: Abdomen is soft  Tenderness: There is no abdominal tenderness  Musculoskeletal:         General: No tenderness or deformity  Normal range of motion  Cervical back: Normal range of motion and neck supple  Skin:     General: Skin is warm and dry  Neurological:      Mental Status: She is alert and oriented to person, place, and time  Cranial Nerves: No cranial nerve deficit  Sensory: Sensory deficit present  Motor: Weakness present  Psychiatric:         Behavior: Behavior normal          Thought Content:  Thought content normal          Judgment: Judgment normal        Gertrudis Obrien PA-C

## 2023-06-08 ENCOUNTER — APPOINTMENT (OUTPATIENT)
Dept: RADIOLOGY | Facility: CLINIC | Age: 53
End: 2023-06-08
Payer: COMMERCIAL

## 2023-06-08 DIAGNOSIS — M54.12 CERVICAL RADICULITIS: ICD-10-CM

## 2023-06-08 PROCEDURE — 72050 X-RAY EXAM NECK SPINE 4/5VWS: CPT

## 2023-07-15 ENCOUNTER — HOSPITAL ENCOUNTER (OUTPATIENT)
Dept: CT IMAGING | Facility: HOSPITAL | Age: 53
Discharge: HOME | End: 2023-07-15
Attending: INTERNAL MEDICINE
Payer: COMMERCIAL

## 2023-07-15 DIAGNOSIS — F17.211 CIGARETTE NICOTINE DEPENDENCE IN REMISSION: ICD-10-CM

## 2023-07-15 PROCEDURE — 71271 CT THORAX LUNG CANCER SCR C-: CPT

## 2023-07-25 DIAGNOSIS — E78.5 HYPERLIPIDEMIA ASSOCIATED WITH TYPE 2 DIABETES MELLITUS (HCC): ICD-10-CM

## 2023-07-25 DIAGNOSIS — E11.69 HYPERLIPIDEMIA ASSOCIATED WITH TYPE 2 DIABETES MELLITUS (HCC): ICD-10-CM

## 2023-07-26 RX ORDER — PRAVASTATIN SODIUM 20 MG
TABLET ORAL
Qty: 90 TABLET | Refills: 1 | Status: SHIPPED | OUTPATIENT
Start: 2023-07-26

## 2023-07-31 DIAGNOSIS — I10 ESSENTIAL HYPERTENSION: ICD-10-CM

## 2023-07-31 RX ORDER — LISINOPRIL 2.5 MG/1
2.5 TABLET ORAL DAILY
Qty: 90 TABLET | Refills: 1 | Status: SHIPPED | OUTPATIENT
Start: 2023-07-31

## 2023-08-12 DIAGNOSIS — K21.9 GASTROESOPHAGEAL REFLUX DISEASE WITHOUT ESOPHAGITIS: ICD-10-CM

## 2023-08-12 RX ORDER — OMEPRAZOLE 40 MG/1
CAPSULE, DELAYED RELEASE ORAL
Qty: 30 CAPSULE | Refills: 5 | Status: SHIPPED | OUTPATIENT
Start: 2023-08-12

## 2023-08-15 ENCOUNTER — OFFICE VISIT (OUTPATIENT)
Dept: OBGYN CLINIC | Facility: CLINIC | Age: 53
End: 2023-08-15
Payer: COMMERCIAL

## 2023-08-15 VITALS
HEART RATE: 99 BPM | HEIGHT: 62 IN | DIASTOLIC BLOOD PRESSURE: 66 MMHG | BODY MASS INDEX: 36.29 KG/M2 | SYSTOLIC BLOOD PRESSURE: 106 MMHG

## 2023-08-15 DIAGNOSIS — M18.0 ARTHRITIS OF CARPOMETACARPAL (CMC) JOINT OF BOTH THUMBS: Primary | ICD-10-CM

## 2023-08-15 PROCEDURE — 20600 DRAIN/INJ JOINT/BURSA W/O US: CPT | Performed by: ORTHOPAEDIC SURGERY

## 2023-08-15 PROCEDURE — 99213 OFFICE O/P EST LOW 20 MIN: CPT | Performed by: ORTHOPAEDIC SURGERY

## 2023-08-15 RX ORDER — BUPIVACAINE HYDROCHLORIDE 2.5 MG/ML
0.5 INJECTION, SOLUTION INFILTRATION; PERINEURAL
Status: COMPLETED | OUTPATIENT
Start: 2023-08-15 | End: 2023-08-15

## 2023-08-15 RX ORDER — BETAMETHASONE SODIUM PHOSPHATE AND BETAMETHASONE ACETATE 3; 3 MG/ML; MG/ML
3 INJECTION, SUSPENSION INTRA-ARTICULAR; INTRALESIONAL; INTRAMUSCULAR; SOFT TISSUE
Status: COMPLETED | OUTPATIENT
Start: 2023-08-15 | End: 2023-08-15

## 2023-08-15 RX ADMIN — BUPIVACAINE HYDROCHLORIDE 0.5 ML: 2.5 INJECTION, SOLUTION INFILTRATION; PERINEURAL at 13:00

## 2023-08-15 RX ADMIN — BETAMETHASONE SODIUM PHOSPHATE AND BETAMETHASONE ACETATE 3 MG: 3; 3 INJECTION, SUSPENSION INTRA-ARTICULAR; INTRALESIONAL; INTRAMUSCULAR; SOFT TISSUE at 13:00

## 2023-08-15 NOTE — PROGRESS NOTES
ASSESSMENT/PLAN:    Diagnoses and all orders for this visit:    Arthritis of carpometacarpal [place]) joint of both thumbs    Other orders  -     Small joint arthrocentesis        CMC joints of both of the patient's thumbs were injected with Celestone and Marcaine. She tolerated the injections quite well. She will follow-up with our office in 3 months. She is acceptable to this plan. Return in about 3 months (around 11/15/2023). Patient has arthritis along the base of her thumbs. Under aseptic technique, both CMC joints were injected with Celestone and Marcaine. She tolerated the procedures quite well. Return back 3 months evaluation. If her condition changes, she would not hesitate to let us know      _____________________________________________________  CHIEF COMPLAINT:  Chief Complaint   Patient presents with   • Left Thumb - Follow-up   • Right Thumb - Follow-up         SUBJECTIVE:  Katie Dye is a 45 y.o. female who presents to our office for a follow-up visit. The patient has a history of CMC joint arthritis of both thumbs. She would like corticosteroid injections today, as they have provided her with adequate relief of her symptoms in the past.  She denies any numbness or tingling. She denies any fever or chills. She still complains of bilateral thumb pain.     The following portions of the patient's history were reviewed and updated as appropriate: allergies, current medications, past family history, past medical history, past social history, past surgical history and problem list.    PAST MEDICAL HISTORY:  Past Medical History:   Diagnosis Date   • Abnormal ECG    • Anemia 1982   • Anxiety    • Arthritis    • Asthma    • Callus    • Cancer ([address])     renal,cervical   • Chronic kidney disease 1989   • COPD (chronic obstructive pulmonary disease) ([facility]) 2005   • Depression    • Diabetes mellitus ([facility])    • Difficulty walking    • Diverticulitis    • GERD (gastroesophageal reflux disease)    • Hyperlipidemia associated with type 2 diabetes mellitus ([address])    • Hypertension    • Insulinoma    • Liver disease    • Lung disease    • Miscarriage    • Myocardial infarction [facility])    • Non-recurrent acute suppurative otitis media of left ear without spontaneous rupture of tympanic membrane 2019   • Peptic ulceration    • Plantar fasciitis 04/15/2022   • Pulmonary emboli (H[facility])    • Sleep apnea    • Sleep apnea, obstructive    • Stroke [facility])        PAST SURGICAL HISTORY:  Past Surgical History:   Procedure Laterality Date   • ABDOMINAL SURGERY  1990    Pancreatectomy (partial)   •  SECTION      twice   • CHOLECYSTECTOMY     • KNEE ARTHROSCOPY Left    • KNEE SURGERY     • PANCREATECTOMY      Partial    • WY COLONOSCOPY FLX DX W/COLLJ SPEC WHEN PFRMD N/A 2019    Procedure: COLONOSCOPY;  Surgeon: Joanne Gallo DO;  Location: [facility];  Service: Gastroenterology   • WY LAPAROSCOPY SURG CHOLECYSTECTOMY N/A 2018    Procedure: CHOLECYSTECTOMY LAPAROSCOPIC;  Surgeon: Lalito Malone DO;  Location: [facility];  Service: General       FAMILY HISTORY:  Family History   Problem Relation Age of Onset   • Heart disease Mother    • Hyperlipidemia Mother    • Obesity Mother    • Hypertension Mother    • Other Mother         Bundle branch block   • Tuberculosis Mother    • Arthritis Mother    • Diabetes Mother    • Osteoporosis Mother    • Ulcerative colitis Mother    • Substance Abuse Father    • Cancer Father    • No Known Problems Sister    • No Known Problems Sister    • No Known Problems Daughter    • Other Maternal Grandmother         ascending aortic aneurysm resection   • Diabetes Maternal Grandmother    • Diabetes Paternal Grandmother    • No Known Problems Brother    • Tuberculosis Maternal Aunt    • No Known Problems Paternal Aunt    • No Known Problems Paternal Aunt    • No Known Problems Paternal Aunt    • No Known Problems Paternal Aunt    • No Known Problems Paternal Aunt    • Breast cancer Other 80   • Substance Abuse Family    • Osteoporosis Family    • No Known Problems Half-Sister        SOCIAL HISTORY:  Social History     Tobacco Use   • Smoking status: Former     Packs/day: 1.00     Years: 30.00     Total pack years: 30.00     Types: Cigarettes     Quit date: 1/1/2013     Years since quitting: 10.6   • Smokeless tobacco: Never   • Tobacco comments:     Dont need at all   Vaping Use   • Vaping Use: Never used   Substance Use Topics   • Alcohol use: Not Currently   • Drug use: No       MEDICATIONS:    Current Outpatient Medications:   •  ARIPiprazole (ABILIFY) 10 mg tablet, Take 10 mg by mouth daily, Disp: , Rfl:   •  B-D UF III MINI PEN NEEDLES 31G X 5 MM MISC, use once daily IN THE MORNING, Disp: 100 each, Rfl: 1  •  budesonide-formoterol (SYMBICORT) 80-4.5 MCG/ACT inhaler, Inhale 2 puffs 2 (two) times a day Rinse mouth after use., Disp: 10.2 g, Rfl: 1  •  Canagliflozin-metFORMIN HCl (Invokamet) 150-1000 MG TABS, Take 1 tablet by mouth 2 (two) times a day, Disp: 60 tablet, Rfl: 5  •  ciclopirox (PENLAC) 8 % solution, Apply topically daily at bedtime, Disp: 6.6 mL, Rfl: 0  •  gabapentin (NEURONTIN) 300 mg capsule, Take 1 capsule (300 mg total) by mouth 3 (three) times a day, Disp: 60 capsule, Rfl: 2  •  liraglutide (Victoza) injection, Inject 0.2 mL (1.2 mg total) under the skin daily, Disp: 3 mL, Rfl: 5  •  lisinopril (ZESTRIL) 2.5 mg tablet, take 1 tablet by mouth once daily, Disp: 90 tablet, Rfl: 1  •  Microlet Lancets MISC, Test twice daily, Disp: 100 each, Rfl: 3  •  omeprazole (PriLOSEC) 40 MG capsule, swallow 1 capsule once daily, Disp: 30 capsule, Rfl: 5  •  pravastatin (PRAVACHOL) 20 mg tablet, take 1 tablet by mouth once daily, Disp: 90 tablet, Rfl: 1  •  venlafaxine (EFFEXOR-XR) 150 mg 24 hr capsule, Take 150 mg by mouth daily  , Disp: , Rfl:   •  EPINEPHrine (EPIPEN) 0.3 mg/0.3 mL SOAJ, Inject 0.3 mL (0.3 mg total) into a muscle once for 1 dose, Disp: 0.3 mL, Rfl: 0    ALLERGIES:  Allergies   Allergen Reactions   • Iodine - Food Allergy Anaphylaxis     IVP DYE    • Nuts - Food Allergy Anaphylaxis     [facility] - [id number]: Pine nuts       ROS:  Review of Systems     Constitutional: Negative for fatigue, fever or loss of appetite. HENT: Negative. Respiratory: Negative for shortness of breath, dyspnea. Cardiovascular: Negative for chest pain/tightness. Gastrointestinal: Negative for abdominal pain, N/V. Endocrine: Negative for cold/heat intolerance, unexplained weight loss/gain. Genitourinary: Negative for flank pain, dysuria, hematuria. Musculoskeletal: Positive for arthralgia   Skin: Negative for rash. Neurological: Negative for numbness or tingling  Psychiatric/Behavioral: Negative for agitation. _____________________________________________________  PHYSICAL EXAMINATION:    Blood pressure 106/66, pulse 99, height 5' 2" (1.575 m), last menstrual period 05/20/2019. Constitutional: Oriented to person, place, and time. Appears well-developed and well-nourished. No distress. HENT:   Head: Normocephalic. Eyes: Conjunctivae are normal. Right eye exhibits no discharge. Left eye exhibits no discharge. No scleral icterus. Cardiovascular: Normal rate. Pulmonary/Chest: Effort normal.   Neurological: Alert and oriented to person, place, and time. Skin: Skin is warm and dry. No rash noted. Not diaphoretic. No erythema. No pallor. Psychiatric: Normal mood and affect.  Behavior is normal. Judgment and thought content normal.      MUSCULOSKELETAL EXAMINATION:   Physical Exam  Ortho Exam    Bilateral upper extremities are neurovascularly intact  Fingers are pink and mobile  Compartments are soft  There is grinding with pain along bilateral first CMC joints  No warmth erythema  Brisk cap refill  Sensation intact  Objective:  BP Readings from Last 1 Encounters:   08/15/23 106/66      Wt Readings from Last 1 Encounters:   06/06/23 90 kg (198 lb 6.4 oz) BMI:   Estimated body mass index is 36.29 kg/m² as calculated from the following:    Height as of this encounter: 5' 2" (1.575 m). Weight as of 6/6/23: 90 kg (198 lb 6.4 oz). PROCEDURES PERFORMED:  Small joint arthrocentesis: bilateral thumb CMC  Universal Protocol:  Risks and benefits: risks, benefits and alternatives were discussed  Consent given by: patient  Site marked: the operative site was marked  Radiology Images displayed and confirmed.  If images not available, report reviewed: imaging studies available    Supporting Documentation  Indications: pain   Procedure Details  Location: thumb - bilateral thumb CMC  Preparation: Patient was prepped and draped in the usual sterile fashion  Needle size: 27 G  Ultrasound guidance: no  Approach: dorsal    Medications (Right): 3 mg betamethasone acetate-betamethasone sodium phosphate 6 (3-3) mg/mL; 0.5 mL bupivacaine 0.25 %Medications (Left): 3 mg betamethasone acetate-betamethasone sodium phosphate 6 (3-3) mg/mL; 0.5 mL bupivacaine 0.25 %   Patient tolerance: patient tolerated the procedure well with no immediate complications  Dressing:  Sterile dressing applied            Scribe Attestation    I,:  Annamarie You PA-C am acting as a scribe while in the presence of the attending physician.:       I,:  Jahaira Valle DO personally performed the services described in this documentation    as scribed in my presence.:

## 2023-08-21 ENCOUNTER — TELEPHONE (OUTPATIENT)
Dept: INTERNAL MEDICINE CLINIC | Facility: CLINIC | Age: 53
End: 2023-08-21

## 2023-08-21 DIAGNOSIS — M54.2 NECK PAIN: ICD-10-CM

## 2023-08-21 DIAGNOSIS — M25.511 CHRONIC RIGHT SHOULDER PAIN: ICD-10-CM

## 2023-08-21 DIAGNOSIS — G89.29 CHRONIC RIGHT SHOULDER PAIN: ICD-10-CM

## 2023-08-21 DIAGNOSIS — M54.13 RADICULOPATHY OF CERVICOTHORACIC REGION: ICD-10-CM

## 2023-08-21 RX ORDER — GABAPENTIN 300 MG/1
300 CAPSULE ORAL 3 TIMES DAILY
Qty: 60 CAPSULE | Refills: 2 | Status: SHIPPED | OUTPATIENT
Start: 2023-08-21

## 2023-08-22 DIAGNOSIS — E11.65 TYPE 2 DIABETES MELLITUS WITH HYPERGLYCEMIA, WITHOUT LONG-TERM CURRENT USE OF INSULIN (HCC): ICD-10-CM

## 2023-08-22 RX ORDER — FLURBIPROFEN SODIUM 0.3 MG/ML
SOLUTION/ DROPS OPHTHALMIC
Qty: 100 EACH | Refills: 1 | Status: SHIPPED | OUTPATIENT
Start: 2023-08-22

## 2023-08-22 RX ORDER — FLURBIPROFEN SODIUM 0.3 MG/ML
SOLUTION/ DROPS OPHTHALMIC
Qty: 100 EACH | Refills: 1 | Status: CANCELLED | OUTPATIENT
Start: 2023-08-22

## 2023-08-24 DIAGNOSIS — E11.65 TYPE 2 DIABETES MELLITUS WITH HYPERGLYCEMIA, WITHOUT LONG-TERM CURRENT USE OF INSULIN (HCC): ICD-10-CM

## 2023-08-24 RX ORDER — CANAGLIFLOZIN AND METFORMIN HYDROCHLORIDE 150; 1000 MG/1; MG/1
1 TABLET, FILM COATED ORAL 2 TIMES DAILY
Qty: 60 TABLET | Refills: 5 | Status: SHIPPED | OUTPATIENT
Start: 2023-08-24

## 2023-08-30 ENCOUNTER — HOSPITAL ENCOUNTER (OUTPATIENT)
Dept: MAMMOGRAPHY | Facility: HOSPITAL | Age: 53
Discharge: HOME | End: 2023-08-30
Payer: COMMERCIAL

## 2023-08-30 VITALS — HEIGHT: 62 IN | WEIGHT: 198 LBS | BODY MASS INDEX: 36.44 KG/M2

## 2023-08-30 DIAGNOSIS — Z12.31 VISIT FOR SCREENING MAMMOGRAM: ICD-10-CM

## 2023-08-30 PROCEDURE — 77063 BREAST TOMOSYNTHESIS BI: CPT

## 2023-08-30 PROCEDURE — 77067 SCR MAMMO BI INCL CAD: CPT

## 2023-09-05 NOTE — RESULT ENCOUNTER NOTE
Joshua Wilson  I reviewed your mammogram and it was was normal. We recommend repeating in one year for maintenance. Have a great day.    -Radha Bobby

## 2023-09-06 ENCOUNTER — OFFICE VISIT (OUTPATIENT)
Dept: INTERNAL MEDICINE CLINIC | Facility: CLINIC | Age: 53
End: 2023-09-06
Payer: COMMERCIAL

## 2023-09-06 VITALS
TEMPERATURE: 97.9 F | DIASTOLIC BLOOD PRESSURE: 62 MMHG | HEART RATE: 97 BPM | BODY MASS INDEX: 37.36 KG/M2 | WEIGHT: 203 LBS | OXYGEN SATURATION: 96 % | SYSTOLIC BLOOD PRESSURE: 122 MMHG | HEIGHT: 62 IN

## 2023-09-06 DIAGNOSIS — Z11.4 SCREENING FOR HIV (HUMAN IMMUNODEFICIENCY VIRUS): ICD-10-CM

## 2023-09-06 DIAGNOSIS — E11.65 TYPE 2 DIABETES MELLITUS WITH HYPERGLYCEMIA, WITHOUT LONG-TERM CURRENT USE OF INSULIN (HCC): Primary | ICD-10-CM

## 2023-09-06 DIAGNOSIS — Z23 ENCOUNTER FOR IMMUNIZATION: ICD-10-CM

## 2023-09-06 DIAGNOSIS — F41.9 ANXIETY: ICD-10-CM

## 2023-09-06 LAB — SL AMB POCT HEMOGLOBIN AIC: 8 (ref ?–6.5)

## 2023-09-06 PROCEDURE — 99214 OFFICE O/P EST MOD 30 MIN: CPT | Performed by: PHYSICIAN ASSISTANT

## 2023-09-06 PROCEDURE — 90715 TDAP VACCINE 7 YRS/> IM: CPT

## 2023-09-06 PROCEDURE — 90471 IMMUNIZATION ADMIN: CPT

## 2023-09-06 PROCEDURE — 83036 HEMOGLOBIN GLYCOSYLATED A1C: CPT | Performed by: PHYSICIAN ASSISTANT

## 2023-09-06 RX ORDER — LIRAGLUTIDE 6 MG/ML
1.8 INJECTION SUBCUTANEOUS DAILY
Qty: 3 ML | Refills: 5 | Status: SHIPPED | OUTPATIENT
Start: 2023-09-06

## 2023-09-06 RX ORDER — BUSPIRONE HYDROCHLORIDE 5 MG/1
5 TABLET ORAL 3 TIMES DAILY
Qty: 90 TABLET | Refills: 0 | Status: SHIPPED | OUTPATIENT
Start: 2023-09-06

## 2023-09-07 PROBLEM — F41.9 ANXIETY: Status: ACTIVE | Noted: 2023-09-07

## 2023-09-07 PROBLEM — H69.82 DYSFUNCTION OF LEFT EUSTACHIAN TUBE: Status: RESOLVED | Noted: 2019-04-16 | Resolved: 2023-09-07

## 2023-09-07 PROBLEM — N95.2 VAGINAL ATROPHY: Status: RESOLVED | Noted: 2022-01-13 | Resolved: 2023-09-07

## 2023-09-07 PROBLEM — H69.92 DYSFUNCTION OF LEFT EUSTACHIAN TUBE: Status: RESOLVED | Noted: 2019-04-16 | Resolved: 2023-09-07

## 2023-09-07 PROBLEM — B37.31 VAGINA, CANDIDIASIS: Status: RESOLVED | Noted: 2023-04-04 | Resolved: 2023-09-07

## 2023-09-07 NOTE — ASSESSMENT & PLAN NOTE
Lab Results   Component Value Date    HGBA1C 8.0 (A) 09/06/2023   Foot exam performed 12/5/22  Eye exam HN 8/8/22  A1C 9/6/23: 8.0  Pt takes ACE and statin. Will continue current regime but increase victoza to 1.8mg. Directions for use and possible side effects discussed and patient verbalized understanding of these.

## 2023-09-07 NOTE — PROGRESS NOTES
Name: Lora Bauman      : 1970      MRN: [id number]  Encounter Provider: Luis Kang PA-C  Encounter Date: 2023   Encounter department: [address] [place], [place] [address]john[place]     1. Type 2 diabetes mellitus with hyperglycemia, without long-term current use of insulin [facility])  Assessment & Plan:    Lab Results   Component Value Date    HGBA1C 8.0 (A) 2023   Foot exam performed 22  Eye exam HN 22  A1C 23: 8.0  Pt takes ACE and statin. Will continue current regime but increase victoza to 1.8mg. Directions for use and possible side effects discussed and patient verbalized understanding of these. Orders:  -     POCT hemoglobin A1c  -     liraglutide (Victoza) injection; Inject 0.3 mL (1.8 mg total) under the skin daily    2. Screening for HIV (human immunodeficiency virus)    3. Encounter for immunization  -     TDAP VACCINE GREATER THAN OR EQUAL TO 8YO IM    4. Anxiety  Assessment & Plan:  Start buspar. Directions for use and possible side effects discussed and patient verbalized understanding of these. Orders:  -     busPIRone (BUSPAR) 5 mg tablet; Take 1 tablet (5 mg total) by mouth 3 (three) times a day           Subjective      Pt presents for routine visit. She is up to date with diabetic foot exam, mammogram, CRC screening, labs and lung screening CT. She is due for tdap and is agreeable. Her A1C today is increased at 8.0 with previous value being 7.6. Bp is controlled. She is noting some increased anxiety and feeling overwhelmed with caring for her mother who has had a decline in her health over the last year. Review of Systems   Constitutional: Negative for chills and fever. HENT: Negative for congestion, ear pain, hearing loss, postnasal drip, rhinorrhea, sinus pressure, sinus pain, sore throat and trouble swallowing. Eyes: Negative for pain and visual disturbance.    Respiratory: Negative for cough, chest tightness, shortness of breath and wheezing. Cardiovascular: Negative. Negative for chest pain, palpitations and leg swelling. Gastrointestinal: Negative for abdominal pain, blood in stool, constipation, diarrhea, nausea and vomiting. Endocrine: Negative for cold intolerance, heat intolerance, polydipsia, polyphagia and polyuria. Genitourinary: Negative for difficulty urinating, dysuria, flank pain and urgency. Musculoskeletal: Negative for arthralgias, back pain, gait problem and myalgias. Skin: Negative for rash. Allergic/Immunologic: Negative. Neurological: Negative for dizziness, weakness, light-headedness and headaches. Hematological: Negative. Psychiatric/Behavioral: Negative for behavioral problems, dysphoric mood and sleep disturbance. The patient is nervous/anxious. Current Outpatient Medications on File Prior to Visit   Medication Sig   • ARIPiprazole (ABILIFY) 10 mg tablet Take 10 mg by mouth daily   • gabapentin (NEURONTIN) 300 mg capsule Take 1 capsule (300 mg total) by mouth 3 (three) times a day   • Insulin Pen Needle (B-D UF III MINI PEN NEEDLES) 31G X 5 MM MISC Use once daily as directed. • Invokamet 150-1000 MG TABS take 1 tablet by mouth twice a day   • lisinopril (ZESTRIL) 2.5 mg tablet take 1 tablet by mouth once daily   • Microlet Lancets MISC Test twice daily   • omeprazole (PriLOSEC) 40 MG capsule swallow 1 capsule once daily   • pravastatin (PRAVACHOL) 20 mg tablet take 1 tablet by mouth once daily   • venlafaxine (EFFEXOR-XR) 150 mg 24 hr capsule Take 150 mg by mouth daily     • EPINEPHrine (EPIPEN) 0.3 mg/0.3 mL SOAJ Inject 0.3 mL (0.3 mg total) into a muscle once for 1 dose       Objective     /62 (BP Location: Left arm, Patient Position: Sitting)   Pulse 97   Temp 97.9 °F (36.6 °C)   Ht 5' 2" (1.575 m)   Wt 92.1 kg (203 lb)   LMP 05/20/2019   SpO2 96%   BMI 37.13 kg/m²     Physical Exam  Vitals and nursing note reviewed.    Constitutional:       General: She is not in acute distress. Appearance: She is well-developed. She is not diaphoretic. HENT:      Head: Normocephalic and atraumatic. Right Ear: External ear normal.      Left Ear: External ear normal.      Nose: Nose normal.      Mouth/Throat:      Pharynx: No oropharyngeal exudate. Eyes:      General: No scleral icterus. Right eye: No discharge. Left eye: No discharge. Conjunctiva/sclera: Conjunctivae normal.      Pupils: Pupils are equal, round, and reactive to light. Neck:      Thyroid: No thyromegaly. Cardiovascular:      Rate and Rhythm: Normal rate and regular rhythm. Heart sounds: Normal heart sounds. No murmur heard. No friction rub. No gallop. Pulmonary:      Effort: Pulmonary effort is normal. No respiratory distress. Breath sounds: Normal breath sounds. No wheezing or rales. Abdominal:      General: Bowel sounds are normal. There is no distension. Palpations: Abdomen is soft. Tenderness: There is no abdominal tenderness. Musculoskeletal:         General: No tenderness or deformity. Normal range of motion. Cervical back: Normal range of motion and neck supple. Skin:     General: Skin is warm and dry. Neurological:      Mental Status: She is alert and oriented to person, place, and time. Cranial Nerves: No cranial nerve deficit. Psychiatric:         Mood and Affect: Mood is anxious. Behavior: Behavior normal.         Thought Content:  Thought content normal.         Judgment: Judgment normal.       Gertrudis Obrien PA-C

## 2023-09-07 NOTE — ASSESSMENT & PLAN NOTE
Start buspar. Directions for use and possible side effects discussed and patient verbalized understanding of these.

## 2023-09-29 ENCOUNTER — IMMUNIZATIONS (OUTPATIENT)
Dept: INTERNAL MEDICINE CLINIC | Facility: CLINIC | Age: 53
End: 2023-09-29
Payer: COMMERCIAL

## 2023-09-29 ENCOUNTER — APPOINTMENT (OUTPATIENT)
Dept: RADIOLOGY | Facility: CLINIC | Age: 53
End: 2023-09-29
Payer: COMMERCIAL

## 2023-09-29 DIAGNOSIS — M54.16 LUMBAR RADICULITIS: ICD-10-CM

## 2023-09-29 DIAGNOSIS — F41.9 ANXIETY: ICD-10-CM

## 2023-09-29 DIAGNOSIS — Z23 ENCOUNTER FOR IMMUNIZATION: Primary | ICD-10-CM

## 2023-09-29 DIAGNOSIS — M54.16 LUMBAR RADICULITIS: Primary | ICD-10-CM

## 2023-09-29 PROCEDURE — 90471 IMMUNIZATION ADMIN: CPT | Performed by: PHYSICIAN ASSISTANT

## 2023-09-29 PROCEDURE — 73502 X-RAY EXAM HIP UNI 2-3 VIEWS: CPT

## 2023-09-29 PROCEDURE — 90686 IIV4 VACC NO PRSV 0.5 ML IM: CPT | Performed by: PHYSICIAN ASSISTANT

## 2023-09-29 PROCEDURE — 72110 X-RAY EXAM L-2 SPINE 4/>VWS: CPT

## 2023-09-29 RX ORDER — BUSPIRONE HYDROCHLORIDE 5 MG/1
5 TABLET ORAL 3 TIMES DAILY
Qty: 90 TABLET | Refills: 0 | Status: SHIPPED | OUTPATIENT
Start: 2023-09-29

## 2023-10-23 DIAGNOSIS — I10 ESSENTIAL HYPERTENSION: ICD-10-CM

## 2023-10-23 DIAGNOSIS — E78.5 HYPERLIPIDEMIA ASSOCIATED WITH TYPE 2 DIABETES MELLITUS: ICD-10-CM

## 2023-10-23 DIAGNOSIS — M54.2 NECK PAIN: ICD-10-CM

## 2023-10-23 DIAGNOSIS — G89.29 CHRONIC RIGHT SHOULDER PAIN: ICD-10-CM

## 2023-10-23 DIAGNOSIS — E11.69 HYPERLIPIDEMIA ASSOCIATED WITH TYPE 2 DIABETES MELLITUS: ICD-10-CM

## 2023-10-23 DIAGNOSIS — M25.511 CHRONIC RIGHT SHOULDER PAIN: ICD-10-CM

## 2023-10-23 DIAGNOSIS — M54.13 RADICULOPATHY OF CERVICOTHORACIC REGION: ICD-10-CM

## 2023-10-23 RX ORDER — LISINOPRIL 2.5 MG/1
2.5 TABLET ORAL DAILY
Qty: 90 TABLET | Refills: 0 | Status: SHIPPED | OUTPATIENT
Start: 2023-10-23

## 2023-10-23 RX ORDER — GABAPENTIN 300 MG/1
300 CAPSULE ORAL 3 TIMES DAILY
Qty: 60 CAPSULE | Refills: 2 | Status: SHIPPED | OUTPATIENT
Start: 2023-10-23

## 2023-10-23 NOTE — TELEPHONE ENCOUNTER
Patient requesting refill(s) of: gabapentin 300 mg TID    Last filled: 8/21/2023 #60 x 2  Last appt: 9/6/2023  Next appt: 12/7/2023  Pharmacy: [facility]

## 2023-10-24 RX ORDER — PRAVASTATIN SODIUM 20 MG
20 TABLET ORAL DAILY
Qty: 90 TABLET | Refills: 0 | Status: SHIPPED | OUTPATIENT
Start: 2023-10-24

## 2023-10-27 DIAGNOSIS — F41.9 ANXIETY: ICD-10-CM

## 2023-10-27 RX ORDER — BUSPIRONE HYDROCHLORIDE 5 MG/1
5 TABLET ORAL 3 TIMES DAILY
Qty: 90 TABLET | Refills: 0 | Status: SHIPPED | OUTPATIENT
Start: 2023-10-27

## 2023-11-01 ENCOUNTER — OFFICE VISIT (OUTPATIENT)
Dept: PULMONOLOGY | Facility: CLINIC | Age: 53
End: 2023-11-01

## 2023-11-01 VITALS
RESPIRATION RATE: 16 BRPM | OXYGEN SATURATION: 94 % | SYSTOLIC BLOOD PRESSURE: 98 MMHG | BODY MASS INDEX: 37.73 KG/M2 | HEIGHT: 62 IN | WEIGHT: 205 LBS | HEART RATE: 62 BPM | DIASTOLIC BLOOD PRESSURE: 58 MMHG

## 2023-11-01 DIAGNOSIS — J45.30 MILD PERSISTENT ASTHMA WITHOUT COMPLICATION: Primary | ICD-10-CM

## 2023-11-01 DIAGNOSIS — F17.211 CIGARETTE NICOTINE DEPENDENCE IN REMISSION: ICD-10-CM

## 2023-11-01 DIAGNOSIS — G47.33 OSA (OBSTRUCTIVE SLEEP APNEA): ICD-10-CM

## 2023-11-01 RX ORDER — BUDESONIDE AND FORMOTEROL FUMARATE DIHYDRATE 80; 4.5 UG/1; UG/1
2 AEROSOL RESPIRATORY (INHALATION) 2 TIMES DAILY
Qty: 10.2 G | Refills: 5 | Status: SHIPPED | OUTPATIENT
Start: 2023-11-01

## 2023-11-01 NOTE — ASSESSMENT & PLAN NOTE
-Has not needed Symbicort for the past 3 months  -No recent exacerbations and denies daily pulmonary symptoms  -Continue Symbicort 80 mcg 2 puffs twice per day as needed  -Declined need for albuterol inhaler  -Avoid known allergens  -UTD on vaccinations

## 2023-11-01 NOTE — ASSESSMENT & PLAN NOTE
-Reviewed recent CPAP compliance report.   Residual AHI still elevated at 7.4, however patient has significant mask leakage at 54.3 L/min  -Patient has not noticed any obvious mask leakage  -We will have patient go for mask fitting appointment  -Check CPAP compliance again at next office visit  -If residual AHI still high with minimal mask leakage, consider repeating CPAP study

## 2023-11-01 NOTE — PROGRESS NOTES
Pulmonary Follow Up Note  Katie Dye 52 y.o. female MRN: [id number]  11/1/2023    Assessment:    Mild persistent asthma without complication  -Has not needed Symbicort for the past 3 months  -No recent exacerbations and denies daily pulmonary symptoms  -Continue Symbicort 80 mcg 2 puffs twice per day as needed  -Declined need for albuterol inhaler  -Avoid known allergens  -UTD on vaccinations    ANSHUL (obstructive sleep apnea)  -Reviewed recent CPAP compliance report. Residual AHI still elevated at 7.4, however patient has significant mask leakage at 54.3 L/min  -Patient has not noticed any obvious mask leakage  -We will have patient go for mask fitting appointment  -Check CPAP compliance again at next office visit  -If residual AHI still high with minimal mask leakage, consider repeating CPAP study    Cigarette nicotine dependence in remission  -At least 30-pack-year smoking history, quit 10 years ago  -Due for yearly CT lung cancer screening July 2024. Previous CT screening with no new or enlarging pulmonary nodules      Plan:    Diagnoses and all orders for this visit:    ANSHUL (obstructive sleep apnea)  -     Mask fitting only; Future    Mild persistent asthma without complication  -     budesonide-formoterol (Symbicort) 80-4.5 MCG/ACT inhaler; Inhale 2 puffs 2 (two) times a day Rinse mouth after use. Cigarette nicotine dependence in remission        Return in about 2 months (around 1/1/2024). History of Present Illness     Chief Complaint:   Chief Complaint   Patient presents with    Asthma     Patient reports not using symibcort as she felt she did not need it.         Patient ID: Jackie Smith is a 52 y.o. y.o. female has a past medical history of Abnormal ECG, Anemia (1982), Anxiety, Arthritis, Asthma, Bronchiectasis ([address]), Callus, Cancer ([address]), Cataract (2021), Chronic bronchitis ([address]) (1984), Chronic kidney disease (1989), COPD (chronic obstructive pulmonary disease) ([address]) (2005), Depression, Diabetes mellitus ([address]), Difficulty walking, Diverticulitis, GERD (gastroesophageal reflux disease), Hyperlipidemia associated with type 2 diabetes mellitus, Hypertension, Insulinoma, Liver disease, Lung disease, Miscarriage (1989), Myocardial infarction ([address]) (2008), Non-recurrent acute suppurative otitis media of left ear without spontaneous rupture of tympanic membrane (08/13/2019), Peptic ulceration, Plantar fasciitis (04/15/2022), Pulmonary emboli ([address]), Rheumatoid arthritis ([address]) (1998), Sleep apnea, Sleep apnea, obstructive, and Stroke ([address] 11/1/2023  HPI: Casa Mancini is a 52 y.o. female who presents to the office today for a follow up visit. She has a past medical history including but not limited to asthma, ANSHUL on CPAP, DM 2, PLMD, obesity, PE in 2017, reflux, depression. She is a former smoker with 30-pack-year history, quit 10 years ago. She was previously seen in the office 6 months ago, maintained on Symbicort 80 as needed and CPAP nightly. There was concern for an elevated residual AHI and potential need for repeat CPAP titration study. Patient reports that her asthma has been stable. Has had no exacerbations and has not needed Symbicort since July. Not using albuterol inhaler. Denies any daily pulmonary symptoms except for occasional shortness of breath with exertion. She has been wearing her CPAP nightly and feels well rested during the day. She has not noticed any significant mask leakage, but does sometimes note that her residual AHI scores are elevated. Review of Systems   Constitutional:  Negative for activity change, chills, diaphoresis, fever and unexpected weight change. HENT:  Negative for congestion, postnasal drip, rhinorrhea, sore throat, trouble swallowing and voice change. Respiratory:  Positive for shortness of breath. Negative for cough, chest tightness and wheezing. Cardiovascular:  Negative for chest pain, palpitations and leg swelling. Allergic/Immunologic: Positive for environmental allergies.        Historical Information   Past Medical History:   Diagnosis Date    Abnormal ECG     Anemia     Anxiety     Arthritis     Asthma     Bronchiectasis ([address])     Callus     Cancer ([address])     renal,cervical    Cataract     Chronic bronchitis ([address]) 1984    Chronic kidney disease     COPD (chronic obstructive pulmonary disease) ([address]) 2005    Depression     Diabetes mellitus ([address])     Difficulty walking     Diverticulitis     GERD (gastroesophageal reflux disease)     Hyperlipidemia associated with type 2 diabetes mellitus      Hypertension     Insulinoma     Liver disease     Lung disease     Miscarriage     Myocardial infarction ([address])     Non-recurrent acute suppurative otitis media of left ear without spontaneous rupture of tympanic membrane 2019    Peptic ulceration     Plantar fasciitis 04/15/2022    Pulmonary emboli (HCC)     Rheumatoid arthritis ([address])     Sleep apnea     Sleep apnea, obstructive     Stroke [facility])      Past Surgical History:   Procedure Laterality Date    ABDOMINAL SURGERY  1990    Pancreatectomy (partial)     SECTION      twice    CHOLECYSTECTOMY      EYE SURGERY      KNEE ARTHROSCOPY Left     KNEE SURGERY      PANCREATECTOMY      Partial     GA COLONOSCOPY FLX DX W/COLLJ SPEC WHEN PFRMD N/A 2019    Procedure: COLONOSCOPY;  Surgeon: Kvng Montana DO;  [facility]: [facility];  Service: Gastroenterology    GA LAPAROSCOPY SURG CHOLECYSTECTOMY N/A 2018    Procedure: CHOLECYSTECTOMY LAPAROSCOPIC;  Surgeon: Melania Morales DO;  Location: [facility];  Service: General    TRACHEOSTOMY  1981     Family History   Problem Relation Age of Onset    Heart disease Mother     Hyperlipidemia Mother     Obesity Mother     Hypertension Mother     Other Mother         Bundle branch block    Tuberculosis Mother     Arthritis Mother     Diabetes Mother     Osteoporosis Mother     Ulcerative colitis Mother     Asthma Mother     COPD Mother     Coronary artery disease Mother     Substance Abuse Father     Cancer Father     No Known Problems Sister     No Known Problems Sister     No Known Problems Daughter     Other Maternal Grandmother         ascending aortic aneurysm resection    Diabetes Maternal Grandmother     Diabetes Paternal Grandmother     No Known Problems Brother     Tuberculosis Maternal Aunt     No Known Problems Paternal Aunt     No Known Problems Paternal Aunt     No Known Problems Paternal Aunt     No Known Problems Paternal Aunt     No Known Problems Paternal Aunt     Breast cancer Other 80    Substance Abuse Family     Osteoporosis Family     No Known Problems Half-Sister        Smoking history: She reports that she quit smoking about 11 years ago. Her smoking use included cigarettes. She started smoking about 41 years ago. She has a 30.00 pack-year smoking history.  She has never used smokeless tobacco.    Occupational History:     Immunization History   Administered Date(s) Administered    COVID-19 MODERNA VACC 0.5 ML IM 04/01/2021, 04/29/2021, 12/23/2021    Hep A / Hep B 05/18/2016, 09/08/2016    Hep A, adult 12/08/2016    Hep B, adult 12/08/2016    INFLUENZA 08/13/2014, 08/10/2015, 10/01/2016, 10/11/2016, 09/21/2017, 09/20/2019, 09/01/2020, 12/05/2022    Influenza Quadrivalent Preservative Free 3 years and older IM 09/21/2017    Influenza Quadrivalent, 6-35 Months IM 10/01/2016    Influenza, injectable, quadrivalent, preservative free 0.5 mL 09/23/2019, 09/01/2020, 09/29/2023    Influenza, recombinant, quadrivalent,injectable, preservative free 10/10/2018, 09/17/2021, 12/05/2022    Influenza, seasonal, injectable 1970, 09/15/2011, 12/03/2012, 10/17/2013, 08/01/2015    Pneumococcal Conjugate 13-Valent 10/15/2016    Pneumococcal Polysaccharide PPV23 05/18/2016    Tdap 12/03/2012, 09/06/2023    Zoster Vaccine Recombinant 10/31/2019, 01/04/2020       Meds/Allergies     Current Outpatient Medications:     ARIPiprazole (ABILIFY) 10 mg tablet, Take 10 mg by mouth daily, Disp: , Rfl:     budesonide-formoterol (Symbicort) 80-4.5 MCG/ACT inhaler, Inhale 2 puffs 2 (two) times a day Rinse mouth after use., Disp: 10.2 g, Rfl: 5    busPIRone (BUSPAR) 5 mg tablet, take 1 tablet by mouth three times a day, Disp: 90 tablet, Rfl: 0    gabapentin (NEURONTIN) 300 mg capsule, Take 1 capsule (300 mg total) by mouth 3 (three) times a day, Disp: 60 capsule, Rfl: 2    Insulin Pen Needle (B-D UF III MINI PEN NEEDLES) 31G X 5 MM MISC, Use once daily as directed., Disp: 100 each, Rfl: 1    Invokamet 150-1000 MG TABS, take 1 tablet by mouth twice a day, Disp: 60 tablet, Rfl: 5    liraglutide (Victoza) injection, Inject 0.3 mL (1.8 mg total) under the skin daily, Disp: 3 mL, Rfl: 5    lisinopril (ZESTRIL) 2.5 mg tablet, Take 1 tablet (2.5 mg total) by mouth daily, Disp: 90 tablet, Rfl: 0    Microlet Lancets MISC, Test twice daily, Disp: 100 each, Rfl: 3    omeprazole (PriLOSEC) 40 MG capsule, swallow 1 capsule once daily, Disp: 30 capsule, Rfl: 5    pravastatin (PRAVACHOL) 20 mg tablet, Take 1 tablet (20 mg total) by mouth daily, Disp: 90 tablet, Rfl: 0    venlafaxine (EFFEXOR-XR) 150 mg 24 hr capsule, Take 150 mg by mouth daily  , Disp: , Rfl:     EPINEPHrine (EPIPEN) 0.3 mg/0.3 mL SOAJ, Inject 0.3 mL (0.3 mg total) into a muscle once for 1 dose, Disp: 0.3 mL, Rfl: 0  Allergies: Allergies   Allergen Reactions    Iodine - Food Allergy Anaphylaxis     IVP DYE     Nuts - Food Allergy Anaphylaxis     Annotation - [id number]: Pine nuts         Vitals:  Vitals:    11/01/23 1330   BP: 98/58   Pulse: 62   Resp: 16   SpO2: 94%   Weight: 93 kg (205 lb)   Height: 5' 2" (1.575 m)   Oxygen Therapy  SpO2: 94 %  . Wt Readings from Last 3 Encounters:   11/01/23 93 kg (205 lb)   09/06/23 92.1 kg (203 lb)   08/30/23 89.8 kg (198 lb)     Body mass index is 37.49 kg/m². Physical Exam  Vitals and nursing note reviewed.    Constitutional: General: She is not in acute distress. Appearance: Normal appearance. She is well-developed. Cardiovascular:      Rate and Rhythm: Normal rate and regular rhythm. Heart sounds: Normal heart sounds, S1 normal and S2 normal. No murmur heard. Pulmonary:      Effort: Pulmonary effort is normal.      Breath sounds: Normal breath sounds. No decreased breath sounds, wheezing, rhonchi or rales. Musculoskeletal:         General: No swelling. Right lower leg: No edema. Left lower leg: No edema. Neurological:      Mental Status: She is alert. Psychiatric:         Mood and Affect: Mood and affect normal.         Behavior: Behavior normal. Behavior is cooperative. Labs: I have personally reviewed pertinent lab results. Lab Results   Component Value Date    WBC 6.37 12/13/2022    HGB 14.4 12/13/2022    HCT 45.2 12/13/2022    MCV 92 12/13/2022     12/13/2022     Lab Results   Component Value Date    GLUCOSE 116 11/19/2015    CALCIUM 9.9 12/13/2022     06/09/2018    K 4.4 12/13/2022    CO2 28 12/13/2022    CL 99 12/13/2022    BUN 18 12/13/2022    CREATININE 0.85 12/13/2022     No results found for: "IGE"  Lab Results   Component Value Date    ALT 14 12/13/2022    AST 11 (L) 12/13/2022    ALKPHOS 89 12/13/2022    BILITOT 0.4 06/09/2018       Imaging and other studies: I have personally reviewed pertinent reports and I have personally reviewed pertinent films in PACS     CT lung screening 7/15/2023  Benign 2 mm right upper lobe pulmonary nodule. No new or enlarging pulmonary nodules.     Pulmonary function testing:     Pulmonary Functions Testing Results: 10/7/2020    FEV1/FVC ratio 75%    FEV1 84% predicted  FVC 84% predicted  (-) response to bronchodilators  TLC 86 % predicted   % predicted  DLCO corrected for hemoglobin 86 % predicted    Impression: No obstructive airflow defect, however FEF 25 to 75% is significantly decreased which can be consistent with small airways disease. Flow-volume curve shape on exhalation also consistent with obstruction. No significant response to administration of bronchodilators. Normal lung volumes.   Normal diffusion capacity

## 2023-11-01 NOTE — ASSESSMENT & PLAN NOTE
-At least 30-pack-year smoking history, quit 10 years ago  -Due for yearly CT lung cancer screening July 2024.   Previous CT screening with no new or enlarging pulmonary nodules

## 2023-11-01 NOTE — PATIENT INSTRUCTIONS
Mask fitting appointment  Continue wearing CPAP nightly  Continue Symbicort twice daily as needed  Follow up in 2 months to check CPAP compliance

## 2023-11-02 ENCOUNTER — OFFICE VISIT (OUTPATIENT)
Dept: URGENT CARE | Facility: CLINIC | Age: 53
End: 2023-11-02
Payer: COMMERCIAL

## 2023-11-02 VITALS
SYSTOLIC BLOOD PRESSURE: 124 MMHG | OXYGEN SATURATION: 97 % | DIASTOLIC BLOOD PRESSURE: 78 MMHG | TEMPERATURE: 97.9 F | HEART RATE: 101 BPM | RESPIRATION RATE: 18 BRPM

## 2023-11-02 DIAGNOSIS — K08.89 DENTALGIA: Primary | ICD-10-CM

## 2023-11-02 DIAGNOSIS — J06.9 ACUTE URI: ICD-10-CM

## 2023-11-02 PROCEDURE — 99213 OFFICE O/P EST LOW 20 MIN: CPT | Performed by: PHYSICIAN ASSISTANT

## 2023-11-02 RX ORDER — AMOXICILLIN 500 MG/1
500 CAPSULE ORAL EVERY 8 HOURS SCHEDULED
Qty: 21 CAPSULE | Refills: 0 | Status: SHIPPED | OUTPATIENT
Start: 2023-11-02 | End: 2023-11-09

## 2023-11-02 RX ORDER — FLUTICASONE PROPIONATE 50 MCG
2 SPRAY, SUSPENSION (ML) NASAL DAILY
Qty: 1 G | Refills: 0 | Status: SHIPPED | OUTPATIENT
Start: 2023-11-02

## 2023-11-02 RX ORDER — IBUPROFEN 600 MG/1
600 TABLET ORAL EVERY 8 HOURS PRN
Qty: 30 TABLET | Refills: 0 | Status: SHIPPED | OUTPATIENT
Start: 2023-11-02

## 2023-11-02 NOTE — PROGRESS NOTES
[facility]        NAME: Hill Art is a 52 y.o. female  : 1970    MRN: [id number]  DATE: 2023  TIME: 6:20 PM    Assessment and Plan   Maryana Sosa [K08.89]  1. Dentalgia  amoxicillin (AMOXIL) 500 mg capsule      2. Acute URI  ibuprofen (MOTRIN) 600 mg tablet    fluticasone (FLONASE) 50 mcg/act nasal spray    amoxicillin (AMOXIL) 500 mg capsule            Patient Instructions       Follow up with PCP in 3-5 days. Proceed to  ER if symptoms worsen. Chief Complaint     Chief Complaint   Patient presents with    Dental Pain     X 3 days    Cold Like Symptoms     Sore throat and pressure to ears headache, nasal congestion          History of Present Illness       Patient is a 52year old female presenting to [facility] with left upper toothache. Pt has a hx of dental fracture to this weekend. Toothache began 1 week ago, hx of dental fracture 3 months ago. Pt recommended to have tooth pulled. Pt began w/ cough/congestion a few days ago. Patient denies fever, chills, body aches, sweats. +Sinus/nasal pressure. Dental Pain   This is a new problem. The current episode started in the past 7 days. The problem occurs constantly. The problem has been waxing and waning. Pertinent negatives include no fever. She has tried nothing for the symptoms. Review of Systems   Review of Systems   Constitutional:  Negative for chills and fever. HENT:  Positive for congestion, dental problem and rhinorrhea. Negative for ear pain and sore throat. Eyes:  Negative for pain and visual disturbance. Respiratory:  Positive for cough. Negative for shortness of breath. Cardiovascular:  Negative for chest pain and palpitations. Gastrointestinal:  Negative for abdominal pain and vomiting. Genitourinary:  Negative for dysuria and hematuria. Musculoskeletal:  Negative for arthralgias and back pain. Skin:  Negative for color change and rash.    Neurological:  Negative for seizures and syncope. All other systems reviewed and are negative.         Current Medications       Current Outpatient Medications:     amoxicillin (AMOXIL) 500 mg capsule, Take 1 capsule (500 mg total) by mouth every 8 (eight) hours for 7 days, Disp: 21 capsule, Rfl: 0    fluticasone (FLONASE) 50 mcg/act nasal spray, 2 sprays into each nostril daily, Disp: 1 g, Rfl: 0    ibuprofen (MOTRIN) 600 mg tablet, Take 1 tablet (600 mg total) by mouth every 8 (eight) hours as needed for mild pain, Disp: 30 tablet, Rfl: 0    ARIPiprazole (ABILIFY) 10 mg tablet, Take 10 mg by mouth daily, Disp: , Rfl:     budesonide-formoterol (Symbicort) 80-4.5 MCG/ACT inhaler, Inhale 2 puffs 2 (two) times a day Rinse mouth after use., Disp: 10.2 g, Rfl: 5    busPIRone (BUSPAR) 5 mg tablet, take 1 tablet by mouth three times a day, Disp: 90 tablet, Rfl: 0    EPINEPHrine (EPIPEN) 0.3 mg/0.3 mL SOAJ, Inject 0.3 mL (0.3 mg total) into a muscle once for 1 dose, Disp: 0.3 mL, Rfl: 0    gabapentin (NEURONTIN) 300 mg capsule, Take 1 capsule (300 mg total) by mouth 3 (three) times a day, Disp: 60 capsule, Rfl: 2    Insulin Pen Needle (B-D UF III MINI PEN NEEDLES) 31G X 5 MM MISC, Use once daily as directed., Disp: 100 each, Rfl: 1    Invokamet 150-1000 MG TABS, take 1 tablet by mouth twice a day, Disp: 60 tablet, Rfl: 5    liraglutide (Victoza) injection, Inject 0.3 mL (1.8 mg total) under the skin daily, Disp: 3 mL, Rfl: 5    lisinopril (ZESTRIL) 2.5 mg tablet, Take 1 tablet (2.5 mg total) by mouth daily, Disp: 90 tablet, Rfl: 0    Microlet Lancets MISC, Test twice daily, Disp: 100 each, Rfl: 3    omeprazole (PriLOSEC) 40 MG capsule, swallow 1 capsule once daily, Disp: 30 capsule, Rfl: 5    pravastatin (PRAVACHOL) 20 mg tablet, Take 1 tablet (20 mg total) by mouth daily, Disp: 90 tablet, Rfl: 0    venlafaxine (EFFEXOR-XR) 150 mg 24 hr capsule, Take 150 mg by mouth daily  , Disp: , Rfl:     Current Allergies     Allergies as of 11/02/2023 - Reviewed 11/02/2023 Allergen Reaction Noted    Iodine - food allergy Anaphylaxis 2018    Nuts - food allergy Anaphylaxis 2013            The following portions of the patient's history were reviewed and updated as appropriate: allergies, current medications, past family history, past medical history, past social history, past surgical history and problem list.     Past Medical History:   Diagnosis Date    Abnormal ECG     Anemia     Anxiety     Arthritis     Asthma     Bronchiectasis ([address])     Callus     Cancer ([address])     renal,cervical    Cataract     Chronic bronchitis ([address])     Chronic kidney disease     COPD (chronic obstructive pulmonary disease) ([address])     Depression     Diabetes mellitus ([address])     Difficulty walking     Diverticulitis     GERD (gastroesophageal reflux disease)     Hyperlipidemia associated with type 2 diabetes mellitus      Hypertension     Insulinoma     Liver disease     Lung disease     Miscarriage     Myocardial infarction ([address])     Non-recurrent acute suppurative otitis media of left ear without spontaneous rupture of tympanic membrane 2019    Peptic ulceration     Plantar fasciitis 04/15/2022    Pulmonary emboli (HCC)     Rheumatoid arthritis ([address])     Sleep apnea     Sleep apnea, obstructive     Stroke [facility])        Past Surgical History:   Procedure Laterality Date    ABDOMINAL SURGERY  1990    Pancreatectomy (partial)     SECTION      twice    CHOLECYSTECTOMY      EYE SURGERY      KNEE ARTHROSCOPY Left     KNEE SURGERY      PANCREATECTOMY      Partial     CA COLONOSCOPY FLX DX W/COLLJ SPEC WHEN PFRMD N/A 2019    Procedure: COLONOSCOPY;  Surgeon: Chance Cintron DO;  [facility];  Service: Gastroenterology    CA LAPAROSCOPY SURG CHOLECYSTECTOMY N/A 2018    Procedure: CHOLECYSTECTOMY LAPAROSCOPIC;  Surgeon: Luci Shukla DO;  [facility];  Service: General    TRACHEOSTOMY         Family History   Problem Relation Age of Onset    Heart disease Mother     Hyperlipidemia Mother     Obesity Mother     Hypertension Mother     Other Mother         Bundle branch block    Tuberculosis Mother     Arthritis Mother     Diabetes Mother     Osteoporosis Mother     Ulcerative colitis Mother     Asthma Mother     COPD Mother     Coronary artery disease Mother     Substance Abuse Father     Cancer Father     No Known Problems Sister     No Known Problems Sister     No Known Problems Daughter     Other Maternal Grandmother         ascending aortic aneurysm resection    Diabetes Maternal Grandmother     Diabetes Paternal Grandmother     No Known Problems Brother     Tuberculosis Maternal Aunt     No Known Problems Paternal Aunt     No Known Problems Paternal Aunt     No Known Problems Paternal Aunt     No Known Problems Paternal Aunt     No Known Problems Paternal Aunt     Breast cancer Other 80    Substance Abuse Family     Osteoporosis Family     No Known Problems Half-Sister          Medications have been verified. Objective   /78   Pulse 101   Temp 97.9 °F (36.6 °C)   Resp 18   LMP 05/20/2019   SpO2 97%   Patient's last menstrual period was 05/20/2019. Physical Exam     Physical Exam  Constitutional:       Appearance: Normal appearance. HENT:      Head: Normocephalic and atraumatic. Nose: Congestion present. Mouth/Throat:      Mouth: Mucous membranes are moist.      Pharynx: Posterior oropharyngeal erythema present. Eyes:      Extraocular Movements: Extraocular movements intact. Conjunctiva/sclera: Conjunctivae normal.      Pupils: Pupils are equal, round, and reactive to light. Cardiovascular:      Rate and Rhythm: Normal rate and regular rhythm. Heart sounds: No murmur heard. No friction rub. No gallop. Pulmonary:      Effort: Pulmonary effort is normal.      Breath sounds: No wheezing, rhonchi or rales. Musculoskeletal:         General: Normal range of motion. Cervical back: Normal range of motion and neck supple. Skin:     General: Skin is warm and dry. Capillary Refill: Capillary refill takes less than 2 seconds. Neurological:      General: No focal deficit present. Mental Status: She is alert and oriented to person, place, and time.    Psychiatric:         Mood and Affect: Mood normal.         Behavior: Behavior normal.

## 2023-11-14 ENCOUNTER — OFFICE VISIT (OUTPATIENT)
Dept: OBGYN CLINIC | Facility: CLINIC | Age: 53
End: 2023-11-14
Payer: COMMERCIAL

## 2023-11-14 VITALS
DIASTOLIC BLOOD PRESSURE: 79 MMHG | SYSTOLIC BLOOD PRESSURE: 116 MMHG | HEIGHT: 62 IN | BODY MASS INDEX: 37.73 KG/M2 | HEART RATE: 112 BPM | WEIGHT: 205 LBS

## 2023-11-14 DIAGNOSIS — M18.0 ARTHRITIS OF CARPOMETACARPAL (CMC) JOINT OF BOTH THUMBS: Primary | ICD-10-CM

## 2023-11-14 PROCEDURE — 20600 DRAIN/INJ JOINT/BURSA W/O US: CPT | Performed by: ORTHOPAEDIC SURGERY

## 2023-11-14 PROCEDURE — 99213 OFFICE O/P EST LOW 20 MIN: CPT | Performed by: ORTHOPAEDIC SURGERY

## 2023-11-14 RX ORDER — BETAMETHASONE SODIUM PHOSPHATE AND BETAMETHASONE ACETATE 3; 3 MG/ML; MG/ML
6 INJECTION, SUSPENSION INTRA-ARTICULAR; INTRALESIONAL; INTRAMUSCULAR; SOFT TISSUE
Status: COMPLETED | OUTPATIENT
Start: 2023-11-14 | End: 2023-11-14

## 2023-11-14 RX ORDER — LIDOCAINE HYDROCHLORIDE 10 MG/ML
1 INJECTION, SOLUTION INFILTRATION; PERINEURAL
Status: COMPLETED | OUTPATIENT
Start: 2023-11-14 | End: 2023-11-14

## 2023-11-14 RX ADMIN — LIDOCAINE HYDROCHLORIDE 1 ML: 10 INJECTION, SOLUTION INFILTRATION; PERINEURAL at 13:15

## 2023-11-14 RX ADMIN — BETAMETHASONE SODIUM PHOSPHATE AND BETAMETHASONE ACETATE 6 MG: 3; 3 INJECTION, SUSPENSION INTRA-ARTICULAR; INTRALESIONAL; INTRAMUSCULAR; SOFT TISSUE at 13:15

## 2023-11-14 NOTE — PROGRESS NOTES
Assessment/Plan:   Diagnoses and all orders for this visit:    Arthritis of carpometacarpal [place]) joint of both thumbs  -     Small joint arthrocentesis: bilateral thumb CMC    Discussed with patient that today's physical exam is consistent with flareup of primary osteoarthritis of the bilateral CMC joints of the thumbs. Patient was offered, and accepted, betamethasone and Marcaine injection(s) to the bilateral thumb CMC joints for relief of pain and inflammation. Patient tolerated treatment(s) well. She will be seen in 3 months for re-evaluation and consideration for repeat injections as necessary. Patient expresses understanding and is in agreement with this treatment plan. Patient has arthritis of her bilateral thumbs. Under aseptic technique, both thumbs were injected with Celestone and Marcaine. She tolerated the procedures well. Return back in 3 months for evaluation    Subjective:   Patient ID: Linda Dye  1970     HPI  Patient is a 52 y.o. female who presents for follow-up evaluation of bilateral thumb CMC joint osteoarthritis. She was last seen regresses issue on 8/15/2023, which time she received bilateral corticosteroid injections. She states that she experienced significant relief following those injections. Her symptoms began to return approximately 1 to 2 weeks ago. On today's presentation she reports achy pain at the base of her thumbs that is exacerbated with gripping motion.      The following portions of the patient's history were reviewed and updated as appropriate:  Past medical history, past surgical history, Family history, social history, current medications and allergies    Past Medical History:   Diagnosis Date    Abnormal ECG     Anemia 1982    Anxiety     Arthritis     Asthma     Bronchiectasis ([address])     Callus     Cancer ([address])     renal,cervical    Cataract 2021    Chronic bronchitis ([address]) 1984    Chronic kidney disease 1989    COPD (chronic obstructive pulmonary disease) ([address]) 2005    Depression     Diabetes mellitus ([address])     Difficulty walking     Diverticulitis     GERD (gastroesophageal reflux disease)     Hyperlipidemia associated with type 2 diabetes mellitus      Hypertension     Insulinoma     Liver disease     Lung disease     Miscarriage     Myocardial infarction ([address])     Non-recurrent acute suppurative otitis media of left ear without spontaneous rupture of tympanic membrane 2019    Peptic ulceration     Plantar fasciitis 04/15/2022    Pulmonary emboli (HCC)     Rheumatoid arthritis ([address]) 1998    Sleep apnea     Sleep apnea, obstructive     Stroke [facility])        Past Surgical History:   Procedure Laterality Date    ABDOMINAL SURGERY  1990    Pancreatectomy (partial)     SECTION      twice    CHOLECYSTECTOMY      EYE SURGERY      KNEE ARTHROSCOPY Left     KNEE SURGERY      PANCREATECTOMY      Partial     KS COLONOSCOPY FLX DX W/COLLJ SPEC WHEN PFRMD N/A 2019    Procedure: COLONOSCOPY;  Surgeon: Ruperto Heimlich, DO;  Location: [facility];  Service: Gastroenterology    KS LAPAROSCOPY SURG CHOLECYSTECTOMY N/A 2018    Procedure: CHOLECYSTECTOMY LAPAROSCOPIC;  Surgeon: Roc Gong DO;  Location: [facility];  Service: General    TRACHEOSTOMY         Family History   Problem Relation Age of Onset    Heart disease Mother     Hyperlipidemia Mother     Obesity Mother     Hypertension Mother     Other Mother         Bundle branch block    Tuberculosis Mother     Arthritis Mother     Diabetes Mother     Osteoporosis Mother     Ulcerative colitis Mother     Asthma Mother     COPD Mother     Coronary artery disease Mother     Substance Abuse Father     Cancer Father     No Known Problems Sister     No Known Problems Sister     No Known Problems Daughter     Other Maternal Grandmother         ascending aortic aneurysm resection    Diabetes Maternal Grandmother     Diabetes Paternal Grandmother     No Known Problems Brother Tuberculosis Maternal Aunt     No Known Problems Paternal Aunt     No Known Problems Paternal Aunt     No Known Problems Paternal Aunt     No Known Problems Paternal Aunt     No Known Problems Paternal Aunt     Breast cancer Other 80    Substance Abuse Family     Osteoporosis Family     No Known Problems Half-Sister        Social History     Socioeconomic History    Marital status:      Spouse name: None    Number of children: None    Years of education: None    Highest education level: None   Occupational History    Occupation: UNEMPLOYED   Tobacco Use    Smoking status: Former     Packs/day: 1.00     Years: 30.00     Total pack years: 30.00     Types: Cigarettes     Start date: 1982     Quit date: 2012     Years since quittin.3    Smokeless tobacco: Never    Tobacco comments:     Dont need at all   Vaping Use    Vaping Use: Never used   Substance and Sexual Activity    Alcohol use: Not Currently    Drug use: No    Sexual activity: Not Currently     Partners: Male     Birth control/protection: Abstinence   Other Topics Concern    None   Social History Narrative    Single-    Unemployed    Lives at home with mother     Caffeine use     Social Determinants of Health     Financial Resource Strain: Not on file   Food Insecurity: Not on file   Transportation Needs: Not on file   Physical Activity: Not on file   Stress: Not on file   Social Connections: Not on file   Intimate Partner Violence: Not on file   Housing Stability: Not on file         Current Outpatient Medications:     ARIPiprazole (ABILIFY) 10 mg tablet, Take 10 mg by mouth daily, Disp: , Rfl:     budesonide-formoterol (Symbicort) 80-4.5 MCG/ACT inhaler, Inhale 2 puffs 2 (two) times a day Rinse mouth after use., Disp: 10.2 g, Rfl: 5    fluticasone (FLONASE) 50 mcg/act nasal spray, 2 sprays into each nostril daily, Disp: 1 g, Rfl: 0    gabapentin (NEURONTIN) 300 mg capsule, Take 1 capsule (300 mg total) by mouth 3 (three) times a day, Disp: 60 capsule, Rfl: 2    ibuprofen (MOTRIN) 600 mg tablet, Take 1 tablet (600 mg total) by mouth every 8 (eight) hours as needed for mild pain, Disp: 30 tablet, Rfl: 0    Insulin Pen Needle (B-D UF III MINI PEN NEEDLES) 31G X 5 MM MISC, Use once daily as directed., Disp: 100 each, Rfl: 1    Invokamet 150-1000 MG TABS, take 1 tablet by mouth twice a day, Disp: 60 tablet, Rfl: 5    liraglutide (Victoza) injection, Inject 0.3 mL (1.8 mg total) under the skin daily, Disp: 3 mL, Rfl: 5    lisinopril (ZESTRIL) 2.5 mg tablet, Take 1 tablet (2.5 mg total) by mouth daily, Disp: 90 tablet, Rfl: 0    Microlet Lancets MISC, Test twice daily, Disp: 100 each, Rfl: 3    omeprazole (PriLOSEC) 40 MG capsule, swallow 1 capsule once daily, Disp: 30 capsule, Rfl: 5    pravastatin (PRAVACHOL) 20 mg tablet, Take 1 tablet (20 mg total) by mouth daily, Disp: 90 tablet, Rfl: 0    venlafaxine (EFFEXOR-XR) 150 mg 24 hr capsule, Take 150 mg by mouth daily  , Disp: , Rfl:     busPIRone (BUSPAR) 5 mg tablet, take 1 tablet by mouth three times a day, Disp: 90 tablet, Rfl: 0    EPINEPHrine (EPIPEN) 0.3 mg/0.3 mL SOAJ, Inject 0.3 mL (0.3 mg total) into a muscle once for 1 dose, Disp: 0.3 mL, Rfl: 0    Allergies   Allergen Reactions    Iodine - Food Allergy Anaphylaxis     IVP DYE     Nuts - Food Allergy Anaphylaxis     Annotation - [id number]: Pine nuts       Review of Systems   Constitutional:  Negative for chills, fever and unexpected weight change. HENT:  Negative for hearing loss, nosebleeds and sore throat. Eyes:  Negative for pain, redness and visual disturbance. Respiratory:  Negative for cough, shortness of breath and wheezing. Cardiovascular:  Negative for chest pain, palpitations and leg swelling. Gastrointestinal:  Negative for abdominal pain, nausea and vomiting. Endocrine: Negative for polydipsia and polyuria. Genitourinary:  Negative for dysuria and hematuria.    Musculoskeletal:         As noted in HPI   Skin: Negative for rash and wound. Neurological:  Negative for dizziness, numbness and headaches. Psychiatric/Behavioral:  Negative for decreased concentration and suicidal ideas. The patient is not nervous/anxious. Objective:  /79   Pulse (!) 112   Ht 5' 2" (1.575 m)   Wt 93 kg (205 lb)   LMP 05/20/2019   BMI 37.49 kg/m²     Ortho Exam  Bilateral hand/thumbs -   No adduction contracture  No hyperextension deformity of MCP joint  Positive localized tenderness over radial and dorsal aspect of thumb (CMC joint)  Grind test is Positive for pain and Positive for crepitus  Metacarpal load shift test positive  No triggering or tenderness over the A1 pulley  Negative pain with Finkelstein’s maneuver   2+ distal radial pulse with brisk capillary refill to the fingers  Radial, median, and ulnar motor and sensory distributions intact  Sensation to light touch intact distally     Physical Exam  Vitals and nursing note reviewed. Constitutional:       General: She is not in acute distress. Appearance: She is well-developed. HENT:      Head: Normocephalic and atraumatic. Eyes:      Conjunctiva/sclera: Conjunctivae normal.   Cardiovascular:      Rate and Rhythm: Normal rate. Pulmonary:      Effort: Pulmonary effort is normal.   Musculoskeletal:      Cervical back: Neck supple. Skin:     General: Skin is warm and dry. Capillary Refill: Capillary refill takes less than 2 seconds. Neurological:      Mental Status: She is alert and oriented to person, place, and time. Psychiatric:         Mood and Affect: Mood normal.         Behavior: Behavior normal.          Diagnostic Test Review:  No new imaging reviewed this visit    Small joint arthrocentesis: bilateral thumb CMC  [place] Protocol:  Procedure performed by: SYED Byrd, ATC)  Consent: Verbal consent obtained.   Consent given by: patient  Timeout called at: 11/14/2023 1:31 PM.  Patient understanding: patient states understanding of the procedure being performed  Site marked: the operative site was marked  Patient identity confirmed: verbally with patient  Supporting Documentation  Indications: pain and joint swelling   Procedure Details  Location: thumb - bilateral thumb CMC  Needle size: 25 G  Ultrasound guidance: no  Approach: radial    Medications (Right): 1 mL lidocaine 1 %; 6 mg betamethasone acetate-betamethasone sodium phosphate 6 (3-3) mg/mLMedications (Left): 1 mL lidocaine 1 %; 6 mg betamethasone acetate-betamethasone sodium phosphate 6 (3-3) mg/mL   Patient tolerance: patient tolerated the procedure well with no immediate complications  Dressing:  Sterile dressing applied           Scribe Attestation      I,:  Layla Pierre am acting as a scribe while in the presence of the attending physician.:       I,:  Opal Clancy DO personally performed the services described in this documentation    as scribed in my presence.:

## 2023-11-25 DIAGNOSIS — F41.9 ANXIETY: ICD-10-CM

## 2023-11-27 RX ORDER — BUSPIRONE HYDROCHLORIDE 5 MG/1
5 TABLET ORAL 3 TIMES DAILY
Qty: 90 TABLET | Refills: 0 | Status: SHIPPED | OUTPATIENT
Start: 2023-11-27 | End: 2023-12-07 | Stop reason: ALTCHOICE

## 2023-12-01 DIAGNOSIS — E11.65 TYPE 2 DIABETES MELLITUS WITH HYPERGLYCEMIA, WITHOUT LONG-TERM CURRENT USE OF INSULIN (HCC): ICD-10-CM

## 2023-12-01 RX ORDER — LIRAGLUTIDE 6 MG/ML
1.8 INJECTION SUBCUTANEOUS DAILY
Qty: 3 ML | Refills: 5 | Status: SHIPPED | OUTPATIENT
Start: 2023-12-01

## 2023-12-07 ENCOUNTER — OFFICE VISIT (OUTPATIENT)
Dept: INTERNAL MEDICINE CLINIC | Facility: CLINIC | Age: 53
End: 2023-12-07
Payer: COMMERCIAL

## 2023-12-07 VITALS
HEIGHT: 62 IN | DIASTOLIC BLOOD PRESSURE: 74 MMHG | TEMPERATURE: 97.8 F | HEART RATE: 108 BPM | BODY MASS INDEX: 37.73 KG/M2 | WEIGHT: 205 LBS | SYSTOLIC BLOOD PRESSURE: 126 MMHG | OXYGEN SATURATION: 96 %

## 2023-12-07 DIAGNOSIS — Z13.29 SCREENING FOR THYROID DISORDER: ICD-10-CM

## 2023-12-07 DIAGNOSIS — E11.65 TYPE 2 DIABETES MELLITUS WITH HYPERGLYCEMIA, WITHOUT LONG-TERM CURRENT USE OF INSULIN (HCC): Primary | ICD-10-CM

## 2023-12-07 DIAGNOSIS — Z13.0 SCREENING FOR DEFICIENCY ANEMIA: ICD-10-CM

## 2023-12-07 DIAGNOSIS — G89.29 CHRONIC RIGHT SHOULDER PAIN: ICD-10-CM

## 2023-12-07 DIAGNOSIS — M54.2 NECK PAIN: ICD-10-CM

## 2023-12-07 DIAGNOSIS — J30.2 SEASONAL ALLERGIC RHINITIS, UNSPECIFIED TRIGGER: ICD-10-CM

## 2023-12-07 DIAGNOSIS — E78.00 HYPERCHOLESTEROLEMIA: ICD-10-CM

## 2023-12-07 DIAGNOSIS — M25.511 CHRONIC RIGHT SHOULDER PAIN: ICD-10-CM

## 2023-12-07 DIAGNOSIS — E55.9 VITAMIN D DEFICIENCY: ICD-10-CM

## 2023-12-07 DIAGNOSIS — M54.13 RADICULOPATHY OF CERVICOTHORACIC REGION: ICD-10-CM

## 2023-12-07 DIAGNOSIS — Z11.4 SCREENING FOR HIV (HUMAN IMMUNODEFICIENCY VIRUS): ICD-10-CM

## 2023-12-07 LAB — SL AMB POCT HEMOGLOBIN AIC: 8 (ref ?–6.5)

## 2023-12-07 PROCEDURE — 83036 HEMOGLOBIN GLYCOSYLATED A1C: CPT | Performed by: PHYSICIAN ASSISTANT

## 2023-12-07 PROCEDURE — 99213 OFFICE O/P EST LOW 20 MIN: CPT | Performed by: PHYSICIAN ASSISTANT

## 2023-12-07 RX ORDER — LORATADINE 10 MG/1
10 TABLET ORAL DAILY
Qty: 30 TABLET | Refills: 2 | Status: SHIPPED | OUTPATIENT
Start: 2023-12-07

## 2023-12-07 RX ORDER — FLURBIPROFEN SODIUM 0.3 MG/ML
SOLUTION/ DROPS OPHTHALMIC
Qty: 100 EACH | Refills: 1 | Status: SHIPPED | OUTPATIENT
Start: 2023-12-07

## 2023-12-07 RX ORDER — GABAPENTIN 300 MG/1
300 CAPSULE ORAL 3 TIMES DAILY
Qty: 60 CAPSULE | Refills: 2 | Status: SHIPPED | OUTPATIENT
Start: 2023-12-07

## 2023-12-08 NOTE — ASSESSMENT & PLAN NOTE
Lab Results   Component Value Date    HGBA1C 8.0 (A) 12/07/2023   Foot exam performed 5/10/24  Eye exam HN 8/8/22  A1C 12/7/23: 8.0  Pt takes ACE and statin.

## 2023-12-08 NOTE — PROGRESS NOTES
Name: Casa Mancini      : 1970      MRN: [id number]  Encounter Provider: Lashanda Brown PA-C  Encounter Date: 2023   Encounter department: [id number] [place] [address] [place]     1. Type 2 diabetes mellitus with hyperglycemia, without long-term current use of insulin [facility])  Assessment & Plan:    Lab Results   Component Value Date    HGBA1C 8.0 (A) 2023   Foot exam performed 5/10/24  Eye exam HN 22  A1C 23: 8.0  Pt takes ACE and statin. Orders:  -     Albumin / creatinine urine ratio; Future  -     POCT hemoglobin A1c  -     Comprehensive metabolic panel; Future  -     Insulin Pen Needle (B-D UF III MINI PEN NEEDLES) 31G X 5 MM MISC; Use once daily as directed. 2. Screening for HIV (human immunodeficiency virus)    3. Hypercholesterolemia  -     Lipid panel; Future    4. Vitamin D deficiency  -     Vitamin D 25 hydroxy; Future    5. Screening for deficiency anemia  -     CBC and differential; Future    6. Screening for thyroid disorder  -     TSH, 3rd generation; Future    7. Neck pain  -     gabapentin (NEURONTIN) 300 mg capsule; Take 1 capsule (300 mg total) by mouth 3 (three) times a day    8. Chronic right shoulder pain  -     gabapentin (NEURONTIN) 300 mg capsule; Take 1 capsule (300 mg total) by mouth 3 (three) times a day    9. Radiculopathy of cervicothoracic region  -     gabapentin (NEURONTIN) 300 mg capsule; Take 1 capsule (300 mg total) by mouth 3 (three) times a day    10. Seasonal allergic rhinitis, unspecified trigger  -     loratadine (CLARITIN) 10 mg tablet; Take 1 tablet (10 mg total) by mouth daily           Subjective      Pt presents for routine visit. She is up to date with CRC screening, diabetic foot and eye exams, mammogram and lung screening CT. She is due for labs. A1C is stable at 8.0. BP is controlled. She denies any new complaints or concerns. Review of Systems   Constitutional:  Negative for chills and fever.    HENT: Negative for congestion, ear pain, hearing loss, postnasal drip, rhinorrhea, sinus pressure, sinus pain, sore throat and trouble swallowing. Eyes:  Negative for pain and visual disturbance. Respiratory:  Negative for cough, chest tightness, shortness of breath and wheezing. Cardiovascular: Negative. Negative for chest pain, palpitations and leg swelling. Gastrointestinal:  Negative for abdominal pain, blood in stool, constipation, diarrhea, nausea and vomiting. Endocrine: Negative for cold intolerance, heat intolerance, polydipsia, polyphagia and polyuria. Genitourinary:  Negative for difficulty urinating, dysuria, flank pain and urgency. Musculoskeletal:  Negative for arthralgias, back pain, gait problem and myalgias. Skin:  Negative for rash. Allergic/Immunologic: Negative. Neurological:  Negative for dizziness, weakness, light-headedness and headaches. Hematological: Negative. Psychiatric/Behavioral:  Negative for behavioral problems, dysphoric mood and sleep disturbance. The patient is not nervous/anxious. Current Outpatient Medications on File Prior to Visit   Medication Sig   • ARIPiprazole (ABILIFY) 10 mg tablet Take 10 mg by mouth daily   • budesonide-formoterol (Symbicort) 80-4.5 MCG/ACT inhaler Inhale 2 puffs 2 (two) times a day Rinse mouth after use.    • EPINEPHrine (EPIPEN) 0.3 mg/0.3 mL SOAJ Inject 0.3 mL (0.3 mg total) into a muscle once for 1 dose   • Invokamet 150-1000 MG TABS take 1 tablet by mouth twice a day   • liraglutide (Victoza) injection Inject 0.3 mL (1.8 mg total) under the skin daily   • lisinopril (ZESTRIL) 2.5 mg tablet Take 1 tablet (2.5 mg total) by mouth daily   • Microlet Lancets MISC Test twice daily   • omeprazole (PriLOSEC) 40 MG capsule swallow 1 capsule once daily   • pravastatin (PRAVACHOL) 20 mg tablet Take 1 tablet (20 mg total) by mouth daily   • venlafaxine (EFFEXOR-XR) 150 mg 24 hr capsule Take 150 mg by mouth daily         Objective /74   Pulse (!) 108   Temp 97.8 °F (36.6 °C)   Ht 5' 2" (1.575 m)   Wt 93 kg (205 lb)   LMP 05/20/2019   SpO2 96%   BMI 37.49 kg/m²     Physical Exam  Vitals and nursing note reviewed. Constitutional:       General: She is not in acute distress. Appearance: Normal appearance. She is well-developed. She is not diaphoretic. HENT:      Head: Normocephalic and atraumatic. Right Ear: External ear normal.      Left Ear: External ear normal.      Nose: Nose normal.      Mouth/Throat:      Pharynx: No oropharyngeal exudate. Eyes:      General: No scleral icterus. Right eye: No discharge. Left eye: No discharge. Conjunctiva/sclera: Conjunctivae normal.      Pupils: Pupils are equal, round, and reactive to light. Neck:      Thyroid: No thyromegaly. Cardiovascular:      Rate and Rhythm: Normal rate and regular rhythm. Heart sounds: Normal heart sounds. No murmur heard. No friction rub. No gallop. Pulmonary:      Effort: Pulmonary effort is normal. No respiratory distress. Breath sounds: Normal breath sounds. No wheezing or rales. Abdominal:      General: Bowel sounds are normal. There is no distension. Palpations: Abdomen is soft. Tenderness: There is no abdominal tenderness. Musculoskeletal:         General: No tenderness or deformity. Normal range of motion. Cervical back: Normal range of motion and neck supple. Skin:     General: Skin is warm and dry. Neurological:      Mental Status: She is alert and oriented to person, place, and time. Cranial Nerves: No cranial nerve deficit. Psychiatric:         Behavior: Behavior normal.         Thought Content:  Thought content normal.         Judgment: Judgment normal.       Gertrudis Obrien PA-C

## 2023-12-09 ENCOUNTER — APPOINTMENT (OUTPATIENT)
Dept: LAB | Facility: HOSPITAL | Age: 53
End: 2023-12-09
Payer: COMMERCIAL

## 2023-12-09 DIAGNOSIS — Z13.29 SCREENING FOR THYROID DISORDER: ICD-10-CM

## 2023-12-09 DIAGNOSIS — E55.9 VITAMIN D DEFICIENCY: ICD-10-CM

## 2023-12-09 DIAGNOSIS — E11.65 TYPE 2 DIABETES MELLITUS WITH HYPERGLYCEMIA, WITHOUT LONG-TERM CURRENT USE OF INSULIN (HCC): ICD-10-CM

## 2023-12-09 DIAGNOSIS — Z13.0 SCREENING FOR DEFICIENCY ANEMIA: ICD-10-CM

## 2023-12-09 DIAGNOSIS — E78.00 HYPERCHOLESTEROLEMIA: ICD-10-CM

## 2023-12-09 LAB
25(OH)D3 SERPL-MCNC: 36.5 NG/ML (ref 30–100)
ALBUMIN SERPL BCP-MCNC: 4.3 G/DL (ref 3.5–5)
ALP SERPL-CCNC: 82 U/L (ref 34–104)
ALT SERPL W P-5'-P-CCNC: 15 U/L (ref 7–52)
ANION GAP SERPL CALCULATED.3IONS-SCNC: 9 MMOL/L
AST SERPL W P-5'-P-CCNC: 12 U/L (ref 13–39)
BASOPHILS # BLD AUTO: 0.01 THOUSANDS/ÂΜL (ref 0–0.1)
BASOPHILS NFR BLD AUTO: 0 % (ref 0–1)
BILIRUB SERPL-MCNC: 0.36 MG/DL (ref 0.2–1)
BUN SERPL-MCNC: 17 MG/DL (ref 5–25)
CALCIUM SERPL-MCNC: 9.1 MG/DL (ref 8.4–10.2)
CHLORIDE SERPL-SCNC: 100 MMOL/L (ref 96–108)
CHOLEST SERPL-MCNC: 207 MG/DL
CO2 SERPL-SCNC: 27 MMOL/L (ref 21–32)
CREAT SERPL-MCNC: 0.75 MG/DL (ref 0.6–1.3)
CREAT UR-MCNC: 38.8 MG/DL
EOSINOPHIL # BLD AUTO: 0.06 THOUSAND/ÂΜL (ref 0–0.61)
EOSINOPHIL NFR BLD AUTO: 1 % (ref 0–6)
ERYTHROCYTE [DISTWIDTH] IN BLOOD BY AUTOMATED COUNT: 13.6 % (ref 11.6–15.1)
GFR SERPL CREATININE-BSD FRML MDRD: 90 ML/MIN/1.73SQ M
GLUCOSE P FAST SERPL-MCNC: 129 MG/DL (ref 65–99)
HCT VFR BLD AUTO: 44.7 % (ref 34.8–46.1)
HDLC SERPL-MCNC: 49 MG/DL
HGB BLD-MCNC: 14.1 G/DL (ref 11.5–15.4)
IMM GRANULOCYTES # BLD AUTO: 0.02 THOUSAND/UL (ref 0–0.2)
IMM GRANULOCYTES NFR BLD AUTO: 0 % (ref 0–2)
LDLC SERPL CALC-MCNC: 128 MG/DL (ref 0–100)
LYMPHOCYTES # BLD AUTO: 1.95 THOUSANDS/ÂΜL (ref 0.6–4.47)
LYMPHOCYTES NFR BLD AUTO: 34 % (ref 14–44)
MCH RBC QN AUTO: 29.3 PG (ref 26.8–34.3)
MCHC RBC AUTO-ENTMCNC: 31.5 G/DL (ref 31.4–37.4)
MCV RBC AUTO: 93 FL (ref 82–98)
MICROALBUMIN UR-MCNC: <7 MG/L
MICROALBUMIN/CREAT 24H UR: <18 MG/G CREATININE (ref 0–30)
MONOCYTES # BLD AUTO: 0.54 THOUSAND/ÂΜL (ref 0.17–1.22)
MONOCYTES NFR BLD AUTO: 9 % (ref 4–12)
NEUTROPHILS # BLD AUTO: 3.14 THOUSANDS/ÂΜL (ref 1.85–7.62)
NEUTS SEG NFR BLD AUTO: 56 % (ref 43–75)
NONHDLC SERPL-MCNC: 158 MG/DL
NRBC BLD AUTO-RTO: 0 /100 WBCS
PLATELET # BLD AUTO: 210 THOUSANDS/UL (ref 149–390)
PMV BLD AUTO: 10.6 FL (ref 8.9–12.7)
POTASSIUM SERPL-SCNC: 4 MMOL/L (ref 3.5–5.3)
PROT SERPL-MCNC: 6.8 G/DL (ref 6.4–8.4)
RBC # BLD AUTO: 4.82 MILLION/UL (ref 3.81–5.12)
SODIUM SERPL-SCNC: 136 MMOL/L (ref 135–147)
TRIGL SERPL-MCNC: 151 MG/DL
TSH SERPL DL<=0.05 MIU/L-ACNC: 2.1 UIU/ML (ref 0.45–4.5)
WBC # BLD AUTO: 5.72 THOUSAND/UL (ref 4.31–10.16)

## 2023-12-09 PROCEDURE — 84443 ASSAY THYROID STIM HORMONE: CPT

## 2023-12-09 PROCEDURE — 80061 LIPID PANEL: CPT

## 2023-12-09 PROCEDURE — 80053 COMPREHEN METABOLIC PANEL: CPT

## 2023-12-09 PROCEDURE — 36415 COLL VENOUS BLD VENIPUNCTURE: CPT

## 2023-12-09 PROCEDURE — 82306 VITAMIN D 25 HYDROXY: CPT

## 2023-12-09 PROCEDURE — 85025 COMPLETE CBC W/AUTO DIFF WBC: CPT

## 2023-12-09 PROCEDURE — 82043 UR ALBUMIN QUANTITATIVE: CPT

## 2023-12-09 PROCEDURE — 82570 ASSAY OF URINE CREATININE: CPT

## 2023-12-29 DIAGNOSIS — G89.29 CHRONIC RIGHT SHOULDER PAIN: ICD-10-CM

## 2023-12-29 DIAGNOSIS — M54.13 RADICULOPATHY OF CERVICOTHORACIC REGION: ICD-10-CM

## 2023-12-29 DIAGNOSIS — M54.2 NECK PAIN: ICD-10-CM

## 2023-12-29 DIAGNOSIS — M25.511 CHRONIC RIGHT SHOULDER PAIN: ICD-10-CM

## 2023-12-29 RX ORDER — GABAPENTIN 300 MG/1
300 CAPSULE ORAL 3 TIMES DAILY
Qty: 60 CAPSULE | Refills: 0 | Status: SHIPPED | OUTPATIENT
Start: 2023-12-29

## 2024-01-02 ENCOUNTER — OFFICE VISIT (OUTPATIENT)
Dept: PULMONOLOGY | Facility: CLINIC | Age: 54
End: 2024-01-02
Payer: COMMERCIAL

## 2024-01-02 VITALS
SYSTOLIC BLOOD PRESSURE: 126 MMHG | OXYGEN SATURATION: 95 % | TEMPERATURE: 98.3 F | HEART RATE: 108 BPM | BODY MASS INDEX: 37.84 KG/M2 | HEIGHT: 62 IN | DIASTOLIC BLOOD PRESSURE: 72 MMHG | WEIGHT: 205.6 LBS

## 2024-01-02 DIAGNOSIS — J32.0 MAXILLARY SINUSITIS, UNSPECIFIED CHRONICITY: Primary | ICD-10-CM

## 2024-01-02 DIAGNOSIS — J45.30 MILD PERSISTENT ASTHMA WITHOUT COMPLICATION: ICD-10-CM

## 2024-01-02 DIAGNOSIS — G47.33 OSA (OBSTRUCTIVE SLEEP APNEA): ICD-10-CM

## 2024-01-02 DIAGNOSIS — F17.211 CIGARETTE NICOTINE DEPENDENCE IN REMISSION: ICD-10-CM

## 2024-01-02 PROCEDURE — 99214 OFFICE O/P EST MOD 30 MIN: CPT

## 2024-01-02 RX ORDER — METHYLPREDNISOLONE 4 MG/1
TABLET ORAL
Qty: 21 TABLET | Refills: 0 | Status: SHIPPED | OUTPATIENT
Start: 2024-01-02

## 2024-01-02 RX ORDER — AMOXICILLIN AND CLAVULANATE POTASSIUM 875; 125 MG/1; MG/1
1 TABLET, FILM COATED ORAL EVERY 12 HOURS SCHEDULED
Qty: 14 TABLET | Refills: 0 | Status: SHIPPED | OUTPATIENT
Start: 2024-01-02 | End: 2024-01-09

## 2024-01-02 RX ORDER — ALBUTEROL SULFATE 90 UG/1
2 AEROSOL, METERED RESPIRATORY (INHALATION) EVERY 6 HOURS PRN
Qty: 18 G | Refills: 3 | Status: SHIPPED | OUTPATIENT
Start: 2024-01-02

## 2024-01-02 NOTE — PATIENT INSTRUCTIONS
Start Augmentin and Medrol dose pack, take as prescribed for sinus infection. Continue Flonase nasal spray, add nasal rinses.  Continue Symbicort 2 puffs twice daily  Albuterol inhaler 2 puffs every 6 hours as needed for shortness of breath or wheezing  Re-schedule mask fitting appointment  Continue wearing CPAP nightly

## 2024-01-02 NOTE — ASSESSMENT & PLAN NOTE
-Reviewed 90 day compliance report. Still experiencing high mask leakage of 52.9 L/min and residual AHI 6.1. Also feeling more tired/napping more during the day  -Mask fitting ordered at her previous visit, but was sick and could not attend  -Will have patient re-schedule mask fitting, continue wearing CPAP nightly and follow in in 3 months to review compliance data again  -If not improved, may need CPAP study

## 2024-01-02 NOTE — ASSESSMENT & PLAN NOTE
-30-pack-year smoking history, quit in 2012  -Due for yearly CT lung cancer screening July 2024.  This can be discussed and ordered at a future visit.  Previous CT lung cancer screening with no new or enlarging pulmonary nodules

## 2024-01-02 NOTE — ASSESSMENT & PLAN NOTE
-Patient with sinusitis and mild increase in asthma symptoms. Not in any acute distress, do not feel patient is experiencing exacerbation of her asthma at this time  -Will treat sinus infection with Augmentin and Medrol dose pack  -Encouraged patient to use Symbicort 80 2 puffs twice daily, was only using PRN/daily and add albuterol every 6 hours PRN SOB or wheezing  -Follow up in 3 months or sooner if needed

## 2024-01-02 NOTE — PROGRESS NOTES
Pulmonary Follow Up Note  Katie Dye 53 y.o. female MRN: [id number]  1/2/2024    Assessment:    Mild persistent asthma without complication  -Patient with sinusitis and mild increase in asthma symptoms. Not in any acute distress, do not feel patient is experiencing exacerbation of her asthma at this time  -Will treat sinus infection with Augmentin and Medrol dose pack  -Encouraged patient to use Symbicort 80 2 puffs twice daily, was only using PRN/daily and add albuterol every 6 hours PRN SOB or wheezing  -Follow up in 3 months or sooner if needed    ANSHUL (obstructive sleep apnea)  -Reviewed 90 day compliance report. Still experiencing high mask leakage of 52.9 L/min and residual AHI 6.1. Also feeling more tired/napping more during the day  -Mask fitting ordered at her previous visit, but was sick and could not attend  -Will have patient re-schedule mask fitting, continue wearing CPAP nightly and follow in in 3 months to review compliance data again  -If not improved, may need CPAP study    Cigarette nicotine dependence in remission  -30-pack-year smoking history, quit in 2012  -Due for yearly CT lung cancer screening July 2024.  This can be discussed and ordered at a future visit.  Previous CT lung cancer screening with no new or enlarging pulmonary nodules      Plan:    Diagnoses and all orders for this visit:    Maxillary sinusitis, unspecified chronicity  -     amoxicillin-clavulanate (AUGMENTIN) 875-125 mg per tablet; Take 1 tablet by mouth every 12 (twelve) hours for 7 days  -     methylPREDNISolone 4 MG tablet therapy pack; Use as directed on package    Mild persistent asthma without complication  -     albuterol (Ventolin HFA) 90 mcg/act inhaler; Inhale 2 puffs every 6 (six) hours as needed for wheezing    ANSHUL (obstructive sleep apnea)    Cigarette nicotine dependence in remission          Return in about 3 months (around 4/2/2024).        History of Present Illness     Chief Complaint:   Chief Complaint    Patient presents with    Follow-up     Has been wearing CPAP most nights.  Has been coughing a lot and coughing up yellow mucas       Patient ID: Katie FONTENOT is a 53 y.o. y.o. female has a past medical history of Abnormal ECG, Allergic (1969), Anemia (1982), Anxiety, Arthritis, Asthma, Bronchiectasis (HCC), Callus, Cancer (HCC), Cataract (2021), Chronic bronchitis ([facility]) (1984), Chronic kidney disease (1989), COPD (chronic obstructive pulmonary disease) ([facility]) (2005), Depression, Diabetes mellitus ([facility]), Difficulty walking, Diverticulitis, Diverticulitis of colon (2014), GERD (gastroesophageal reflux disease), Heart murmur (1994), Hyperlipidemia associated with type 2 diabetes mellitus, Hypertension, Inflammatory bowel disease (1991), Insulinoma, Liver disease, Lung disease, Memory loss (2004), Miscarriage (1989), Myocardial infarction ([facility]) (2008), Non-recurrent acute suppurative otitis media of left ear without spontaneous rupture of tympanic membrane (08/13/2019), Obesity (1998), Peptic ulceration, Plantar fasciitis (04/15/2022), Pulmonary emboli ([facility]), Rheumatoid arthritis ([facility]) (1998), Sleep apnea, Sleep apnea, obstructive, Stroke (HCC), and Visual impairment (1983).      1/2/2024  HPI: Katie Dye is a 53 y.o. female who presents to the office today for a follow up visit.  She has a past medical history including but not limited to asthma, ANSHUL on CPAP, DM 2, PLMD, obesity, PE in 2017, reflux, depression.  She is a former smoker with 30-pack-year history, quit 10 years ago.  She was previously seen in the office 2 months ago.  Her asthma has been well-controlled and was on Symbicort 80 as needed.  At her last office visit, she was experiencing high mask leakage and elevated residual AHI.  She was sent for mask fitting appointment.  She is here to review CPAP compliance again.    Unfortunately, patient did not go for mask fitting appointment as she was sick and had to cancel.  Did not reschedule.  She has  continued to wear her CPAP nightly and is experiencing increased daytime sleepiness.  Patient also reports continued sinus pain/congestion.  She was treated 2 months ago with amoxicillin and feels like this has not resolved.  Still having frequent cough with yellow productive sputum, occasional wheezing, and chest tightness. Does not feel SOB.  She has been using her Flonase nasal spray as needed, not taking any other over-the-counter medications or performing nasal rinses.  She restarted using her Symbicort inhaler a few days ago once daily.  Does not have an albuterol inhaler at home.  Patient denies any fevers, chills, or hemoptysis.      Review of Systems   Constitutional:  Negative for activity change, appetite change, chills, diaphoresis, fever and unexpected weight change.   HENT:  Positive for congestion, postnasal drip, sinus pressure and sinus pain. Negative for ear pain, sneezing, sore throat, trouble swallowing and voice change.    Respiratory:  Positive for cough and wheezing. Negative for chest tightness and shortness of breath.    Cardiovascular:  Positive for chest pain. Negative for palpitations and leg swelling.   Allergic/Immunologic: Positive for environmental allergies.   Neurological:  Positive for headaches.       Historical Information   Past Medical History:   Diagnosis Date    Abnormal ECG     Allergic 1969    Anemia 1982    Anxiety     Arthritis     Asthma     Bronchiectasis (HCC)     Callus     Cancer (HCC)     renal,cervical    Cataract 2021    Chronic bronchitis (HCC) 1984    Chronic kidney disease 1989    COPD (chronic obstructive pulmonary disease) (HCC) 2005    Depression     Diabetes mellitus (HCC)     Difficulty walking     Diverticulitis     Diverticulitis of colon 2014    GERD (gastroesophageal reflux disease)     Heart murmur 1994    Hyperlipidemia associated with type 2 diabetes mellitus      Hypertension     Inflammatory bowel disease 1991    Insulinoma     Liver disease      Lung disease     Memory loss 2004    Traumatic brain injury treated    Miscarriage     Myocardial infarction (HCC)     Non-recurrent acute suppurative otitis media of left ear without spontaneous rupture of tympanic membrane 2019    Obesity 1998    Peptic ulceration     Plantar fasciitis 04/15/2022    Pulmonary emboli (HCC)     Rheumatoid arthritis (HCC)     Sleep apnea     Sleep apnea, obstructive     Stroke (HCC)     Visual impairment 1983     Past Surgical History:   Procedure Laterality Date    ABDOMINAL SURGERY  1990    Pancreatectomy (partial)     SECTION      twice    CHOLECYSTECTOMY      EYE SURGERY      KNEE ARTHROSCOPY Left     KNEE SURGERY      PANCREATECTOMY      Partial     NE COLONOSCOPY FLX DX W/COLLJ SPEC WHEN PFRMD N/A 2019    Procedure: COLONOSCOPY;  Surgeon: Sharon Tejada DO;  Location: [facility];  Service: Gastroenterology    NE LAPAROSCOPY SURG CHOLECYSTECTOMY N/A 2018    Procedure: CHOLECYSTECTOMY LAPAROSCOPIC;  Surgeon: Demar Tinsley DO;  Location: [facility];  Service: General    TRACHEOSTOMY      TUBAL LIGATION  1998     Family History   Problem Relation Age of Onset    Heart disease Mother     Hyperlipidemia Mother     Obesity Mother     Hypertension Mother     Other Mother         Bundle branch block    Tuberculosis Mother     Arthritis Mother     Diabetes Mother     Osteoporosis Mother     Ulcerative colitis Mother     Asthma Mother     COPD Mother     Coronary artery disease Mother     Vision loss Mother         Cataracts    Substance Abuse Father     Cancer Father     Alcohol abuse Father     Mental illness Father     Psychiatric Illness Father     Psychosis Father     Schizoaffective Disorder  Father     Schizophrenia Father     Self-Injury Father     Suicide Attempts Father     No Known Problems Sister     Anxiety disorder Sister     No Known Problems Daughter     Other Maternal Grandmother         ascending aortic aneurysm  resection    Diabetes Maternal Grandmother     Diabetes Paternal Grandmother     No Known Problems Brother     Tuberculosis Maternal Aunt     No Known Problems Paternal Aunt     No Known Problems Paternal Aunt     No Known Problems Paternal Aunt     No Known Problems Paternal Aunt     No Known Problems Paternal Aunt     Breast cancer Other 88    Substance Abuse Family     Osteoporosis Family     No Known Problems Half-Sister        Smoking history: She reports that she quit smoking about 11 years ago. Her smoking use included cigarettes. She started smoking about 41 years ago. She has a 30.1 pack-year smoking history. She has never used smokeless tobacco.    Occupational History:     Immunization History   Administered Date(s) Administered    COVID-19 MODERNA VACC 0.5 ML IM 04/01/2021, 04/29/2021, 12/23/2021    COVID-19 Moderna mRNA Vaccine 12 Yr+ 50 mcg/0.5 mL (Spikevax) 10/10/2023    Hep A / Hep B 05/18/2016, 09/08/2016    Hep A, adult 12/08/2016    Hep B, adult 12/08/2016    INFLUENZA 08/13/2014, 08/10/2015, 10/01/2016, 10/11/2016, 09/21/2017, 09/20/2019, 09/01/2020, 12/05/2022    Influenza Quadrivalent Preservative Free 3 years and older IM 09/21/2017    Influenza Quadrivalent, 6-35 Months IM 10/01/2016    Influenza, injectable, quadrivalent, preservative free 0.5 mL 09/23/2019, 09/01/2020, 09/29/2023    Influenza, recombinant, quadrivalent,injectable, preservative free 10/10/2018, 09/17/2021, 12/05/2022    Influenza, seasonal, injectable 1968, 09/15/2011, 12/03/2012, 10/17/2013, 08/01/2015    Pneumococcal Conjugate 13-Valent 10/15/2016    Pneumococcal Polysaccharide PPV23 05/18/2016    Tdap 12/03/2012, 09/06/2023    Zoster Vaccine Recombinant 10/31/2019, 01/04/2020       Meds/Allergies     Current Outpatient Medications:     albuterol (Ventolin HFA) 90 mcg/act inhaler, Inhale 2 puffs every 6 (six) hours as needed for wheezing, Disp: 18 g, Rfl: 3    amoxicillin-clavulanate (AUGMENTIN) 875-125 mg per  tablet, Take 1 tablet by mouth every 12 (twelve) hours for 7 days, Disp: 14 tablet, Rfl: 0    ARIPiprazole (ABILIFY) 10 mg tablet, Take 10 mg by mouth daily, Disp: , Rfl:     budesonide-formoterol (Symbicort) 80-4.5 MCG/ACT inhaler, Inhale 2 puffs 2 (two) times a day Rinse mouth after use., Disp: 10.2 g, Rfl: 5    EPINEPHrine (EPIPEN) 0.3 mg/0.3 mL SOAJ, Inject 0.3 mL (0.3 mg total) into a muscle once for 1 dose, Disp: 0.3 mL, Rfl: 0    gabapentin (NEURONTIN) 300 mg capsule, Take 1 capsule (300 mg total) by mouth 3 (three) times a day, Disp: 60 capsule, Rfl: 0    Insulin Pen Needle (B-D UF III MINI PEN NEEDLES) 31G X 5 MM MISC, Use once daily as directed., Disp: 100 each, Rfl: 1    Invokamet 150-1000 MG TABS, take 1 tablet by mouth twice a day, Disp: 60 tablet, Rfl: 5    liraglutide (Victoza) injection, Inject 0.3 mL (1.8 mg total) under the skin daily, Disp: 3 mL, Rfl: 5    lisinopril (ZESTRIL) 2.5 mg tablet, Take 1 tablet (2.5 mg total) by mouth daily, Disp: 90 tablet, Rfl: 0    loratadine (CLARITIN) 10 mg tablet, Take 1 tablet (10 mg total) by mouth daily, Disp: 30 tablet, Rfl: 2    methylPREDNISolone 4 MG tablet therapy pack, Use as directed on package, Disp: 21 tablet, Rfl: 0    Microlet Lancets MISC, Test twice daily, Disp: 100 each, Rfl: 3    omeprazole (PriLOSEC) 40 MG capsule, swallow 1 capsule once daily, Disp: 30 capsule, Rfl: 5    pravastatin (PRAVACHOL) 20 mg tablet, Take 1 tablet (20 mg total) by mouth daily, Disp: 90 tablet, Rfl: 0    venlafaxine (EFFEXOR-XR) 150 mg 24 hr capsule, Take 150 mg by mouth daily  , Disp: , Rfl:   Allergies:   Allergies   Allergen Reactions    Iodine - Food Allergy Anaphylaxis     IVP DYE     Nuts - Food Allergy Anaphylaxis     Annotation - [id number]: Pine nuts         Vitals:  Vitals:    01/02/24 0950   BP: 126/72   BP Location: Left arm   Patient Position: Sitting   Cuff Size: Adult   Pulse: (!) 108   Temp: 98.3 °F (36.8 °C)   TempSrc: Temporal   SpO2: 95%   Weight: 93.3  "kg (205 lb 9.6 oz)   Height: 5' 2\" (1.575 m)     Oxygen Therapy  SpO2: 95 %  .  Wt Readings from Last 3 Encounters:   01/02/24 93.3 kg (205 lb 9.6 oz)   12/07/23 93 kg (205 lb)   11/14/23 93 kg (205 lb)     Body mass index is 37.6 kg/m².    Physical Exam  Vitals and nursing note reviewed.   Constitutional:       General: She is not in acute distress.     Appearance: Normal appearance. She is well-developed. She is obese.   HENT:      Nose:      Right Sinus: Maxillary sinus tenderness present.      Left Sinus: Maxillary sinus tenderness present.   Cardiovascular:      Rate and Rhythm: Normal rate and regular rhythm.      Heart sounds: Normal heart sounds, S1 normal and S2 normal. No murmur heard.  Pulmonary:      Effort: Pulmonary effort is normal.      Breath sounds: Examination of the right-lower field reveals rhonchi. Rhonchi (mild) present. No decreased breath sounds, wheezing or rales.   Musculoskeletal:         General: No swelling.      Right lower leg: No edema.      Left lower leg: No edema.   Neurological:      Mental Status: She is alert.   Psychiatric:         Mood and Affect: Mood and affect normal.         Behavior: Behavior normal. Behavior is cooperative.           Labs: I have personally reviewed pertinent lab results.  Lab Results   Component Value Date    WBC 5.72 12/09/2023    HGB 14.1 12/09/2023    HCT 44.7 12/09/2023    MCV 93 12/09/2023     12/09/2023     Lab Results   Component Value Date    GLUCOSE 116 11/19/2015    CALCIUM 9.1 12/09/2023     06/09/2018    K 4.0 12/09/2023    CO2 27 12/09/2023     12/09/2023    BUN 17 12/09/2023    CREATININE 0.75 12/09/2023     No results found for: \"IGE\"  Lab Results   Component Value Date    ALT 15 12/09/2023    AST 12 (L) 12/09/2023    ALKPHOS 82 12/09/2023    BILITOT 0.4 06/09/2018       Imaging and other studies: I have personally reviewed pertinent reports and I have personally reviewed pertinent films in PACS     CT lung screening " 7/15/2023  Benign 2 mm right upper lobe pulmonary nodule.  No new or enlarging pulmonary nodules.    Pulmonary function testing:     Pulmonary Functions Testing Results: 10/7/2020    FEV1/FVC ratio 75%    FEV1 84% predicted  FVC 84% predicted  (-) response to bronchodilators  TLC 86 % predicted   % predicted  DLCO corrected for hemoglobin 86 % predicted    Impression: No obstructive airflow defect, however FEF 25 to 75% is significantly decreased which can be consistent with small airways disease.  Flow-volume curve shape on exhalation also consistent with obstruction.  No significant response to administration of bronchodilators.  Normal lung volumes.  Normal diffusion capacity      Answers submitted by the patient for this visit:  Pulmonology Questionnaire (Submitted on 12/26/2023)  Chief Complaint: Primary symptoms  Do you have difficulty breathing?: Yes  Do you experience frequent throat clearing?: Yes  Do you have a hoarse voice?: Yes  Chronicity: recurrent  When did you first notice your symptoms?: more than 1 year ago  How often do your symptoms occur?: 2 to 4 times per day  Since you first noticed this problem, how has it changed?: gradually improving  Do you have shortness of breath that occurs with effort or exertion?: Yes  Do you have ear congestion?: No  Do you have heartburn?: No  Do you have fatigue?: No  Do you have nasal congestion?: Yes  Do you have shortness of breath when lying flat?: Yes  Do you have shortness of breath when you wake up?: No  Do you have sweats?: No  Have you experienced weight loss?: No  Which of the following makes your symptoms worse?: animal exposure, change in weather, climbing stairs, emotional stress, exercise, exposure to fumes, exposure to smoke, lying down, strenuous activity, URI  Which of the following makes your symptoms better?: change in position, cold air, nitroglycerine, rest  Risk factors for lung disease: animal exposure, occupational exposure,  smoking/tobacco exposure, travel

## 2024-01-15 DIAGNOSIS — G89.29 CHRONIC RIGHT SHOULDER PAIN: ICD-10-CM

## 2024-01-15 DIAGNOSIS — M54.13 RADICULOPATHY OF CERVICOTHORACIC REGION: ICD-10-CM

## 2024-01-15 DIAGNOSIS — M25.511 CHRONIC RIGHT SHOULDER PAIN: ICD-10-CM

## 2024-01-15 DIAGNOSIS — M54.2 NECK PAIN: ICD-10-CM

## 2024-01-15 RX ORDER — GABAPENTIN 300 MG/1
300 CAPSULE ORAL 3 TIMES DAILY
Qty: 60 CAPSULE | Refills: 5 | Status: SHIPPED | OUTPATIENT
Start: 2024-01-15

## 2024-01-15 NOTE — TELEPHONE ENCOUNTER
Patient left this voicemail please advise       I'm calling about my gabapentin. I know I called last month for it, but they said that I need a new prescription. Again, it's on [facility] [address] in [facility]. Thank you

## 2024-02-07 DIAGNOSIS — K21.9 GASTROESOPHAGEAL REFLUX DISEASE WITHOUT ESOPHAGITIS: ICD-10-CM

## 2024-02-07 RX ORDER — OMEPRAZOLE 40 MG/1
CAPSULE, DELAYED RELEASE ORAL
Qty: 30 CAPSULE | Refills: 5 | Status: SHIPPED | OUTPATIENT
Start: 2024-02-07

## 2024-02-11 DIAGNOSIS — E11.65 TYPE 2 DIABETES MELLITUS WITH HYPERGLYCEMIA, WITHOUT LONG-TERM CURRENT USE OF INSULIN (HCC): ICD-10-CM

## 2024-02-12 RX ORDER — CANAGLIFLOZIN AND METFORMIN HYDROCHLORIDE 150; 1000 MG/1; MG/1
1 TABLET, FILM COATED ORAL 2 TIMES DAILY
Qty: 60 TABLET | Refills: 2 | Status: SHIPPED | OUTPATIENT
Start: 2024-02-12

## 2024-02-20 ENCOUNTER — OFFICE VISIT (OUTPATIENT)
Dept: URGENT CARE | Facility: CLINIC | Age: 54
End: 2024-02-20
Payer: COMMERCIAL

## 2024-02-20 VITALS — RESPIRATION RATE: 18 BRPM | TEMPERATURE: 98.4 F | HEART RATE: 118 BPM | OXYGEN SATURATION: 95 %

## 2024-02-20 DIAGNOSIS — N89.8 VAGINAL ITCHING: Primary | ICD-10-CM

## 2024-02-20 DIAGNOSIS — H10.021 PINK EYE DISEASE OF RIGHT EYE: ICD-10-CM

## 2024-02-20 LAB
SL AMB  POCT GLUCOSE, UA: NEGATIVE
SL AMB LEUKOCYTE ESTERASE,UA: NEGATIVE
SL AMB POCT BILIRUBIN,UA: NEGATIVE
SL AMB POCT BLOOD,UA: NEGATIVE
SL AMB POCT CLARITY,UA: ABNORMAL
SL AMB POCT COLOR,UA: YELLOW
SL AMB POCT KETONES,UA: NEGATIVE
SL AMB POCT NITRITE,UA: NEGATIVE
SL AMB POCT PH,UA: 5
SL AMB POCT SPECIFIC GRAVITY,UA: 1.01
SL AMB POCT URINE PROTEIN: NEGATIVE
SL AMB POCT UROBILINOGEN: 0.2

## 2024-02-20 PROCEDURE — 81002 URINALYSIS NONAUTO W/O SCOPE: CPT | Performed by: PHYSICIAN ASSISTANT

## 2024-02-20 PROCEDURE — 99213 OFFICE O/P EST LOW 20 MIN: CPT | Performed by: PHYSICIAN ASSISTANT

## 2024-02-20 RX ORDER — FLUCONAZOLE 150 MG/1
150 TABLET ORAL ONCE
Qty: 1 TABLET | Refills: 0 | Status: SHIPPED | OUTPATIENT
Start: 2024-02-20 | End: 2024-02-20

## 2024-02-20 RX ORDER — POLYMYXIN B SULFATE AND TRIMETHOPRIM 1; 10000 MG/ML; [USP'U]/ML
1 SOLUTION OPHTHALMIC EVERY 4 HOURS
Qty: 10 ML | Refills: 0 | Status: SHIPPED | OUTPATIENT
Start: 2024-02-20

## 2024-02-20 NOTE — PATIENT INSTRUCTIONS
Will prescribe Diflucan for suspected yeast infection.  To use antibiotic eyedrops and eye as instructed over the next 5 days.  Proper handwashing and avoid rubbing and scratching at eye.

## 2024-02-20 NOTE — PROGRESS NOTES
[facility]        NAME: Katie Dye is a 53 y.o. female  : 1970    MRN: [id number]  DATE: 2024  TIME: 2:14 PM    Assessment and Plan   Vaginal itching [N89.8]  1. Vaginal itching  POCT urine dip    fluconazole (DIFLUCAN) 150 mg tablet      2. Pink eye disease of right eye  polymyxin b-trimethoprim (POLYTRIM) ophthalmic solution            Patient Instructions     Patient Instructions   Will prescribe Diflucan for suspected yeast infection.  To use antibiotic eyedrops and eye as instructed over the next 5 days.  Proper handwashing and avoid rubbing and scratching at eye.      Follow up with PCP in 3-5 days.  Proceed to  ER if symptoms worsen.    Chief Complaint     Chief Complaint   Patient presents with    Conjunctivitis    Vaginal Itching         History of Present Illness       Patient is a 53-year-old female presenting today with vaginal irritation times several weeks.  Patient notes since completing a course of antibiotics last month she developed some symptoms consistent to a yeast infection, has had some whitish vaginal discharge and drainage, states this often happens after finishing a course of antibiotics, has not tried any medication limiting factors for her symptoms.  Denies fever, abdominal pain, flank pain, urinary symptoms.    Patient also notes this morning she woke with some right eye discharge and drainage, notes that her granddaughter was recently diagnosed with pinkeye, has been in close contact with her, has not taken any medications for this.  Denies eye pain, fever, blurred vision, vision loss.        Review of Systems   Review of Systems   Constitutional:  Negative for chills and fever.   HENT:  Negative for ear pain and sore throat.    Eyes:  Positive for discharge, redness and itching. Negative for pain and visual disturbance.   Respiratory:  Negative for cough and shortness of breath.    Cardiovascular:  Negative for chest pain and palpitations.    Gastrointestinal:  Negative for abdominal pain and vomiting.   Genitourinary:  Positive for vaginal discharge. Negative for dysuria and hematuria.   Musculoskeletal:  Negative for arthralgias and back pain.   Skin:  Negative for color change and rash.   Neurological:  Negative for seizures and syncope.   All other systems reviewed and are negative.        Current Medications       Current Outpatient Medications:     fluconazole (DIFLUCAN) 150 mg tablet, Take 1 tablet (150 mg total) by mouth once for 1 dose, Disp: 1 tablet, Rfl: 0    polymyxin b-trimethoprim (POLYTRIM) ophthalmic solution, Administer 1 drop into the left eye every 4 (four) hours, Disp: 10 mL, Rfl: 0    albuterol (Ventolin HFA) 90 mcg/act inhaler, Inhale 2 puffs every 6 (six) hours as needed for wheezing, Disp: 18 g, Rfl: 3    ARIPiprazole (ABILIFY) 10 mg tablet, Take 10 mg by mouth daily, Disp: , Rfl:     budesonide-formoterol (Symbicort) 80-4.5 MCG/ACT inhaler, Inhale 2 puffs 2 (two) times a day Rinse mouth after use., Disp: 10.2 g, Rfl: 5    Canagliflozin-metFORMIN HCl (Invokamet) 150-1000 MG TABS, TAKE 1 TABLET BY MOUTH TWICE A DAY, Disp: 60 tablet, Rfl: 2    EPINEPHrine (EPIPEN) 0.3 mg/0.3 mL SOAJ, Inject 0.3 mL (0.3 mg total) into a muscle once for 1 dose, Disp: 0.3 mL, Rfl: 0    gabapentin (NEURONTIN) 300 mg capsule, Take 1 capsule (300 mg total) by mouth 3 (three) times a day, Disp: 60 capsule, Rfl: 5    Insulin Pen Needle (B-D UF III MINI PEN NEEDLES) 31G X 5 MM MISC, Use once daily as directed., Disp: 100 each, Rfl: 1    liraglutide (Victoza) injection, Inject 0.3 mL (1.8 mg total) under the skin daily, Disp: 3 mL, Rfl: 5    lisinopril (ZESTRIL) 2.5 mg tablet, Take 1 tablet (2.5 mg total) by mouth daily, Disp: 90 tablet, Rfl: 0    loratadine (CLARITIN) 10 mg tablet, Take 1 tablet (10 mg total) by mouth daily, Disp: 30 tablet, Rfl: 2    methylPREDNISolone 4 MG tablet therapy pack, Use as directed on package, Disp: 21 tablet, Rfl: 0     Microlet Lancets MISC, Test twice daily, Disp: 100 each, Rfl: 3    omeprazole (PriLOSEC) 40 MG capsule, swallow 1 capsule once daily, Disp: 30 capsule, Rfl: 5    pravastatin (PRAVACHOL) 20 mg tablet, Take 1 tablet (20 mg total) by mouth daily, Disp: 90 tablet, Rfl: 0    venlafaxine (EFFEXOR-XR) 150 mg 24 hr capsule, Take 150 mg by mouth daily  , Disp: , Rfl:     Current Allergies     Allergies as of 02/20/2024 - Reviewed 02/20/2024   Allergen Reaction Noted    Iodine - food allergy Anaphylaxis 06/01/2018    Nuts - food allergy Anaphylaxis 09/27/2013            The following portions of the patient's history were reviewed and updated as appropriate: allergies, current medications, past family history, past medical history, past social history, past surgical history and problem list.     Past Medical History:   Diagnosis Date    Abnormal ECG     Allergic 1969    Anemia 1982    Anxiety     Arthritis     Asthma     Bronchiectasis (HCC)     Callus     Cancer (Formerly [facility])     renal,cervical    Cataract 2021    Chronic bronchitis ([facility]) 1984    Chronic kidney disease 1989    COPD (chronic obstructive pulmonary disease) (Former[facility]) 2005    Depression     Diabetes mellitus ([facility])     Difficulty walking     Diverticulitis     Diverticulitis of colon 2014    GERD (gastroesophageal reflux disease)     Heart murmur 1994    Hyperlipidemia associated with type 2 diabetes mellitus      Hypertension     Inflammatory bowel disease 1991    Insulinoma     Liver disease     Lung disease     Memory loss 2004    Traumatic brain injury treated    Miscarriage 1989    Myocardial infarction ([facility]) 2008    Non-recurrent acute suppurative otitis media of left ear without spontaneous rupture of tympanic membrane 08/13/2019    Obesity 1998    Peptic ulceration     Plantar fasciitis 04/15/2022    Pulmonary emboli ([facility])     Rheumatoid arthritis ([facility]) 1998    Sleep apnea     Sleep apnea, obstructive     Stroke ([facility])     Visual impairment 1983       Past Surgical  History:   Procedure Laterality Date    ABDOMINAL SURGERY  1990    Pancreatectomy (partial)     SECTION      twice    CHOLECYSTECTOMY      EYE SURGERY      KNEE ARTHROSCOPY Left     KNEE SURGERY      PANCREATECTOMY      Partial     UT COLONOSCOPY FLX DX W/COLLJ SPEC WHEN PFRMD N/A 2019    Procedure: COLONOSCOPY;  Surgeon: Sharon Tejada DO;  Location: [facility];  Service: Gastroenterology    UT LAPAROSCOPY SURG CHOLECYSTECTOMY N/A 2018    Procedure: CHOLECYSTECTOMY LAPAROSCOPIC;  Surgeon: Demar Tinsley DO;  Location: [facility];  Service: General    TRACHEOSTOMY      TUBAL LIGATION         Family History   Problem Relation Age of Onset    Heart disease Mother     Hyperlipidemia Mother     Obesity Mother     Hypertension Mother     Other Mother         Bundle branch block    Tuberculosis Mother     Arthritis Mother     Diabetes Mother     Osteoporosis Mother     Ulcerative colitis Mother     Asthma Mother     COPD Mother     Coronary artery disease Mother     Vision loss Mother         Cataracts    Substance Abuse Father     Cancer Father     Alcohol abuse Father     Mental illness Father     Psychiatric Illness Father     Psychosis Father     Schizoaffective Disorder  Father     Schizophrenia Father     Self-Injury Father     Suicide Attempts Father     No Known Problems Sister     Anxiety disorder Sister     No Known Problems Daughter     Other Maternal Grandmother         ascending aortic aneurysm resection    Diabetes Maternal Grandmother     Diabetes Paternal Grandmother     No Known Problems Brother     Tuberculosis Maternal Aunt     No Known Problems Paternal Aunt     No Known Problems Paternal Aunt     No Known Problems Paternal Aunt     No Known Problems Paternal Aunt     No Known Problems Paternal Aunt     Breast cancer Other 88    Substance Abuse Family     Osteoporosis Family     No Known Problems Half-Sister          Medications have been  verified.        Objective   Pulse (!) 118   Temp 98.4 °F (36.9 °C)   Resp 18   LMP 05/20/2019   SpO2 95%        Physical Exam     Physical Exam  Vitals reviewed.   Constitutional:       Appearance: Normal appearance.   HENT:      Head: Normocephalic and atraumatic.      Right Ear: External ear normal.      Left Ear: External ear normal.   Eyes:      General:         Right eye: Discharge present.      Extraocular Movements: Extraocular movements intact.      Pupils: Pupils are equal, round, and reactive to light.      Comments: Mild scleral injection of right eye and presence of purulent crusting on upper and lower lash line, no photophobia, vision grossly intact   Cardiovascular:      Rate and Rhythm: Normal rate.      Pulses: Normal pulses.   Pulmonary:      Effort: Pulmonary effort is normal.   Abdominal:      General: Abdomen is flat. Bowel sounds are normal.      Palpations: Abdomen is soft.      Tenderness: There is no abdominal tenderness. There is no right CVA tenderness or left CVA tenderness.   Skin:     General: Skin is warm.      Capillary Refill: Capillary refill takes less than 2 seconds.   Neurological:      General: No focal deficit present.      Mental Status: She is alert and oriented to person, place, and time.

## 2024-02-21 PROBLEM — Z01.419 ENCOUNTER FOR GYNECOLOGICAL EXAMINATION WITHOUT ABNORMAL FINDING: Status: RESOLVED | Noted: 2019-04-03 | Resolved: 2024-02-21

## 2024-03-05 ENCOUNTER — OFFICE VISIT (OUTPATIENT)
Dept: OBGYN CLINIC | Facility: CLINIC | Age: 54
End: 2024-03-05
Payer: COMMERCIAL

## 2024-03-05 VITALS
HEART RATE: 108 BPM | HEIGHT: 62 IN | BODY MASS INDEX: 37.73 KG/M2 | WEIGHT: 205 LBS | DIASTOLIC BLOOD PRESSURE: 68 MMHG | SYSTOLIC BLOOD PRESSURE: 104 MMHG

## 2024-03-05 DIAGNOSIS — M18.0 ARTHRITIS OF CARPOMETACARPAL (CMC) JOINT OF BOTH THUMBS: Primary | ICD-10-CM

## 2024-03-05 PROCEDURE — 20600 DRAIN/INJ JOINT/BURSA W/O US: CPT | Performed by: ORTHOPAEDIC SURGERY

## 2024-03-05 PROCEDURE — 99213 OFFICE O/P EST LOW 20 MIN: CPT | Performed by: ORTHOPAEDIC SURGERY

## 2024-03-05 RX ORDER — BETAMETHASONE SODIUM PHOSPHATE AND BETAMETHASONE ACETATE 3; 3 MG/ML; MG/ML
3 INJECTION, SUSPENSION INTRA-ARTICULAR; INTRALESIONAL; INTRAMUSCULAR; SOFT TISSUE
Status: COMPLETED | OUTPATIENT
Start: 2024-03-05 | End: 2024-03-05

## 2024-03-05 RX ORDER — BUPIVACAINE HYDROCHLORIDE 2.5 MG/ML
0.5 INJECTION, SOLUTION INFILTRATION; PERINEURAL
Status: COMPLETED | OUTPATIENT
Start: 2024-03-05 | End: 2024-03-05

## 2024-03-05 RX ADMIN — BETAMETHASONE SODIUM PHOSPHATE AND BETAMETHASONE ACETATE 3 MG: 3; 3 INJECTION, SUSPENSION INTRA-ARTICULAR; INTRALESIONAL; INTRAMUSCULAR; SOFT TISSUE at 13:15

## 2024-03-05 RX ADMIN — BUPIVACAINE HYDROCHLORIDE 0.5 ML: 2.5 INJECTION, SOLUTION INFILTRATION; PERINEURAL at 13:15

## 2024-03-05 NOTE — PROGRESS NOTES
Assessment/Plan:   Diagnoses and all orders for this visit:    Arthritis of carpometacarpal (CMC) joint of both thumbs  -     Hand/upper extremity injection      Discussed with patient her exam is consistent with flareup of primary osteoarthritis of the bilateral CMC joints of the thumbs. She was offered and accepted an injection(s) of Celestone and Marcaine to her Bilateral thumb(s) for symptomatic relief of pain and inflammation. Patient tolerated the treatment(s) well. Ice and post injection protocol advised. Weightbearing activities as tolerated. She will be seen for follow-up in 3 months for re-evaluation and consideration for repeat injections as necessary. Patient expresses understanding and is in agreement with this treatment plan. The patient was given the opportunity to ask questions or present concerns.    The patient has arthritis at the base of her thumbs.  Under aseptic technique, both thumbs were injected with Celestone and Marcaine.  She tolerated procedure quite well.  Return back 3 months evaluation    Subjective:   Patient ID: Katie Dye  1970     HPI  Patient is a 53 y.o. female who presents for follow-up evaluation of bilateral thumb CMC joint osteoarthritis. She was last seen in office on 11/14/2023, at which time she received bilateral corticosteroid injections. She states that she experienced significant relief following those injections. Her symptoms began to return approximately 1 to 2 weeks ago. On today's presentation she reports achy pain at the base of her thumbs. She states that her pain is exacerbated with gripping motions and repetitive activity. She states that the pain is worse on the right comparatively. She denies numbness or tingling. She requests repeat CS injections on today's visit.     The following portions of the patient's history were reviewed and updated as appropriate:  Past medical history, past surgical history, Family history, social history, current  medications and allergies    Past Medical History:   Diagnosis Date    Abnormal ECG     Allergic 1969    Anemia 1982    Anxiety     Arthritis     Asthma     Bronchiectasis (HCC)     Callus     Cancer (HCC)     renal,cervical    Cataract     Chronic bronchitis (HCC)     Chronic kidney disease     COPD (chronic obstructive pulmonary disease) (HCC)     Depression     Diabetes mellitus (HCC)     Difficulty walking     Diverticulitis     Diverticulitis of colon     GERD (gastroesophageal reflux disease)     Heart murmur     Hyperlipidemia associated with type 2 diabetes mellitus      Hypertension     Inflammatory bowel disease     Insulinoma     Liver disease     Lung disease     Memory loss     Traumatic brain injury treated    Miscarriage     Myocardial infarction (HCC)     Non-recurrent acute suppurative otitis media of left ear without spontaneous rupture of tympanic membrane 2019    Obesity 1998    Peptic ulceration     Plantar fasciitis 04/15/2022    Pulmonary emboli (HCC)     Rheumatoid arthritis (HCC)     Sleep apnea     Sleep apnea, obstructive     Stroke (HCC)     Visual impairment        Past Surgical History:   Procedure Laterality Date    ABDOMINAL SURGERY  1990    Pancreatectomy (partial)     SECTION      twice    CHOLECYSTECTOMY      EYE SURGERY      KNEE ARTHROSCOPY Left     KNEE SURGERY      PANCREATECTOMY      Partial     ID COLONOSCOPY FLX DX W/COLLJ SPEC WHEN PFRMD N/A 2019    Procedure: COLONOSCOPY;  Surgeon: Sharon Tejada DO;  Location: [facility];  Service: Gastroenterology    ID LAPAROSCOPY SURG CHOLECYSTECTOMY N/A 2018    Procedure: CHOLECYSTECTOMY LAPAROSCOPIC;  Surgeon: Demar Tinsley DO;  Location: [facility];  Service: General    TRACHEOSTOMY  1981    TUBAL LIGATION  1998       Family History   Problem Relation Age of Onset    Heart disease Mother     Hyperlipidemia Mother     Obesity Mother      Hypertension Mother     Other Mother         Bundle branch block    Tuberculosis Mother     Arthritis Mother     Diabetes Mother     Osteoporosis Mother     Ulcerative colitis Mother     Asthma Mother     COPD Mother     Coronary artery disease Mother     Vision loss Mother         Cataracts    Substance Abuse Father     Cancer Father     Alcohol abuse Father     Mental illness Father     Psychiatric Illness Father     Psychosis Father     Schizoaffective Disorder  Father     Schizophrenia Father     Self-Injury Father     Suicide Attempts Father     No Known Problems Sister     Anxiety disorder Sister     No Known Problems Daughter     Other Maternal Grandmother         ascending aortic aneurysm resection    Diabetes Maternal Grandmother     Diabetes Paternal Grandmother     No Known Problems Brother     Tuberculosis Maternal Aunt     No Known Problems Paternal Aunt     No Known Problems Paternal Aunt     No Known Problems Paternal Aunt     No Known Problems Paternal Aunt     No Known Problems Paternal Aunt     Breast cancer Other 88    Substance Abuse Family     Osteoporosis Family     No Known Problems Half-Sister        Social History     Socioeconomic History    Marital status:      Spouse name: None    Number of children: None    Years of education: None    Highest education level: None   Occupational History    Occupation: UNEMPLOYED   Tobacco Use    Smoking status: Former     Current packs/day: 0.00     Average packs/day: 1 pack/day for 30.1 years (30.1 ttl pk-yrs)     Types: Cigarettes     Start date: 1982     Quit date: 2012     Years since quittin.6    Smokeless tobacco: Never    Tobacco comments:     Dont need at all   Vaping Use    Vaping status: Never Used   Substance and Sexual Activity    Alcohol use: Not Currently    Drug use: No    Sexual activity: Not Currently     Partners: Male     Birth control/protection: Abstinence   Other Topics Concern    None   Social History Narrative     Single-    Unemployed    Lives at home with mother     Caffeine use     Social Determinants of Health     Financial Resource Strain: Not on file   Food Insecurity: Not on file   Transportation Needs: Not on file   Physical Activity: Not on file   Stress: Not on file   Social Connections: Not on file   Intimate Partner Violence: Not on file   Housing Stability: Not on file         Current Outpatient Medications:     albuterol (Ventolin HFA) 90 mcg/act inhaler, Inhale 2 puffs every 6 (six) hours as needed for wheezing, Disp: 18 g, Rfl: 3    ARIPiprazole (ABILIFY) 10 mg tablet, Take 10 mg by mouth daily, Disp: , Rfl:     budesonide-formoterol (Symbicort) 80-4.5 MCG/ACT inhaler, Inhale 2 puffs 2 (two) times a day Rinse mouth after use., Disp: 10.2 g, Rfl: 5    Canagliflozin-metFORMIN HCl (Invokamet) 150-1000 MG TABS, TAKE 1 TABLET BY MOUTH TWICE A DAY, Disp: 60 tablet, Rfl: 2    gabapentin (NEURONTIN) 300 mg capsule, Take 1 capsule (300 mg total) by mouth 3 (three) times a day, Disp: 60 capsule, Rfl: 5    Insulin Pen Needle (B-D UF III MINI PEN NEEDLES) 31G X 5 MM MISC, Use once daily as directed., Disp: 100 each, Rfl: 1    liraglutide (Victoza) injection, Inject 0.3 mL (1.8 mg total) under the skin daily, Disp: 3 mL, Rfl: 5    lisinopril (ZESTRIL) 2.5 mg tablet, Take 1 tablet (2.5 mg total) by mouth daily, Disp: 90 tablet, Rfl: 0    loratadine (CLARITIN) 10 mg tablet, Take 1 tablet (10 mg total) by mouth daily, Disp: 30 tablet, Rfl: 2    Microlet Lancets MISC, Test twice daily, Disp: 100 each, Rfl: 3    omeprazole (PriLOSEC) 40 MG capsule, swallow 1 capsule once daily, Disp: 30 capsule, Rfl: 5    polymyxin b-trimethoprim (POLYTRIM) ophthalmic solution, Administer 1 drop into the left eye every 4 (four) hours, Disp: 10 mL, Rfl: 0    pravastatin (PRAVACHOL) 20 mg tablet, Take 1 tablet (20 mg total) by mouth daily, Disp: 90 tablet, Rfl: 0    venlafaxine (EFFEXOR-XR) 150 mg 24 hr capsule, Take 150 mg by mouth  "daily  , Disp: , Rfl:     EPINEPHrine (EPIPEN) 0.3 mg/0.3 mL SOAJ, Inject 0.3 mL (0.3 mg total) into a muscle once for 1 dose, Disp: 0.3 mL, Rfl: 0    methylPREDNISolone 4 MG tablet therapy pack, Use as directed on package, Disp: 21 tablet, Rfl: 0    Allergies   Allergen Reactions    Iodine - Food Allergy Anaphylaxis     IVP DYE     Nuts - Food Allergy Anaphylaxis     Annotation - [id number]: Pine nuts       Review of Systems   Constitutional:  Negative for chills, fever and unexpected weight change.   HENT:  Negative for hearing loss, nosebleeds and sore throat.    Eyes:  Negative for pain, redness and visual disturbance.   Respiratory:  Negative for cough, shortness of breath and wheezing.    Cardiovascular:  Negative for chest pain, palpitations and leg swelling.   Gastrointestinal:  Negative for abdominal pain, nausea and vomiting.   Endocrine: Negative for polydipsia and polyuria.   Genitourinary:  Negative for dysuria and hematuria.   Musculoskeletal:         As noted in HPI   Skin:  Negative for rash and wound.   Neurological:  Negative for dizziness, numbness and headaches.   Psychiatric/Behavioral:  Negative for decreased concentration and suicidal ideas. The patient is not nervous/anxious.         Objective:  /68 (BP Location: Left arm, Patient Position: Sitting, Cuff Size: Large)   Pulse (!) 108   Ht 5' 2\" (1.575 m)   Wt 93 kg (205 lb) Comment: reported  LMP 05/20/2019   BMI 37.49 kg/m²     Ortho Exam  Bilateral hand/thumbs -   No adduction contracture  No hyperextension deformity of MCP joint  Tenderness to palpation over the CMC joint  Grind test is Positive for pain and Positive for crepitus  Metacarpal load shift test positive  No triggering or tenderness over the A1 pulley  Negative pain with Finkelstein’s maneuver   2+ distal radial pulse with brisk capillary refill to the fingers  Radial, median, and ulnar motor and sensory distributions intact  Sensation to light touch intact distally "     Physical Exam  Vitals and nursing note reviewed.   Constitutional:       General: She is not in acute distress.     Appearance: She is well-developed.   HENT:      Head: Normocephalic and atraumatic.   Eyes:      Conjunctiva/sclera: Conjunctivae normal.   Cardiovascular:      Rate and Rhythm: Normal rate.   Pulmonary:      Effort: Pulmonary effort is normal.   Musculoskeletal:      Cervical back: Neck supple.   Skin:     General: Skin is warm and dry.      Capillary Refill: Capillary refill takes less than 2 seconds.   Neurological:      Mental Status: She is alert and oriented to person, place, and time.   Psychiatric:         Mood and Affect: Mood normal.         Behavior: Behavior normal.          Diagnostic Test Review:  No new imaging reviewed this visit    Hand/upper extremity injection  Universal Protocol:  Consent: Verbal consent obtained.  Risks and benefits: risks, benefits and alternatives were discussed  Consent given by: patient  Timeout called at: 3/5/2024 1:17 PM.  Patient understanding: patient states understanding of the procedure being performed  Site marked: the operative site was marked  Radiology Images displayed and confirmed. If images not available, report reviewed: imaging studies available  Patient identity confirmed: verbally with patient  Supporting Documentation  Indications: joint swelling and pain   Procedure Details  Condition comment: Bilateral CMC joints   Preparation: Patient was prepped and draped in the usual sterile fashion  Needle size: 25 G  Ultrasound guidance: no  Approach: dorsal  Medications administered: 0.5 mL bupivacaine 0.25 %; 3 mg betamethasone acetate-betamethasone sodium phosphate 6 (3-3) mg/mL  Patient tolerance: patient tolerated the procedure well with no immediate complications  Dressing:  Sterile dressing applied              Scribe Attestation      I,:  Myra Nieto am acting as a scribe while in the presence of the attending physician.:        I,:  Benja Sanches DO personally performed the services described in this documentation    as scribed in my presence.:

## 2024-03-13 ENCOUNTER — OFFICE VISIT (OUTPATIENT)
Dept: INTERNAL MEDICINE CLINIC | Facility: CLINIC | Age: 54
End: 2024-03-13
Payer: COMMERCIAL

## 2024-03-13 ENCOUNTER — APPOINTMENT (OUTPATIENT)
Dept: RADIOLOGY | Facility: CLINIC | Age: 54
End: 2024-03-13
Payer: COMMERCIAL

## 2024-03-13 ENCOUNTER — APPOINTMENT (OUTPATIENT)
Dept: LAB | Facility: CLINIC | Age: 54
End: 2024-03-13
Payer: COMMERCIAL

## 2024-03-13 VITALS
HEART RATE: 113 BPM | DIASTOLIC BLOOD PRESSURE: 62 MMHG | WEIGHT: 202.38 LBS | OXYGEN SATURATION: 95 % | HEIGHT: 62 IN | SYSTOLIC BLOOD PRESSURE: 110 MMHG | TEMPERATURE: 98.4 F | BODY MASS INDEX: 37.24 KG/M2

## 2024-03-13 DIAGNOSIS — E11.65 TYPE 2 DIABETES MELLITUS WITH HYPERGLYCEMIA, WITHOUT LONG-TERM CURRENT USE OF INSULIN (HCC): Primary | ICD-10-CM

## 2024-03-13 DIAGNOSIS — B37.31 VAGINA, CANDIDIASIS: ICD-10-CM

## 2024-03-13 DIAGNOSIS — Z11.4 SCREENING FOR HIV (HUMAN IMMUNODEFICIENCY VIRUS): ICD-10-CM

## 2024-03-13 DIAGNOSIS — M79.89 SWELLING OF FINGER: ICD-10-CM

## 2024-03-13 LAB — SL AMB POCT HEMOGLOBIN AIC: 8.5 (ref ?–6.5)

## 2024-03-13 PROCEDURE — 83036 HEMOGLOBIN GLYCOSYLATED A1C: CPT | Performed by: PHYSICIAN ASSISTANT

## 2024-03-13 PROCEDURE — 2028F FOOT EXAM PERFORMED: CPT | Performed by: PHYSICIAN ASSISTANT

## 2024-03-13 PROCEDURE — 99214 OFFICE O/P EST MOD 30 MIN: CPT | Performed by: PHYSICIAN ASSISTANT

## 2024-03-13 PROCEDURE — 73140 X-RAY EXAM OF FINGER(S): CPT

## 2024-03-13 RX ORDER — GLIMEPIRIDE 2 MG/1
2 TABLET ORAL
Qty: 30 TABLET | Refills: 5 | Status: SHIPPED | OUTPATIENT
Start: 2024-03-13 | End: 2024-09-09

## 2024-03-13 RX ORDER — FLUCONAZOLE 150 MG/1
150 TABLET ORAL ONCE
Qty: 1 TABLET | Refills: 0 | Status: SHIPPED | OUTPATIENT
Start: 2024-03-13 | End: 2024-03-13

## 2024-03-13 NOTE — ASSESSMENT & PLAN NOTE
Lab Results   Component Value Date    HGBA1C 8.5 (A) 03/13/2024   Foot exam performed 3/13/24  Eye exam HN 3/11/24, will get copy  A1C 3/13/24: 8.5  Pt takes ACE and statin.     Continue victoza and invokamet. Start glimepiride. Directions for use and possible side effects discussed and patient verbalized understanding of these.

## 2024-03-13 NOTE — ASSESSMENT & PLAN NOTE
Will start diflucan as well as terazole cream as this covers more types of yeast. Call if symptoms fail to improve.

## 2024-03-13 NOTE — PROGRESS NOTES
Name: Katie Dye      : 1970      MRN: [id number]  Encounter Provider: Gertrudis Obrien PA-C  Encounter Date: 3/13/2024   Encounter department: [facility]    Assessment & Plan     1. Type 2 diabetes mellitus with hyperglycemia, without long-term current use of insulin ([facility])  Assessment & Plan:    Lab Results   Component Value Date    HGBA1C 8.5 (A) 2024   Foot exam performed 3/13/24  Eye exam HN 3/11/24, will get copy  A1C 3/13/24: 8.5  Pt takes ACE and statin.     Continue victoza and invokamet. Start glimepiride. Directions for use and possible side effects discussed and patient verbalized understanding of these.      Orders:  -     Diabetic foot exam; Future  -     POCT hemoglobin A1c  -     glimepiride (AMARYL) 2 mg tablet; Take 1 tablet (2 mg total) by mouth daily with breakfast    2. Screening for HIV (human immunodeficiency virus)    3. Swelling of finger  Assessment & Plan:  Xray ordered. Treat according to results    Orders:  -     XR finger right fifth digit-torrey; Future; Expected date: 2024    4. Vagina, candidiasis  Assessment & Plan:  Will start diflucan as well as terazole cream as this covers more types of yeast. Call if symptoms fail to improve.     Orders:  -     fluconazole (DIFLUCAN) 150 mg tablet; Take 1 tablet (150 mg total) by mouth once for 1 dose  -     terconazole (TERAZOL 7) 0.4 % vaginal cream; Insert 1 applicator into the vagina daily at bedtime        Depression Screening and Follow-up Plan: Patient was screened for depression during today's encounter. They screened negative with a PHQ-9 score of 1.        Subjective      Pt presents for routine visit. She is doing well overall. She is up to date with lung screening, mammogram, diabetic eye exam, CRC screening and labs. A1C today is elevated at 8.5. Foot exam performed and normal. She had eye exam this past week with marry jha. She is noting a vaginal yeast infection that has been  difficult to treat. She tried 1 diflucan without much success. She does take an SGLT-2  She also notes that she injured her right pinky when she was walking her dog and the leash was pulled and her finger hyperextended. Incident occurred 2 weeks ago. She now has tenderness with flexion and localized swelling.       Review of Systems   Constitutional:  Negative for chills and fever.   HENT:  Negative for congestion, ear pain, hearing loss, postnasal drip, rhinorrhea, sinus pressure, sinus pain, sore throat and trouble swallowing.    Eyes:  Negative for pain and visual disturbance.   Respiratory:  Negative for cough, chest tightness, shortness of breath and wheezing.    Cardiovascular: Negative.  Negative for chest pain, palpitations and leg swelling.   Gastrointestinal:  Negative for abdominal pain, blood in stool, constipation, diarrhea, nausea and vomiting.   Endocrine: Negative for cold intolerance, heat intolerance, polydipsia, polyphagia and polyuria.   Genitourinary:  Positive for vaginal discharge. Negative for difficulty urinating, dysuria, flank pain and urgency.   Musculoskeletal:  Negative for arthralgias, back pain, gait problem and myalgias.   Skin:  Negative for rash.   Allergic/Immunologic: Negative.    Neurological:  Negative for dizziness, weakness, light-headedness and headaches.   Hematological: Negative.    Psychiatric/Behavioral:  Negative for behavioral problems, dysphoric mood and sleep disturbance. The patient is not nervous/anxious.        Current Outpatient Medications on File Prior to Visit   Medication Sig    ARIPiprazole (ABILIFY) 10 mg tablet Take 10 mg by mouth daily    budesonide-formoterol (Symbicort) 80-4.5 MCG/ACT inhaler Inhale 2 puffs 2 (two) times a day Rinse mouth after use.    Canagliflozin-metFORMIN HCl (Invokamet) 150-1000 MG TABS TAKE 1 TABLET BY MOUTH TWICE A DAY    EPINEPHrine (EPIPEN) 0.3 mg/0.3 mL SOAJ Inject 0.3 mL (0.3 mg total) into a muscle once for 1 dose     "gabapentin (NEURONTIN) 300 mg capsule Take 1 capsule (300 mg total) by mouth 3 (three) times a day    Insulin Pen Needle (B-D UF III MINI PEN NEEDLES) 31G X 5 MM MISC Use once daily as directed.    liraglutide (Victoza) injection Inject 0.3 mL (1.8 mg total) under the skin daily    lisinopril (ZESTRIL) 2.5 mg tablet Take 1 tablet (2.5 mg total) by mouth daily    loratadine (CLARITIN) 10 mg tablet Take 1 tablet (10 mg total) by mouth daily    Microlet Lancets MISC Test twice daily    omeprazole (PriLOSEC) 40 MG capsule swallow 1 capsule once daily    pravastatin (PRAVACHOL) 20 mg tablet Take 1 tablet (20 mg total) by mouth daily    venlafaxine (EFFEXOR-XR) 150 mg 24 hr capsule Take 150 mg by mouth daily      polymyxin b-trimethoprim (POLYTRIM) ophthalmic solution Administer 1 drop into the left eye every 4 (four) hours (Patient not taking: Reported on 3/13/2024)    [DISCONTINUED] albuterol (Ventolin HFA) 90 mcg/act inhaler Inhale 2 puffs every 6 (six) hours as needed for wheezing (Patient not taking: Reported on 3/13/2024)    [DISCONTINUED] methylPREDNISolone 4 MG tablet therapy pack Use as directed on package       Objective     /62 (BP Location: Left arm, Patient Position: Sitting)   Pulse (!) 113   Temp 98.4 °F (36.9 °C)   Ht 5' 2\" (1.575 m)   Wt 91.8 kg (202 lb 6 oz)   LMP 05/20/2019   SpO2 95%   BMI 37.01 kg/m²     Physical Exam  Vitals and nursing note reviewed.   Constitutional:       General: She is not in acute distress.     Appearance: Normal appearance. She is well-developed. She is not diaphoretic.   HENT:      Head: Normocephalic and atraumatic.      Right Ear: External ear normal.      Left Ear: External ear normal.      Nose: Nose normal.      Mouth/Throat:      Pharynx: No oropharyngeal exudate.   Eyes:      General: No scleral icterus.        Right eye: No discharge.         Left eye: No discharge.      Conjunctiva/sclera: Conjunctivae normal.      Pupils: Pupils are equal, round, and " reactive to light.   Neck:      Thyroid: No thyromegaly.   Cardiovascular:      Rate and Rhythm: Normal rate and regular rhythm.      Pulses: no weak pulses.           Dorsalis pedis pulses are 2+ on the right side and 2+ on the left side.        Posterior tibial pulses are 2+ on the right side and 2+ on the left side.      Heart sounds: Normal heart sounds. No murmur heard.     No friction rub. No gallop.   Pulmonary:      Effort: Pulmonary effort is normal. No respiratory distress.      Breath sounds: Normal breath sounds. No wheezing or rales.   Abdominal:      General: Bowel sounds are normal. There is no distension.      Palpations: Abdomen is soft.      Tenderness: There is no abdominal tenderness.   Musculoskeletal:         General: No tenderness or deformity. Normal range of motion.      Cervical back: Normal range of motion and neck supple.   Feet:      Right foot:      Skin integrity: No ulcer, skin breakdown, erythema, warmth, callus or dry skin.      Left foot:      Skin integrity: No ulcer, skin breakdown, erythema, warmth, callus or dry skin.   Skin:     General: Skin is warm and dry.   Neurological:      Mental Status: She is alert and oriented to person, place, and time.      Cranial Nerves: No cranial nerve deficit.   Psychiatric:         Behavior: Behavior normal.         Thought Content: Thought content normal.         Judgment: Judgment normal.       Gertrudis Obrien PA-C  Diabetic Foot Exam    Patient's shoes and socks removed.    Right Foot/Ankle   Right Foot Inspection  Skin Exam: skin normal and skin intact. No dry skin, no warmth, no callus, no erythema, no maceration, no abnormal color, no pre-ulcer, no ulcer and no callus.     Toe Exam: ROM and strength within normal limits.     Sensory   Vibration: intact  Proprioception: intact  Monofilament testing: intact    Vascular  Capillary refills: < 3 seconds  The right DP pulse is 2+. The right PT pulse is 2+.     Left Foot/Ankle  Left Foot  Inspection  Skin Exam: skin normal and skin intact. No dry skin, no warmth, no erythema, no maceration, normal color, no pre-ulcer, no ulcer and no callus.     Toe Exam: ROM and strength within normal limits.     Sensory   Vibration: intact  Proprioception: intact  Monofilament testing: intact    Vascular  Capillary refills: < 3 seconds  The left DP pulse is 2+. The left PT pulse is 2+.     Assign Risk Category  No deformity present  No loss of protective sensation  No weak pulses  Risk: 0

## 2024-03-13 NOTE — RESULT ENCOUNTER NOTE
Joshua Wilson  Your xray was negative for any fractures, likely a strain. Continue with splinting if needed and tylenol for pain or swelling. It should improve over the next 2 weeks

## 2024-03-14 ENCOUNTER — TELEPHONE (OUTPATIENT)
Dept: ADMINISTRATIVE | Facility: OTHER | Age: 54
End: 2024-03-14

## 2024-03-14 NOTE — TELEPHONE ENCOUNTER
Upon review of the In Basket request and the patient's chart, initial outreach has been made via fax to facility. Please see Contacts section for details.     Thank you  Ivanna Longo

## 2024-03-14 NOTE — TELEPHONE ENCOUNTER
----- Message from Gertrudis Obrien PA-C sent at 3/14/2024  8:10 AM EDT -----  03/14/24 8:11 AM    Hello, our patient Katie Dye has had Diabetic Eye Exam completed/performed. Please assist in updating the patient chart by making an External outreach to [facility] located in [place]. The date of service is current.    Thank you,  Gertrudis Obrien PA-C  [facility]

## 2024-03-14 NOTE — LETTER
Diabetic Eye Exam Form    Date Requested: 24  Patient: Katie Dye  Patient : 1970   Referring Provider: Gertrudis Obrien PA-C      DIABETIC Eye Exam Date _______________________________      Type of Exam MUST be documented for Diabetic Eye Exams. Please CHECK ONE.     Retinal Exam       Dilated Retinal Exam       OCT       Optomap-Iris Exam      Fundus Photography       Left Eye - Please check Retinopathy or No Retinopathy        Exam did show retinopathy    Exam did not show retinopathy       Right Eye - Please check Retinopathy or No Retinopathy       Exam did show retinopathy    Exam did not show retinopathy       Comments __________________________________________________________    Practice Providing Exam ______________________________________________    Exam Performed By (print name) _______________________________________      Provider Signature ___________________________________________________      These reports are needed for  compliance.  Please fax this completed form and a copy of the Diabetic Eye Exam report to our office located at [address] [place] [phone] as soon as possible via Fax [phone] attention Ivanna: Phone [phone]  We thank you for your assistance in treating our mutual patient.

## 2024-03-18 NOTE — TELEPHONE ENCOUNTER
As a follow-up, a second attempt has been made for outreach via fax to facility. Please see Contacts section for details.    Thank you  Ivanna Longo

## 2024-03-20 NOTE — TELEPHONE ENCOUNTER
Upon review of the In Basket request we were able to locate, review, and update the patient chart as requested for Diabetic Eye Exam.    Any additional questions or concerns should be emailed to the Practice Liaisons via the appropriate education email address, please do not reply via In Basket.    Thank you  Ivanna Longo

## 2024-03-25 ENCOUNTER — TELEPHONE (OUTPATIENT)
Dept: INTERNAL MEDICINE CLINIC | Facility: CLINIC | Age: 54
End: 2024-03-25

## 2024-03-25 NOTE — TELEPHONE ENCOUNTER
Pt called stating she needs pre op clearance forms completed for cataract surgery. Surgery date is 5/21   Where would you like this scheduled?

## 2024-03-27 ENCOUNTER — TELEPHONE (OUTPATIENT)
Dept: INTERNAL MEDICINE CLINIC | Facility: CLINIC | Age: 54
End: 2024-03-27

## 2024-03-27 NOTE — TELEPHONE ENCOUNTER
Patient is calling stating that   liraglutide (Victoza) injection [[id number]] is not being dispense with dosage of 1.8mg, patient is getting 1.2 mg.  Patient was told that this has to be verified by her provider. Please  advise

## 2024-03-28 DIAGNOSIS — E11.65 TYPE 2 DIABETES MELLITUS WITH HYPERGLYCEMIA, WITHOUT LONG-TERM CURRENT USE OF INSULIN (HCC): ICD-10-CM

## 2024-03-28 RX ORDER — LIRAGLUTIDE 6 MG/ML
1.2 INJECTION SUBCUTANEOUS DAILY
Qty: 3 ML | Refills: 5 | Status: SHIPPED | OUTPATIENT
Start: 2024-03-28 | End: 2024-03-29 | Stop reason: SDUPTHER

## 2024-03-29 DIAGNOSIS — E11.65 TYPE 2 DIABETES MELLITUS WITH HYPERGLYCEMIA, WITHOUT LONG-TERM CURRENT USE OF INSULIN (HCC): ICD-10-CM

## 2024-03-29 RX ORDER — LIRAGLUTIDE 6 MG/ML
1.2 INJECTION SUBCUTANEOUS DAILY
Qty: 9 ML | Refills: 1 | Status: SHIPPED | OUTPATIENT
Start: 2024-03-29

## 2024-03-29 NOTE — TELEPHONE ENCOUNTER
Patient called stating this script is still wrong. I called the pharmacy. They state the quantity needs to either be changed to 6mL or 9mL in order to work.. Are you guys able to change that and re-send it?

## 2024-03-29 NOTE — TELEPHONE ENCOUNTER
[facility]  [address] [place] [address]  Mckenzie SCHWARTZ [address]  Phone: [phone]  Fax: [phone]                  Aiden Norton   2/10/2017 4:00 PM   Office Visit    Description:  Male : 2013   Provider:  Jihan Joyce MD   Department:  [facility]           Reason for Visit     Cough           Diagnoses this Visit        Comments    Post-nasal drip    -  Primary     Developmental delay         Learning difficulty                To Do List           Goals (5 Years of Data)     None       These Medications        Disp Refills Start End    fluticasone (FLONASE) 50 mcg/actuation nasal spray 16 g 2 2/10/2017 2/10/2018    1 spray by Each Nare route once daily. - Each Nare    [facility] [address] [place] [address] [facility] Ph #: [phone]         [facility] On Call     [facility] On Call Nurse Care Line -  Assistance  Registered nurses in the [facility] On Call Center provide clinical advisement, health education, appointment booking, and other advisory services.  Call for this free service at [phone].             Medications           Message regarding Medications     Verify the changes and/or additions to your medication regime listed below are the same as discussed with your clinician today.  If any of these changes or additions are incorrect, please notify your healthcare provider.        START taking these NEW medications        Refills    fluticasone (FLONASE) 50 mcg/actuation nasal spray 2    Si spray by Each Nare route once daily.    Class: Normal    Route: Each Nare      STOP taking these medications     diphenhydrAMINE (BENYLIN) 12.5 mg/5 mL liquid Take by mouth 4 (four) times daily as needed for Allergies.           Verify that the below list of medications is an accurate representation of the medications you are currently taking.  If none reported, the list may be blank. If incorrect, please contact your healthcare provider. Carry this  Script has been sent    "list with you in case of emergency.           Current Medications     fluticasone (FLONASE) 50 mcg/actuation nasal spray 1 spray by Each Nare route once daily.           Clinical Reference Information           Your Vitals Were     Temp Height Weight BMI       97.5 °F (36.4 °C) (Oral) 3' 11.44" (1.205 m) 22.3 kg (49 lb 1 oz) 15.33 kg/m2       Allergies as of 2/10/2017     No Known Allergies      Immunizations Administered on Date of Encounter - 2/10/2017     None      Orders Placed During Today's Visit      Normal Orders This Visit    Ambulatory consult to Child development       Language Assistance Services     ATTENTION: Language assistance services are available, free of charge. Please call [phone].      ATENCIÓN: Si shanela lizette, tiene a hairston disposición servicios gratuitos de asistencia lingüística. Llame al [phone].     CHÚ Ý: N?u b?n nói Ti?ng Vi?t, có các d?ch v? h? tr? ngôn ng? mi?n phí dành cho b?n. G?i s? [phone].         [facility] complies with applicable Federal civil rights laws and does not discriminate on the basis of race, color, national origin, age, disability, or sex.        "

## 2024-04-01 RX ORDER — LIRAGLUTIDE 6 MG/ML
INJECTION SUBCUTANEOUS
Qty: 3 ML | Refills: 5 | Status: SHIPPED | OUTPATIENT
Start: 2024-04-01

## 2024-04-14 DIAGNOSIS — I10 ESSENTIAL HYPERTENSION: ICD-10-CM

## 2024-04-15 RX ORDER — LISINOPRIL 2.5 MG/1
2.5 TABLET ORAL DAILY
Qty: 90 TABLET | Refills: 1 | Status: SHIPPED | OUTPATIENT
Start: 2024-04-15

## 2024-04-19 ENCOUNTER — TELEPHONE (OUTPATIENT)
Dept: INTERNAL MEDICINE CLINIC | Facility: CLINIC | Age: 54
End: 2024-04-19

## 2024-04-22 DIAGNOSIS — Z91.030 BEE STING ALLERGY: ICD-10-CM

## 2024-04-22 DIAGNOSIS — J30.2 SEASONAL ALLERGIC RHINITIS, UNSPECIFIED TRIGGER: ICD-10-CM

## 2024-04-22 RX ORDER — LORATADINE 10 MG/1
10 TABLET ORAL DAILY
Qty: 90 TABLET | Refills: 1 | Status: SHIPPED | OUTPATIENT
Start: 2024-04-22

## 2024-04-23 RX ORDER — EPINEPHRINE 0.3 MG/.3ML
0.3 INJECTION SUBCUTANEOUS ONCE
Qty: 0.3 ML | Refills: 0 | Status: SHIPPED | OUTPATIENT
Start: 2024-04-23 | End: 2024-05-01

## 2024-04-24 ENCOUNTER — TELEPHONE (OUTPATIENT)
Age: 54
End: 2024-04-24

## 2024-04-24 NOTE — TELEPHONE ENCOUNTER
PA for EPINEPHrine (EPIPEN) 0.3 mg/0.3 mL     Submitted via    [x]CMM-KEY [id number]  []Surescripts-Case ID #   []Faxed to plan   []Other website   []Phone call Case ID #     Office notes sent, clinical questions answered. Awaiting determination    Turnaround time for your insurance to make a decision on your Prior Authorization can take 7-21 business days.

## 2024-04-24 NOTE — TELEPHONE ENCOUNTER
PA for  EPINEPHrine (EPIPEN) 0.3 mg/0.3 mL  not required     Reason- (screenshot if applicable)              Message sent to provider pool No

## 2024-05-01 ENCOUNTER — OFFICE VISIT (OUTPATIENT)
Dept: PULMONOLOGY | Facility: CLINIC | Age: 54
End: 2024-05-01
Payer: COMMERCIAL

## 2024-05-01 VITALS
TEMPERATURE: 98.3 F | SYSTOLIC BLOOD PRESSURE: 118 MMHG | DIASTOLIC BLOOD PRESSURE: 64 MMHG | OXYGEN SATURATION: 98 % | HEIGHT: 62 IN | BODY MASS INDEX: 37.5 KG/M2 | HEART RATE: 85 BPM | WEIGHT: 203.8 LBS

## 2024-05-01 DIAGNOSIS — J45.30 MILD PERSISTENT ASTHMA WITHOUT COMPLICATION: ICD-10-CM

## 2024-05-01 DIAGNOSIS — J30.2 SEASONAL ALLERGIC RHINITIS, UNSPECIFIED TRIGGER: ICD-10-CM

## 2024-05-01 DIAGNOSIS — G47.33 OSA (OBSTRUCTIVE SLEEP APNEA): Primary | ICD-10-CM

## 2024-05-01 DIAGNOSIS — F17.211 CIGARETTE NICOTINE DEPENDENCE IN REMISSION: ICD-10-CM

## 2024-05-01 PROCEDURE — 99214 OFFICE O/P EST MOD 30 MIN: CPT

## 2024-05-01 RX ORDER — FLUTICASONE PROPIONATE 50 MCG
1 SPRAY, SUSPENSION (ML) NASAL DAILY
Qty: 18.2 ML | Refills: 5 | Status: SHIPPED | OUTPATIENT
Start: 2024-05-01

## 2024-05-01 NOTE — PROGRESS NOTES
Pulmonary Follow Up Note  Katie yDe 53 y.o. female MRN: [id number]  5/1/2024    Assessment:    Mild persistent asthma without complication  -Continues to remain well-controlled.  No recent exacerbations or daily symptoms  -Continue dose Symbicort as needed. Encouraged to use on daily basis if felt worse symptoms. Continue albuterol as needed.    Seasonal allergic rhinitis  -Patient experiencing postnasal drainage, tried Flonase but only intermittently.  Also taking Claritin  -Advised patient to restart Flonase and use regularly to achieve benefit.  Continue Claritin daily    ANSHUL (obstructive sleep apnea)  Reviewed compliance report over the past 90 days.  Patient has auto CPAP 13-20 cm H2O with nasal mask. She has been wearing 94% of the time with an average nightly use of 6 hours and 23 minutes.  Residual AHI is 7.4.  Average mask leakage 44 L/min.  Patient is sleeping well, but still having daytime fatigue.  Feels that her mask does not fit properly.    -Patient encouraged to schedule mask fitting appointment that was ordered previously.  She will return in 3 months to review compliance data.       Cigarette nicotine dependence in remission  -Due for CT lung cancer screening in July.  Patient wishes to continue screening, orders placed        Plan:    Diagnoses and all orders for this visit:    ANSHUL (obstructive sleep apnea)    Seasonal allergic rhinitis, unspecified trigger  -     fluticasone (FLONASE) 50 mcg/act nasal spray; 1 spray into each nostril daily    Mild persistent asthma without complication    Cigarette nicotine dependence in remission  -     CT lung screening program; Future            Return in about 3 months (around 8/1/2024).        History of Present Illness     Chief Complaint:   Chief Complaint   Patient presents with    Follow-up     Patient is here for a follow up and she feels SOB only if she has to run after dog or something       Patient ID: Katie FONTENOT is a 53 y.o. y.o. female has a  past medical history of Abnormal ECG, Allergic (1969), Anemia (1982), Anxiety, Arthritis, Asthma, Bronchiectasis (HCC), Callus, Cancer ([facility]), Cataract (2021), Chronic bronchitis ([facility]) (1984), Chronic kidney disease (1989), COPD (chronic obstructive pulmonary disease) ([facility]) (2005), Depression, Diabetes mellitus ([facility]), Difficulty walking, Diverticulitis, Diverticulitis of colon (2014), GERD (gastroesophageal reflux disease), Heart murmur (1994), Hyperlipidemia associated with type 2 diabetes mellitus  (HCC), Hypertension, Inflammatory bowel disease (1991), Insulinoma, Liver disease, Lung disease, Memory loss (2004), Miscarriage (1989), Myocardial infarction ([facility]) (2008), Non-recurrent acute suppurative otitis media of left ear without spontaneous rupture of tympanic membrane (08/13/2019), Obesity (1998), Peptic ulceration, Plantar fasciitis (04/15/2022), Pulmonary emboli ([facility]), Rheumatoid arthritis ([facility]) (1998), Sleep apnea, Sleep apnea, obstructive, Stroke ([facility]), and Visual impairment (1983).      5/1/2024  HPI: Katie Dye is a 53 y.o. female who presents to the office today for a follow up visit.  She is accompanied by her mother who is also being seen by Pulmonology today. She has a past medical history including but not limited to asthma, ANSHUL on CPAP, DM 2, PLMD, obesity, PE in 2017, reflux, depression.  She is a former smoker with 30-pack-year history, quit 11 years ago. Patient was previously seen in the office in January, was treated for sinus infection.  No exacerbation of her asthma.  Treated with Augmentin and Medrol Dosepak.  Maintained on Symbicort 80-4.5 mcg  2 puffs twice daily and albuterol as needed.  She was encouraged to reschedule her mask fitting appointment as she was previously noted to have a significant mask leakage and elevated residual AHI in combination with more daytime fatigue.    Patient returns today stating that she did not go for mask fitting appointment.  She thought she was told  she did not need to go.  She is, however noticing that her nasal mask is not fitting well.  Feels like it is too small.  She still feels tired during the day, however she states she sleeps very well.  She denies any recent asthma exacerbations.  Last use of Symbicort was 2 weeks ago after cutting grass.  No albuterol use in the past 2 years she states.  Patient does have postnasal drainage, states she tried Flonase nasal spray intermittently, but did not work.  Also on Claritin.    Review of Systems   Constitutional:  Negative for activity change, chills, diaphoresis, fever and unexpected weight change.   HENT:  Positive for postnasal drip. Negative for congestion, rhinorrhea, sore throat, trouble swallowing and voice change.    Respiratory:  Negative for cough, chest tightness, shortness of breath and wheezing.    Cardiovascular:  Negative for chest pain, palpitations and leg swelling.   Allergic/Immunologic: Negative.        Historical Information   Past Medical History:   Diagnosis Date    Abnormal ECG     Allergic 1969    Anemia 1982    Anxiety     Arthritis     Asthma     Bronchiectasis (HCC)     Callus     Cancer (HCC)     renal,cervical    Cataract 2021    Chronic bronchitis (HCC) 1984    Chronic kidney disease 1989    COPD (chronic obstructive pulmonary disease) (HCC) 2005    Depression     Diabetes mellitus (HCC)     Difficulty walking     Diverticulitis     Diverticulitis of colon 2014    GERD (gastroesophageal reflux disease)     Heart murmur 1994    Hyperlipidemia associated with type 2 diabetes mellitus  (HCC)     Hypertension     Inflammatory bowel disease 1991    Insulinoma     Liver disease     Lung disease     Memory loss 2004    Traumatic brain injury treated    Miscarriage 1989    Myocardial infarction ([facility]) 2008    Non-recurrent acute suppurative otitis media of left ear without spontaneous rupture of tympanic membrane 08/13/2019    Obesity 1998    Peptic ulceration     Plantar fasciitis  04/15/2022    Pulmonary emboli (HCC)     Rheumatoid arthritis (HCC)     Sleep apnea     Sleep apnea, obstructive     Stroke (HCC)     Visual impairment      Past Surgical History:   Procedure Laterality Date    ABDOMINAL SURGERY  1990    Pancreatectomy (partial)     SECTION      twice    CHOLECYSTECTOMY      EYE SURGERY      KNEE ARTHROSCOPY Left     KNEE SURGERY      PANCREATECTOMY      Partial     TX COLONOSCOPY FLX DX W/COLLJ SPEC WHEN PFRMD N/A 2019    Procedure: COLONOSCOPY;  Surgeon: Sharon Tejada DO;  Location: [facility];  Service: Gastroenterology    TX LAPAROSCOPY SURG CHOLECYSTECTOMY N/A 2018    Procedure: CHOLECYSTECTOMY LAPAROSCOPIC;  Surgeon: Demar Tinsley DO;  Location: [facility];  Service: General    TRACHEOSTOMY  1981    TUBAL LIGATION       Family History   Problem Relation Age of Onset    Heart disease Mother     Hyperlipidemia Mother     Obesity Mother     Hypertension Mother     Other Mother         Bundle branch block    Tuberculosis Mother     Arthritis Mother     Diabetes Mother     Osteoporosis Mother     Ulcerative colitis Mother     Asthma Mother     COPD Mother     Coronary artery disease Mother     Vision loss Mother         Cataracts    Substance Abuse Father     Cancer Father     Alcohol abuse Father     Mental illness Father     Psychiatric Illness Father     Psychosis Father     Schizoaffective Disorder  Father     Schizophrenia Father     Self-Injury Father     Suicide Attempts Father     No Known Problems Sister     Anxiety disorder Sister     No Known Problems Daughter     Other Maternal Grandmother         ascending aortic aneurysm resection    Diabetes Maternal Grandmother     Diabetes Paternal Grandmother     No Known Problems Brother     Tuberculosis Maternal Aunt     No Known Problems Paternal Aunt     No Known Problems Paternal Aunt     No Known Problems Paternal Aunt     No Known Problems Paternal Aunt     No Known Problems  Paternal Aunt     Breast cancer Other 88    Substance Abuse Family     Osteoporosis Family     No Known Problems Half-Sister        Smoking history: She reports that she quit smoking about 11 years ago. Her smoking use included cigarettes. She started smoking about 41 years ago. She has a 30.1 pack-year smoking history. She has never been exposed to tobacco smoke. She has never used smokeless tobacco.    Occupational History:     Immunization History   Administered Date(s) Administered    COVID-19 MODERNA VACC 0.5 ML IM 04/01/2021, 04/29/2021, 12/23/2021    COVID-19 Moderna mRNA Vaccine 12 Yr+ 50 mcg/0.5 mL (Spikevax) 10/10/2023    Hep A / Hep B 05/18/2016, 09/08/2016    Hep A, adult 12/08/2016    Hep B, adult 12/08/2016    INFLUENZA 08/13/2014, 08/10/2015, 10/01/2016, 10/11/2016, 09/21/2017, 09/20/2019, 09/01/2020, 12/05/2022    Influenza Quadrivalent Preservative Free 3 years and older IM 09/21/2017    Influenza Quadrivalent, 6-35 Months IM 10/01/2016    Influenza, injectable, quadrivalent, preservative free 0.5 mL 09/23/2019, 09/01/2020, 09/29/2023    Influenza, recombinant, quadrivalent,injectable, preservative free 10/10/2018, 09/17/2021, 12/05/2022    Influenza, seasonal, injectable 1968, 09/15/2011, 12/03/2012, 10/17/2013, 08/01/2015    Pneumococcal Conjugate 13-Valent 10/15/2016    Pneumococcal Polysaccharide PPV23 05/18/2016    Tdap 12/03/2012, 09/06/2023    Zoster Vaccine Recombinant 10/31/2019, 01/04/2020       Meds/Allergies     Current Outpatient Medications:     ARIPiprazole (ABILIFY) 10 mg tablet, Take 10 mg by mouth daily, Disp: , Rfl:     budesonide-formoterol (Symbicort) 80-4.5 MCG/ACT inhaler, Inhale 2 puffs 2 (two) times a day Rinse mouth after use. (Patient taking differently: Inhale 2 puffs 2 (two) times a day Rinse mouth after use. Only taking as needed), Disp: 10.2 g, Rfl: 5    Canagliflozin-metFORMIN HCl (Invokamet) 150-1000 MG TABS, TAKE 1 TABLET BY MOUTH TWICE A DAY, Disp: 60 tablet,  Rfl: 2    EPINEPHrine (EPIPEN) 0.3 mg/0.3 mL SOAJ, Inject 0.3 mL (0.3 mg total) into a muscle once for 1 dose, Disp: 0.3 mL, Rfl: 0    fluticasone (FLONASE) 50 mcg/act nasal spray, 1 spray into each nostril daily, Disp: 18.2 mL, Rfl: 5    gabapentin (NEURONTIN) 300 mg capsule, Take 1 capsule (300 mg total) by mouth 3 (three) times a day, Disp: 60 capsule, Rfl: 5    glimepiride (AMARYL) 2 mg tablet, Take 1 tablet (2 mg total) by mouth daily with breakfast, Disp: 30 tablet, Rfl: 5    Insulin Pen Needle (B-D UF III MINI PEN NEEDLES) 31G X 5 MM MISC, Use once daily as directed., Disp: 100 each, Rfl: 1    liraglutide (Victoza) injection, Inject 0.2 mL (1.2 mg total) under the skin daily, Disp: 9 mL, Rfl: 1    lisinopril (ZESTRIL) 2.5 mg tablet, take 1 tablet by mouth once daily, Disp: 90 tablet, Rfl: 1    loratadine (CLARITIN) 10 mg tablet, Take 1 tablet (10 mg total) by mouth daily, Disp: 90 tablet, Rfl: 1    Microlet Lancets MISC, Test twice daily, Disp: 100 each, Rfl: 3    omeprazole (PriLOSEC) 40 MG capsule, swallow 1 capsule once daily, Disp: 30 capsule, Rfl: 5    pravastatin (PRAVACHOL) 20 mg tablet, Take 1 tablet (20 mg total) by mouth daily, Disp: 90 tablet, Rfl: 0    venlafaxine (EFFEXOR-XR) 150 mg 24 hr capsule, Take 150 mg by mouth daily  , Disp: , Rfl:     polymyxin b-trimethoprim (POLYTRIM) ophthalmic solution, Administer 1 drop into the left eye every 4 (four) hours (Patient not taking: Reported on 3/13/2024), Disp: 10 mL, Rfl: 0    terconazole (TERAZOL 7) 0.4 % vaginal cream, Insert 1 applicator into the vagina daily at bedtime (Patient not taking: Reported on 5/1/2024), Disp: 45 g, Rfl: 0    Victoza injection, INJECT 1.2 MILLIGRAM SUBCUTANEOUSLY DAILY (Patient not taking: Reported on 5/1/2024), Disp: 3 mL, Rfl: 5  Allergies:   Allergies   Allergen Reactions    Iodine - Food Allergy Anaphylaxis     IVP DYE     Nuts - Food Allergy Anaphylaxis     Annotation - [id number]: Pine nuts         Vitals:  Vitals:     "05/01/24 1018   BP: 118/64   BP Location: Right arm   Patient Position: Sitting   Cuff Size: Adult   Pulse: 85   Temp: 98.3 °F (36.8 °C)   TempSrc: Temporal   SpO2: 98%   Weight: 92.4 kg (203 lb 12.8 oz)   Height: 5' 2\" (1.575 m)       Oxygen Therapy  SpO2: 98 %  .  Wt Readings from Last 3 Encounters:   05/01/24 92.4 kg (203 lb 12.8 oz)   03/13/24 91.8 kg (202 lb 6 oz)   03/05/24 93 kg (205 lb)     Body mass index is 37.28 kg/m².    Physical Exam  Vitals and nursing note reviewed.   Constitutional:       General: She is not in acute distress.     Appearance: Normal appearance. She is well-developed. She is obese.   Cardiovascular:      Rate and Rhythm: Normal rate and regular rhythm.      Heart sounds: Normal heart sounds, S1 normal and S2 normal. No murmur heard.  Pulmonary:      Effort: Pulmonary effort is normal.      Breath sounds: No decreased breath sounds, wheezing, rhonchi or rales.   Musculoskeletal:         General: No swelling.      Right lower leg: No edema.      Left lower leg: No edema.   Neurological:      Mental Status: She is alert.   Psychiatric:         Mood and Affect: Mood and affect normal.         Behavior: Behavior normal. Behavior is cooperative.           Labs: I have personally reviewed pertinent lab results.  Lab Results   Component Value Date    WBC 5.72 12/09/2023    HGB 14.1 12/09/2023    HCT 44.7 12/09/2023    MCV 93 12/09/2023     12/09/2023     Lab Results   Component Value Date    GLUCOSE 116 11/19/2015    CALCIUM 9.1 12/09/2023     06/09/2018    K 4.0 12/09/2023    CO2 27 12/09/2023     12/09/2023    BUN 17 12/09/2023    CREATININE 0.75 12/09/2023     No results found for: \"IGE\"  Lab Results   Component Value Date    ALT 15 12/09/2023    AST 12 (L) 12/09/2023    ALKPHOS 82 12/09/2023    BILITOT 0.4 06/09/2018       Imaging and other studies: I have personally reviewed pertinent reports and I have personally reviewed pertinent films in PACS     CT lung screening " 7/15/2023  Benign 2 mm right upper lobe pulmonary nodule.  No new or enlarging pulmonary nodules.    Pulmonary function testing:     Pulmonary Functions Testing Results: 10/7/2020    FEV1/FVC ratio 75%    FEV1 84% predicted  FVC 84% predicted  (-) response to bronchodilators  TLC 86 % predicted   % predicted  DLCO corrected for hemoglobin 86 % predicted    Impression: No obstructive airflow defect, however FEF 25 to 75% is significantly decreased which can be consistent with small airways disease.  Flow-volume curve shape on exhalation also consistent with obstruction.  No significant response to administration of bronchodilators.  Normal lung volumes.  Normal diffusion capacity

## 2024-05-01 NOTE — ASSESSMENT & PLAN NOTE
Reviewed compliance report over the past 90 days.  Patient has auto CPAP 13-20 cm H2O with nasal mask. She has been wearing 94% of the time with an average nightly use of 6 hours and 23 minutes.  Residual AHI is 7.4.  Average mask leakage 44 L/min.  Patient is sleeping well, but still having daytime fatigue.  Feels that her mask does not fit properly.    -Patient encouraged to schedule mask fitting appointment that was ordered previously.  She will return in 3 months to review compliance data.

## 2024-05-01 NOTE — ASSESSMENT & PLAN NOTE
-Patient experiencing postnasal drainage, tried Flonase but only intermittently.  Also taking Claritin  -Advised patient to restart Flonase and use regularly to achieve benefit.  Continue Claritin daily

## 2024-05-01 NOTE — ASSESSMENT & PLAN NOTE
-Continues to remain well-controlled.  No recent exacerbations or daily symptoms  -Continue dose Symbicort as needed. Encouraged to use on daily basis if felt worse symptoms. Continue albuterol as needed.

## 2024-05-02 DIAGNOSIS — E11.69 HYPERLIPIDEMIA ASSOCIATED WITH TYPE 2 DIABETES MELLITUS  (HCC): ICD-10-CM

## 2024-05-02 DIAGNOSIS — E78.5 HYPERLIPIDEMIA ASSOCIATED WITH TYPE 2 DIABETES MELLITUS  (HCC): ICD-10-CM

## 2024-05-03 ENCOUNTER — TELEPHONE (OUTPATIENT)
Age: 54
End: 2024-05-03

## 2024-05-03 ENCOUNTER — TELEPHONE (OUTPATIENT)
Dept: SLEEP CENTER | Facility: CLINIC | Age: 54
End: 2024-05-03

## 2024-05-03 DIAGNOSIS — G47.33 OSA (OBSTRUCTIVE SLEEP APNEA): Primary | ICD-10-CM

## 2024-05-03 NOTE — TELEPHONE ENCOUNTER
Patient left message stating she was scheduled for a mask fitting in [place] but doesn't want to go to [place].  She'd like the appointment closer to home.    Per chart, patient scheduled for mask fitting in the [place] sleep center with [facility].      Patient's DME provider is listed as Alexia and she is not a patient of the sleep center.  Patient sees Dr. Curry/Payal SONG at [facility] [place].    Called patient and left detailed message advising that I have cancelled the mask fitting appointment in [place] as she is not a patient of [facility] or the sleep center.  Advised patient to contact Alexia to schedule a mask fitting. Provided Alexia's phone number.  Also advised to follow up with the pulmonary office if she has any questions or concerns about her CPAP or CPAP equipment.

## 2024-05-04 RX ORDER — PRAVASTATIN SODIUM 20 MG
20 TABLET ORAL DAILY
Qty: 90 TABLET | Refills: 1 | Status: SHIPPED | OUTPATIENT
Start: 2024-05-04

## 2024-05-04 RX ORDER — PRAVASTATIN SODIUM 20 MG
20 TABLET ORAL DAILY
Qty: 90 TABLET | Refills: 0 | OUTPATIENT
Start: 2024-05-04

## 2024-05-05 ENCOUNTER — OFFICE VISIT (OUTPATIENT)
Dept: URGENT CARE | Facility: CLINIC | Age: 54
End: 2024-05-05
Payer: COMMERCIAL

## 2024-05-05 VITALS
DIASTOLIC BLOOD PRESSURE: 55 MMHG | OXYGEN SATURATION: 92 % | SYSTOLIC BLOOD PRESSURE: 112 MMHG | HEART RATE: 112 BPM | TEMPERATURE: 98.3 F | RESPIRATION RATE: 18 BRPM

## 2024-05-05 DIAGNOSIS — J02.9 SORE THROAT: ICD-10-CM

## 2024-05-05 DIAGNOSIS — J06.9 UPPER RESPIRATORY TRACT INFECTION, UNSPECIFIED TYPE: Primary | ICD-10-CM

## 2024-05-05 LAB — S PYO AG THROAT QL: NEGATIVE

## 2024-05-05 PROCEDURE — 87880 STREP A ASSAY W/OPTIC: CPT

## 2024-05-05 PROCEDURE — 99213 OFFICE O/P EST LOW 20 MIN: CPT

## 2024-05-05 NOTE — PATIENT INSTRUCTIONS
Start Claritin as discussed  Vitamin D3 2000 IU daily  Vitamin C 1000mg twice per day  Multivitamin daily  Fluids and rest  Over the counter cold medication as needed (EX: Coricidin HBP, tylenol/motrin)  Follow up with PCP in 3-5 days.  Proceed to ER if symptoms worsen.

## 2024-05-05 NOTE — PROGRESS NOTES
[facility]        NAME: Katie Dye is a 53 y.o. female  : 1970    MRN: [id number]  DATE: May 5, 2024  TIME: 3:33 PM    Assessment and Plan   Upper respiratory tract infection, unspecified type [J06.9]  1. Upper respiratory tract infection, unspecified type        2. Sore throat  POCT rapid strepA          POCT rapid strep: Negative    Patient Instructions     Start Claritin as discussed  Vitamin D3 2000 IU daily  Vitamin C 1000mg twice per day  Multivitamin daily  Fluids and rest  Over the counter cold medication as needed (EX: Coricidin HBP, tylenol/motrin)  Follow up with PCP in 3-5 days.  Proceed to  ER if symptoms worsen.    If tests are performed, our office will contact you with results only if changes need to made to the care plan discussed with you at the visit. [facility]    Chief Complaint     Chief Complaint   Patient presents with    Cold Like Symptoms     Ear pain x 2, sore throat, congestion, started yesterday          History of Present Illness       Patient is a 52 yo female with significant PMH asthma, HTN, DM2, and MI presenting in the clinic today for cold sx x 2 days. Admits sore throat, b/l ear fullness, congestion, and cough. Denies fever, chills, body aches, fatigue, headache, dizziness, sinus pain/pressure, chest pain, SOB, abdominal pain, n/v/d. Admits the use of _ for sx management.  Sick contacts        Review of Systems   Review of Systems   Constitutional:  Negative for chills, fatigue and fever.   HENT:  Positive for congestion, ear pain and sore throat. Negative for postnasal drip, rhinorrhea, sinus pressure and sinus pain.    Respiratory:  Negative for cough and shortness of breath.    Cardiovascular:  Negative for chest pain.   Gastrointestinal:  Negative for abdominal pain, diarrhea, nausea and vomiting.   Musculoskeletal:  Negative for myalgias.   Skin:  Negative for rash.   Neurological:  Negative for headaches.          Current Medications       Current Outpatient Medications:     ARIPiprazole (ABILIFY) 10 mg tablet, Take 10 mg by mouth daily, Disp: , Rfl:     budesonide-formoterol (Symbicort) 80-4.5 MCG/ACT inhaler, Inhale 2 puffs 2 (two) times a day Rinse mouth after use. (Patient taking differently: Inhale 2 puffs 2 (two) times a day Rinse mouth after use. Only taking as needed), Disp: 10.2 g, Rfl: 5    Canagliflozin-metFORMIN HCl (Invokamet) 150-1000 MG TABS, TAKE 1 TABLET BY MOUTH TWICE A DAY, Disp: 60 tablet, Rfl: 2    EPINEPHrine (EPIPEN) 0.3 mg/0.3 mL SOAJ, Inject 0.3 mL (0.3 mg total) into a muscle once for 1 dose, Disp: 0.3 mL, Rfl: 0    fluticasone (FLONASE) 50 mcg/act nasal spray, 1 spray into each nostril daily, Disp: 18.2 mL, Rfl: 5    gabapentin (NEURONTIN) 300 mg capsule, Take 1 capsule (300 mg total) by mouth 3 (three) times a day, Disp: 60 capsule, Rfl: 5    glimepiride (AMARYL) 2 mg tablet, Take 1 tablet (2 mg total) by mouth daily with breakfast, Disp: 30 tablet, Rfl: 5    Insulin Pen Needle (B-D UF III MINI PEN NEEDLES) 31G X 5 MM MISC, Use once daily as directed., Disp: 100 each, Rfl: 1    liraglutide (Victoza) injection, Inject 0.2 mL (1.2 mg total) under the skin daily, Disp: 9 mL, Rfl: 1    lisinopril (ZESTRIL) 2.5 mg tablet, take 1 tablet by mouth once daily, Disp: 90 tablet, Rfl: 1    loratadine (CLARITIN) 10 mg tablet, Take 1 tablet (10 mg total) by mouth daily, Disp: 90 tablet, Rfl: 1    Microlet Lancets MISC, Test twice daily, Disp: 100 each, Rfl: 3    omeprazole (PriLOSEC) 40 MG capsule, swallow 1 capsule once daily, Disp: 30 capsule, Rfl: 5    polymyxin b-trimethoprim (POLYTRIM) ophthalmic solution, Administer 1 drop into the left eye every 4 (four) hours (Patient not taking: Reported on 3/13/2024), Disp: 10 mL, Rfl: 0    pravastatin (PRAVACHOL) 20 mg tablet, take 1 tablet by mouth once daily, Disp: 90 tablet, Rfl: 1    terconazole (TERAZOL 7) 0.4 % vaginal cream, Insert 1 applicator into the  vagina daily at bedtime (Patient not taking: Reported on 5/1/2024), Disp: 45 g, Rfl: 0    venlafaxine (EFFEXOR-XR) 150 mg 24 hr capsule, Take 150 mg by mouth daily  , Disp: , Rfl:     Victoza injection, INJECT 1.2 MILLIGRAM SUBCUTANEOUSLY DAILY (Patient not taking: Reported on 5/1/2024), Disp: 3 mL, Rfl: 5    Current Allergies     Allergies as of 05/05/2024 - Reviewed 05/05/2024   Allergen Reaction Noted    Iodine - food allergy Anaphylaxis 06/01/2018    Nuts - food allergy Anaphylaxis 09/27/2013            The following portions of the patient's history were reviewed and updated as appropriate: allergies, current medications, past family history, past medical history, past social history, past surgical history and problem list.     Past Medical History:   Diagnosis Date    Abnormal ECG     Allergic 1969    Anemia 1982    Anxiety     Arthritis     Asthma     Bronchiectasis (HCC)     Callus     Cancer (HCC)     renal,cervical    Cataract 2021    Chronic bronchitis ([facility]) 1984    Chronic kidney disease 1989    COPD (chronic obstructive pulmonary disease) ([facility]) 2005    Depression     Diabetes mellitus ([facility])     Difficulty walking     Diverticulitis     Diverticulitis of colon 2014    GERD (gastroesophageal reflux disease)     Heart murmur 1994    Hyperlipidemia associated with type 2 diabetes mellitus  (HCC)     Hypertension     Inflammatory bowel disease 1991    Insulinoma     Liver disease     Lung disease     Memory loss 2004    Traumatic brain injury treated    Miscarriage 1989    Myocardial infarction ([facility]) 2008    Non-recurrent acute suppurative otitis media of left ear without spontaneous rupture of tympanic membrane 08/13/2019    Obesity 1998    Peptic ulceration     Plantar fasciitis 04/15/2022    Pulmonary emboli ([facility])     Rheumatoid arthritis ([facility]) 1998    Sleep apnea     Sleep apnea, obstructive     Stroke ([facility])     Visual impairment 1983       Past Surgical History:   Procedure Laterality Date    ABDOMINAL  SURGERY  1990    Pancreatectomy (partial)     SECTION      twice    CHOLECYSTECTOMY      EYE SURGERY      KNEE ARTHROSCOPY Left     KNEE SURGERY      PANCREATECTOMY      Partial     DE COLONOSCOPY FLX DX W/COLLJ SPEC WHEN PFRMD N/A 2019    Procedure: COLONOSCOPY;  Surgeon: Sharon Tejada DO;  Location: [facility];  Service: Gastroenterology    DE LAPAROSCOPY SURG CHOLECYSTECTOMY N/A 2018    Procedure: CHOLECYSTECTOMY LAPAROSCOPIC;  Surgeon: Demar Tinsley DO;  Location: [facility];  Service: General    TRACHEOSTOMY      TUBAL LIGATION         Family History   Problem Relation Age of Onset    Heart disease Mother     Hyperlipidemia Mother     Obesity Mother     Hypertension Mother     Other Mother         Bundle branch block    Tuberculosis Mother     Arthritis Mother     Diabetes Mother     Osteoporosis Mother     Ulcerative colitis Mother     Asthma Mother     COPD Mother     Coronary artery disease Mother     Vision loss Mother         Cataracts    Substance Abuse Father     Cancer Father     Alcohol abuse Father     Mental illness Father     Psychiatric Illness Father     Psychosis Father     Schizoaffective Disorder  Father     Schizophrenia Father     Self-Injury Father     Suicide Attempts Father     No Known Problems Sister     Anxiety disorder Sister     No Known Problems Daughter     Other Maternal Grandmother         ascending aortic aneurysm resection    Diabetes Maternal Grandmother     Diabetes Paternal Grandmother     No Known Problems Brother     Tuberculosis Maternal Aunt     No Known Problems Paternal Aunt     No Known Problems Paternal Aunt     No Known Problems Paternal Aunt     No Known Problems Paternal Aunt     No Known Problems Paternal Aunt     Breast cancer Other 88    Substance Abuse Family     Osteoporosis Family     No Known Problems Half-Sister          Medications have been verified.        Objective   /55   Pulse (!) 112   Temp 98.3 °F  (36.8 °C)   Resp 18   LMP 05/20/2019   SpO2 92%        Physical Exam     Physical Exam  Vitals reviewed.   Constitutional:       General: She is not in acute distress.     Appearance: Normal appearance. She is normal weight. She is not ill-appearing.   HENT:      Head: Normocephalic.      Right Ear: Hearing, tympanic membrane, ear canal and external ear normal. No middle ear effusion. There is no impacted cerumen. Tympanic membrane is not erythematous or bulging.      Left Ear: Hearing, tympanic membrane, ear canal and external ear normal.  No middle ear effusion. There is no impacted cerumen. Tympanic membrane is not erythematous or bulging.      Nose: Congestion present. No rhinorrhea.      Right Sinus: No maxillary sinus tenderness or frontal sinus tenderness.      Left Sinus: No maxillary sinus tenderness or frontal sinus tenderness.      Mouth/Throat:      Lips: Pink.      Mouth: Mucous membranes are moist.      Pharynx: Oropharynx is clear. Uvula midline. No pharyngeal swelling, oropharyngeal exudate, posterior oropharyngeal erythema or uvula swelling.      Tonsils: No tonsillar exudate or tonsillar abscesses. 1+ on the right. 1+ on the left.   Eyes:      General:         Right eye: No discharge.         Left eye: No discharge.      Conjunctiva/sclera: Conjunctivae normal.   Cardiovascular:      Rate and Rhythm: Normal rate and regular rhythm.      Pulses: Normal pulses.      Heart sounds: Normal heart sounds. No murmur heard.     No friction rub. No gallop.   Pulmonary:      Effort: Pulmonary effort is normal.      Breath sounds: Normal breath sounds. No wheezing, rhonchi or rales.   Musculoskeletal:      Cervical back: Normal range of motion and neck supple. No tenderness.   Lymphadenopathy:      Cervical: No cervical adenopathy.   Skin:     General: Skin is warm.      Findings: No rash.   Neurological:      Mental Status: She is alert.   Psychiatric:         Mood and Affect: Mood normal.          Behavior: Behavior normal.

## 2024-05-12 DIAGNOSIS — E11.65 TYPE 2 DIABETES MELLITUS WITH HYPERGLYCEMIA, WITHOUT LONG-TERM CURRENT USE OF INSULIN (HCC): ICD-10-CM

## 2024-05-14 RX ORDER — CANAGLIFLOZIN AND METFORMIN HYDROCHLORIDE 150; 1000 MG/1; MG/1
1 TABLET, FILM COATED ORAL 2 TIMES DAILY
Qty: 180 TABLET | Refills: 1 | Status: SHIPPED | OUTPATIENT
Start: 2024-05-14

## 2024-05-17 ENCOUNTER — CONSULT (OUTPATIENT)
Dept: INTERNAL MEDICINE CLINIC | Facility: CLINIC | Age: 54
End: 2024-05-17
Payer: COMMERCIAL

## 2024-05-17 VITALS
WEIGHT: 198.13 LBS | SYSTOLIC BLOOD PRESSURE: 116 MMHG | DIASTOLIC BLOOD PRESSURE: 80 MMHG | OXYGEN SATURATION: 94 % | HEART RATE: 105 BPM | HEIGHT: 62 IN | BODY MASS INDEX: 36.46 KG/M2 | TEMPERATURE: 97.3 F

## 2024-05-17 DIAGNOSIS — J06.9 UPPER RESPIRATORY TRACT INFECTION, UNSPECIFIED TYPE: ICD-10-CM

## 2024-05-17 DIAGNOSIS — Z01.818 PRE-OPERATIVE CLEARANCE: Primary | ICD-10-CM

## 2024-05-17 PROCEDURE — 99243 OFF/OP CNSLTJ NEW/EST LOW 30: CPT | Performed by: PHYSICIAN ASSISTANT

## 2024-05-17 RX ORDER — AZITHROMYCIN 250 MG/1
TABLET, FILM COATED ORAL
Qty: 6 TABLET | Refills: 0 | Status: SHIPPED | OUTPATIENT
Start: 2024-05-17 | End: 2024-05-21

## 2024-05-20 NOTE — PROGRESS NOTES
Subjective:     Katie Dye is a 53 y.o. female who presents to the office today for a preoperative consultation at the request of surgeon  who plans on performing bilateral cataract extraction on May 21 (left eye) and June 4 (right eye).  Planned anesthesia: local. The patient has the following known anesthesia issues:  none . Patients bleeding risk: no recent abnormal bleeding. Patient does not have objections to receiving blood products if needed.    The following portions of the patient's history were reviewed and updated as appropriate: She  has a past medical history of Abnormal ECG, Allergic (1969), Anemia (1982), Anxiety, Arthritis, Asthma, Bronchiectasis (HCC), Callus, Cancer ([facility]), Cataract (2021), Chronic bronchitis ([facility]) (1984), Chronic kidney disease (1989), COPD (chronic obstructive pulmonary disease) ([facility]) (2005), Depression, Diabetes mellitus ([facility]), Difficulty walking, Diverticulitis, Diverticulitis of colon (2014), GERD (gastroesophageal reflux disease), Heart murmur (1994), Hyperlipidemia associated with type 2 diabetes mellitus  (HCC), Hypertension, Inflammatory bowel disease (1991), Insulinoma, Liver disease, Lung disease, Memory loss (2004), Miscarriage (1989), Myocardial infarction ([facility]) (2008), Non-recurrent acute suppurative otitis media of left ear without spontaneous rupture of tympanic membrane (08/13/2019), Obesity (1998), Peptic ulceration, Plantar fasciitis (04/15/2022), Pulmonary emboli ([facility]), Rheumatoid arthritis ([facility]) (1998), Sleep apnea, Sleep apnea, obstructive, Stroke ([facility]), and Visual impairment (1983).  She   Patient Active Problem List    Diagnosis Date Noted    Swelling of finger 03/13/2024    Anxiety 09/07/2023    Cervical radiculitis 06/06/2023    Vagina, candidiasis 04/04/2023    Abnormal Pap smear of vagina 04/04/2023    Claudication (HCC) 12/06/2022    DALE (dyspnea on exertion) 09/19/2022    Arthritis of carpometacarpal (CMC) joint of both thumbs 04/13/2022    Iron  deficiency 2022    Seasonal allergic rhinitis 2022    PLMD (periodic limb movement disorder) 2021    Radiculopathy of cervicothoracic region 10/28/2021    Encounter for screening for malignant neoplasm of lung in former smoker who quit in past 15 years with 30 pack year history or greater 2021    Cigarette nicotine dependence in remission 2021    Sciatica of left side 2020    Mild persistent asthma without complication 2020    Class 2 obesity due to excess calories in adult 2020    ANSHUL (obstructive sleep apnea) 2020    History of pulmonary embolism 2020    Abnormal chest CT 2020    Lumbar spine pain 2019    Pancreatic insufficiency 2019    Actinic keratosis 2019    Gastroesophageal reflux disease 2015    Insomnia 2013    Type 2 diabetes mellitus (HCC) 2013    Depression 2013    Hypercholesterolemia 2013     She  has a past surgical history that includes Pancreatectomy;  section; Knee surgery; Knee arthroscopy (Left); pr laparoscopy surg cholecystectomy (N/A, 2018); Cholecystectomy; pr colonoscopy flx dx w/collj spec when pfrmd (N/A, 2019); Abdominal surgery (1990); Eye surgery (); Tracheostomy (); and Tubal ligation ().  Her family history includes Alcohol abuse in her father; Anxiety disorder in her sister; Arthritis in her mother; Asthma in her mother; Breast cancer (age of onset: 88) in her other; COPD in her mother; Cancer in her father; Coronary artery disease in her mother; Diabetes in her maternal grandmother, mother, and paternal grandmother; Heart disease in her mother; Hyperlipidemia in her mother; Hypertension in her mother; Mental illness in her father; No Known Problems in her brother, daughter, half-sister, paternal aunt, paternal aunt, paternal aunt, paternal aunt, paternal aunt, and sister; Obesity in her mother; Osteoporosis in her family and mother;  Other in her maternal grandmother and mother; Psychiatric Illness in her father; Psychosis in her father; Schizoaffective Disorder  in her father; Schizophrenia in her father; Self-Injury in her father; Substance Abuse in her family and father; Suicide Attempts in her father; Tuberculosis in her maternal aunt and mother; Ulcerative colitis in her mother; Vision loss in her mother.  She  reports that she quit smoking about 11 years ago. Her smoking use included cigarettes. She started smoking about 41 years ago. She has a 30.1 pack-year smoking history. She has never been exposed to tobacco smoke. She has never used smokeless tobacco. She reports that she does not currently use alcohol. She reports that she does not use drugs.  Current Outpatient Medications   Medication Sig Dispense Refill    ARIPiprazole (ABILIFY) 10 mg tablet Take 10 mg by mouth daily      azithromycin (ZITHROMAX) 250 mg tablet Take 2 po on day one and then 1 po daily on days 2-5 6 tablet 0    budesonide-formoterol (Symbicort) 80-4.5 MCG/ACT inhaler Inhale 2 puffs 2 (two) times a day Rinse mouth after use. (Patient taking differently: Inhale 2 puffs 2 (two) times a day Rinse mouth after use. Only taking as needed) 10.2 g 5    Canagliflozin-metFORMIN HCl (Invokamet) 150-1000 MG TABS TAKE 1 TABLET BY MOUTH TWICE A  tablet 1    EPINEPHrine (EPIPEN) 0.3 mg/0.3 mL SOAJ Inject 0.3 mL (0.3 mg total) into a muscle once for 1 dose 0.3 mL 0    fluticasone (FLONASE) 50 mcg/act nasal spray 1 spray into each nostril daily 18.2 mL 5    gabapentin (NEURONTIN) 300 mg capsule Take 1 capsule (300 mg total) by mouth 3 (three) times a day 60 capsule 5    glimepiride (AMARYL) 2 mg tablet Take 1 tablet (2 mg total) by mouth daily with breakfast 30 tablet 5    Insulin Pen Needle (B-D UF III MINI PEN NEEDLES) 31G X 5 MM MISC Use once daily as directed. 100 each 1    lisinopril (ZESTRIL) 2.5 mg tablet take 1 tablet by mouth once daily 90 tablet 1    loratadine  (CLARITIN) 10 mg tablet Take 1 tablet (10 mg total) by mouth daily 90 tablet 1    omeprazole (PriLOSEC) 40 MG capsule swallow 1 capsule once daily 30 capsule 5    polymyxin b-trimethoprim (POLYTRIM) ophthalmic solution Administer 1 drop into the left eye every 4 (four) hours 10 mL 0    pravastatin (PRAVACHOL) 20 mg tablet take 1 tablet by mouth once daily 90 tablet 1    venlafaxine (EFFEXOR-XR) 150 mg 24 hr capsule Take 150 mg by mouth daily        Victoza injection INJECT 1.2 MILLIGRAM SUBCUTANEOUSLY DAILY 3 mL 5     No current facility-administered medications for this visit.     Current Outpatient Medications on File Prior to Visit   Medication Sig    ARIPiprazole (ABILIFY) 10 mg tablet Take 10 mg by mouth daily    budesonide-formoterol (Symbicort) 80-4.5 MCG/ACT inhaler Inhale 2 puffs 2 (two) times a day Rinse mouth after use. (Patient taking differently: Inhale 2 puffs 2 (two) times a day Rinse mouth after use. Only taking as needed)    Canagliflozin-metFORMIN HCl (Invokamet) 150-1000 MG TABS TAKE 1 TABLET BY MOUTH TWICE A DAY    EPINEPHrine (EPIPEN) 0.3 mg/0.3 mL SOAJ Inject 0.3 mL (0.3 mg total) into a muscle once for 1 dose    fluticasone (FLONASE) 50 mcg/act nasal spray 1 spray into each nostril daily    gabapentin (NEURONTIN) 300 mg capsule Take 1 capsule (300 mg total) by mouth 3 (three) times a day    glimepiride (AMARYL) 2 mg tablet Take 1 tablet (2 mg total) by mouth daily with breakfast    Insulin Pen Needle (B-D UF III MINI PEN NEEDLES) 31G X 5 MM MISC Use once daily as directed.    lisinopril (ZESTRIL) 2.5 mg tablet take 1 tablet by mouth once daily    loratadine (CLARITIN) 10 mg tablet Take 1 tablet (10 mg total) by mouth daily    omeprazole (PriLOSEC) 40 MG capsule swallow 1 capsule once daily    polymyxin b-trimethoprim (POLYTRIM) ophthalmic solution Administer 1 drop into the left eye every 4 (four) hours    pravastatin (PRAVACHOL) 20 mg tablet take 1 tablet by mouth once daily    venlafaxine  "(EFFEXOR-XR) 150 mg 24 hr capsule Take 150 mg by mouth daily      Victoza injection INJECT 1.2 MILLIGRAM SUBCUTANEOUSLY DAILY     No current facility-administered medications on file prior to visit.     She is allergic to iodine - food allergy and nuts - food allergy..    Review of Systems  Constitutional: negative  Ears, nose, mouth, throat, and face: negative  Respiratory: negative  Cardiovascular: negative  Integument/breast: negative  Hematologic/lymphatic: negative  Musculoskeletal:negative  Neurological: negative  Behavioral/Psych: negative     Objective:     /80 (BP Location: Left arm, Patient Position: Sitting)   Pulse 105   Temp (!) 97.3 °F (36.3 °C) (Tympanic)   Ht 5' 2\" (1.575 m)   Wt 89.9 kg (198 lb 2 oz)   LMP 05/20/2019   SpO2 94%   BMI 36.24 kg/m²     General Appearance:    Alert, cooperative, no distress, appears stated age   Head:    Normocephalic, without obvious abnormality, atraumatic   Eyes:    PERRL, conjunctiva/corneas clear, EOM's intact, fundi     benign, both eyes   Ears:    Normal TM's and external ear canals, both ears   Nose:   Nares normal, septum midline, mucosa normal, no drainage    or sinus tenderness   Throat:   Lips, mucosa, and tongue normal; teeth and gums normal   Neck:   Supple, symmetrical, trachea midline, no adenopathy;     thyroid:  no enlargement/tenderness/nodules; no carotid    bruit or JVD   Back:     Symmetric, no curvature, ROM normal, no CVA tenderness   Lungs:     Clear to auscultation bilaterally, respirations unlabored   Chest Wall:    No tenderness or deformity    Heart:    Regular rate and rhythm, S1 and S2 normal, no murmur, rub   or gallop   Breast Exam:    No tenderness, masses, or nipple abnormality   Abdomen:     Soft, non-tender, bowel sounds active all four quadrants,     no masses, no organomegaly   Genitalia:    Normal female without lesion, discharge or tenderness   Rectal:    Normal tone, no masses or tenderness; guaiac negative stool "   Extremities:   Extremities normal, atraumatic, no cyanosis or edema   Pulses:   2+ and symmetric all extremities   Skin:   Skin color, texture, turgor normal, no rashes or lesions   Lymph nodes:   Cervical, supraclavicular, and axillary nodes normal   Neurologic:   CNII-XII intact, normal strength, sensation and reflexes     throughout         Cardiographics  ECG: no prior ECG      Imaging  Chest x-ray:  not required      Lab Review   not applicable     Assessment:     53 y.o. female with planned surgery as above.    Known risk factors for perioperative complications: None    Difficulty with intubation is not anticipated.    Cardiac Risk Estimation: low    Current medications which may produce withdrawal symptoms if withheld perioperatively: none     Plan:     1. Preoperative workup as follows none.  2. Change in medication regimen before surgery: none, continue medication regimen including morning of surgery, with sip of water.  3. Brock risk Index Score: 0 giving pt a less than 1% chance of perioperative cardiac complications. Form completed for pt and faxed, original given back to pt

## 2024-05-28 DIAGNOSIS — G89.29 CHRONIC RIGHT SHOULDER PAIN: ICD-10-CM

## 2024-05-28 DIAGNOSIS — M25.511 CHRONIC RIGHT SHOULDER PAIN: ICD-10-CM

## 2024-05-28 DIAGNOSIS — M54.2 NECK PAIN: ICD-10-CM

## 2024-05-28 DIAGNOSIS — M54.13 RADICULOPATHY OF CERVICOTHORACIC REGION: ICD-10-CM

## 2024-05-29 RX ORDER — GABAPENTIN 300 MG/1
300 CAPSULE ORAL 3 TIMES DAILY
Qty: 60 CAPSULE | Refills: 8 | Status: SHIPPED | OUTPATIENT
Start: 2024-05-29

## 2024-06-11 ENCOUNTER — OFFICE VISIT (OUTPATIENT)
Dept: OBGYN CLINIC | Facility: CLINIC | Age: 54
End: 2024-06-11
Payer: COMMERCIAL

## 2024-06-11 VITALS
BODY MASS INDEX: 36.44 KG/M2 | DIASTOLIC BLOOD PRESSURE: 72 MMHG | HEART RATE: 101 BPM | HEIGHT: 62 IN | SYSTOLIC BLOOD PRESSURE: 119 MMHG | WEIGHT: 198 LBS

## 2024-06-11 DIAGNOSIS — M18.0 ARTHRITIS OF CARPOMETACARPAL (CMC) JOINT OF BOTH THUMBS: Primary | ICD-10-CM

## 2024-06-11 DIAGNOSIS — E11.65 TYPE 2 DIABETES MELLITUS WITH HYPERGLYCEMIA, WITHOUT LONG-TERM CURRENT USE OF INSULIN (HCC): ICD-10-CM

## 2024-06-11 PROCEDURE — 20600 DRAIN/INJ JOINT/BURSA W/O US: CPT | Performed by: ORTHOPAEDIC SURGERY

## 2024-06-11 PROCEDURE — 99213 OFFICE O/P EST LOW 20 MIN: CPT | Performed by: ORTHOPAEDIC SURGERY

## 2024-06-11 RX ORDER — BUPIVACAINE HYDROCHLORIDE 2.5 MG/ML
0.5 INJECTION, SOLUTION INFILTRATION; PERINEURAL
Status: COMPLETED | OUTPATIENT
Start: 2024-06-11 | End: 2024-06-11

## 2024-06-11 RX ORDER — BETAMETHASONE SODIUM PHOSPHATE AND BETAMETHASONE ACETATE 3; 3 MG/ML; MG/ML
3 INJECTION, SUSPENSION INTRA-ARTICULAR; INTRALESIONAL; INTRAMUSCULAR; SOFT TISSUE
Status: COMPLETED | OUTPATIENT
Start: 2024-06-11 | End: 2024-06-11

## 2024-06-11 RX ADMIN — BETAMETHASONE SODIUM PHOSPHATE AND BETAMETHASONE ACETATE 3 MG: 3; 3 INJECTION, SUSPENSION INTRA-ARTICULAR; INTRALESIONAL; INTRAMUSCULAR; SOFT TISSUE at 10:00

## 2024-06-11 RX ADMIN — BUPIVACAINE HYDROCHLORIDE 0.5 ML: 2.5 INJECTION, SOLUTION INFILTRATION; PERINEURAL at 10:00

## 2024-06-11 NOTE — PROGRESS NOTES
Assessment/Plan:   Diagnoses and all orders for this visit:    Arthritis of carpometacarpal (CMC) joint of both thumbs  -     Small joint arthrocentesis: bilateral thumb CMC    Discussed with patient that today's physical exam is consistent with a flareup of primary osteoarthritis of the bilateral thumb CMC joints. Patient was offered, and accepted, betamethasone and Marcaine injection(s) to the bilateral thumb CMC joints for relief of pain and inflammation.  Patient tolerated treatment(s) well. She will be seen in 3 months for re-evaluation and consideration for repeat injections as necessary.  Patient expresses understanding and is in agreement with this treatment plan.    The patient has arthritis at the base of her bilateral thumbs.  Both thumbs were injected with Kenalog and Marcaine.  She tolerated procedures quite well.  Return back in 3 months for reevaluation    Subjective:   Patient ID: Katie Dye  1970     HPI  Patient is a 53 y.o. female who presents for follow-up evaluation for bilateral thumb CMC osteoarthritis. She was last seen regards to this issue on 3/5/2024, at which time she received bilateral corticosteroid injections. She states that she experiencing Novocain relief following those injections, unfortunately symptoms began to return approximately 2 weeks ago. On today's presentation she reports achy pain that is exacerbated with heavy gripping motions. She reports mild swelling, but denies any associated bruising, numbness, tingling, or or mechanical symptoms. She desires repeat injections today.    The following portions of the patient's history were reviewed and updated as appropriate:  Past medical history, past surgical history, Family history, social history, current medications and allergies    Past Medical History:   Diagnosis Date    Abnormal ECG     Allergic 1969    Anemia 1982    Anxiety     Arthritis     Asthma     Bronchiectasis (HCC)     Callus     Cancer (HCC)      renal,cervical    Cataract     Chronic bronchitis (HCC)     Chronic kidney disease     COPD (chronic obstructive pulmonary disease) (HCC)     Depression     Diabetes mellitus (HCC)     Difficulty walking     Diverticulitis     Diverticulitis of colon     GERD (gastroesophageal reflux disease)     Heart murmur     Hyperlipidemia associated with type 2 diabetes mellitus  (HCC)     Hypertension     Inflammatory bowel disease     Insulinoma     Liver disease     Lung disease     Memory loss     Traumatic brain injury treated    Miscarriage     Myocardial infarction ([facility])     Non-recurrent acute suppurative otitis media of left ear without spontaneous rupture of tympanic membrane 2019    Obesity 1998    Peptic ulceration     Plantar fasciitis 04/15/2022    Pulmonary emboli (HCC)     Rheumatoid arthritis ([facility])     Sleep apnea     Sleep apnea, obstructive     Stroke ([facility])     Visual impairment        Past Surgical History:   Procedure Laterality Date    ABDOMINAL SURGERY  1990    Pancreatectomy (partial)     SECTION      twice    CHOLECYSTECTOMY      EYE SURGERY      KNEE ARTHROSCOPY Left     KNEE SURGERY      PANCREATECTOMY      Partial     GA COLONOSCOPY FLX DX W/COLLJ SPEC WHEN PFRMD N/A 2019    Procedure: COLONOSCOPY;  Surgeon: Sharon Tejada DO;  Location: [facility];  Service: Gastroenterology    GA LAPAROSCOPY SURG CHOLECYSTECTOMY N/A 2018    Procedure: CHOLECYSTECTOMY LAPAROSCOPIC;  Surgeon: Demar Tinsley DO;  Location: [facility];  Service: General    TRACHEOSTOMY  1981    TUBAL LIGATION  1998       Family History   Problem Relation Age of Onset    Heart disease Mother     Hyperlipidemia Mother     Obesity Mother     Hypertension Mother     Other Mother         Bundle branch block    Tuberculosis Mother     Arthritis Mother     Diabetes Mother     Osteoporosis Mother     Ulcerative colitis Mother     Asthma Mother     COPD  Mother     Coronary artery disease Mother     Vision loss Mother         Cataracts    Substance Abuse Father     Cancer Father     Alcohol abuse Father     Mental illness Father     Psychiatric Illness Father     Psychosis Father     Schizoaffective Disorder  Father     Schizophrenia Father     Self-Injury Father     Suicide Attempts Father     No Known Problems Sister     Anxiety disorder Sister     No Known Problems Daughter     Other Maternal Grandmother         ascending aortic aneurysm resection    Diabetes Maternal Grandmother     Diabetes Paternal Grandmother     No Known Problems Brother     Tuberculosis Maternal Aunt     No Known Problems Paternal Aunt     No Known Problems Paternal Aunt     No Known Problems Paternal Aunt     No Known Problems Paternal Aunt     No Known Problems Paternal Aunt     Breast cancer Other 88    Substance Abuse Family     Osteoporosis Family     No Known Problems Half-Sister        Social History     Socioeconomic History    Marital status:      Spouse name: None    Number of children: None    Years of education: None    Highest education level: None   Occupational History    Occupation: UNEMPLOYED   Tobacco Use    Smoking status: Former     Current packs/day: 0.00     Average packs/day: 1 pack/day for 30.1 years (30.1 ttl pk-yrs)     Types: Cigarettes     Start date: 1982     Quit date: 2012     Years since quittin.9     Passive exposure: Never    Smokeless tobacco: Never    Tobacco comments:     Dont need at all   Vaping Use    Vaping status: Never Used   Substance and Sexual Activity    Alcohol use: Not Currently    Drug use: No    Sexual activity: Not Currently     Partners: Male     Birth control/protection: Abstinence   Other Topics Concern    None   Social History Narrative    Single-    Unemployed    Lives at home with mother     Caffeine use     Social Determinants of Health     Financial Resource Strain: Not on file   Food Insecurity: Not on  file   Transportation Needs: Not on file   Physical Activity: Not on file   Stress: Not on file   Social Connections: Not on file   Intimate Partner Violence: Not on file   Housing Stability: Not on file         Current Outpatient Medications:     ARIPiprazole (ABILIFY) 10 mg tablet, Take 10 mg by mouth daily, Disp: , Rfl:     budesonide-formoterol (Symbicort) 80-4.5 MCG/ACT inhaler, Inhale 2 puffs 2 (two) times a day Rinse mouth after use. (Patient taking differently: Inhale 2 puffs 2 (two) times a day Rinse mouth after use. Only taking as needed), Disp: 10.2 g, Rfl: 5    Canagliflozin-metFORMIN HCl (Invokamet) 150-1000 MG TABS, TAKE 1 TABLET BY MOUTH TWICE A DAY, Disp: 180 tablet, Rfl: 1    fluticasone (FLONASE) 50 mcg/act nasal spray, 1 spray into each nostril daily, Disp: 18.2 mL, Rfl: 5    gabapentin (NEURONTIN) 300 mg capsule, Take 1 capsule (300 mg total) by mouth 3 (three) times a day, Disp: 60 capsule, Rfl: 8    glimepiride (AMARYL) 2 mg tablet, Take 1 tablet (2 mg total) by mouth daily with breakfast, Disp: 30 tablet, Rfl: 5    Insulin Pen Needle (B-D UF III MINI PEN NEEDLES) 31G X 5 MM MISC, Use once daily as directed., Disp: 100 each, Rfl: 1    lisinopril (ZESTRIL) 2.5 mg tablet, take 1 tablet by mouth once daily, Disp: 90 tablet, Rfl: 1    loratadine (CLARITIN) 10 mg tablet, Take 1 tablet (10 mg total) by mouth daily, Disp: 90 tablet, Rfl: 1    omeprazole (PriLOSEC) 40 MG capsule, swallow 1 capsule once daily, Disp: 30 capsule, Rfl: 5    polymyxin b-trimethoprim (POLYTRIM) ophthalmic solution, Administer 1 drop into the left eye every 4 (four) hours, Disp: 10 mL, Rfl: 0    pravastatin (PRAVACHOL) 20 mg tablet, take 1 tablet by mouth once daily, Disp: 90 tablet, Rfl: 1    venlafaxine (EFFEXOR-XR) 150 mg 24 hr capsule, Take 150 mg by mouth daily  , Disp: , Rfl:     Victoza injection, INJECT 1.2 MILLIGRAM SUBCUTANEOUSLY DAILY, Disp: 3 mL, Rfl: 5    EPINEPHrine (EPIPEN) 0.3 mg/0.3 mL SOAJ, Inject 0.3 mL  "(0.3 mg total) into a muscle once for 1 dose, Disp: 0.3 mL, Rfl: 0    Allergies   Allergen Reactions    Iodine - Food Allergy Anaphylaxis     IVP DYE     Nuts - Food Allergy Anaphylaxis     Annotation - [id number]: Pine nuts       Review of Systems   Constitutional:  Negative for chills, fever and unexpected weight change.   HENT:  Negative for hearing loss, nosebleeds and sore throat.    Eyes:  Negative for pain, redness and visual disturbance.   Respiratory:  Negative for cough, shortness of breath and wheezing.    Cardiovascular:  Negative for chest pain, palpitations and leg swelling.   Gastrointestinal:  Negative for abdominal pain, nausea and vomiting.   Endocrine: Negative for polydipsia and polyuria.   Genitourinary:  Negative for dysuria and hematuria.   Musculoskeletal:         As noted in HPI   Skin:  Negative for rash and wound.   Neurological:  Negative for dizziness, numbness and headaches.   Psychiatric/Behavioral:  Negative for decreased concentration and suicidal ideas. The patient is not nervous/anxious.         Objective:  /72   Pulse 101   Ht 5' 2\" (1.575 m)   Wt 89.8 kg (198 lb)   LMP 05/20/2019   BMI 36.21 kg/m²     Ortho Exam  Bilateral hands/thumbs -   Patient presents with no obvious anatomical deformity  Skin is warm and dry to touch with no signs of erythema, ecchymosis, infection  No soft tissue swelling or effusion noted   APB, EPB, and FPL mechanisms intact  No rotational deformity with composite finger flexion  TTP over thumb CMC joints  + CMC grind  + CMC load-and-shift  Demonstrates normal wrist, elbow, and shoulder motion  Forearm compartments are soft and supple  2+ distal radial pulse with brisk capillary refill to the fingers  Radial, median, and ulnar motor and sensory distributions intact  Sensation light touch intact distally    Physical Exam  Vitals and nursing note reviewed.   Constitutional:       General: She is not in acute distress.     Appearance: She is " well-developed.   HENT:      Head: Normocephalic and atraumatic.   Eyes:      Conjunctiva/sclera: Conjunctivae normal.   Cardiovascular:      Rate and Rhythm: Normal rate.   Pulmonary:      Effort: Pulmonary effort is normal.   Musculoskeletal:      Cervical back: Neck supple.   Skin:     General: Skin is warm and dry.      Capillary Refill: Capillary refill takes less than 2 seconds.   Neurological:      Mental Status: She is alert and oriented to person, place, and time.   Psychiatric:         Mood and Affect: Mood normal.         Behavior: Behavior normal.          Diagnostic Test Review:  No new imaging reviewed this visit    Small joint arthrocentesis: bilateral thumb CMC  [place] Protocol:  Procedure performed by: (Markos Pa, LAT, ATC)  Consent: Verbal consent obtained.  Consent given by: patient  Timeout called at: 6/11/2024 10:21 AM.  Patient understanding: patient states understanding of the procedure being performed  Site marked: the operative site was marked  Patient identity confirmed: verbally with patient  Supporting Documentation  Indications: pain and joint swelling   Procedure Details  Location: thumb - bilateral thumb CMC  Needle size: 25 G  Ultrasound guidance: no  Approach: radial    Medications (Right): 0.5 mL bupivacaine 0.25 %; 3 mg betamethasone acetate-betamethasone sodium phosphate 6 (3-3) mg/mLPatient tolerance: patient tolerated the procedure well with no immediate complications  Dressing:  Sterile dressing applied        Scribe Attestation      I,:  Markos Pa am acting as a scribe while in the presence of the attending physician.:       I,:  Benja Sanches DO personally performed the services described in this documentation    as scribed in my presence.:

## 2024-06-12 RX ORDER — LIRAGLUTIDE 6 MG/ML
INJECTION SUBCUTANEOUS
Qty: 3 ML | Refills: 5 | Status: SHIPPED | OUTPATIENT
Start: 2024-06-12

## 2024-06-20 LAB

## 2024-06-25 NOTE — PROGRESS NOTES
Occupational Therapy    Evaluation    Patient Name: Twyla Waller  MRN: [id number]  Today's Date: 6/25/2024  Time Calculation  Start Time: 0900  Stop Time: 0916  Time Calculation (min): 16 min        Assessment:  Prognosis: Good  End of Session Communication: Bedside nurse  End of Session Patient Position: Up in chair, Alarm on  OT Assessment Results: Decreased ADL status, Decreased endurance  Prognosis: Good  Plan:  Treatment Interventions: ADL retraining, Endurance training, Functional transfer training  OT Frequency: 2 times per week  OT Discharge Recommendations: Low intensity level of continued care  OT - OK to Discharge: Yes (when medically stable/cleared)      Subjective   Current Problem:  1. Fatigue, unspecified type        2. Cystitis        3. Pacemaker  Cardiac Device Check - Inpatient    Cardiac Device Check - Inpatient      4. Near syncope  Cardiac Device Check - Inpatient    Cardiac Device Check - Inpatient      5. Atrial tachycardia (CMS-HCC)  Case Request EP Lab: Cardioversion    Case Request EP Lab: Cardioversion    Electrophysiology procedure    Electrophysiology procedure        General:  General  Reason for Referral: ADL impairment  Referred By: Marcio OT/PT 6/24  Past Medical History Relevant to Rehab: pacemaker, DM, sick sinus syndrome, HTN, HLD  Family/Caregiver Present: No  Prior to Session Communication: Bedside nurse  Patient Position Received: Bed, 2 rail up, Alarm on (PCA present)  General Comment: Pt. is 74 y/o female to ED with generalized fatigue, dizziness, and near syncope. EP consulted for pacemaker interrogation. K 3.3, CT head (-)  Precautions:  Medical Precautions: Fall precautions  Vital Signs:  Heart Rate: 110  Pain:  Pain Assessment  Pain Assessment: 0-10  0-10 (Numeric) Pain Score: 0 - No pain    Objective   Cognition:  Overall Cognitive Status: Within Functional Limits    Home Living:  Home Living Comments: Pt. lives with  in two story house, elevator lift into the  PAP DME pressure change order placed house. Main floor set up bed/bath with walk in shower, seat available, and grab bars  Prior Function:  Ambulatory Assistance:  (RW, WW, and QC. Uses QC when has dizziness)  Prior Function Comments: [place] with ADLs/IADLs. One fall. Does not drive.    ADL:  Eating Assistance: Independent  Grooming Assistance: Stand by  Bathing Assistance: Stand by  UE Dressing Assistance: Independent  LE Dressing Assistance: Stand by  Toileting Assistance with Device: Stand by  Activity Tolerance:  Activity Tolerance Comments: Mild dizziness  Bed Mobility/Transfers: Bed Mobility  Bed Mobility:  (SBA sup to sit)    Transfers  Transfer:  (CGA sit to stand from edge of bed, stand to sit to arm chair, and pivot transfer. WW use. VCs for safe hand placement.)    Functional Mobility:  Functional Mobility  Functional Mobility Performed:  (CGA with WW ~45 feet.)    Standing Balance:  Dynamic Standing Balance  Dynamic Standing-Comments: Fair +     Sensation:  Sensation Comment: Reports neuropathy in B feet  Strength:  Strength Comments: BUEs WNL 4+/5 MMT    Hand Function:  Gross Grasp: Functional  Extremities: RUE   RUE : Within Functional Limits and LUE   LUE: Within Functional Limits    Outcome Measures:[facility] Daily Activity  Putting on and taking off regular lower body clothing: A little  Bathing (including washing, rinsing, drying): A little  Putting on and taking off regular upper body clothing: None  Toileting, which includes using toilet, bedpan or urinal: A little  Taking care of personal grooming such as brushing teeth: A little  Eating Meals: None  Daily Activity - Total Score: 20      Education Documentation  ADL Training, taught by Xiomy Clemens OT at 6/25/2024  1:23 PM.  Learner: Patient  Readiness: Acceptance  Method: Explanation  Response: Verbalizes Understanding, Needs Reinforcement      IP EDUCATION:  Education  Individual(s) Educated: Patient  Education Provided: Risk and benefits of OT discussed with  patient or other, POC discussed and agreed upon    Goals:  Encounter Problems       Encounter Problems (Active)       OT Goals       Mod I for all functional transfers  (Progressing)       Start:  06/25/24    Expected End:  07/09/24            Good dyn standing balance during ADLs an functional transfers  (Progressing)       Start:  06/25/24    Expected End:  07/09/24            Mod I LB dressing  (Progressing)       Start:  06/25/24    Expected End:  07/09/24            Mod I toileting tasks and clothing mgmt  (Progressing)       Start:  06/25/24    Expected End:  07/09/24

## 2024-07-09 ENCOUNTER — OFFICE VISIT (OUTPATIENT)
Dept: PULMONOLOGY | Facility: CLINIC | Age: 54
End: 2024-07-09
Payer: COMMERCIAL

## 2024-07-09 VITALS
TEMPERATURE: 97.3 F | WEIGHT: 202.8 LBS | SYSTOLIC BLOOD PRESSURE: 110 MMHG | BODY MASS INDEX: 37.32 KG/M2 | HEART RATE: 91 BPM | RESPIRATION RATE: 18 BRPM | HEIGHT: 62 IN | DIASTOLIC BLOOD PRESSURE: 72 MMHG | OXYGEN SATURATION: 98 %

## 2024-07-09 DIAGNOSIS — G47.33 OSA (OBSTRUCTIVE SLEEP APNEA): Primary | ICD-10-CM

## 2024-07-09 DIAGNOSIS — J30.2 SEASONAL ALLERGIC RHINITIS, UNSPECIFIED TRIGGER: ICD-10-CM

## 2024-07-09 DIAGNOSIS — J45.30 MILD PERSISTENT ASTHMA WITHOUT COMPLICATION: ICD-10-CM

## 2024-07-09 PROCEDURE — 99213 OFFICE O/P EST LOW 20 MIN: CPT

## 2024-07-09 NOTE — PROGRESS NOTES
Pulmonary Follow Up Note  Katie Dye 53 y.o. female MRN: [id number]  7/9/2024    Assessment:    Mild persistent asthma without complication  -No change since her last office visit.  Continues to remain well-controlled.  No significant daily pulmonary symptoms  -She will continue low-dose Symbicort as needed.  Albuterol PRN    ANSHUL (obstructive sleep apnea)  -There was a misunderstanding with scheduling for mask fitting appointment, did not complete.   -Reviewed compliance data, still has mask leakage of 46 L/min with residual AHI 6.9.  Patient still feeling sleepy during the day with Carver Sleepiness Scale score 12.  -Will have staff schedule mask fitting appointment before she leaves office today.  She will continue wearing CPAP nightly  -Check compliance at her next office visit, if still with elevated residual AHI and daytime sleepiness, consider CPAP study.    Seasonal allergic rhinitis  -Continue Claritin and Flonase          Plan:    Diagnoses and all orders for this visit:    ANSHUL (obstructive sleep apnea)  -     Mask fitting only; Future    Mild persistent asthma without complication    Seasonal allergic rhinitis, unspecified trigger            Return in about 3 months (around 10/9/2024).        History of Present Illness     Chief Complaint:   Chief Complaint   Patient presents with    Follow-up    Sleep Apnea       Patient ID: Katie FONTENOT is a 53 y.o. y.o. female has a past medical history of Abnormal ECG, Allergic (1969), Anemia (1982), Anxiety, Arthritis, Asthma, Bronchiectasis (HCC), Callus, Cancer ([facility]), Cataract (2021), Chronic bronchitis ([facility]) (1984), Chronic kidney disease (1989), COPD (chronic obstructive pulmonary disease) ([facility]) (2005), Depression, Diabetes mellitus (HCC), Difficulty walking, Diverticulitis, Diverticulitis of colon (2014), GERD (gastroesophageal reflux disease), Heart murmur (1994), Hyperlipidemia associated with type 2 diabetes mellitus  ([facility]), Hypertension, Inflammatory bowel  disease (1991), Insulinoma, Liver disease, Lung disease, Memory loss (2004), Miscarriage (1989), Myocardial infarction (HCC) (2008), Non-recurrent acute suppurative otitis media of left ear without spontaneous rupture of tympanic membrane (08/13/2019), Obesity (1998), Peptic ulceration, Plantar fasciitis (04/15/2022), Pulmonary emboli (HCC), Rheumatoid arthritis (HCC) (1998), Sleep apnea, Sleep apnea, obstructive, Stroke (HCC), and Visual impairment (1983).      7/9/2024  HPI: Katie Dye is a 53 y.o. female with a history of asthma, ANSHUL on CPAP, DM 2, PLMD, obesity, PE in 2017, reflux, and depression.  She is a former smoker with 30-pack-year history, quit 11 years ago. She is here today for a follow up visit   She is here to review her CPAP compliance.  She was previously seen in the office 2 months ago and experiencing mask leakage.  She was encouraged to schedule appointment with mask fitting.  Patient tells me that when she called to schedule, they told her she needed to go to sleep lab, but when she called the sleep lab they told her they did not do it there.  She is still feeling sleepy during the day, but reports she is sleeping well at night.  Denies any new issues.  Asthma continues to remain well-controlled.      Review of Systems   Constitutional:  Negative for activity change, appetite change, chills, diaphoresis, fever and unexpected weight change.   HENT:  Negative for congestion, ear pain, postnasal drip, rhinorrhea, sneezing, sore throat, trouble swallowing and voice change.    Respiratory:  Negative for cough, chest tightness, shortness of breath and wheezing.    Cardiovascular:  Negative for chest pain, palpitations and leg swelling.   Musculoskeletal:  Positive for myalgias.   Allergic/Immunologic: Negative.        Historical Information   Past Medical History:   Diagnosis Date    Abnormal ECG     Allergic 1969    Anemia 1982    Anxiety     Arthritis     Asthma     Bronchiectasis ([facility])      Callus     Cancer (HCC)     renal,cervical    Cataract     Chronic bronchitis (HCC)     Chronic kidney disease     COPD (chronic obstructive pulmonary disease) (HCC)     Depression     Diabetes mellitus (HCC)     Difficulty walking     Diverticulitis     Diverticulitis of colon     GERD (gastroesophageal reflux disease)     Heart murmur     Hyperlipidemia associated with type 2 diabetes mellitus  (HCC)     Hypertension     Inflammatory bowel disease     Insulinoma     Liver disease     Lung disease     Memory loss     Traumatic brain injury treated    Miscarriage     Myocardial infarction (HCC)     Non-recurrent acute suppurative otitis media of left ear without spontaneous rupture of tympanic membrane 2019    Obesity 1998    Peptic ulceration     Plantar fasciitis 04/15/2022    Pulmonary emboli (HCC)     Rheumatoid arthritis (HCC)     Sleep apnea     Sleep apnea, obstructive     Stroke (HCC)     Visual impairment      Past Surgical History:   Procedure Laterality Date    ABDOMINAL SURGERY  1990    Pancreatectomy (partial)     SECTION      twice    CHOLECYSTECTOMY      EYE SURGERY      KNEE ARTHROSCOPY Left     KNEE SURGERY      PANCREATECTOMY      Partial     OR COLONOSCOPY FLX DX W/COLLJ SPEC WHEN PFRMD N/A 2019    Procedure: COLONOSCOPY;  Surgeon: Sharon Tejada DO;  Location: [facility];  Service: Gastroenterology    OR LAPAROSCOPY SURG CHOLECYSTECTOMY N/A 2018    Procedure: CHOLECYSTECTOMY LAPAROSCOPIC;  Surgeon: Demar Tinsley DO;  Location: [facility];  Service: General    TRACHEOSTOMY  1981    TUBAL LIGATION  1998     Family History   Problem Relation Age of Onset    Heart disease Mother     Hyperlipidemia Mother     Obesity Mother     Hypertension Mother     Other Mother         Bundle branch block    Tuberculosis Mother     Arthritis Mother     Diabetes Mother     Osteoporosis Mother     Ulcerative colitis Mother      Asthma Mother     COPD Mother     Coronary artery disease Mother     Vision loss Mother         Cataracts    Substance Abuse Father     Cancer Father     Alcohol abuse Father     Mental illness Father     Psychiatric Illness Father     Psychosis Father     Schizoaffective Disorder  Father     Schizophrenia Father     Self-Injury Father     Suicide Attempts Father     No Known Problems Sister     Anxiety disorder Sister     No Known Problems Daughter     Other Maternal Grandmother         ascending aortic aneurysm resection    Diabetes Maternal Grandmother     Diabetes Paternal Grandmother     No Known Problems Brother     Tuberculosis Maternal Aunt     No Known Problems Paternal Aunt     No Known Problems Paternal Aunt     No Known Problems Paternal Aunt     No Known Problems Paternal Aunt     No Known Problems Paternal Aunt     Breast cancer Other 88    Substance Abuse Family     Osteoporosis Family     No Known Problems Half-Sister        Smoking history: She reports that she quit smoking about 12 years ago. Her smoking use included cigarettes. She started smoking about 42 years ago. She has a 30.1 pack-year smoking history. She has never been exposed to tobacco smoke. She has never used smokeless tobacco.    Occupational History:     Immunization History   Administered Date(s) Administered    COVID-19 MODERNA VACC 0.5 ML IM 04/01/2021, 04/29/2021, 12/23/2021    COVID-19 Moderna mRNA Vaccine 12 Yr+ 50 mcg/0.5 mL (Spikevax) 10/10/2023    Hep A / Hep B 05/18/2016, 09/08/2016    Hep A, adult 12/08/2016    Hep B, adult 12/08/2016    INFLUENZA 08/13/2014, 08/10/2015, 10/01/2016, 10/11/2016, 09/21/2017, 09/20/2019, 09/01/2020, 12/05/2022    Influenza Quadrivalent Preservative Free 3 years and older IM 09/21/2017    Influenza Quadrivalent, 6-35 Months IM 10/01/2016    Influenza, injectable, quadrivalent, preservative free 0.5 mL 09/23/2019, 09/01/2020, 09/29/2023    Influenza, recombinant, quadrivalent,injectable,  preservative free 10/10/2018, 09/17/2021, 12/05/2022    Influenza, seasonal, injectable 1968, 09/15/2011, 12/03/2012, 10/17/2013, 08/01/2015    Pneumococcal Conjugate 13-Valent 10/15/2016    Pneumococcal Polysaccharide PPV23 05/18/2016    Tdap 12/03/2012, 09/06/2023    Zoster Vaccine Recombinant 10/31/2019, 01/04/2020       Meds/Allergies     Current Outpatient Medications:     ARIPiprazole (ABILIFY) 10 mg tablet, Take 10 mg by mouth daily, Disp: , Rfl:     budesonide-formoterol (Symbicort) 80-4.5 MCG/ACT inhaler, Inhale 2 puffs 2 (two) times a day Rinse mouth after use. (Patient taking differently: Inhale 2 puffs 2 (two) times a day Rinse mouth after use. Only taking as needed), Disp: 10.2 g, Rfl: 5    Canagliflozin-metFORMIN HCl (Invokamet) 150-1000 MG TABS, TAKE 1 TABLET BY MOUTH TWICE A DAY, Disp: 180 tablet, Rfl: 1    EPINEPHrine (EPIPEN) 0.3 mg/0.3 mL SOAJ, Inject 0.3 mL (0.3 mg total) into a muscle once for 1 dose, Disp: 0.3 mL, Rfl: 0    fluticasone (FLONASE) 50 mcg/act nasal spray, 1 spray into each nostril daily, Disp: 18.2 mL, Rfl: 5    gabapentin (NEURONTIN) 300 mg capsule, Take 1 capsule (300 mg total) by mouth 3 (three) times a day, Disp: 60 capsule, Rfl: 8    glimepiride (AMARYL) 2 mg tablet, Take 1 tablet (2 mg total) by mouth daily with breakfast, Disp: 30 tablet, Rfl: 5    Insulin Pen Needle (B-D UF III MINI PEN NEEDLES) 31G X 5 MM MISC, Use once daily as directed., Disp: 100 each, Rfl: 1    liraglutide (Victoza) injection, INJECT 1.2 MILLIGRAM SUBCUTANEOUSLY DAILY, Disp: 3 mL, Rfl: 5    lisinopril (ZESTRIL) 2.5 mg tablet, take 1 tablet by mouth once daily, Disp: 90 tablet, Rfl: 1    omeprazole (PriLOSEC) 40 MG capsule, swallow 1 capsule once daily, Disp: 30 capsule, Rfl: 5    polymyxin b-trimethoprim (POLYTRIM) ophthalmic solution, Administer 1 drop into the left eye every 4 (four) hours, Disp: 10 mL, Rfl: 0    pravastatin (PRAVACHOL) 20 mg tablet, take 1 tablet by mouth once daily, Disp: 90  "tablet, Rfl: 1    venlafaxine (EFFEXOR-XR) 150 mg 24 hr capsule, Take 150 mg by mouth daily  , Disp: , Rfl:     loratadine (CLARITIN) 10 mg tablet, Take 1 tablet (10 mg total) by mouth daily (Patient not taking: Reported on 7/9/2024), Disp: 90 tablet, Rfl: 1  Allergies:   Allergies   Allergen Reactions    Iodine - Food Allergy Anaphylaxis     IVP DYE     Nuts - Food Allergy Anaphylaxis     Annotation - [id number]: Pine nuts         Vitals:  Vitals:    07/09/24 1018 07/09/24 1020   BP: 110/72    Pulse: 91    Resp: 18    Temp: (!) 97.3 °F (36.3 °C)    SpO2: 98% 98%   Weight: 92 kg (202 lb 12.8 oz)    Height: 5' 2\" (1.575 m)          Oxygen Therapy  SpO2: 98 %  Oxygen Therapy: None (Room air)  .  Wt Readings from Last 3 Encounters:   07/09/24 92 kg (202 lb 12.8 oz)   06/11/24 89.8 kg (198 lb)   05/17/24 89.9 kg (198 lb 2 oz)     Body mass index is 37.09 kg/m².    Physical Exam  Vitals and nursing note reviewed.   Constitutional:       General: She is not in acute distress.     Appearance: Normal appearance. She is well-developed. She is obese.   Cardiovascular:      Rate and Rhythm: Normal rate and regular rhythm.      Heart sounds: Normal heart sounds, S1 normal and S2 normal. No murmur heard.  Pulmonary:      Effort: Pulmonary effort is normal.      Breath sounds: No decreased breath sounds, wheezing, rhonchi or rales.   Musculoskeletal:         General: No swelling.      Right lower leg: No edema.      Left lower leg: No edema.   Neurological:      Mental Status: She is alert.   Psychiatric:         Mood and Affect: Mood and affect normal.         Behavior: Behavior normal. Behavior is cooperative.           Labs: I have personally reviewed pertinent lab results.  Lab Results   Component Value Date    WBC 5.72 12/09/2023    HGB 14.1 12/09/2023    HCT 44.7 12/09/2023    MCV 93 12/09/2023     12/09/2023     Lab Results   Component Value Date    GLUCOSE 116 11/19/2015    CALCIUM 9.1 12/09/2023     " "06/09/2018    K 4.0 12/09/2023    CO2 27 12/09/2023     12/09/2023    BUN 17 12/09/2023    CREATININE 0.75 12/09/2023     No results found for: \"IGE\"  Lab Results   Component Value Date    ALT 15 12/09/2023    AST 12 (L) 12/09/2023    ALKPHOS 82 12/09/2023    BILITOT 0.4 06/09/2018       Imaging and other studies: I have personally reviewed pertinent reports and I have personally reviewed pertinent films in PACS     CT lung screening 7/15/2023  Benign 2 mm right upper lobe pulmonary nodule.  No new or enlarging pulmonary nodules.    Pulmonary function testing:     Pulmonary Functions Testing Results: 10/7/2020    FEV1/FVC ratio 75%    FEV1 84% predicted  FVC 84% predicted  (-) response to bronchodilators  TLC 86 % predicted   % predicted  DLCO corrected for hemoglobin 86 % predicted    Impression: No obstructive airflow defect, however FEF 25 to 75% is significantly decreased which can be consistent with small airways disease.  Flow-volume curve shape on exhalation also consistent with obstruction.  No significant response to administration of bronchodilators.  Normal lung volumes.  Normal diffusion capacity  "

## 2024-07-09 NOTE — ASSESSMENT & PLAN NOTE
-No change since her last office visit.  Continues to remain well-controlled.  No significant daily pulmonary symptoms  -She will continue low-dose Symbicort as needed.  Albuterol PRN

## 2024-07-10 ENCOUNTER — OFFICE VISIT (OUTPATIENT)
Dept: INTERNAL MEDICINE CLINIC | Facility: CLINIC | Age: 54
End: 2024-07-10
Payer: COMMERCIAL

## 2024-07-10 VITALS
BODY MASS INDEX: 39.66 KG/M2 | WEIGHT: 202 LBS | OXYGEN SATURATION: 97 % | HEART RATE: 101 BPM | TEMPERATURE: 97.7 F | HEIGHT: 60 IN | DIASTOLIC BLOOD PRESSURE: 78 MMHG | SYSTOLIC BLOOD PRESSURE: 116 MMHG

## 2024-07-10 DIAGNOSIS — Z12.31 ENCOUNTER FOR SCREENING MAMMOGRAM FOR BREAST CANCER: ICD-10-CM

## 2024-07-10 DIAGNOSIS — E78.00 HYPERCHOLESTEROLEMIA: ICD-10-CM

## 2024-07-10 DIAGNOSIS — Z00.00 WELL ADULT EXAM: Primary | ICD-10-CM

## 2024-07-10 DIAGNOSIS — E11.65 TYPE 2 DIABETES MELLITUS WITH HYPERGLYCEMIA, WITHOUT LONG-TERM CURRENT USE OF INSULIN (HCC): ICD-10-CM

## 2024-07-10 DIAGNOSIS — Z11.4 SCREENING FOR HIV (HUMAN IMMUNODEFICIENCY VIRUS): ICD-10-CM

## 2024-07-10 LAB — SL AMB POCT HEMOGLOBIN AIC: 6.8 (ref ?–6.5)

## 2024-07-10 PROCEDURE — 99396 PREV VISIT EST AGE 40-64: CPT | Performed by: PHYSICIAN ASSISTANT

## 2024-07-10 PROCEDURE — 83036 HEMOGLOBIN GLYCOSYLATED A1C: CPT | Performed by: PHYSICIAN ASSISTANT

## 2024-07-10 NOTE — PROGRESS NOTES
Adult Annual Physical  Name: Katie Dye      : 1970      MRN: [id number]  Encounter Provider: Gertrudis Obrien PA-C  Encounter Date: 7/10/2024   Encounter department: [facility]    Assessment & Plan   1. Type 2 diabetes mellitus with hyperglycemia, without long-term current use of insulin (HCC)  -     POCT hemoglobin A1c  2. Encounter for screening mammogram for breast cancer  -     Mammo screening bilateral w 3d & cad; Future; Expected date: 07/10/2024  3. Screening for HIV (human immunodeficiency virus)  -     HIV 1/2 AG/AB w Reflex [facility] for 2 yr old and above; Future  4. Hypercholesterolemia  -     LDL cholesterol, direct; Future    Immunizations and preventive care screenings were discussed with patient today. Appropriate education was printed on patient's after visit summary.    Counseling:  Diabetic eye and foot exam current. CRC screening current. Mammogram and lung screening CT due and ordered. A1C much improved at 6.8.       Depression Screening and Follow-up Plan: Patient's depression screening was positive with a PHQ-9 score of 9. Continue regular follow-up with their mental health provider who is managing their mental health condition(s).         History of Present Illness     Adult Annual Physical:  Patient presents for annual physical.     Diet and Physical Activity:  - Diet/Nutrition: diabetic diet.  - Exercise: no formal exercise.    Depression Screening:    - PHQ-9 Score: 9    General Health:  - Sleep: sleeps well.  - Hearing: normal hearing bilateral ears.  - Vision: goes for regular eye exams.  - Dental: regular dental visits.    /GYN Health:    - Menopause: postmenopausal.   - History of STDs: no    Advanced Care Planning:  - Has an advanced directive?: no    - Has a durable medical POA?: no    - ACP document given to patient?: no      Review of Systems   Constitutional:  Negative for chills and fever.   HENT:  Negative for congestion, ear pain, hearing loss,  postnasal drip, rhinorrhea, sinus pressure, sinus pain, sore throat and trouble swallowing.    Eyes:  Negative for pain and visual disturbance.   Respiratory:  Negative for cough, chest tightness, shortness of breath and wheezing.    Cardiovascular: Negative.  Negative for chest pain, palpitations and leg swelling.   Gastrointestinal:  Negative for abdominal pain, blood in stool, constipation, diarrhea, nausea and vomiting.   Endocrine: Negative for cold intolerance, heat intolerance, polydipsia, polyphagia and polyuria.   Genitourinary:  Negative for difficulty urinating, dysuria, flank pain and urgency.   Musculoskeletal:  Negative for arthralgias, back pain, gait problem and myalgias.   Skin:  Negative for rash.   Allergic/Immunologic: Negative.    Neurological:  Negative for dizziness, weakness, light-headedness and headaches.   Hematological: Negative.    Psychiatric/Behavioral:  Negative for behavioral problems, dysphoric mood and sleep disturbance. The patient is not nervous/anxious.      Current Outpatient Medications on File Prior to Visit   Medication Sig Dispense Refill    ARIPiprazole (ABILIFY) 10 mg tablet Take 10 mg by mouth daily      budesonide-formoterol (Symbicort) 80-4.5 MCG/ACT inhaler Inhale 2 puffs 2 (two) times a day Rinse mouth after use. (Patient taking differently: Inhale 2 puffs 2 (two) times a day Rinse mouth after use. Only taking as needed) 10.2 g 5    Canagliflozin-metFORMIN HCl (Invokamet) 150-1000 MG TABS TAKE 1 TABLET BY MOUTH TWICE A  tablet 1    EPINEPHrine (EPIPEN) 0.3 mg/0.3 mL SOAJ Inject 0.3 mL (0.3 mg total) into a muscle once for 1 dose 0.3 mL 0    fluticasone (FLONASE) 50 mcg/act nasal spray 1 spray into each nostril daily 18.2 mL 5    gabapentin (NEURONTIN) 300 mg capsule Take 1 capsule (300 mg total) by mouth 3 (three) times a day 60 capsule 8    glimepiride (AMARYL) 2 mg tablet Take 1 tablet (2 mg total) by mouth daily with breakfast 30 tablet 5    Insulin Pen  "Needle (B-D UF III MINI PEN NEEDLES) 31G X 5 MM MISC Use once daily as directed. 100 each 1    liraglutide (Victoza) injection INJECT 1.2 MILLIGRAM SUBCUTANEOUSLY DAILY 3 mL 5    lisinopril (ZESTRIL) 2.5 mg tablet take 1 tablet by mouth once daily 90 tablet 1    loratadine (CLARITIN) 10 mg tablet Take 1 tablet (10 mg total) by mouth daily 90 tablet 1    omeprazole (PriLOSEC) 40 MG capsule swallow 1 capsule once daily 30 capsule 5    polymyxin b-trimethoprim (POLYTRIM) ophthalmic solution Administer 1 drop into the left eye every 4 (four) hours 10 mL 0    pravastatin (PRAVACHOL) 20 mg tablet take 1 tablet by mouth once daily 90 tablet 1    venlafaxine (EFFEXOR-XR) 150 mg 24 hr capsule Take 150 mg by mouth daily         No current facility-administered medications on file prior to visit.      Social History     Tobacco Use    Smoking status: Former     Current packs/day: 0.00     Average packs/day: 1 pack/day for 30.1 years (30.1 ttl pk-yrs)     Types: Cigarettes     Start date: 1982     Quit date: 2012     Years since quittin.0     Passive exposure: Never    Smokeless tobacco: Never    Tobacco comments:     Dont need at all   Vaping Use    Vaping status: Never Used   Substance and Sexual Activity    Alcohol use: Not Currently    Drug use: No    Sexual activity: Not Currently     Partners: Male     Birth control/protection: Abstinence       Objective     /78 (BP Location: Left arm, Patient Position: Sitting)   Pulse 101   Temp 97.7 °F (36.5 °C) (Tympanic)   Ht 5' 0.24\" (1.53 m)   Wt 91.6 kg (202 lb)   LMP 2019   SpO2 97%   BMI 39.14 kg/m²     Physical Exam  Vitals and nursing note reviewed.   Constitutional:       General: She is not in acute distress.     Appearance: Normal appearance. She is well-developed. She is not diaphoretic.   HENT:      Head: Normocephalic and atraumatic.      Right Ear: External ear normal.      Left Ear: External ear normal.      Nose: Nose normal.      " Mouth/Throat:      Pharynx: No oropharyngeal exudate.   Eyes:      General: No scleral icterus.        Right eye: No discharge.         Left eye: No discharge.      Conjunctiva/sclera: Conjunctivae normal.      Pupils: Pupils are equal, round, and reactive to light.   Neck:      Thyroid: No thyromegaly.   Cardiovascular:      Rate and Rhythm: Normal rate and regular rhythm.      Heart sounds: Normal heart sounds. No murmur heard.     No friction rub. No gallop.   Pulmonary:      Effort: Pulmonary effort is normal. No respiratory distress.      Breath sounds: Normal breath sounds. No wheezing or rales.   Abdominal:      General: Bowel sounds are normal. There is no distension.      Palpations: Abdomen is soft.      Tenderness: There is no abdominal tenderness.   Musculoskeletal:         General: No tenderness or deformity. Normal range of motion.      Cervical back: Normal range of motion and neck supple.   Skin:     General: Skin is warm and dry.   Neurological:      Mental Status: She is alert and oriented to person, place, and time.      Cranial Nerves: No cranial nerve deficit.   Psychiatric:         Behavior: Behavior normal.         Thought Content: Thought content normal.         Judgment: Judgment normal.       Administrative Statements   I have spent a total time of 20 minutes in caring for this patient on the day of the visit/encounter including Instructions for management, Patient and family education, Importance of tx compliance, Risk factor reductions, Impressions, Counseling / Coordination of care, and Documenting in the medical record.

## 2024-07-19 ENCOUNTER — TELEPHONE (OUTPATIENT)
Age: 54
End: 2024-07-19

## 2024-07-25 ENCOUNTER — APPOINTMENT (OUTPATIENT)
Dept: LAB | Facility: CLINIC | Age: 54
End: 2024-07-25
Payer: COMMERCIAL

## 2024-07-25 DIAGNOSIS — Z11.4 SCREENING FOR HIV (HUMAN IMMUNODEFICIENCY VIRUS): ICD-10-CM

## 2024-07-25 DIAGNOSIS — E78.00 HYPERCHOLESTEROLEMIA: ICD-10-CM

## 2024-07-25 LAB
HIV 1+2 AB+HIV1 P24 AG SERPL QL IA: NORMAL
HIV 2 AB SERPL QL IA: NORMAL
HIV1 AB SERPL QL IA: NORMAL
HIV1 P24 AG SERPL QL IA: NORMAL
LDLC SERPL DIRECT ASSAY-MCNC: 150 MG/DL (ref 0–100)

## 2024-07-25 PROCEDURE — 36415 COLL VENOUS BLD VENIPUNCTURE: CPT

## 2024-07-25 PROCEDURE — 87389 HIV-1 AG W/HIV-1&-2 AB AG IA: CPT

## 2024-07-25 PROCEDURE — 83721 ASSAY OF BLOOD LIPOPROTEIN: CPT

## 2024-07-30 ENCOUNTER — TELEPHONE (OUTPATIENT)
Dept: INTERNAL MEDICINE CLINIC | Facility: CLINIC | Age: 54
End: 2024-07-30

## 2024-07-30 DIAGNOSIS — E78.00 HYPERCHOLESTEROLEMIA: Primary | ICD-10-CM

## 2024-07-30 RX ORDER — ATORVASTATIN CALCIUM 40 MG/1
40 TABLET, FILM COATED ORAL DAILY
Qty: 30 TABLET | Refills: 2 | Status: SHIPPED | OUTPATIENT
Start: 2024-07-30

## 2024-07-30 NOTE — TELEPHONE ENCOUNTER
A. Asked questions to (patient/patient representative as listed on communication consent form) as per provider message. Patient/Patient Representative expressed understanding and had the following response:   Pt understood results, said she is fine with making that change in medication  Please advise.

## 2024-07-30 NOTE — TELEPHONE ENCOUNTER
----- Message from Gertrudis Obrien PA-C sent at 7/30/2024 12:59 PM EDT -----  LDL remains elevated at 150, should be close to 70. Would she be willing to change the pravastatin to lipitor?

## 2024-08-07 ENCOUNTER — OFFICE VISIT (OUTPATIENT)
Dept: PODIATRY | Facility: CLINIC | Age: 54
End: 2024-08-07
Payer: COMMERCIAL

## 2024-08-07 VITALS
HEART RATE: 89 BPM | HEIGHT: 60 IN | SYSTOLIC BLOOD PRESSURE: 111 MMHG | WEIGHT: 202 LBS | BODY MASS INDEX: 39.66 KG/M2 | DIASTOLIC BLOOD PRESSURE: 72 MMHG

## 2024-08-07 DIAGNOSIS — B35.1 ONYCHOMYCOSIS: ICD-10-CM

## 2024-08-07 DIAGNOSIS — E11.8 DIABETIC FOOT (HCC): ICD-10-CM

## 2024-08-07 DIAGNOSIS — E11.65 TYPE 2 DIABETES MELLITUS WITH HYPERGLYCEMIA, WITHOUT LONG-TERM CURRENT USE OF INSULIN (HCC): Primary | ICD-10-CM

## 2024-08-07 PROCEDURE — 99213 OFFICE O/P EST LOW 20 MIN: CPT | Performed by: PODIATRIST

## 2024-08-07 RX ORDER — CICLOPIROX 80 MG/ML
SOLUTION TOPICAL
Qty: 6.6 ML | Refills: 3 | Status: SHIPPED | OUTPATIENT
Start: 2024-08-07

## 2024-08-07 NOTE — PROGRESS NOTES
Diabetic Foot Exam    Patient's shoes and socks removed.    Right Foot/Ankle   Right Foot Inspection  Skin Exam: skin normal and skin intact. No dry skin, no warmth, no callus, no erythema, no maceration, no abnormal color, no pre-ulcer, no ulcer and no callus.     Sensory   Monofilament testing: intact    Vascular  Capillary refills: < 3 seconds  The right DP pulse is 2+. The right PT pulse is 2+.     Left Foot/Ankle  Left Foot Inspection  Skin Exam: skin normal and skin intact. No dry skin, no warmth, no erythema, no maceration, normal color, no pre-ulcer, no ulcer and no callus.     Sensory   Monofilament testing: intact    Vascular  Capillary refills: < 3 seconds  The left DP pulse is 2+. The left PT pulse is 2+.     Assign Risk Category  No deformity present  No loss of protective sensation  No weak pulses  Risk: 0            PATIENT:  Katie Dye    1970    ASSESSMENT:     1. Type 2 diabetes mellitus with hyperglycemia, without long-term current use of insulin ([facility])        2. Onychomycosis        3. Diabetic foot ([facility])              PLAN:  1.  Patient was counseled on the condition and diagnosis.    2.  Educated disease prevention and risks related to diabetes.    3.  Educated proper daily foot care and exam.  Instructed proper skin care / protection and footwear.  Instructed to identify any signs of infection and related foot problem.    4.  The recent blood work was reviewed / discussed and the last HbA1c was 6.8.  Discussed proper blood glucose control with diet and exercise.    5.  The patient has *0* risk diabetic foot and will return in 12 months for diabetic foot exam.      Imaging: I have personally reviewed pertinent films in PACS  Labs, pathology, and Other Studies: I have personally reviewed pertinent reports.      High risk  foot precautions reviewed with patient including the need to wear protective shoegear at all times when walking including in the home, the need to check feet all  surfaces daily with a mirror and report and skin breaks to podiatry, the need to apply an emollient to skin of feet daily.  Diabetic Foot Ulcer Risks    - Between 10-15% of diabetic foot ulcers do not heal.[v]  - Of diabetic foot ulcers that do not heal, 25% will require amputation.[v]    iv CHANDU Parry, TITA Pitt, Yulissa ORONA, and Jorje SHEPHERD, Foot Ulcers in the Diabetic Patient, Prevention and Treatment. Vascular Health Risk Management. 2007 Feb; 3(1): 65-76  v ANYI Schmidt, ALBERTO Minaya, JOAO Leggett, PAZ Garcia, Thomas, TLuanaT., Risk factors for lower extremity amputation in patients with diabetic foot ulcers: a hospital-based case-control study. Diabetic Foot & Ankle, 2015. 6:[id number]          Subjective:          HPI  The patient presents for diabetic foot evaluation.  The patient has diabetes for multiple years.  The blood glucose is under control and the last HbA1c was 6.8.  The patient denied any history of diabetic foot ulcer or related foot infection.  No significant numbness or paresthesia.  Denied weakness or significant functional deficit.  The patient denied any acute pedal disorder or injury.  Denied symptoms of arterial claudication in legs.  The patient presents with concerns of discolored and thickened toenails..      The following portions of the patient's history were reviewed and updated as appropriate: allergies, current medications, past family history, past medical history, past social history, past surgical history and problem list.  All pertinent labs and images were reviewed.    Past Medical History  Past Medical History:   Diagnosis Date    Abnormal ECG     Allergic 1969    Anemia 1982    Anxiety     Arthritis     Asthma     Bronchiectasis (HCC)     Callus     Cancer (HCC)     renal,cervical    Cataract 2021    Chronic bronchitis (HCC) 1984    Chronic kidney disease 1989    COPD (chronic obstructive pulmonary disease) (HCC) 2005    Depression     Diabetes mellitus (HCC)     Difficulty  walking     Diverticulitis     Diverticulitis of colon     GERD (gastroesophageal reflux disease)     Heart murmur     Hyperlipidemia associated with type 2 diabetes mellitus  (HCC)     Hypertension     Inflammatory bowel disease     Insulinoma     Liver disease     Lung disease     Memory loss     Traumatic brain injury treated    Miscarriage     Myocardial infarction (HCC)     Non-recurrent acute suppurative otitis media of left ear without spontaneous rupture of tympanic membrane 2019    Obesity 1998    Peptic ulceration     Plantar fasciitis 04/15/2022    Pulmonary emboli (HCC)     Rheumatoid arthritis (HCC)     Sleep apnea     Sleep apnea, obstructive     Stroke (HCC)     Visual impairment        Past Surgical History  Past Surgical History:   Procedure Laterality Date    ABDOMINAL SURGERY  1990    Pancreatectomy (partial)     SECTION      twice    CHOLECYSTECTOMY      EYE SURGERY      KNEE ARTHROSCOPY Left     KNEE SURGERY      PANCREATECTOMY      Partial     CT COLONOSCOPY FLX DX W/COLLJ SPEC WHEN PFRMD N/A 2019    Procedure: COLONOSCOPY;  Surgeon: Sharon Tejada DO;  Location: [facility];  Service: Gastroenterology    CT LAPAROSCOPY SURG CHOLECYSTECTOMY N/A 2018    Procedure: CHOLECYSTECTOMY LAPAROSCOPIC;  Surgeon: Demar Tinsley DO;  Location: [facility];  Service: General    TRACHEOSTOMY  1981    TUBAL LIGATION  1998        Allergies:  Iodine - food allergy and Nuts - food allergy    Medications:  Current Outpatient Medications   Medication Sig Dispense Refill    ARIPiprazole (ABILIFY) 10 mg tablet Take 10 mg by mouth daily      atorvastatin (LIPITOR) 40 mg tablet Take 1 tablet (40 mg total) by mouth daily 30 tablet 2    budesonide-formoterol (Symbicort) 80-4.5 MCG/ACT inhaler Inhale 2 puffs 2 (two) times a day Rinse mouth after use. (Patient taking differently: Inhale 2 puffs 2 (two) times a day Rinse mouth after use. Only taking as  needed) 10.2 g 5    Canagliflozin-metFORMIN HCl (Invokamet) 150-1000 MG TABS TAKE 1 TABLET BY MOUTH TWICE A  tablet 1    fluticasone (FLONASE) 50 mcg/act nasal spray 1 spray into each nostril daily 18.2 mL 5    gabapentin (NEURONTIN) 300 mg capsule Take 1 capsule (300 mg total) by mouth 3 (three) times a day 60 capsule 8    glimepiride (AMARYL) 2 mg tablet Take 1 tablet (2 mg total) by mouth daily with breakfast 30 tablet 5    Insulin Pen Needle (B-D UF III MINI PEN NEEDLES) 31G X 5 MM MISC Use once daily as directed. 100 each 1    liraglutide (Victoza) injection INJECT 1.2 MILLIGRAM SUBCUTANEOUSLY DAILY 3 mL 5    lisinopril (ZESTRIL) 2.5 mg tablet take 1 tablet by mouth once daily 90 tablet 1    loratadine (CLARITIN) 10 mg tablet Take 1 tablet (10 mg total) by mouth daily 90 tablet 1    omeprazole (PriLOSEC) 40 MG capsule swallow 1 capsule once daily 30 capsule 5    polymyxin b-trimethoprim (POLYTRIM) ophthalmic solution Administer 1 drop into the left eye every 4 (four) hours 10 mL 0    venlafaxine (EFFEXOR-XR) 150 mg 24 hr capsule Take 150 mg by mouth daily        EPINEPHrine (EPIPEN) 0.3 mg/0.3 mL SOAJ Inject 0.3 mL (0.3 mg total) into a muscle once for 1 dose 0.3 mL 0     No current facility-administered medications for this visit.       Social History:  Social History     Socioeconomic History    Marital status:      Spouse name: None    Number of children: None    Years of education: None    Highest education level: None   Occupational History    Occupation: UNEMPLOYED   Tobacco Use    Smoking status: Former     Current packs/day: 0.00     Average packs/day: 1 pack/day for 30.1 years (30.1 ttl pk-yrs)     Types: Cigarettes     Start date: 1982     Quit date: 2012     Years since quittin.1     Passive exposure: Never    Smokeless tobacco: Never    Tobacco comments:     Dont need at all   Vaping Use    Vaping status: Never Used   Substance and Sexual Activity    Alcohol use: Not  Currently    Drug use: No    Sexual activity: Not Currently     Partners: Male     Birth control/protection: Abstinence   Other Topics Concern    None   Social History Narrative    Single-    Unemployed    Lives at home with mother     Caffeine use     Social Determinants of Health     Financial Resource Strain: Not on file   Food Insecurity: Not on file   Transportation Needs: Not on file   Physical Activity: Not on file   Stress: Not on file   Social Connections: Not on file   Intimate Partner Violence: Not on file   Housing Stability: Not on file        Review of Systems      Objective:      /72 (BP Location: Right arm, Patient Position: Sitting, Cuff Size: Large)   Pulse 89   Ht 5' (1.524 m)   Wt 91.6 kg (202 lb)   LMP 05/20/2019   BMI 39.45 kg/m²          Physical Exam  Cardiovascular:      Pulses: no weak pulses.           Dorsalis pedis pulses are 2+ on the right side and 2+ on the left side.        Posterior tibial pulses are 2+ on the right side and 2+ on the left side.   Feet:      Right foot:      Skin integrity: No ulcer, skin breakdown, erythema, warmth, callus or dry skin.      Left foot:      Skin integrity: No ulcer, skin breakdown, erythema, warmth, callus or dry skin.   Skin:     Comments: She has discoloration of the right hallux and second toenail.  The remainder of the nails appear to be within normal limits except for the left second digit toenail.  Subungual debris present

## 2024-08-08 DIAGNOSIS — K21.9 GASTROESOPHAGEAL REFLUX DISEASE WITHOUT ESOPHAGITIS: ICD-10-CM

## 2024-08-08 RX ORDER — OMEPRAZOLE 40 MG/1
CAPSULE, DELAYED RELEASE ORAL
Qty: 30 CAPSULE | Refills: 5 | Status: SHIPPED | OUTPATIENT
Start: 2024-08-08

## 2024-08-13 ENCOUNTER — HOSPITAL ENCOUNTER (OUTPATIENT)
Dept: CT IMAGING | Facility: HOSPITAL | Age: 54
Discharge: HOME | End: 2024-08-13
Payer: COMMERCIAL

## 2024-08-13 DIAGNOSIS — F17.211 CIGARETTE NICOTINE DEPENDENCE IN REMISSION: ICD-10-CM

## 2024-08-13 PROCEDURE — 71271 CT THORAX LUNG CANCER SCR C-: CPT

## 2024-08-27 DIAGNOSIS — E11.65 TYPE 2 DIABETES MELLITUS WITH HYPERGLYCEMIA, WITHOUT LONG-TERM CURRENT USE OF INSULIN (HCC): ICD-10-CM

## 2024-08-27 RX ORDER — GLIMEPIRIDE 2 MG/1
2 TABLET ORAL
Qty: 30 TABLET | Refills: 5 | Status: SHIPPED | OUTPATIENT
Start: 2024-08-27

## 2024-09-04 ENCOUNTER — HOSPITAL ENCOUNTER (OUTPATIENT)
Dept: MAMMOGRAPHY | Facility: HOSPITAL | Age: 54
Discharge: HOME | End: 2024-09-04
Payer: COMMERCIAL

## 2024-09-04 VITALS — WEIGHT: 200 LBS | HEIGHT: 60 IN | BODY MASS INDEX: 39.27 KG/M2

## 2024-09-04 DIAGNOSIS — Z12.31 ENCOUNTER FOR SCREENING MAMMOGRAM FOR BREAST CANCER: ICD-10-CM

## 2024-09-04 PROCEDURE — 77067 SCR MAMMO BI INCL CAD: CPT

## 2024-09-04 PROCEDURE — 77063 BREAST TOMOSYNTHESIS BI: CPT

## 2024-09-05 NOTE — RESULT ENCOUNTER NOTE
Joshua Wilson  I reviewed your mammogram and it was was normal. We recommend repeating in one year for maintenance. Have a great day.   Jazmyne

## 2024-09-10 ENCOUNTER — OFFICE VISIT (OUTPATIENT)
Dept: OBGYN CLINIC | Facility: CLINIC | Age: 54
End: 2024-09-10
Payer: COMMERCIAL

## 2024-09-10 VITALS
WEIGHT: 200 LBS | BODY MASS INDEX: 39.27 KG/M2 | HEIGHT: 60 IN | SYSTOLIC BLOOD PRESSURE: 121 MMHG | HEART RATE: 85 BPM | DIASTOLIC BLOOD PRESSURE: 82 MMHG

## 2024-09-10 DIAGNOSIS — M18.0 ARTHRITIS OF CARPOMETACARPAL (CMC) JOINT OF BOTH THUMBS: Primary | ICD-10-CM

## 2024-09-10 PROCEDURE — 20600 DRAIN/INJ JOINT/BURSA W/O US: CPT | Performed by: ORTHOPAEDIC SURGERY

## 2024-09-10 PROCEDURE — 99213 OFFICE O/P EST LOW 20 MIN: CPT | Performed by: ORTHOPAEDIC SURGERY

## 2024-09-10 RX ORDER — BUPIVACAINE HYDROCHLORIDE 2.5 MG/ML
4 INJECTION, SOLUTION INFILTRATION; PERINEURAL
Status: COMPLETED | OUTPATIENT
Start: 2024-09-10 | End: 2024-09-10

## 2024-09-10 RX ORDER — BETAMETHASONE SODIUM PHOSPHATE AND BETAMETHASONE ACETATE 3; 3 MG/ML; MG/ML
6 INJECTION, SUSPENSION INTRA-ARTICULAR; INTRALESIONAL; INTRAMUSCULAR; SOFT TISSUE
Status: COMPLETED | OUTPATIENT
Start: 2024-09-10 | End: 2024-09-10

## 2024-09-10 RX ADMIN — BETAMETHASONE SODIUM PHOSPHATE AND BETAMETHASONE ACETATE 6 MG: 3; 3 INJECTION, SUSPENSION INTRA-ARTICULAR; INTRALESIONAL; INTRAMUSCULAR; SOFT TISSUE at 11:00

## 2024-09-10 RX ADMIN — BUPIVACAINE HYDROCHLORIDE 4 ML: 2.5 INJECTION, SOLUTION INFILTRATION; PERINEURAL at 11:00

## 2024-09-10 NOTE — PROGRESS NOTES
Assessment/Plan:   Diagnoses and all orders for this visit:    Arthritis of carpometacarpal (CMC) joint of both thumbs  -     Hand/upper extremity injection  -     Hand/upper extremity injection       Discussed patients symptoms are consistent with bilateral arthritis of the CMC thumb joint. She was offered and accepted an injection(s) of celestone and marcaine to her Bilateral thumb(s) for symptomatic relief of pain and inflammation. Patient tolerated the treatment(s) well. Ice and post injection protocol advised. Weightbearing activities as tolerated. She will be seen for follow-up in 3 months for re-evaluation. Patient expresses understanding and is in agreement with this treatment plan. The patient was given the opportunity to ask questions or present concerns.    The patient has bilateral basilar thumb arthritis.  Both structures were reinjected with Celestone and Marcaine.  She tolerated procedures quite well.  Return back in 3 months for reevaluation      Subjective:   Patient ID: Katie Dye  1970     HPI  Patient is a 53 y.o. female who presents for for follow-up evaluation of bilateral arthritis of the CMC joint of both thumbs. She was last seen on 6/11/2024 at which time she received cortisone injections. Today, she notes the injections helped, but the pain has returned.     The following portions of the patient's history were reviewed and updated as appropriate:  Past medical history, past surgical history, Family history, social history, current medications and allergies    Past Medical History:   Diagnosis Date    Abnormal ECG     Allergic 1969    Anemia 1982    Anxiety     Arthritis     Asthma     Bronchiectasis (HCC)     Callus     Cancer (HCC)     renal,cervical    Cataract 2021    Chronic bronchitis (HCC) 1984    Chronic kidney disease 1989    COPD (chronic obstructive pulmonary disease) (HCC) 2005    Depression     Diabetes mellitus (HCC)     Difficulty walking     Diverticulitis      Diverticulitis of colon     GERD (gastroesophageal reflux disease)     Heart murmur     Hyperlipidemia associated with type 2 diabetes mellitus  (HCC)     Hypertension     Inflammatory bowel disease     Insulinoma     Liver disease     Lung disease     Memory loss     Traumatic brain injury treated    Miscarriage     Myocardial infarction (HCC)     Non-recurrent acute suppurative otitis media of left ear without spontaneous rupture of tympanic membrane 2019    Obesity 1998    Peptic ulceration     Plantar fasciitis 04/15/2022    Pulmonary emboli (HCC)     Rheumatoid arthritis (HCC)     Sleep apnea     Sleep apnea, obstructive     Stroke (HCC)     Visual impairment        Past Surgical History:   Procedure Laterality Date    ABDOMINAL SURGERY  1990    Pancreatectomy (partial)     SECTION      twice    CHOLECYSTECTOMY      EYE SURGERY      KNEE ARTHROSCOPY Left     KNEE SURGERY      PANCREATECTOMY      Partial     MS COLONOSCOPY FLX DX W/COLLJ SPEC WHEN PFRMD N/A 2019    Procedure: COLONOSCOPY;  Surgeon: Sharon Tejada DO;  Location: [facility];  Service: Gastroenterology    MS LAPAROSCOPY SURG CHOLECYSTECTOMY N/A 2018    Procedure: CHOLECYSTECTOMY LAPAROSCOPIC;  Surgeon: Demar Tinsley DO;  Location: [facility];  Service: General    TRACHEOSTOMY  1981    TUBAL LIGATION  1998       Family History   Problem Relation Age of Onset    Heart disease Mother     Hyperlipidemia Mother     Obesity Mother     Hypertension Mother     Other Mother         Bundle branch block    Tuberculosis Mother     Arthritis Mother     Diabetes Mother     Osteoporosis Mother     Ulcerative colitis Mother     Asthma Mother     COPD Mother     Coronary artery disease Mother     Vision loss Mother         Cataracts    Substance Abuse Father     Cancer Father     Alcohol abuse Father     Mental illness Father     Psychiatric Illness Father     Psychosis Father      Schizoaffective Disorder  Father     Schizophrenia Father     Self-Injury Father     Suicide Attempts Father     No Known Problems Sister     Anxiety disorder Sister     No Known Problems Daughter     Other Maternal Grandmother         ascending aortic aneurysm resection    Diabetes Maternal Grandmother     Diabetes Paternal Grandmother     No Known Problems Brother     Tuberculosis Maternal Aunt     No Known Problems Paternal Aunt     No Known Problems Paternal Aunt     No Known Problems Paternal Aunt     No Known Problems Paternal Aunt     No Known Problems Paternal Aunt     Breast cancer Other 88    Substance Abuse Family     Osteoporosis Family     No Known Problems Half-Sister        Social History     Socioeconomic History    Marital status:      Spouse name: None    Number of children: None    Years of education: None    Highest education level: None   Occupational History    Occupation: UNEMPLOYED   Tobacco Use    Smoking status: Former     Current packs/day: 0.00     Average packs/day: 1 pack/day for 30.1 years (30.1 ttl pk-yrs)     Types: Cigarettes     Start date: 1982     Quit date: 2012     Years since quittin.2     Passive exposure: Never    Smokeless tobacco: Never    Tobacco comments:     Dont need at all   Vaping Use    Vaping status: Never Used   Substance and Sexual Activity    Alcohol use: Not Currently    Drug use: No    Sexual activity: Not Currently     Partners: Male     Birth control/protection: Abstinence   Other Topics Concern    None   Social History Narrative    Single-    Unemployed    Lives at home with mother     Caffeine use     Social Determinants of Health     Financial Resource Strain: Not on file   Food Insecurity: Not on file   Transportation Needs: Not on file   Physical Activity: Not on file   Stress: Not on file   Social Connections: Not on file   Intimate Partner Violence: Not on file   Housing Stability: Not on file         Current Outpatient  Medications:     ARIPiprazole (ABILIFY) 10 mg tablet, Take 10 mg by mouth daily, Disp: , Rfl:     atorvastatin (LIPITOR) 40 mg tablet, Take 1 tablet (40 mg total) by mouth daily, Disp: 30 tablet, Rfl: 2    budesonide-formoterol (Symbicort) 80-4.5 MCG/ACT inhaler, Inhale 2 puffs 2 (two) times a day Rinse mouth after use. (Patient taking differently: Inhale 2 puffs 2 (two) times a day Rinse mouth after use. Only taking as needed), Disp: 10.2 g, Rfl: 5    Canagliflozin-metFORMIN HCl (Invokamet) 150-1000 MG TABS, TAKE 1 TABLET BY MOUTH TWICE A DAY, Disp: 180 tablet, Rfl: 1    ciclopirox (PENLAC) 8 % solution, Apply topically daily at bedtime, Disp: 6.6 mL, Rfl: 3    fluticasone (FLONASE) 50 mcg/act nasal spray, 1 spray into each nostril daily, Disp: 18.2 mL, Rfl: 5    gabapentin (NEURONTIN) 300 mg capsule, Take 1 capsule (300 mg total) by mouth 3 (three) times a day, Disp: 60 capsule, Rfl: 8    glimepiride (AMARYL) 2 mg tablet, take 1 tablet by mouth once daily with breakfast, Disp: 30 tablet, Rfl: 5    Insulin Pen Needle (B-D UF III MINI PEN NEEDLES) 31G X 5 MM MISC, Use once daily as directed., Disp: 100 each, Rfl: 1    lisinopril (ZESTRIL) 2.5 mg tablet, take 1 tablet by mouth once daily, Disp: 90 tablet, Rfl: 1    loratadine (CLARITIN) 10 mg tablet, Take 1 tablet (10 mg total) by mouth daily, Disp: 90 tablet, Rfl: 1    omeprazole (PriLOSEC) 40 MG capsule, swallow 1 capsule once daily, Disp: 30 capsule, Rfl: 5    polymyxin b-trimethoprim (POLYTRIM) ophthalmic solution, Administer 1 drop into the left eye every 4 (four) hours, Disp: 10 mL, Rfl: 0    EPINEPHrine (EPIPEN) 0.3 mg/0.3 mL SOAJ, Inject 0.3 mL (0.3 mg total) into a muscle once for 1 dose, Disp: 0.3 mL, Rfl: 0    liraglutide (Victoza) injection, INJECT 1.2 MILLIGRAM SUBCUTANEOUSLY DAILY, Disp: 3 mL, Rfl: 5    venlafaxine (EFFEXOR-XR) 150 mg 24 hr capsule, Take 150 mg by mouth daily  , Disp: , Rfl:     Allergies   Allergen Reactions    Iodinated Contrast Media  Anaphylaxis    Iodine - Food Allergy Anaphylaxis     IVP DYE     Nuts - Food Allergy Anaphylaxis     Annotation - [id number]: Pine nuts       Review of Systems   Constitutional:  Negative for chills and fever.   HENT:  Negative for ear pain and sore throat.    Eyes:  Negative for pain and visual disturbance.   Respiratory:  Negative for cough and shortness of breath.    Cardiovascular:  Negative for chest pain and palpitations.   Gastrointestinal:  Negative for abdominal pain and vomiting.   Genitourinary:  Negative for dysuria and hematuria.   Musculoskeletal:  Negative for arthralgias and back pain.   Skin:  Negative for color change and rash.   Neurological:  Negative for seizures and syncope.   All other systems reviewed and are negative.       Objective:  /82 (BP Location: Left arm, Patient Position: Sitting, Cuff Size: Large)   Pulse 85   Ht 5' (1.524 m)   Wt 90.7 kg (200 lb) Comment: reported  LMP 05/20/2019   BMI 39.06 kg/m²     Ortho Exam  bilateral wrist and thumb -  Patient presents withno obvious anatomical deformity  Skin is warm and dry to touch with no signs of erythema, ecchymosis, infection  ROM limited due to stiffness  TTP over MCP of thumb  Mild generalized soft tissue swelling or effusion noted  Full FDS, FDP, extensor mechanisms are intact  + Thenar atrophy, - intrinsic atrophy  Demonstrates normal wrist, elbow, and shoulder motion  Forearm compartments are soft and supple  2+ Distal radial pulse with brisk capillary refill to the fingers  Radial, median, ulnar motor and sensory distribution intact  Sensation to light touch intact distally      Physical Exam  Vitals and nursing note reviewed.   Constitutional:       General: She is not in acute distress.     Appearance: She is well-developed.   HENT:      Head: Normocephalic and atraumatic.   Eyes:      Conjunctiva/sclera: Conjunctivae normal.   Cardiovascular:      Rate and Rhythm: Normal rate and regular rhythm.      Heart sounds:  No murmur heard.  Pulmonary:      Effort: Pulmonary effort is normal. No respiratory distress.      Breath sounds: Normal breath sounds.   Abdominal:      Palpations: Abdomen is soft.      Tenderness: There is no abdominal tenderness.   Musculoskeletal:         General: No swelling.      Cervical back: Neck supple.   Skin:     General: Skin is warm and dry.      Capillary Refill: Capillary refill takes less than 2 seconds.   Neurological:      Mental Status: She is alert.   Psychiatric:         Mood and Affect: Mood normal.          Diagnostic Test Review:  No new images to review    Hand/upper extremity injection  Universal Protocol:  procedure performed by consultantConsent: Verbal consent obtained.  Risks and benefits: risks, benefits and alternatives were discussed  Consent given by: patient  Timeout called at: 9/10/2024 11:20 AM.  Patient understanding: patient states understanding of the procedure being performed  Site marked: the operative site was marked  Radiology Images displayed and confirmed. If images not available, report reviewed: imaging studies available  Patient identity confirmed: verbally with patient  Supporting Documentation  Indications: joint swelling and pain   Procedure Details  Condition comment: Right Thumb Arthritis   Preparation: Patient was prepped and draped in the usual sterile fashion  Needle size: 25 G  Ultrasound guidance: no  Approach: lateral  Medications administered: 6 mg betamethasone acetate-betamethasone sodium phosphate 6 (3-3) mg/mL; 4 mL bupivacaine 0.25 %  Patient tolerance: patient tolerated the procedure well with no immediate complications  Dressing:  Sterile dressing applied       Hand/upper extremity injection  Universal Protocol:  procedure performed by consultantConsent: Verbal consent obtained.  Risks and benefits: risks, benefits and alternatives were discussed  Consent given by: patient  Timeout called at: 9/10/2024 11:20 AM.  Patient understanding: patient  states understanding of the procedure being performed  Site marked: the operative site was marked  Radiology Images displayed and confirmed. If images not available, report reviewed: imaging studies available  Patient identity confirmed: verbally with patient  Supporting Documentation  Indications: joint swelling and pain   Procedure Details  Condition comment: Left thumb arthritis   Preparation: Patient was prepped and draped in the usual sterile fashion  Needle size: 25 G  Ultrasound guidance: no  Approach: lateral  Medications administered: 6 mg betamethasone acetate-betamethasone sodium phosphate 6 (3-3) mg/mL; 4 mL bupivacaine 0.25 %  Patient tolerance: patient tolerated the procedure well with no immediate complications  Dressing:  Sterile dressing applied            Scribe Attestation      I,:  Alice Randle am acting as a scribe while in the presence of the attending physician.:       I,:  Benja Sanches DO personally performed the services described in this documentation    as scribed in my presence.:

## 2024-09-19 DIAGNOSIS — E78.00 HYPERCHOLESTEROLEMIA: ICD-10-CM

## 2024-09-19 RX ORDER — ATORVASTATIN CALCIUM 40 MG/1
40 TABLET, FILM COATED ORAL DAILY
Qty: 90 TABLET | Refills: 1 | Status: SHIPPED | OUTPATIENT
Start: 2024-09-19

## 2024-09-23 DIAGNOSIS — E11.65 TYPE 2 DIABETES MELLITUS WITH HYPERGLYCEMIA, WITHOUT LONG-TERM CURRENT USE OF INSULIN (HCC): ICD-10-CM

## 2024-09-24 RX ORDER — GLIMEPIRIDE 2 MG/1
2 TABLET ORAL
Qty: 90 TABLET | Refills: 1 | Status: SHIPPED | OUTPATIENT
Start: 2024-09-24

## 2024-09-25 ENCOUNTER — VBI (OUTPATIENT)
Dept: ADMINISTRATIVE | Facility: OTHER | Age: 54
End: 2024-09-25

## 2024-09-25 NOTE — TELEPHONE ENCOUNTER
09/25/24 12:26 PM     Chart reviewed for Hemoglobin A1c was/were submitted to the patient's insurance.     Daphney Miranda   [facility]

## 2024-10-05 DIAGNOSIS — K21.9 GASTROESOPHAGEAL REFLUX DISEASE WITHOUT ESOPHAGITIS: ICD-10-CM

## 2024-10-07 RX ORDER — OMEPRAZOLE 40 MG/1
CAPSULE, DELAYED RELEASE ORAL
Qty: 90 CAPSULE | Refills: 1 | Status: SHIPPED | OUTPATIENT
Start: 2024-10-07

## 2024-10-10 DIAGNOSIS — I10 ESSENTIAL HYPERTENSION: ICD-10-CM

## 2024-10-10 RX ORDER — LISINOPRIL 2.5 MG/1
2.5 TABLET ORAL DAILY
Qty: 90 TABLET | Refills: 1 | Status: SHIPPED | OUTPATIENT
Start: 2024-10-10

## 2024-10-17 DIAGNOSIS — J30.2 SEASONAL ALLERGIC RHINITIS, UNSPECIFIED TRIGGER: ICD-10-CM

## 2024-10-17 RX ORDER — LORATADINE 10 MG/1
10 TABLET ORAL DAILY
Qty: 90 TABLET | Refills: 1 | Status: SHIPPED | OUTPATIENT
Start: 2024-10-17

## 2024-10-22 ENCOUNTER — OFFICE VISIT (OUTPATIENT)
Dept: PULMONOLOGY | Facility: CLINIC | Age: 54
End: 2024-10-22
Payer: COMMERCIAL

## 2024-10-22 VITALS
RESPIRATION RATE: 18 BRPM | SYSTOLIC BLOOD PRESSURE: 110 MMHG | WEIGHT: 208 LBS | TEMPERATURE: 98.7 F | DIASTOLIC BLOOD PRESSURE: 60 MMHG | HEART RATE: 68 BPM | OXYGEN SATURATION: 98 % | BODY MASS INDEX: 40.62 KG/M2

## 2024-10-22 DIAGNOSIS — J30.2 SEASONAL ALLERGIC RHINITIS, UNSPECIFIED TRIGGER: ICD-10-CM

## 2024-10-22 DIAGNOSIS — F17.211 CIGARETTE NICOTINE DEPENDENCE IN REMISSION: ICD-10-CM

## 2024-10-22 DIAGNOSIS — J45.30 MILD PERSISTENT ASTHMA WITHOUT COMPLICATION: ICD-10-CM

## 2024-10-22 DIAGNOSIS — G47.33 OSA (OBSTRUCTIVE SLEEP APNEA): Primary | ICD-10-CM

## 2024-10-22 PROCEDURE — 99213 OFFICE O/P EST LOW 20 MIN: CPT

## 2024-10-22 NOTE — PROGRESS NOTES
Pulmonary Follow Up Note  Katie Dye 54 y.o. female MRN: [id number]  10/22/2024    Assessment:    Mild persistent asthma without complication  -Remains well controlled without any recent exacerbations. No significant daily pulmonary symptoms. Using low-dose Symbicort PRN, last use 1 month ago when cutting grass. Lungs are clear on exam today  -She will continue low-dose Symbicort PRN      ANSHUL (obstructive sleep apnea)  -Patient cancelled her mask fitting appointment in August at the [facility] office in [place] because she couldn't get a ride. Was supposed to be scheduled at [place] office.  -Compliance report reviewed from 9/22/24-10/21/24. Shows 97% usage with avg nightly use of 6 hrs 41 mins. Still with high mask leakage of 55 L/min. Elevated residual AHI of 6.2. Patient still feeling sleepy during the day  -Rescheduled mask fitting appointment for Monday, 10/28/2024 at the [place] office  -Will follow-up in 3 months to review compliance data again    Seasonal allergic rhinitis  -Continue Claritin and Flonase PRN    Cigarette nicotine dependence in remission  -30-pack-year history quit 12 years ago  -Lung cancer screening performed in August, negative for new or suspicious nodules  -Continue yearly CT lung cancer screening due August 2025 until patient is smoke-free x 15 years        Plan:    Diagnoses and all orders for this visit:    ANSHUL (obstructive sleep apnea)    Cigarette nicotine dependence in remission    Mild persistent asthma without complication    Seasonal allergic rhinitis, unspecified trigger              Return in about 3 months (around 1/22/2025).        History of Present Illness     Chief Complaint:   Chief Complaint   Patient presents with    Follow-up       Patient ID: Katie FONTENOT is a 54 y.o. y.o. female has a past medical history of Abnormal ECG, Allergic (1969), Anemia (1982), Anxiety, Arthritis, Asthma, Bronchiectasis (HCC), Callus, Cancer (HCC), Cataract (2021), Chronic bronchitis (HCC)  (1984), Chronic kidney disease (1989), COPD (chronic obstructive pulmonary disease) ([facility]) (2005), Depression, Diabetes mellitus ([facility]), Difficulty walking, Diverticulitis, Diverticulitis of colon (2014), GERD (gastroesophageal reflux disease), Heart murmur (1994), Hyperlipidemia associated with type 2 diabetes mellitus  (HCC), Hypertension, Inflammatory bowel disease (1991), Insulinoma, Liver disease, Lung disease, Memory loss (2004), Miscarriage (1989), Myocardial infarction ([facility]) (2008), Non-recurrent acute suppurative otitis media of left ear without spontaneous rupture of tympanic membrane (08/13/2019), Obesity (1998), Peptic ulceration, Plantar fasciitis (04/15/2022), Pulmonary emboli (HCC), Rheumatoid arthritis ([facility]) (1998), Sleep apnea, Sleep apnea, obstructive, Stroke ([facility]), and Visual impairment (1983).      10/22/2024  HPI: Katie Dye is a 54 y.o. female with a history of asthma, ANSHUL on CPAP, DM 2, PLMD, obesity, PE in 2017, reflux, and depression.  She is a former smoker with 30-pack-year history, quit 11 years ago. She is here today for a follow up visit. Patient was previously seen in the office 3 months ago.  She was having mask leaking issues, elevated residual AHI score, and daytime sleepiness.  She was sent for mask fitting appointment.  She also had CT lung cancer screening in August which was normal.  Maintained on Symbicort and albuterol for asthma, Claritin and Flonase for allergies.     Patient returns today, tells me she did not go to mask imaging appointment in August stating that it was moved from the [place] office to the [facility] office in [place] and could not get a ride.  Still has the same mask.  She has been wearing the CPAP nightly, but still feeling sleepy during the day.  Her CPAP compliance data was reviewed and still showed high mask leakage with elevated residual AHI  Patient's asthma remains well-controlled, on low-dose PRN Symbicort with last use 1 month ago after cutting  grass.  Denies any significant daily pulmonary symptoms.  No nocturnal symptoms. Otherwise no concerns or complaints today.    Review of Systems   Constitutional:  Negative for activity change, chills, diaphoresis, fever and unexpected weight change.   HENT:  Negative for congestion, ear pain, postnasal drip, rhinorrhea, sneezing, sore throat, trouble swallowing and voice change.    Respiratory:  Negative for cough, chest tightness, shortness of breath and wheezing.    Cardiovascular:  Negative for chest pain, palpitations and leg swelling.   Allergic/Immunologic: Negative.        Historical Information   Past Medical History:   Diagnosis Date    Abnormal ECG     Allergic 1969    Anemia     Anxiety     Arthritis     Asthma     Bronchiectasis (HCC)     Callus     Cancer (HCC)     renal,cervical    Cataract     Chronic bronchitis (HCC)     Chronic kidney disease     COPD (chronic obstructive pulmonary disease) (HCC)     Depression     Diabetes mellitus (HCC)     Difficulty walking     Diverticulitis     Diverticulitis of colon     GERD (gastroesophageal reflux disease)     Heart murmur     Hyperlipidemia associated with type 2 diabetes mellitus  (HCC)     Hypertension     Inflammatory bowel disease     Insulinoma     Liver disease     Lung disease     Memory loss     Traumatic brain injury treated    Miscarriage     Myocardial infarction (HCC)     Non-recurrent acute suppurative otitis media of left ear without spontaneous rupture of tympanic membrane 2019    Obesity 1998    Peptic ulceration     Plantar fasciitis 04/15/2022    Pulmonary emboli (HCC)     Rheumatoid arthritis (HCC) 1998    Sleep apnea     Sleep apnea, obstructive     Stroke (HCC)     Visual impairment      Past Surgical History:   Procedure Laterality Date    ABDOMINAL SURGERY  1990    Pancreatectomy (partial)     SECTION      twice    CHOLECYSTECTOMY      EYE SURGERY      KNEE  ARTHROSCOPY Left     KNEE SURGERY      PANCREATECTOMY      Partial     SD COLONOSCOPY FLX DX W/COLLJ SPEC WHEN PFRMD N/A 04/17/2019    Procedure: COLONOSCOPY;  Surgeon: Sharon Tejada DO;  Location: [facility];  Service: Gastroenterology    SD LAPAROSCOPY SURG CHOLECYSTECTOMY N/A 08/01/2018    Procedure: CHOLECYSTECTOMY LAPAROSCOPIC;  Surgeon: Demar Tinsley DO;  Location: [facility];  Service: General    TRACHEOSTOMY  1981    TUBAL LIGATION  1998     Family History   Problem Relation Age of Onset    Heart disease Mother     Hyperlipidemia Mother     Obesity Mother     Hypertension Mother     Other Mother         Bundle branch block    Tuberculosis Mother     Arthritis Mother     Diabetes Mother     Osteoporosis Mother     Ulcerative colitis Mother     Asthma Mother     COPD Mother     Coronary artery disease Mother     Vision loss Mother         Cataracts    Substance Abuse Father     Cancer Father     Alcohol abuse Father     Mental illness Father     Psychiatric Illness Father     Psychosis Father     Schizoaffective Disorder  Father     Schizophrenia Father     Self-Injury Father     Suicide Attempts Father     No Known Problems Sister     Anxiety disorder Sister     No Known Problems Daughter     Other Maternal Grandmother         ascending aortic aneurysm resection    Diabetes Maternal Grandmother     Diabetes Paternal Grandmother     No Known Problems Brother     Tuberculosis Maternal Aunt     No Known Problems Paternal Aunt     No Known Problems Paternal Aunt     No Known Problems Paternal Aunt     No Known Problems Paternal Aunt     No Known Problems Paternal Aunt     Breast cancer Other 88    Substance Abuse Family     Osteoporosis Family     No Known Problems Half-Sister        Smoking history: She reports that she quit smoking about 12 years ago. Her smoking use included cigarettes. She started smoking about 42 years ago. She has a 30.1 pack-year smoking history. She has never been exposed to tobacco  smoke. She has never used smokeless tobacco.    Occupational History:     Immunization History   Administered Date(s) Administered    COVID-19 MODERNA VACC 0.5 ML IM 04/01/2021, 04/29/2021, 12/23/2021    COVID-19 Moderna mRNA Vaccine 12 Yr+ 50 mcg/0.5 mL (Spikevax) 10/10/2023    Hep A / Hep B 05/18/2016, 09/08/2016    Hep A, adult 12/08/2016    Hep B, adult 12/08/2016    INFLUENZA 08/13/2014, 08/10/2015, 10/01/2016, 10/11/2016, 09/21/2017, 09/20/2019, 09/01/2020, 12/05/2022    Influenza Quadrivalent Preservative Free 3 years and older IM 09/21/2017    Influenza Quadrivalent, 6-35 Months IM 10/01/2016    Influenza, injectable, quadrivalent, preservative free 0.5 mL 09/23/2019, 09/01/2020, 09/29/2023    Influenza, recombinant, quadrivalent,injectable, preservative free 10/10/2018, 09/17/2021, 12/05/2022    Influenza, seasonal, injectable 1970, 09/15/2011, 12/03/2012, 10/17/2013, 08/01/2015    Pneumococcal Conjugate 13-Valent 10/15/2016    Pneumococcal Polysaccharide PPV23 05/18/2016    Tdap 12/03/2012, 09/06/2023    Zoster Vaccine Recombinant 10/31/2019, 01/04/2020       Meds/Allergies     Current Outpatient Medications:     ARIPiprazole (ABILIFY) 10 mg tablet, Take 10 mg by mouth daily, Disp: , Rfl:     atorvastatin (LIPITOR) 40 mg tablet, take 1 tablet by mouth once daily, Disp: 90 tablet, Rfl: 1    budesonide-formoterol (Symbicort) 80-4.5 MCG/ACT inhaler, Inhale 2 puffs 2 (two) times a day Rinse mouth after use. (Patient taking differently: Inhale 2 puffs 2 (two) times a day Rinse mouth after use. Only taking as needed), Disp: 10.2 g, Rfl: 5    Canagliflozin-metFORMIN HCl (Invokamet) 150-1000 MG TABS, TAKE 1 TABLET BY MOUTH TWICE A DAY, Disp: 180 tablet, Rfl: 1    ciclopirox (PENLAC) 8 % solution, Apply topically daily at bedtime, Disp: 6.6 mL, Rfl: 3    gabapentin (NEURONTIN) 300 mg capsule, Take 1 capsule (300 mg total) by mouth 3 (three) times a day, Disp: 60 capsule, Rfl: 8    glimepiride (AMARYL) 2 mg  tablet, take 1 tablet by mouth once daily with breakfast, Disp: 90 tablet, Rfl: 1    Insulin Pen Needle (B-D UF III MINI PEN NEEDLES) 31G X 5 MM MISC, Use once daily as directed., Disp: 100 each, Rfl: 1    liraglutide (Victoza) injection, INJECT 1.2 MILLIGRAM SUBCUTANEOUSLY DAILY, Disp: 3 mL, Rfl: 5    lisinopril (ZESTRIL) 2.5 mg tablet, take 1 tablet by mouth once daily, Disp: 90 tablet, Rfl: 1    loratadine (CLARITIN) 10 mg tablet, take 1 tablet by mouth once daily, Disp: 90 tablet, Rfl: 1    omeprazole (PriLOSEC) 40 MG capsule, swallow 1 capsule once daily, Disp: 90 capsule, Rfl: 1    venlafaxine (EFFEXOR-XR) 150 mg 24 hr capsule, Take 150 mg by mouth daily  , Disp: , Rfl:     EPINEPHrine (EPIPEN) 0.3 mg/0.3 mL SOAJ, Inject 0.3 mL (0.3 mg total) into a muscle once for 1 dose, Disp: 0.3 mL, Rfl: 0    fluticasone (FLONASE) 50 mcg/act nasal spray, 1 spray into each nostril daily (Patient not taking: Reported on 10/22/2024), Disp: 18.2 mL, Rfl: 5    polymyxin b-trimethoprim (POLYTRIM) ophthalmic solution, Administer 1 drop into the left eye every 4 (four) hours, Disp: 10 mL, Rfl: 0  Allergies:   Allergies   Allergen Reactions    Iodinated Contrast Media Anaphylaxis    Iodine - Food Allergy Anaphylaxis     IVP DYE     Nuts - Food Allergy Anaphylaxis     Annotation - [id number]: Pine nuts         Vitals:  Vitals:    10/22/24 1351   BP: 110/60   BP Location: Right arm   Patient Position: Sitting   Cuff Size: Large   Pulse: 68   Resp: 18   Temp: 98.7 °F (37.1 °C)   TempSrc: Temporal   SpO2: 98%   Weight: 94.3 kg (208 lb)           Oxygen Therapy  SpO2: 98 %  .  Wt Readings from Last 3 Encounters:   10/22/24 94.3 kg (208 lb)   09/10/24 90.7 kg (200 lb)   09/04/24 90.7 kg (200 lb)     Body mass index is 40.62 kg/m².    Physical Exam  Vitals and nursing note reviewed.   Constitutional:       General: She is not in acute distress.     Appearance: Normal appearance. She is well-developed. She is obese.   Cardiovascular:       "Rate and Rhythm: Normal rate and regular rhythm.      Heart sounds: Normal heart sounds, S1 normal and S2 normal. No murmur heard.  Pulmonary:      Effort: Pulmonary effort is normal.      Breath sounds: No decreased breath sounds, wheezing, rhonchi or rales.   Musculoskeletal:         General: No swelling.      Right lower leg: No edema.      Left lower leg: No edema.   Neurological:      Mental Status: She is alert.   Psychiatric:         Mood and Affect: Mood and affect normal.         Behavior: Behavior normal. Behavior is cooperative.           Labs: I have personally reviewed pertinent lab results.  Lab Results   Component Value Date    WBC 5.72 12/09/2023    HGB 14.1 12/09/2023    HCT 44.7 12/09/2023    MCV 93 12/09/2023     12/09/2023     Lab Results   Component Value Date    GLUCOSE 116 11/19/2015    CALCIUM 9.1 12/09/2023     06/09/2018    K 4.0 12/09/2023    CO2 27 12/09/2023     12/09/2023    BUN 17 12/09/2023    CREATININE 0.75 12/09/2023     No results found for: \"IGE\"  Lab Results   Component Value Date    ALT 15 12/09/2023    AST 12 (L) 12/09/2023    ALKPHOS 82 12/09/2023    BILITOT 0.4 06/09/2018       Imaging and other studies: I have personally reviewed pertinent reports and I have personally reviewed pertinent films in PACS     CT lung screening 8/13/2024  Right middle lobe perifissural nodule most likely an intrapulmonary node is unchanged.  Simple pulmonary cyst in the right lower lobe unchanged.  No suspicious pulmonary nodules.  Stable exam.    CT lung screening 7/15/2023  Benign 2 mm right upper lobe pulmonary nodule.  No new or enlarging pulmonary nodules.    Pulmonary function testing:     Pulmonary Functions Testing Results: 10/7/2020    FEV1/FVC ratio 75%    FEV1 84% predicted  FVC 84% predicted  (-) response to bronchodilators  TLC 86 % predicted   % predicted  DLCO corrected for hemoglobin 86 % predicted    Impression: No obstructive airflow defect, however FEF " 25 to 75% is significantly decreased which can be consistent with small airways disease.  Flow-volume curve shape on exhalation also consistent with obstruction.  No significant response to administration of bronchodilators.  Normal lung volumes.  Normal diffusion capacity

## 2024-10-22 NOTE — ASSESSMENT & PLAN NOTE
-30-pack-year history quit 12 years ago  -Lung cancer screening performed in August, negative for new or suspicious nodules  -Continue yearly CT lung cancer screening due August 2025 until patient is smoke-free x 15 years

## 2024-10-22 NOTE — ASSESSMENT & PLAN NOTE
-Patient cancelled her mask fitting appointment in August at the [facility] office in [place] because she couldn't get a ride. Was supposed to be scheduled at [facility].  -Compliance report reviewed from 9/22/24-10/21/24. Shows 97% usage with avg nightly use of 6 hrs 41 mins. Still with high mask leakage of 55 L/min. Elevated residual AHI of 6.2. Patient still feeling sleepy during the day  -Rescheduled mask fitting appointment for Monday, 10/28/2024 at the [place] office  -Will follow-up in 3 months to review compliance data again

## 2024-10-22 NOTE — ASSESSMENT & PLAN NOTE
-Remains well controlled without any recent exacerbations. No significant daily pulmonary symptoms. Using low-dose Symbicort PRN, last use 1 month ago when cutting grass. Lungs are clear on exam today  -She will continue low-dose Symbicort PRN

## 2024-11-04 DIAGNOSIS — E11.65 TYPE 2 DIABETES MELLITUS WITH HYPERGLYCEMIA, WITHOUT LONG-TERM CURRENT USE OF INSULIN (HCC): ICD-10-CM

## 2024-11-05 RX ORDER — CANAGLIFLOZIN AND METFORMIN HYDROCHLORIDE 150; 1000 MG/1; MG/1
1 TABLET, FILM COATED ORAL 2 TIMES DAILY
Qty: 180 TABLET | Refills: 1 | Status: SHIPPED | OUTPATIENT
Start: 2024-11-05

## 2024-11-14 ENCOUNTER — APPOINTMENT (OUTPATIENT)
Dept: LAB | Facility: CLINIC | Age: 54
End: 2024-11-14
Payer: COMMERCIAL

## 2024-11-14 ENCOUNTER — APPOINTMENT (OUTPATIENT)
Dept: RADIOLOGY | Facility: CLINIC | Age: 54
End: 2024-11-14
Payer: COMMERCIAL

## 2024-11-14 ENCOUNTER — OFFICE VISIT (OUTPATIENT)
Dept: INTERNAL MEDICINE CLINIC | Facility: CLINIC | Age: 54
End: 2024-11-14
Payer: COMMERCIAL

## 2024-11-14 VITALS
HEIGHT: 60 IN | TEMPERATURE: 98.2 F | BODY MASS INDEX: 41.62 KG/M2 | DIASTOLIC BLOOD PRESSURE: 70 MMHG | SYSTOLIC BLOOD PRESSURE: 102 MMHG | OXYGEN SATURATION: 98 % | WEIGHT: 212 LBS | HEART RATE: 107 BPM

## 2024-11-14 DIAGNOSIS — Z13.0 SCREENING FOR DEFICIENCY ANEMIA: ICD-10-CM

## 2024-11-14 DIAGNOSIS — S16.1XXA STRAIN OF NECK MUSCLE, INITIAL ENCOUNTER: Primary | ICD-10-CM

## 2024-11-14 DIAGNOSIS — E78.5 HYPERLIPIDEMIA ASSOCIATED WITH TYPE 2 DIABETES MELLITUS  (HCC): ICD-10-CM

## 2024-11-14 DIAGNOSIS — E11.69 HYPERLIPIDEMIA ASSOCIATED WITH TYPE 2 DIABETES MELLITUS  (HCC): ICD-10-CM

## 2024-11-14 DIAGNOSIS — E55.9 VITAMIN D DEFICIENCY: ICD-10-CM

## 2024-11-14 DIAGNOSIS — M54.12 CERVICAL RADICULITIS: ICD-10-CM

## 2024-11-14 DIAGNOSIS — Z23 NEED FOR COVID-19 VACCINE: ICD-10-CM

## 2024-11-14 DIAGNOSIS — E11.65 TYPE 2 DIABETES MELLITUS WITH HYPERGLYCEMIA, WITHOUT LONG-TERM CURRENT USE OF INSULIN (HCC): ICD-10-CM

## 2024-11-14 DIAGNOSIS — Z23 ENCOUNTER FOR IMMUNIZATION: ICD-10-CM

## 2024-11-14 DIAGNOSIS — Z13.29 SCREENING FOR THYROID DISORDER: ICD-10-CM

## 2024-11-14 DIAGNOSIS — E11.8 DIABETIC FOOT (HCC): ICD-10-CM

## 2024-11-14 DIAGNOSIS — S16.1XXA STRAIN OF NECK MUSCLE, INITIAL ENCOUNTER: ICD-10-CM

## 2024-11-14 LAB
25(OH)D3 SERPL-MCNC: 30.6 NG/ML (ref 30–100)
ALBUMIN SERPL BCG-MCNC: 4.2 G/DL (ref 3.5–5)
ALP SERPL-CCNC: 79 U/L (ref 34–104)
ALT SERPL W P-5'-P-CCNC: 21 U/L (ref 7–52)
ANION GAP SERPL CALCULATED.3IONS-SCNC: 8 MMOL/L (ref 4–13)
AST SERPL W P-5'-P-CCNC: 15 U/L (ref 13–39)
BASOPHILS # BLD AUTO: 0.03 THOUSANDS/ÂΜL (ref 0–0.1)
BASOPHILS NFR BLD AUTO: 0 % (ref 0–1)
BILIRUB SERPL-MCNC: 0.21 MG/DL (ref 0.2–1)
BUN SERPL-MCNC: 22 MG/DL (ref 5–25)
CALCIUM SERPL-MCNC: 9.5 MG/DL (ref 8.4–10.2)
CHLORIDE SERPL-SCNC: 101 MMOL/L (ref 96–108)
CHOLEST SERPL-MCNC: 167 MG/DL (ref ?–200)
CO2 SERPL-SCNC: 30 MMOL/L (ref 21–32)
CREAT SERPL-MCNC: 0.79 MG/DL (ref 0.6–1.3)
CREAT UR-MCNC: 32.8 MG/DL
EOSINOPHIL # BLD AUTO: 0.08 THOUSAND/ÂΜL (ref 0–0.61)
EOSINOPHIL NFR BLD AUTO: 1 % (ref 0–6)
ERYTHROCYTE [DISTWIDTH] IN BLOOD BY AUTOMATED COUNT: 13.7 % (ref 11.6–15.1)
EST. AVERAGE GLUCOSE BLD GHB EST-MCNC: 192 MG/DL
GFR SERPL CREATININE-BSD FRML MDRD: 83 ML/MIN/1.73SQ M
GLUCOSE P FAST SERPL-MCNC: 127 MG/DL (ref 65–99)
HBA1C MFR BLD: 8.3 %
HCT VFR BLD AUTO: 44.8 % (ref 34.8–46.1)
HDLC SERPL-MCNC: 47 MG/DL
HGB BLD-MCNC: 14.1 G/DL (ref 11.5–15.4)
IMM GRANULOCYTES # BLD AUTO: 0.02 THOUSAND/UL (ref 0–0.2)
IMM GRANULOCYTES NFR BLD AUTO: 0 % (ref 0–2)
LDLC SERPL CALC-MCNC: 85 MG/DL (ref 0–100)
LYMPHOCYTES # BLD AUTO: 2.61 THOUSANDS/ÂΜL (ref 0.6–4.47)
LYMPHOCYTES NFR BLD AUTO: 37 % (ref 14–44)
MCH RBC QN AUTO: 28.7 PG (ref 26.8–34.3)
MCHC RBC AUTO-ENTMCNC: 31.5 G/DL (ref 31.4–37.4)
MCV RBC AUTO: 91 FL (ref 82–98)
MICROALBUMIN UR-MCNC: <7 MG/L
MONOCYTES # BLD AUTO: 0.58 THOUSAND/ÂΜL (ref 0.17–1.22)
MONOCYTES NFR BLD AUTO: 8 % (ref 4–12)
NEUTROPHILS # BLD AUTO: 3.65 THOUSANDS/ÂΜL (ref 1.85–7.62)
NEUTS SEG NFR BLD AUTO: 54 % (ref 43–75)
NONHDLC SERPL-MCNC: 120 MG/DL
NRBC BLD AUTO-RTO: 0 /100 WBCS
PLATELET # BLD AUTO: 207 THOUSANDS/UL (ref 149–390)
PMV BLD AUTO: 11.4 FL (ref 8.9–12.7)
POTASSIUM SERPL-SCNC: 4.4 MMOL/L (ref 3.5–5.3)
PROT SERPL-MCNC: 6.8 G/DL (ref 6.4–8.4)
RBC # BLD AUTO: 4.92 MILLION/UL (ref 3.81–5.12)
SODIUM SERPL-SCNC: 139 MMOL/L (ref 135–147)
TRIGL SERPL-MCNC: 177 MG/DL (ref ?–150)
TSH SERPL DL<=0.05 MIU/L-ACNC: 3.15 UIU/ML (ref 0.45–4.5)
WBC # BLD AUTO: 6.97 THOUSAND/UL (ref 4.31–10.16)

## 2024-11-14 PROCEDURE — 82043 UR ALBUMIN QUANTITATIVE: CPT

## 2024-11-14 PROCEDURE — 83036 HEMOGLOBIN GLYCOSYLATED A1C: CPT

## 2024-11-14 PROCEDURE — 82306 VITAMIN D 25 HYDROXY: CPT

## 2024-11-14 PROCEDURE — 99214 OFFICE O/P EST MOD 30 MIN: CPT | Performed by: PHYSICIAN ASSISTANT

## 2024-11-14 PROCEDURE — 90480 ADMN SARSCOV2 VAC 1/ONLY CMP: CPT

## 2024-11-14 PROCEDURE — 80061 LIPID PANEL: CPT

## 2024-11-14 PROCEDURE — 90471 IMMUNIZATION ADMIN: CPT

## 2024-11-14 PROCEDURE — 90673 RIV3 VACCINE NO PRESERV IM: CPT

## 2024-11-14 PROCEDURE — 82570 ASSAY OF URINE CREATININE: CPT

## 2024-11-14 PROCEDURE — 91320 SARSCV2 VAC 30MCG TRS-SUC IM: CPT

## 2024-11-14 PROCEDURE — 85025 COMPLETE CBC W/AUTO DIFF WBC: CPT

## 2024-11-14 PROCEDURE — 80053 COMPREHEN METABOLIC PANEL: CPT

## 2024-11-14 PROCEDURE — 84443 ASSAY THYROID STIM HORMONE: CPT

## 2024-11-14 PROCEDURE — 36415 COLL VENOUS BLD VENIPUNCTURE: CPT

## 2024-11-14 PROCEDURE — 72050 X-RAY EXAM NECK SPINE 4/5VWS: CPT

## 2024-11-14 RX ORDER — DICLOFENAC SODIUM 75 MG/1
75 TABLET, DELAYED RELEASE ORAL 2 TIMES DAILY
Qty: 60 TABLET | Refills: 2 | Status: SHIPPED | OUTPATIENT
Start: 2024-11-14

## 2024-11-14 RX ORDER — CYCLOBENZAPRINE HCL 10 MG
10 TABLET ORAL 3 TIMES DAILY PRN
Qty: 30 TABLET | Refills: 0 | Status: SHIPPED | OUTPATIENT
Start: 2024-11-14 | End: 2024-11-18

## 2024-11-17 DIAGNOSIS — S16.1XXA STRAIN OF NECK MUSCLE, INITIAL ENCOUNTER: ICD-10-CM

## 2024-11-18 PROBLEM — S16.1XXA CERVICAL STRAIN: Status: ACTIVE | Noted: 2024-11-18

## 2024-11-18 RX ORDER — CYCLOBENZAPRINE HCL 10 MG
10 TABLET ORAL 3 TIMES DAILY PRN
Qty: 30 TABLET | Refills: 0 | Status: SHIPPED | OUTPATIENT
Start: 2024-11-18

## 2024-11-18 NOTE — PROGRESS NOTES
Name: Katie Dye      : 1970      MRN: [id number]  Encounter Provider: Gertrudis Obrien PA-C  Encounter Date: 2024   Encounter department: [facility]  :  Assessment & Plan  Diabetic foot (HCC)    Lab Results   Component Value Date    HGBA1C 8.3 (H) 2024            Encounter for immunization    Orders:    influenza vaccine, recombinant, PF, 0.5 mL IM (Flublok)    Vitamin D deficiency    Orders:    Vitamin D 25 hydroxy; Future    Screening for deficiency anemia    Orders:    CBC and differential; Future    Screening for thyroid disorder    Orders:    TSH, 3rd generation; Future    Hyperlipidemia associated with type 2 diabetes mellitus  (HCC)    Lab Results   Component Value Date    HGBA1C 8.3 (H) 2024       Orders:    Lipid panel; Future    Type 2 diabetes mellitus with hyperglycemia, without long-term current use of insulin (HCC)    Lab Results   Component Value Date    HGBA1C 8.3 (H) 2024     Pts symptoms are stable with current regime. No changes at present.       Orders:    Comprehensive metabolic panel; Future    Hemoglobin A1C; Future    Albumin / creatinine urine ratio; Future    Strain of neck muscle, initial encounter    Orders:    cyclobenzaprine (FLEXERIL) 10 mg tablet; Take 1 tablet (10 mg total) by mouth 3 (three) times a day as needed for muscle spasms    diclofenac (VOLTAREN) 75 mg EC tablet; Take 1 tablet (75 mg total) by mouth 2 (two) times a day    XR spine cervical complete 4 or 5 vw non injury; Future    Need for COVID-19 vaccine    Orders:    COVID-19 Pfizer mRNA vaccine 12 yr and older (Comirnaty pre-filled syringe)    Cervical radiculitis  Symptoms exacerbated with injury while walking her dog. Will get xray. Start flexeril and diclofenac. Directions for use and possible side effects discussed and patient verbalized understanding of these.                 Depression Screening and Follow-up Plan: Patient's depression screening was positive  with a PHQ-9 score of 5. Continue regular follow-up with their mental health provider who is managing their mental health condition(s).       History of Present Illness     Pt presents for routine visit. She is up to date with diabetic foot exam, lung screening CT, mammogram, diabetic eye exam and CRC screening. A1C is controlled at 6.8. She is due for labs and desires covid and flu vaccines. She is noting some left shoulder pain, denies injury. Pain is more posterior and along the trapezius. She notes she was walking her dog and he tugged the leash and believes this may have triggered her pain. ROM intact. She has some numbness and tingling in her fingers. No weakness.       Review of Systems   Constitutional:  Negative for chills and fever.   HENT:  Negative for congestion, ear pain, hearing loss, postnasal drip, rhinorrhea, sinus pressure, sinus pain, sore throat and trouble swallowing.    Eyes:  Negative for pain and visual disturbance.   Respiratory:  Negative for cough, chest tightness, shortness of breath and wheezing.    Cardiovascular: Negative.  Negative for chest pain, palpitations and leg swelling.   Gastrointestinal:  Negative for abdominal pain, blood in stool, constipation, diarrhea, nausea and vomiting.   Endocrine: Negative for cold intolerance, heat intolerance, polydipsia, polyphagia and polyuria.   Genitourinary:  Negative for difficulty urinating, dysuria, flank pain and urgency.   Musculoskeletal:  Positive for arthralgias. Negative for back pain, gait problem and myalgias.   Skin:  Negative for rash.   Allergic/Immunologic: Negative.    Neurological:  Negative for dizziness, weakness, light-headedness and headaches.   Hematological: Negative.    Psychiatric/Behavioral:  Negative for behavioral problems, dysphoric mood and sleep disturbance. The patient is not nervous/anxious.           Objective   /70 (Patient Position: Sitting, Cuff Size: Large)   Pulse (!) 107   Temp 98.2 °F (36.8  °C) (Tympanic)   Ht 5' (1.524 m)   Wt 96.2 kg (212 lb)   LMP 05/20/2019   SpO2 98%   BMI 41.40 kg/m²      Physical Exam  Vitals and nursing note reviewed.   Constitutional:       General: She is not in acute distress.     Appearance: Normal appearance. She is well-developed. She is not diaphoretic.   HENT:      Head: Normocephalic and atraumatic.      Right Ear: External ear normal.      Left Ear: External ear normal.      Nose: Nose normal.      Mouth/Throat:      Pharynx: No oropharyngeal exudate.   Eyes:      General: No scleral icterus.        Right eye: No discharge.         Left eye: No discharge.      Conjunctiva/sclera: Conjunctivae normal.      Pupils: Pupils are equal, round, and reactive to light.   Neck:      Thyroid: No thyromegaly.   Cardiovascular:      Rate and Rhythm: Normal rate and regular rhythm.      Heart sounds: Normal heart sounds. No murmur heard.     No friction rub. No gallop.   Pulmonary:      Effort: Pulmonary effort is normal. No respiratory distress.      Breath sounds: Normal breath sounds. No wheezing or rales.   Abdominal:      General: Bowel sounds are normal. There is no distension.      Palpations: Abdomen is soft.      Tenderness: There is no abdominal tenderness.   Musculoskeletal:         General: No deformity. Normal range of motion.      Left shoulder: Tenderness present.      Cervical back: Normal range of motion and neck supple.   Skin:     General: Skin is warm and dry.   Neurological:      Mental Status: She is alert and oriented to person, place, and time.      Cranial Nerves: No cranial nerve deficit.   Psychiatric:         Behavior: Behavior normal.         Thought Content: Thought content normal.         Judgment: Judgment normal.       Administrative Statements   I have spent a total time of 20 minutes in caring for this patient on the day of the visit/encounter including Risks and benefits of tx options, Instructions for management, Patient and family  education, Importance of tx compliance, Risk factor reductions, Impressions, Counseling / Coordination of care, Documenting in the medical record, and Reviewing / ordering tests, medicine, procedures  .

## 2024-11-18 NOTE — ASSESSMENT & PLAN NOTE
Lab Results   Component Value Date    HGBA1C 8.3 (H) 11/14/2024     Pts symptoms are stable with current regime. No changes at present.       Orders:    Comprehensive metabolic panel; Future    Hemoglobin A1C; Future    Albumin / creatinine urine ratio; Future

## 2024-11-18 NOTE — ASSESSMENT & PLAN NOTE
Symptoms exacerbated with injury while walking her dog. Will get xray. Start flexeril and diclofenac. Directions for use and possible side effects discussed and patient verbalized understanding of these.

## 2024-11-18 NOTE — ASSESSMENT & PLAN NOTE
Orders:    cyclobenzaprine (FLEXERIL) 10 mg tablet; Take 1 tablet (10 mg total) by mouth 3 (three) times a day as needed for muscle spasms    diclofenac (VOLTAREN) 75 mg EC tablet; Take 1 tablet (75 mg total) by mouth 2 (two) times a day    XR spine cervical complete 4 or 5 vw non injury; Future

## 2024-11-22 ENCOUNTER — RESULTS FOLLOW-UP (OUTPATIENT)
Dept: INTERNAL MEDICINE CLINIC | Facility: CLINIC | Age: 54
End: 2024-11-22

## 2024-11-22 NOTE — TELEPHONE ENCOUNTER
----- Message from Gertrudis Obrien PA-C sent at 11/22/2024 12:20 PM EST -----  Xray neck shows degenerative changes but otherwise looks good

## 2024-12-10 ENCOUNTER — OFFICE VISIT (OUTPATIENT)
Dept: OBGYN CLINIC | Facility: CLINIC | Age: 54
End: 2024-12-10
Payer: COMMERCIAL

## 2024-12-10 VITALS
DIASTOLIC BLOOD PRESSURE: 67 MMHG | SYSTOLIC BLOOD PRESSURE: 100 MMHG | HEIGHT: 60 IN | WEIGHT: 212 LBS | HEART RATE: 99 BPM | BODY MASS INDEX: 41.62 KG/M2

## 2024-12-10 DIAGNOSIS — M18.0 ARTHRITIS OF CARPOMETACARPAL (CMC) JOINT OF BOTH THUMBS: Primary | ICD-10-CM

## 2024-12-10 PROCEDURE — 20600 DRAIN/INJ JOINT/BURSA W/O US: CPT | Performed by: ORTHOPAEDIC SURGERY

## 2024-12-10 PROCEDURE — 99213 OFFICE O/P EST LOW 20 MIN: CPT | Performed by: ORTHOPAEDIC SURGERY

## 2024-12-10 RX ORDER — BETAMETHASONE SODIUM PHOSPHATE AND BETAMETHASONE ACETATE 3; 3 MG/ML; MG/ML
3 INJECTION, SUSPENSION INTRA-ARTICULAR; INTRALESIONAL; INTRAMUSCULAR; SOFT TISSUE
Status: COMPLETED | OUTPATIENT
Start: 2024-12-10 | End: 2024-12-10

## 2024-12-10 RX ORDER — BUPIVACAINE HYDROCHLORIDE 2.5 MG/ML
0.5 INJECTION, SOLUTION INFILTRATION; PERINEURAL
Status: COMPLETED | OUTPATIENT
Start: 2024-12-10 | End: 2024-12-10

## 2024-12-10 RX ADMIN — BUPIVACAINE HYDROCHLORIDE 0.5 ML: 2.5 INJECTION, SOLUTION INFILTRATION; PERINEURAL at 13:15

## 2024-12-10 RX ADMIN — BETAMETHASONE SODIUM PHOSPHATE AND BETAMETHASONE ACETATE 3 MG: 3; 3 INJECTION, SUSPENSION INTRA-ARTICULAR; INTRALESIONAL; INTRAMUSCULAR; SOFT TISSUE at 13:15

## 2024-12-10 NOTE — PROGRESS NOTES
Assessment/Plan:   Diagnoses and all orders for this visit:    Arthritis of carpometacarpal (CMC) joint of both thumbs  -     Hand/upper extremity injection  -     Hand/upper extremity injection  The patient has a history of bilateral osteoarthritis of the CMC joint at the thumb. She was offered and accepted an injection(s) of celestone and marcaine to her Bilateral thumb(s) for symptomatic relief of pain and inflammation. Patient tolerated the treatment(s) well. Ice and post injection protocol advised. Weightbearing activities as tolerated. She will be seen for follow-up in 3 months for re-evaluation. Patient expresses understanding and is in agreement with this treatment plan. The patient was given the opportunity to ask questions or present concerns.    The patient has bilateral basilar thumb arthritis.  Under aseptic technique, both thumbs were injected with Celestone and Marcaine.  She tolerated the procedures well.  Return back in 3 months for reevaluation        Subjective:   Patient ID: Katie Dye  1970     HPI  Patient is a 54 y.o. female who presents for for follow-up evaluation of bilateral arthritis of the CMC joint of both thumbs. She was last seen on 9/10/2024 at which time she received cortisone injections for treatment of osteoarthritis. The patient experienced significant relief for approximately 1 month, the pain returned after that and continues to worsen. She reports pain with repeat activities and gripping. She states that the right is worse. She reports grinding and  a feeling of subluxation.      The following portions of the patient's history were reviewed and updated as appropriate:  Past medical history, past surgical history, Family history, social history, current medications and allergies    Past Medical History:   Diagnosis Date    Abnormal ECG     Allergic 1969    Anemia 1982    Anxiety     Arthritis     Asthma     Bronchiectasis (HCC)     Callus     Cancer (HCC)      renal,cervical    Cataract     Chronic bronchitis (HCC)     Chronic kidney disease     COPD (chronic obstructive pulmonary disease) (HCC)     Depression     Diabetes mellitus (HCC)     Difficulty walking     Diverticulitis     Diverticulitis of colon     GERD (gastroesophageal reflux disease)     Heart murmur     Hyperlipidemia associated with type 2 diabetes mellitus  (HCC)     Hypertension     Inflammatory bowel disease     Insulinoma     Liver disease     Lung disease     Memory loss     Traumatic brain injury treated    Miscarriage     Myocardial infarction ([facility])     Non-recurrent acute suppurative otitis media of left ear without spontaneous rupture of tympanic membrane 2019    Obesity 1998    Peptic ulceration     Plantar fasciitis 04/15/2022    Pulmonary emboli (HCC)     Rheumatoid arthritis ([facility])     Sleep apnea     Sleep apnea, obstructive     Stroke ([facility])     Visual impairment        Past Surgical History:   Procedure Laterality Date    ABDOMINAL SURGERY  1990    Pancreatectomy (partial)     SECTION      twice    CHOLECYSTECTOMY      EYE SURGERY      KNEE ARTHROSCOPY Left     KNEE SURGERY      PANCREATECTOMY      Partial     OK COLONOSCOPY FLX DX W/COLLJ SPEC WHEN PFRMD N/A 2019    Procedure: COLONOSCOPY;  Surgeon: Sharon Tejada DO;  Location: [facility];  Service: Gastroenterology    OK LAPAROSCOPY SURG CHOLECYSTECTOMY N/A 2018    Procedure: CHOLECYSTECTOMY LAPAROSCOPIC;  Surgeon: Demar Tinsley DO;  Location: [facility];  Service: General    TRACHEOSTOMY  1981    TUBAL LIGATION  1998       Family History   Problem Relation Age of Onset    Heart disease Mother     Hyperlipidemia Mother     Obesity Mother     Hypertension Mother     Other Mother         Bundle branch block    Tuberculosis Mother     Arthritis Mother     Diabetes Mother     Osteoporosis Mother     Ulcerative colitis Mother     Asthma Mother     COPD  Mother     Coronary artery disease Mother     Vision loss Mother         Cataracts    Substance Abuse Father     Cancer Father     Alcohol abuse Father     Mental illness Father     Psychiatric Illness Father     Psychosis Father     Schizoaffective Disorder  Father     Schizophrenia Father     Self-Injury Father     Suicide Attempts Father     No Known Problems Sister     Anxiety disorder Sister     No Known Problems Daughter     Other Maternal Grandmother         ascending aortic aneurysm resection    Diabetes Maternal Grandmother     Diabetes Paternal Grandmother     No Known Problems Brother     Tuberculosis Maternal Aunt     No Known Problems Paternal Aunt     No Known Problems Paternal Aunt     No Known Problems Paternal Aunt     No Known Problems Paternal Aunt     No Known Problems Paternal Aunt     Breast cancer Other 88    Substance Abuse Family     Osteoporosis Family     No Known Problems Half-Sister        Social History     Socioeconomic History    Marital status:      Spouse name: None    Number of children: None    Years of education: None    Highest education level: None   Occupational History    Occupation: UNEMPLOYED   Tobacco Use    Smoking status: Former     Current packs/day: 0.00     Average packs/day: 1 pack/day for 30.1 years (30.1 ttl pk-yrs)     Types: Cigarettes     Start date: 1982     Quit date: 2012     Years since quittin.4     Passive exposure: Never    Smokeless tobacco: Never    Tobacco comments:     Dont need at all   Vaping Use    Vaping status: Never Used   Substance and Sexual Activity    Alcohol use: Not Currently    Drug use: No    Sexual activity: Not Currently     Partners: Male     Birth control/protection: Abstinence   Other Topics Concern    None   Social History Narrative    Single-    Unemployed    Lives at home with mother     Caffeine use     Social Drivers of Health     Financial Resource Strain: Not on file   Food Insecurity: Not on file    Transportation Needs: Not on file   Physical Activity: Not on file   Stress: Not on file   Social Connections: Not on file   Intimate Partner Violence: Not on file   Housing Stability: Not on file         Current Outpatient Medications:     ARIPiprazole (ABILIFY) 10 mg tablet, Take 10 mg by mouth daily, Disp: , Rfl:     atorvastatin (LIPITOR) 40 mg tablet, take 1 tablet by mouth once daily, Disp: 90 tablet, Rfl: 1    budesonide-formoterol (Symbicort) 80-4.5 MCG/ACT inhaler, Inhale 2 puffs 2 (two) times a day Rinse mouth after use., Disp: 10.2 g, Rfl: 5    ciclopirox (PENLAC) 8 % solution, Apply topically daily at bedtime, Disp: 6.6 mL, Rfl: 3    cyclobenzaprine (FLEXERIL) 10 mg tablet, take 1 tablet by mouth three times a day if needed for muscle spasm, Disp: 30 tablet, Rfl: 0    diclofenac (VOLTAREN) 75 mg EC tablet, Take 1 tablet (75 mg total) by mouth 2 (two) times a day, Disp: 60 tablet, Rfl: 2    fluticasone (FLONASE) 50 mcg/act nasal spray, 1 spray into each nostril daily, Disp: 18.2 mL, Rfl: 5    gabapentin (NEURONTIN) 300 mg capsule, Take 1 capsule (300 mg total) by mouth 3 (three) times a day, Disp: 60 capsule, Rfl: 8    glimepiride (AMARYL) 2 mg tablet, take 1 tablet by mouth once daily with breakfast, Disp: 90 tablet, Rfl: 1    Insulin Pen Needle (B-D UF III MINI PEN NEEDLES) 31G X 5 MM MISC, Use once daily as directed., Disp: 100 each, Rfl: 1    Invokamet 150-1000 MG TABS, TAKE 1 TABLET BY MOUTH TWICE A DAY, Disp: 180 tablet, Rfl: 1    liraglutide (Victoza) injection, INJECT 1.2 MILLIGRAM SUBCUTANEOUSLY DAILY, Disp: 3 mL, Rfl: 5    lisinopril (ZESTRIL) 2.5 mg tablet, take 1 tablet by mouth once daily, Disp: 90 tablet, Rfl: 1    loratadine (CLARITIN) 10 mg tablet, take 1 tablet by mouth once daily, Disp: 90 tablet, Rfl: 1    omeprazole (PriLOSEC) 40 MG capsule, swallow 1 capsule once daily, Disp: 90 capsule, Rfl: 1    venlafaxine (EFFEXOR-XR) 150 mg 24 hr capsule, Take 150 mg by mouth daily  , Disp: ,  Rfl:     EPINEPHrine (EPIPEN) 0.3 mg/0.3 mL SOAJ, Inject 0.3 mL (0.3 mg total) into a muscle once for 1 dose, Disp: 0.3 mL, Rfl: 0    Allergies   Allergen Reactions    Iodinated Contrast Media Anaphylaxis    Iodine - Food Allergy Anaphylaxis     IVP DYE     Nuts - Food Allergy Anaphylaxis     Annotation - [id number]: Pine nuts       Review of Systems   Constitutional:  Negative for chills and fever.   HENT:  Negative for ear pain and sore throat.    Eyes:  Negative for pain and visual disturbance.   Respiratory:  Negative for cough and shortness of breath.    Cardiovascular:  Negative for chest pain and palpitations.   Gastrointestinal:  Negative for abdominal pain and vomiting.   Genitourinary:  Negative for dysuria and hematuria.   Musculoskeletal:  Negative for arthralgias and back pain.   Skin:  Negative for color change and rash.   Neurological:  Negative for seizures and syncope.   All other systems reviewed and are negative.       Objective:  /67 (BP Location: Left arm, Patient Position: Sitting, Cuff Size: Large)   Pulse 99   Ht 5' (1.524 m)   Wt 96.2 kg (212 lb) Comment: reported  LMP 05/20/2019   BMI 41.40 kg/m²     Ortho Exam  bilateral wrist and thumb -  Patient presents withno obvious anatomical deformity  Skin is warm and dry to touch with no signs of erythema, ecchymosis, infection  ROM limited due to stiffness  TTP over MCP of thumb  Mild generalized soft tissue swelling or effusion noted  Full FDS, FDP, extensor mechanisms are intact  + Thenar atrophy, - intrinsic atrophy  Demonstrates normal wrist, elbow, and shoulder motion  Forearm compartments are soft and supple  2+ Distal radial pulse with brisk capillary refill to the fingers  Radial, median, ulnar motor and sensory distribution intact  Sensation to light touch intact distally      Physical Exam  Vitals and nursing note reviewed.   Constitutional:       General: She is not in acute distress.     Appearance: She is well-developed.    HENT:      Head: Normocephalic and atraumatic.   Eyes:      Conjunctiva/sclera: Conjunctivae normal.   Cardiovascular:      Rate and Rhythm: Normal rate and regular rhythm.      Heart sounds: No murmur heard.  Pulmonary:      Effort: Pulmonary effort is normal. No respiratory distress.      Breath sounds: Normal breath sounds.   Abdominal:      Palpations: Abdomen is soft.      Tenderness: There is no abdominal tenderness.   Musculoskeletal:         General: No swelling.      Cervical back: Neck supple.   Skin:     General: Skin is warm and dry.      Capillary Refill: Capillary refill takes less than 2 seconds.   Neurological:      Mental Status: She is alert.   Psychiatric:         Mood and Affect: Mood normal.          Diagnostic Test Review:  No new images to review    Small joint arthrocentesis: bilateral thumb CMC  [place] Protocol:  procedure performed by consultantConsent: Verbal consent obtained.  Risks and benefits: risks, benefits and alternatives were discussed  Consent given by: patient  Timeout called at: 12/10/2024 1:20 PM.  Patient understanding: patient states understanding of the procedure being performed  Patient consent: the patient's understanding of the procedure matches consent given  Site marked: the operative site was marked  Radiology Images displayed and confirmed. If images not available, report reviewed: imaging studies available  Patient identity confirmed: verbally with patient  Supporting Documentation  Indications: pain   Procedure Details  Location: thumb - bilateral thumb CMC  Needle size: 25 G  Ultrasound guidance: no  Approach: radial    Medications (Right): 0.5 mL bupivacaine 0.25 %; 3 mg betamethasone acetate-betamethasone sodium phosphate 6 (3-3) mg/mLMedications (Left): 0.5 mL bupivacaine 0.25 %; 3 mg betamethasone acetate-betamethasone sodium phosphate 6 (3-3) mg/mL   Patient tolerance: patient tolerated the procedure well with no immediate complications  Dressing:   Sterile dressing applied           Scribe Attestation      I,:  Myra Magana am acting as a scribe while in the presence of the attending physician.:       I,:  Benja Sanches DO personally performed the services described in this documentation    as scribed in my presence.:

## 2024-12-15 DIAGNOSIS — S16.1XXA STRAIN OF NECK MUSCLE, INITIAL ENCOUNTER: ICD-10-CM

## 2024-12-16 RX ORDER — CYCLOBENZAPRINE HCL 10 MG
10 TABLET ORAL 3 TIMES DAILY PRN
Qty: 90 TABLET | Refills: 1 | Status: SHIPPED | OUTPATIENT
Start: 2024-12-16

## 2025-01-28 ENCOUNTER — OFFICE VISIT (OUTPATIENT)
Dept: PULMONOLOGY | Facility: CLINIC | Age: 55
End: 2025-01-28
Payer: COMMERCIAL

## 2025-01-28 ENCOUNTER — TELEPHONE (OUTPATIENT)
Dept: PULMONOLOGY | Facility: CLINIC | Age: 55
End: 2025-01-28

## 2025-01-28 VITALS
WEIGHT: 213 LBS | HEIGHT: 60 IN | SYSTOLIC BLOOD PRESSURE: 100 MMHG | BODY MASS INDEX: 41.82 KG/M2 | TEMPERATURE: 98.7 F | RESPIRATION RATE: 18 BRPM | DIASTOLIC BLOOD PRESSURE: 60 MMHG | HEART RATE: 110 BPM | OXYGEN SATURATION: 98 %

## 2025-01-28 DIAGNOSIS — J45.30 MILD PERSISTENT ASTHMA WITHOUT COMPLICATION: ICD-10-CM

## 2025-01-28 DIAGNOSIS — G47.33 OSA (OBSTRUCTIVE SLEEP APNEA): Primary | ICD-10-CM

## 2025-01-28 DIAGNOSIS — F17.211 CIGARETTE NICOTINE DEPENDENCE IN REMISSION: ICD-10-CM

## 2025-01-28 PROCEDURE — 99213 OFFICE O/P EST LOW 20 MIN: CPT

## 2025-01-28 NOTE — ASSESSMENT & PLAN NOTE
-Called [facility], unable to download compliance.  States machine needs to be brought to their office to link.  Reviewed this with patient.  -Patient reports attending mask fitting appointment in November, was recommended different style mask, unsure which kind, however did not receive.  Has been wearing her old nasal mask, but has had issues with mask leakage and elevated residual AHI.  -Still experiencing daytime sleepiness with [place] sleepiness score 15 today.  Also notes sleepiness after taking Abilify, which may be contributing  -I have ordered patient a new fullface mask.  Patient will take machine to [facility] to download compliance.  If mask leakage improves, but still with elevated AHI and daytime sleepiness, may need repeat titration study      Orders:    PAP DME Resupply/Reorder

## 2025-01-28 NOTE — ASSESSMENT & PLAN NOTE
-Approximately 30-pack-year history quit 2012  -Due for yearly CT lung cancer screening in August.  Can order at next office visit.

## 2025-01-28 NOTE — ASSESSMENT & PLAN NOTE
-Remains well-controlled.  Has only used low-dose Symbicort once in the past 3 months.  Lungs are clear on exam today.  -Continue low-dose Symbicort PRN

## 2025-01-28 NOTE — PROGRESS NOTES
Name: Katie Dye      : 1970      MRN: [id number]  Encounter Provider: NOHEMI Ferguson  Encounter Date: 2025   Encounter department: [facility]  :  Assessment & Plan  ANSHUL (obstructive sleep apnea)  -Called [facility], unable to download compliance.  States machine needs to be brought to their office to link.  Reviewed this with patient.  -Patient reports attending mask fitting appointment in November, was recommended different style mask, unsure which kind, however did not receive.  Has been wearing her old nasal mask, but has had issues with mask leakage and elevated residual AHI.  -Still experiencing daytime sleepiness with [place] sleepiness score 15 today.  Also notes sleepiness after taking Abilify, which may be contributing  -I have ordered patient a new fullface mask.  Patient will take machine to [facility] to download compliance.  If mask leakage improves, but still with elevated AHI and daytime sleepiness, may need repeat titration study      Orders:    PAP DME Resupply/Reorder    Mild persistent asthma without complication  -Remains well-controlled.  Has only used low-dose Symbicort once in the past 3 months.  Lungs are clear on exam today.  -Continue low-dose Symbicort PRN       Cigarette nicotine dependence in remission  -Approximately 30-pack-year history quit   -Due for yearly CT lung cancer screening in August.  Can order at next office visit.           History of Present Illness   Katie Dye is a 54 y.o. female with a history of asthma, ANSHUL on CPAP, DM 2, PLMD, obesity, PE in 2017, reflux, and depression who is here today for follow-up visit to review CPAP compliance.  At her last office visit 3 months ago, she was still experiencing large mask leakage and slightly elevated residual AHI.  She was sent for mask fitting and encouraged to continue nightly use.  Maintained on low dose Symbicort for asthma with stable symptoms.    Patient states  she went to her mask fitting appointment sometime in November.  States she was recommended to trial different style mask, but never provided with one.  She is unsure of the name or style they recommended.   She has continued to wear her regular nasal mask.  Still experiencing daytime sleepiness.  [place] Sleepiness Scale score is 15 today.  Patient also notes drowsiness after taking Abilify, which may be contributing.  As far as her asthma, remains well controlled.  States she only had to use her Symbicort once since her last office visit.      Review of Systems   Constitutional: Negative.    Respiratory:  Negative for cough, shortness of breath and wheezing.    Cardiovascular:  Negative for chest pain, palpitations and leg swelling.          Historical Information   Tobacco History: 30 PYH quit 2012  Occupational History:     Objective   LMP 05/20/2019      Physical Exam  Vitals and nursing note reviewed.   Constitutional:       General: She is not in acute distress.     Appearance: Normal appearance. She is well-developed.   Cardiovascular:      Rate and Rhythm: Normal rate and regular rhythm.      Heart sounds: Normal heart sounds, S1 normal and S2 normal. No murmur heard.  Pulmonary:      Effort: Pulmonary effort is normal.      Breath sounds: Normal breath sounds. No decreased breath sounds, wheezing, rhonchi or rales.   Musculoskeletal:         General: No swelling.      Right lower leg: No edema.      Left lower leg: No edema.   Neurological:      Mental Status: She is alert.   Psychiatric:         Mood and Affect: Mood and affect normal.         Behavior: Behavior normal. Behavior is cooperative.         Lab Results: I have reviewed pertinent labs.    Radiology Results Review : No pertinent imaging studies reviewed.  Other Study Results:   PFT Results Reviewed: reviewed

## 2025-01-30 DIAGNOSIS — M54.13 RADICULOPATHY OF CERVICOTHORACIC REGION: ICD-10-CM

## 2025-01-30 DIAGNOSIS — G89.29 CHRONIC RIGHT SHOULDER PAIN: ICD-10-CM

## 2025-01-30 DIAGNOSIS — M25.511 CHRONIC RIGHT SHOULDER PAIN: ICD-10-CM

## 2025-01-30 DIAGNOSIS — M54.2 NECK PAIN: ICD-10-CM

## 2025-01-30 RX ORDER — GABAPENTIN 300 MG/1
300 CAPSULE ORAL 3 TIMES DAILY
Qty: 60 CAPSULE | Refills: 8 | Status: SHIPPED | OUTPATIENT
Start: 2025-01-30

## 2025-02-01 DIAGNOSIS — S16.1XXA STRAIN OF NECK MUSCLE, INITIAL ENCOUNTER: ICD-10-CM

## 2025-02-03 RX ORDER — DICLOFENAC SODIUM 75 MG/1
75 TABLET, DELAYED RELEASE ORAL 2 TIMES DAILY
Qty: 60 TABLET | Refills: 2 | Status: SHIPPED | OUTPATIENT
Start: 2025-02-03

## 2025-02-09 DIAGNOSIS — E11.65 TYPE 2 DIABETES MELLITUS WITH HYPERGLYCEMIA, WITHOUT LONG-TERM CURRENT USE OF INSULIN (HCC): ICD-10-CM

## 2025-02-10 RX ORDER — FLURBIPROFEN SODIUM 0.3 MG/ML
SOLUTION/ DROPS OPHTHALMIC
Qty: 100 EACH | Refills: 1 | Status: SHIPPED | OUTPATIENT
Start: 2025-02-10

## (undated) DEVICE — FORCEPS BIOPSY ALLIGATOR JAW W/NEEDLE 2.8MM

## (undated) DEVICE — 2000CC GUARDIAN II: Brand: GUARDIAN

## (undated) DEVICE — TUBING SMOKE EVAC W/FILTRATION DEVICE PLUMEPORT ACTIV

## (undated) DEVICE — BUTTON SWITCH PENCIL HOLSTER: Brand: VALLEYLAB

## (undated) DEVICE — GAUZE SPONGES,8 PLY: Brand: CURITY

## (undated) DEVICE — SUT MONOCRYL 4-0 PS-2 27 IN Y426H

## (undated) DEVICE — LUBRICANT SURGILUBE TUBE 4 OZ  FLIP TOP

## (undated) DEVICE — UTILITY MARKER,BLACK WITH LABELS: Brand: DEVON

## (undated) DEVICE — LIGHT GLOVE GREEN

## (undated) DEVICE — ENDOPOUCH RETRIEVER SPECIMEN RETRIEVAL BAGS: Brand: ENDOPOUCH RETRIEVER

## (undated) DEVICE — CONMED SCOPE SAVER BITE BLOCK, 20X27 MM: Brand: SCOPE SAVER

## (undated) DEVICE — STRL COTTON TIP APPLCTR 6IN PK: Brand: CARDINAL HEALTH

## (undated) DEVICE — 1820 FOAM BLOCK NEEDLE COUNTER: Brand: DEVON

## (undated) DEVICE — DRAIN JACKSON PRATT 15FR: Brand: CARDINAL HEALTH

## (undated) DEVICE — ENDOPATH 5MM CURVED SCISSORS WITH MONOPOLAR CAUTERY: Brand: ENDOPATH

## (undated) DEVICE — INTENDED FOR TISSUE SEPARATION, AND OTHER PROCEDURES THAT REQUIRE A SHARP SURGICAL BLADE TO PUNCTURE OR CUT.: Brand: BARD-PARKER SAFETY BLADES SIZE 15, STERILE

## (undated) DEVICE — GLOVE INDICATOR PI UNDERGLOVE SZ 7 BLUE

## (undated) DEVICE — ELECTRODE LAP L WIRE E-Z CLEAN 33CM -0100

## (undated) DEVICE — SUT VICRYL 0 UR-6 27 IN J603H

## (undated) DEVICE — 3M™ STERI-STRIP™ REINFORCED ADHESIVE SKIN CLOSURES, R1541, 1/4 IN X 3 IN (6 MM X 75 MM), 3 STRIPS/ENVELOPE: Brand: 3M™ STERI-STRIP™

## (undated) DEVICE — 5 MM CURVED DISSECTORS WITH MONOPOLAR CAUTERY: Brand: ENDOPATH

## (undated) DEVICE — NEEDLE 25G X 1 1/2

## (undated) DEVICE — GLOVE SRG BIOGEL ECLIPSE 7

## (undated) DEVICE — SCD SEQUENTIAL COMPRESSION COMFORT SLEEVE MEDIUM KNEE LENGTH: Brand: KENDALL SCD

## (undated) DEVICE — 3M™ TEGADERM™ TRANSPARENT FILM DRESSING FRAME STYLE, 1624W, 2-3/8 IN X 2-3/4 IN (6 CM X 7 CM), 100/CT 4CT/CASE: Brand: 3M™ TEGADERM™

## (undated) DEVICE — TUBING SUCTION 5MM X 12 FT

## (undated) DEVICE — PLUMEPEN PRO 10FT

## (undated) DEVICE — LIGAMAX 5 MM ENDOSCOPIC MULTIPLE CLIP APPLIER: Brand: LIGAMAX

## (undated) DEVICE — ENDOPATH XCEL UNIVERSAL TROCAR STABLILITY SLEEVES: Brand: ENDOPATH XCEL

## (undated) DEVICE — 3M™ STERI-STRIP™ REINFORCED ADHESIVE SKIN CLOSURES, R1546, 1/4 IN X 4 IN (6 MM X 100 MM), 10 STRIPS/ENVELOPE: Brand: 3M™ STERI-STRIP™

## (undated) DEVICE — IRRIG ENDO FLO TUBING

## (undated) DEVICE — TROCARS: Brand: KII® BALLOON BLUNT TIP SYSTEM

## (undated) DEVICE — GENERAL ENDOSCOPY PACK: Brand: CONVERTORS

## (undated) DEVICE — ENDOPATH XCEL BLADELESS TROCARS WITH STABILITY SLEEVES: Brand: ENDOPATH XCEL

## (undated) DEVICE — PDS II VLT 0 107CM AG ST3: Brand: ENDOLOOP

## (undated) DEVICE — REM POLYHESIVE ADULT PATIENT RETURN ELECTRODE: Brand: VALLEYLAB

## (undated) DEVICE — ANTI-FOG SOLUTION WITH FOAM PAD: Brand: DEVON

## (undated) DEVICE — CHLORAPREP HI-LITE 26ML ORANGE

## (undated) DEVICE — ENDOPATH 5 MM GRASPERS WITH RATCHET HANDLES: Brand: ENDOPATH

## (undated) DEVICE — GAUZE,SPONGE,2"X2",8PLY,STERILE,LF,2'S: Brand: MEDLINE

## (undated) DEVICE — SYRINGE 10ML LL

## (undated) DEVICE — [HIGH FLOW INSUFFLATOR,  DO NOT USE IF PACKAGE IS DAMAGED,  KEEP DRY,  KEEP AWAY FROM SUNLIGHT,  PROTECT FROM HEAT AND RADIOACTIVE SOURCES.]: Brand: PNEUMOSURE

## (undated) DEVICE — SPONGE LAP 18 X 18 IN

## (undated) DEVICE — TRANSPOSAL ULTRAFLEX DUO/QUAD ULTRA CART MANIFOLD